# Patient Record
Sex: FEMALE | Race: BLACK OR AFRICAN AMERICAN | Employment: UNEMPLOYED | RURAL
[De-identification: names, ages, dates, MRNs, and addresses within clinical notes are randomized per-mention and may not be internally consistent; named-entity substitution may affect disease eponyms.]

---

## 2017-01-03 RX ORDER — ASPIRIN 81 MG/1
TABLET ORAL
Qty: 60 TAB | Refills: 3 | Status: SHIPPED | OUTPATIENT
Start: 2017-01-03 | End: 2017-05-16 | Stop reason: SDUPTHER

## 2017-02-02 ENCOUNTER — TELEPHONE (OUTPATIENT)
Dept: FAMILY MEDICINE CLINIC | Age: 62
End: 2017-02-02

## 2017-02-02 NOTE — TELEPHONE ENCOUNTER
Spoke with [facility] pharmacist who states the Labetalol was written on 7/20/16 when she was seen at the hospital by Dr. Renelle Denver.

## 2017-02-09 ENCOUNTER — OFFICE VISIT (OUTPATIENT)
Dept: FAMILY MEDICINE CLINIC | Age: 62
End: 2017-02-09

## 2017-02-09 VITALS
RESPIRATION RATE: 16 BRPM | HEART RATE: 80 BPM | DIASTOLIC BLOOD PRESSURE: 60 MMHG | BODY MASS INDEX: 29.45 KG/M2 | OXYGEN SATURATION: 100 % | SYSTOLIC BLOOD PRESSURE: 140 MMHG | TEMPERATURE: 97.9 F | WEIGHT: 156 LBS | HEIGHT: 61 IN

## 2017-02-09 DIAGNOSIS — R09.81 NASAL CONGESTION: ICD-10-CM

## 2017-02-09 DIAGNOSIS — E11.8 TYPE 2 DIABETES MELLITUS WITH COMPLICATION, WITHOUT LONG-TERM CURRENT USE OF INSULIN (HCC): ICD-10-CM

## 2017-02-09 DIAGNOSIS — H53.9 VISION DISTURBANCE: ICD-10-CM

## 2017-02-09 DIAGNOSIS — M54.50 BILATERAL LOW BACK PAIN WITHOUT SCIATICA, UNSPECIFIED CHRONICITY: Primary | ICD-10-CM

## 2017-02-09 DIAGNOSIS — M79.642 PAIN IN BOTH HANDS: ICD-10-CM

## 2017-02-09 DIAGNOSIS — M79.641 PAIN IN BOTH HANDS: ICD-10-CM

## 2017-02-09 LAB
ALBUMIN UR QL STRIP: 150 MG/L
CREATININE, URINE POC: 100 MG/DL
MICROALBUMIN/CREAT RATIO POC: 300 MG/G

## 2017-02-09 NOTE — MR AVS SNAPSHOT
Visit Information Date & Time Provider Department Dept. Phone Encounter #  
 2/9/2017 10:30 AM Matt Mobley Patrick [phone] [id number] Follow-up Instructions Return if symptoms worsen or fail to improve. Upcoming Health Maintenance Date Due Hepatitis C Screening 1955 FOOT EXAM Q1 10/23/1966 MICROALBUMIN Q1 10/23/1966 EYE EXAM RETINAL OR DILATED Q1 10/23/1966 Pneumococcal 19-64 Highest Risk (1 of 3 - PCV13) 10/23/1975 DTaP/Tdap/Td series (1 - Tdap) 10/23/1977 PAP AKA CERVICAL CYTOLOGY 10/23/1977 BREAST CANCER SCRN MAMMOGRAM 10/23/2006 FOBT Q 1 YEAR AGE 50-75 10/23/2006 ZOSTER VACCINE AGE 60> 10/23/2016 HEMOGLOBIN A1C Q6M 5/14/2017 LIPID PANEL Q1 11/14/2017 Allergies as of 2/9/2017  Review Complete On: 2/9/2017 By: Annette Lema MD  
 No Known Allergies Current Immunizations  Reviewed on 9/19/2016 Name Date Influenza Vaccine (Quad) PF 9/19/2016  2:34 PM  
  
 Not reviewed this visit You Were Diagnosed With   
  
 Codes Comments Bilateral low back pain without sciatica, unspecified chronicity    -  Primary ICD-10-CM: M54.5 ICD-9-CM: 724.2 Pain in both hands     ICD-10-CM: M79.641, U28.913 ICD-9-CM: 729.5 Type 2 diabetes mellitus with complication, without long-term current use of insulin (HCC)     ICD-10-CM: E11.8 ICD-9-CM: 250.90 Vision disturbance     ICD-10-CM: H53.9 ICD-9-CM: 368.9 Nasal congestion     ICD-10-CM: R09.81 ICD-9-CM: 478.19 Vitals BP Pulse Temp Resp Height(growth percentile) Weight(growth percentile) 140/60 (BP 1 Location: Left arm, BP Patient Position: Sitting) 80 97.9 °F (36.6 °C) (Oral) 16 5' 1\" (1.549 m) 156 lb (70.8 kg) SpO2 BMI OB Status Smoking Status 100% 29.48 kg/m2 Postmenopausal Current Some Day Smoker Vitals History BMI and BSA Data  Body Mass Index Body Surface Area  
 29.48 kg/m 2 1.75 m 2  
  
  
 Preferred Pharmacy Pharmacy Name Phone Kevin Willams, [address] [facility] [phone] Your Updated Medication List  
  
   
This list is accurate as of: 2/9/17 11:33 AM.  Always use your most recent med list. amLODIPine 10 mg tablet Commonly known as:  Christine Colon Take 10 mg by mouth daily. aspirin delayed-release 81 mg tablet  
take 2 tablets by mouth once daily  
  
 atorvastatin 10 mg tablet Commonly known as:  LIPITOR  
take 1 tablet by mouth once daily  
  
 glipiZIDE 5 mg tablet Commonly known as:  GLUCOTROL  
take 1 tablet by mouth once daily  
  
 labetalol 200 mg tablet Commonly known as:  Lorayne Parker Take 1 Tab by mouth every eight (8) hours. losartan-hydroCHLOROthiazide 100-25 mg per tablet Commonly known as:  HYZAAR Take 1 Tab by mouth daily. One Touch Delica 33 gauge Misc Generic drug:  lancets ONETOUCH VERIO strip Generic drug:  glucose blood VI test strips We Performed the Following AMB POC URINE, MICROALBUMIN, SEMIQUANT (3 RESULTS) [01652 CPT(R)] REFERRAL TO OPHTHALMOLOGY [REF57 Custom] Follow-up Instructions Return if symptoms worsen or fail to improve. To-Do List   
 02/09/2017 Imaging:  XR HAND LT MIN 3 V   
  
 02/09/2017 Imaging:  XR HAND RT MIN 3 V   
  
 02/09/2017 Imaging:  XR SPINE LUMB 2 OR 3 V Referral Information Referral ID Referred By Referred To  
  
 [id number] [place], [facility] [address] Phone: [phone] Fax: [phone] Visits Status Start Date End Date 1 New Request 2/9/17 2/9/18 If your referral has a status of pending review or denied, additional information will be sent to support the outcome of this decision. Patient Instructions Learning About Relief for Back Pain What is back tension and strain? Back strain happens when you overstretch, or pull, a muscle in your back. You may hurt your back in an accident or when you exercise or lift something. Most back pain will get better with rest and time. You can take care of yourself at home to help your back heal. 
What can you do first to relieve back pain? When you first feel back pain, try these steps: 
· Walk. Take a short walk (10 to 20 minutes) on a level surface (no slopes, hills, or stairs) every 2 to 3 hours. Walk only distances you can manage without pain, especially leg pain. · Relax. Find a comfortable position for rest. Some people are comfortable on the floor or a medium-firm bed with a small pillow under their head and another under their knees. Some people prefer to lie on their side with a pillow between their knees. Don't stay in one position for too long. · Try heat or ice. Try using a heating pad on a low or medium setting, or take a warm shower, for 15 to 20 minutes every 2 to 3 hours. Or you can buy single-use heat wraps that last up to 8 hours. You can also try an ice pack for 10 to 15 minutes every 2 to 3 hours. You can use an ice pack or a bag of frozen vegetables wrapped in a thin towel. There is not strong evidence that either heat or ice will help, but you can try them to see if they help. You may also want to try switching between heat and cold. · Take pain medicine exactly as directed. ¨ If the doctor gave you a prescription medicine for pain, take it as prescribed. ¨ If you are not taking a prescription pain medicine, ask your doctor if you can take an over-the-counter medicine. What else can you do? · Stretch and exercise. Exercises that increase flexibility may relieve your pain and make it easier for your muscles to keep your spine in a good, neutral position. And don't forget to keep walking. · Do self-massage.  You can use self-massage to unwind after work or school or to energize yourself in the morning. You can easily massage your feet, hands, or neck. Self-massage works best if you are in comfortable clothes and are sitting or lying in a comfortable position. Use oil or lotion to massage bare skin. · Reduce stress. Back pain can lead to a vicious [place]: Distress about the pain tenses the muscles in your back, which in turn causes more pain. Learn how to relax your mind and your muscles to lower your stress. Where can you learn more? Go to http://milo-vania.info/. Enter H542 in the search box to learn more about \"Learning About Relief for Back Pain. \" Current as of: May 23, 2016 Content Version: 11.1 © 4349-9237 [employer]UMOback, OnePage[employer]M. Care instructions adapted under license by [employer] (which disclaims liability or warranty for this information). If you have questions about a medical condition or this instruction, always ask your healthcare professional. [place] [address] any warranty or liability for your use of this information. Introducing [facility]! Christopher Pabon introduces DoutÃ­ssima patient portal. Now you can access parts of your medical record, email your doctor's office, and request medication refills online. 1. In your internet browser, go to [email or web address] 2. Click on the First Time User? Click Here link in the Sign In box. You will see the New Member Sign Up page. 3. Enter your DoutÃ­ssima Access Code exactly as it appears below. You will not need to use this code after youve completed the sign-up process. If you do not sign up before the expiration date, you must request a new code. · DoutÃ­ssima Access Code: [email or web address] Expires: 2/12/2017  9:24 AM 
 
4. Enter the last four digits of your Social Security Number (xxxx) and Date of Birth (mm/dd/yyyy) as indicated and click Submit. You will be taken to the next sign-up page. 5. Create a Nordicplan ID. This will be your Nordicplan login ID and cannot be changed, so think of one that is secure and easy to remember. 6. Create a Nordicplan password. You can change your password at any time. 7. Enter your Password Reset Question and Answer. This can be used at a later time if you forget your password. 8. Enter your e-mail address. You will receive e-mail notification when new information is available in [address] 9. Click Sign Up. You can now view and download portions of your medical record. 10. Click the Download Summary menu link to download a portable copy of your medical information. If you have questions, please visit the Frequently Asked Questions section of the Nordicplan website. Remember, Nordicplan is NOT to be used for urgent needs. For medical emergencies, dial 911. Now available from your iPhone and Android! Please provide this summary of care documentation to your next provider. Your primary care clinician is listed as Dane Suazo. If you have any questions after today's visit, please call [phone].

## 2017-02-09 NOTE — PROGRESS NOTES
Subjective:      Yue Acevedo is a 62 y.o. female here for the following:     Sinus congestion: reports nasal congestion mainly at night. Has been going on for \"quite a while\". Has pain behind the eyes. Associated with postnasal drainage and left ear pressure. No fever or chills. Reports that she has taken a nasal spray that was given at the hospital without much relief. Reports that smelling Vicks vapor rub. Back Pain: Symptoms have been present for several weeks and include pain in lower back (aching, \"tired feeling\" in character; intermittent). Initial inciting event: fall at home a few months ago. Alleviating factors identifiable by patient are none. Exacerbating factors identifiable by patient are prolonged standing, bending forwards, bending backwards, bending sideways. Treatments so far initiated by patient: none Previous lower back problems: none. Previous workup: none. Previous treatments: none. Denies paresthesias, weakness, bowel or bladder incontinence. Ankle and pedal edema: worse on the left > right. No swelling in the morning. Notices when she is sitting. Will prop the foot up which will help with the swelling. Current Outpatient Prescriptions   Medication Sig Dispense Refill    labetalol (NORMODYNE) 200 mg tablet Take 1 Tab by mouth every eight (8) hours. 90 Tab 5    aspirin delayed-release 81 mg tablet take 2 tablets by mouth once daily 60 Tab 3    amLODIPine (NORVASC) 10 mg tablet Take 10 mg by mouth daily. 0    glipiZIDE (GLUCOTROL) 5 mg tablet take 1 tablet by mouth once daily 30 Tab 2    atorvastatin (LIPITOR) 10 mg tablet take 1 tablet by mouth once daily 30 Tab 2    ONETOUCH VERIO strip   1    ONE TOUCH DELICA 33 gauge misc   1    losartan-hydrochlorothiazide (HYZAAR) 100-25 mg per tablet Take 1 Tab by mouth daily.   0       No Known Allergies      Past Medical History   Diagnosis Date    Anemia     Arthritis     Calculus of kidney     Cong[facility] heart failure ([facility]     Diabetes ([facility]        Social History   Substance Use Topics    Smoking status: Current Some Day Smoker    Smokeless tobacco: Never Used    Alcohol use No        Review of Systems  Pertinent items are noted in HPI. Objective:     Visit Vitals    /60 (BP 1 Location: Left arm, BP Patient Position: Sitting)    Pulse 80    Temp 97.9 °F (36.6 °C) (Oral)    Resp 16    Ht 5' 1\" (1.549 m)    Wt 156 lb (70.8 kg)    SpO2 100%    BMI 29.48 kg/m2      General appearance - alert, well appearing, and in no distress  Eyes - pupils equal and reactive, extraocular eye movements intact, sclera anicteric  Oropharyngx - mucous membranes moist, pharynx normal without lesions  Nasal - mucosal congestion, mucosal erythema and clear rhinorrhea. Chest - clear to auscultation, no wheezes, rales or rhonchi, symmetric air entry, no tachypnea, retractions or cyanosis  Heart - normal rate, regular rhythm, normal S1, S2, no murmurs, rubs, clicks or gallops  Hands - normal radial pulse, normal capillary refilling and circulation, tenderness of both hands   Back - pain with motion noted during exam, tenderness noted lumbar spine, negative straight-leg raise bilaterally     Assessment/Plan:   Benita Roth is a 62 y.o. female seen for:     1. Bilateral low back pain without sciatica, unspecified chronicity: no alarming history or examination findings. Consider arthritis. Check XR.   - XR SPINE LUMB 2 OR 3 V; Future    2. Pain in both hands: prolonged with arthritic deformities on exam. Check hand XRs. - XR HAND LT MIN 3 V; Future  - XR HAND RT MIN 3 V; Future    3. Type 2 diabetes mellitus with complication, without long-term current use of insulin ([facility])  - AMB POC URINE, MICROALBUMIN, SEMIQUANT (3 RESULTS)    4. Vision disturbance  - REFERRAL TO OPHTHALMOLOGY    5. Nasal congestion: saline nasal spray as needed for congestion.      I have discussed the diagnosis with the patient and the intended plan as seen in the above orders. The patient has received an after-visit summary and questions were answered concerning future plans. I have discussed medication side effects and warnings with the patient as well. Patient verbalizes understanding of plan of care and denies further questions or concerns at this time. Informed patient to return to the office if symptoms worsen or if new symptoms arise. Follow-up Disposition:  Return if symptoms worsen or fail to improve.

## 2017-02-09 NOTE — PATIENT INSTRUCTIONS
Learning About Relief for Back Pain  What is back tension and strain? Back strain happens when you overstretch, or pull, a muscle in your back. You may hurt your back in an accident or when you exercise or lift something. Most back pain will get better with rest and time. You can take care of yourself at home to help your back heal.  What can you do first to relieve back pain? When you first feel back pain, try these steps:  · Walk. Take a short walk (10 to 20 minutes) on a level surface (no slopes, hills, or stairs) every 2 to 3 hours. Walk only distances you can manage without pain, especially leg pain. · Relax. Find a comfortable position for rest. Some people are comfortable on the floor or a medium-firm bed with a small pillow under their head and another under their knees. Some people prefer to lie on their side with a pillow between their knees. Don't stay in one position for too long. · Try heat or ice. Try using a heating pad on a low or medium setting, or take a warm shower, for 15 to 20 minutes every 2 to 3 hours. Or you can buy single-use heat wraps that last up to 8 hours. You can also try an ice pack for 10 to 15 minutes every 2 to 3 hours. You can use an ice pack or a bag of frozen vegetables wrapped in a thin towel. There is not strong evidence that either heat or ice will help, but you can try them to see if they help. You may also want to try switching between heat and cold. · Take pain medicine exactly as directed. ¨ If the doctor gave you a prescription medicine for pain, take it as prescribed. ¨ If you are not taking a prescription pain medicine, ask your doctor if you can take an over-the-counter medicine. What else can you do? · Stretch and exercise. Exercises that increase flexibility may relieve your pain and make it easier for your muscles to keep your spine in a good, neutral position. And don't forget to keep walking. · Do self-massage.  You can use self-massage to unwind after work or school or to energize yourself in the morning. You can easily massage your feet, hands, or neck. Self-massage works best if you are in comfortable clothes and are sitting or lying in a comfortable position. Use oil or lotion to massage bare skin. · Reduce stress. Back pain can lead to a vicious [place]: Distress about the pain tenses the muscles in your back, which in turn causes more pain. Learn how to relax your mind and your muscles to lower your stress. Where can you learn more? Go to http://milo-vania.info/. Enter P304 in the search box to learn more about \"Learning About Relief for Back Pain. \"  Current as of: May 23, 2016  Content Version: 11.1  © 7344-4581 [employer]. Care instructions adapted under license by [employer] (which disclaims liability or warranty for this information). If you have questions about a medical condition or this instruction, always ask your healthcare professional. [place] [address] any warranty or liability for your use of this information.

## 2017-02-09 NOTE — PROGRESS NOTES
Chief Complaint   Patient presents with    LOW BACK PAIN     low back hurts off and on with lifting and bending    Foot Swelling     both ankles swell at times. L > R     \"REVIEWED RECORD IN PREPARATION FOR VISIT AND HAVE OBTAINED THE NECESSARY DOCUMENTATION\"  1. Have you been to the ER, urgent care clinic since your last visit? Hospitalized since your last visit? No    2. Have you seen or consulted any other health care providers outside of the [address] [place] [address] since your last visit? Include any pap smears or colon screening. No  Patient does not have advanced directives.

## 2017-02-13 ENCOUNTER — HOSPITAL ENCOUNTER (OUTPATIENT)
Dept: GENERAL RADIOLOGY | Age: 62
Discharge: HOME | End: 2017-02-13
Payer: COMMERCIAL

## 2017-02-13 DIAGNOSIS — M79.641 PAIN IN BOTH HANDS: ICD-10-CM

## 2017-02-13 DIAGNOSIS — M54.50 BILATERAL LOW BACK PAIN WITHOUT SCIATICA, UNSPECIFIED CHRONICITY: ICD-10-CM

## 2017-02-13 DIAGNOSIS — M79.642 PAIN IN BOTH HANDS: ICD-10-CM

## 2017-02-13 PROCEDURE — 73130 X-RAY EXAM OF HAND: CPT

## 2017-02-13 PROCEDURE — 72100 X-RAY EXAM L-S SPINE 2/3 VWS: CPT

## 2017-03-09 ENCOUNTER — APPOINTMENT (OUTPATIENT)
Dept: CT IMAGING | Age: 62
DRG: 378 | End: 2017-03-09
Attending: PHYSICIAN ASSISTANT
Payer: COMMERCIAL

## 2017-03-09 ENCOUNTER — HOSPITAL ENCOUNTER (INPATIENT)
Age: 62
LOS: 4 days | Discharge: HOME | DRG: 378 | End: 2017-03-13
Attending: EMERGENCY MEDICINE | Admitting: FAMILY MEDICINE
Payer: COMMERCIAL

## 2017-03-09 ENCOUNTER — APPOINTMENT (OUTPATIENT)
Dept: GENERAL RADIOLOGY | Age: 62
DRG: 378 | End: 2017-03-09
Attending: PHYSICIAN ASSISTANT
Payer: COMMERCIAL

## 2017-03-09 DIAGNOSIS — N17.9 ACUTE RENAL FAILURE, UNSPECIFIED ACUTE RENAL FAILURE TYPE (HCC): ICD-10-CM

## 2017-03-09 DIAGNOSIS — D64.9 ANEMIA, UNSPECIFIED TYPE: ICD-10-CM

## 2017-03-09 DIAGNOSIS — K92.2 GASTROINTESTINAL HEMORRHAGE, UNSPECIFIED GASTROINTESTINAL HEMORRHAGE TYPE: Primary | ICD-10-CM

## 2017-03-09 PROBLEM — K80.20 CALCULUS OF GALLBLADDER WITHOUT CHOLECYSTITIS: Status: ACTIVE | Noted: 2017-03-09

## 2017-03-09 LAB
ALBUMIN SERPL BCP-MCNC: 3.4 G/DL (ref 3.5–5)
ALBUMIN/GLOB SERPL: 0.8 {RATIO} (ref 1.1–2.2)
ALP SERPL-CCNC: 185 U/L (ref 45–117)
ALT SERPL-CCNC: 98 U/L (ref 12–78)
ANION GAP BLD CALC-SCNC: 8 MMOL/L (ref 5–15)
APPEARANCE UR: CLEAR
AST SERPL W P-5'-P-CCNC: 41 U/L (ref 15–37)
BACTERIA URNS QL MICRO: NEGATIVE /HPF
BASOPHILS # BLD AUTO: 0 K/UL (ref 0–0.1)
BASOPHILS # BLD: 0 % (ref 0–1)
BILIRUB SERPL-MCNC: 0.3 MG/DL (ref 0.2–1)
BILIRUB UR QL: NEGATIVE
BUN SERPL-MCNC: 55 MG/DL (ref 6–20)
BUN/CREAT SERPL: 20 (ref 12–20)
CALCIUM SERPL-MCNC: 8.5 MG/DL (ref 8.5–10.1)
CHLORIDE SERPL-SCNC: 107 MMOL/L (ref 97–108)
CO2 SERPL-SCNC: 24 MMOL/L (ref 21–32)
COLOR UR: ABNORMAL
CREAT SERPL-MCNC: 2.76 MG/DL (ref 0.55–1.02)
DIFFERENTIAL METHOD BLD: ABNORMAL
EOSINOPHIL # BLD: 0.2 K/UL (ref 0–0.4)
EOSINOPHIL NFR BLD: 3 % (ref 0–7)
EPITH CASTS URNS QL MICRO: ABNORMAL /LPF
ERYTHROCYTE [DISTWIDTH] IN BLOOD BY AUTOMATED COUNT: 13 % (ref 11.5–14.5)
GLOBULIN SER CALC-MCNC: 4.3 G/DL (ref 2–4)
GLUCOSE BLD STRIP.AUTO-MCNC: 87 MG/DL (ref 65–100)
GLUCOSE SERPL-MCNC: 86 MG/DL (ref 65–100)
GLUCOSE UR STRIP.AUTO-MCNC: NEGATIVE MG/DL
HCT VFR BLD AUTO: 23.5 % (ref 35–47)
HEMOCCULT STL QL: POSITIVE
HGB BLD-MCNC: 7.2 G/DL (ref 11.5–16)
HGB UR QL STRIP: ABNORMAL
KETONES UR QL STRIP.AUTO: NEGATIVE MG/DL
LACTATE SERPL-SCNC: 1 MMOL/L (ref 0.4–2)
LEUKOCYTE ESTERASE UR QL STRIP.AUTO: ABNORMAL
LIPASE SERPL-CCNC: 273 U/L (ref 73–393)
LYMPHOCYTES # BLD AUTO: 19 % (ref 12–49)
LYMPHOCYTES # BLD: 0.9 K/UL (ref 0.8–3.5)
MAGNESIUM SERPL-MCNC: 1.9 MG/DL (ref 1.6–2.4)
MCH RBC QN AUTO: 27.2 PG (ref 26–34)
MCHC RBC AUTO-ENTMCNC: 30.6 G/DL (ref 30–36.5)
MCV RBC AUTO: 88.7 FL (ref 80–99)
MONOCYTES # BLD: 0.6 K/UL (ref 0–1)
MONOCYTES NFR BLD AUTO: 13 % (ref 5–13)
NEUTS SEG # BLD: 3.1 K/UL (ref 1.8–8)
NEUTS SEG NFR BLD AUTO: 65 % (ref 32–75)
NITRITE UR QL STRIP.AUTO: NEGATIVE
PH UR STRIP: 6 [PH] (ref 5–8)
PLATELET # BLD AUTO: 159 K/UL (ref 150–400)
POTASSIUM SERPL-SCNC: 4.7 MMOL/L (ref 3.5–5.1)
PROT SERPL-MCNC: 7.7 G/DL (ref 6.4–8.2)
PROT UR STRIP-MCNC: 100 MG/DL
RBC # BLD AUTO: 2.65 M/UL (ref 3.8–5.2)
RBC #/AREA URNS HPF: ABNORMAL /HPF (ref 0–5)
SERVICE CMNT-IMP: NORMAL
SODIUM SERPL-SCNC: 139 MMOL/L (ref 136–145)
SP GR UR REFRACTOMETRY: 1.02 (ref 1–1.03)
TROPONIN I SERPL-MCNC: 0.06 NG/ML
UA: UC IF INDICATED,UAUC: ABNORMAL
UROBILINOGEN UR QL STRIP.AUTO: 0.2 EU/DL (ref 0.2–1)
WBC # BLD AUTO: 4.9 K/UL (ref 3.6–11)
WBC URNS QL MICRO: ABNORMAL /HPF (ref 0–4)

## 2017-03-09 PROCEDURE — 74011250637 HC RX REV CODE- 250/637: Performed by: PHYSICIAN ASSISTANT

## 2017-03-09 PROCEDURE — 82270 OCCULT BLOOD FECES: CPT

## 2017-03-09 PROCEDURE — 82962 GLUCOSE BLOOD TEST: CPT

## 2017-03-09 PROCEDURE — 87086 URINE CULTURE/COLONY COUNT: CPT | Performed by: EMERGENCY MEDICINE

## 2017-03-09 PROCEDURE — 74011250636 HC RX REV CODE- 250/636: Performed by: FAMILY MEDICINE

## 2017-03-09 PROCEDURE — 99285 EMERGENCY DEPT VISIT HI MDM: CPT

## 2017-03-09 PROCEDURE — 84484 ASSAY OF TROPONIN QUANT: CPT | Performed by: PHYSICIAN ASSISTANT

## 2017-03-09 PROCEDURE — 74022 RADEX COMPL AQT ABD SERIES: CPT

## 2017-03-09 PROCEDURE — 83605 ASSAY OF LACTIC ACID: CPT | Performed by: PHYSICIAN ASSISTANT

## 2017-03-09 PROCEDURE — 74176 CT ABD & PELVIS W/O CONTRAST: CPT

## 2017-03-09 PROCEDURE — 81001 URINALYSIS AUTO W/SCOPE: CPT | Performed by: EMERGENCY MEDICINE

## 2017-03-09 PROCEDURE — 93005 ELECTROCARDIOGRAM TRACING: CPT

## 2017-03-09 PROCEDURE — 65270000029 HC RM PRIVATE

## 2017-03-09 PROCEDURE — 36415 COLL VENOUS BLD VENIPUNCTURE: CPT | Performed by: PHYSICIAN ASSISTANT

## 2017-03-09 PROCEDURE — 74011000250 HC RX REV CODE- 250: Performed by: PHYSICIAN ASSISTANT

## 2017-03-09 PROCEDURE — 74011000250 HC RX REV CODE- 250: Performed by: FAMILY MEDICINE

## 2017-03-09 PROCEDURE — 83735 ASSAY OF MAGNESIUM: CPT | Performed by: PHYSICIAN ASSISTANT

## 2017-03-09 PROCEDURE — 80053 COMPREHEN METABOLIC PANEL: CPT | Performed by: PHYSICIAN ASSISTANT

## 2017-03-09 PROCEDURE — 96360 HYDRATION IV INFUSION INIT: CPT

## 2017-03-09 PROCEDURE — C9113 INJ PANTOPRAZOLE SODIUM, VIA: HCPCS | Performed by: FAMILY MEDICINE

## 2017-03-09 PROCEDURE — 74011250636 HC RX REV CODE- 250/636: Performed by: PHYSICIAN ASSISTANT

## 2017-03-09 PROCEDURE — 83690 ASSAY OF LIPASE: CPT | Performed by: PHYSICIAN ASSISTANT

## 2017-03-09 PROCEDURE — 85025 COMPLETE CBC W/AUTO DIFF WBC: CPT | Performed by: PHYSICIAN ASSISTANT

## 2017-03-09 RX ORDER — SODIUM CHLORIDE 9 MG/ML
250 INJECTION, SOLUTION INTRAVENOUS AS NEEDED
Status: DISCONTINUED | OUTPATIENT
Start: 2017-03-09 | End: 2017-03-13 | Stop reason: HOSPADM

## 2017-03-09 RX ADMIN — SODIUM CHLORIDE 1000 ML: 900 INJECTION, SOLUTION INTRAVENOUS at 20:30

## 2017-03-09 RX ADMIN — SODIUM CHLORIDE 40 MG: 9 INJECTION INTRAMUSCULAR; INTRAVENOUS; SUBCUTANEOUS at 22:54

## 2017-03-09 RX ADMIN — LIDOCAINE HYDROCHLORIDE 40 ML: 20 SOLUTION ORAL; TOPICAL at 19:15

## 2017-03-09 NOTE — IP AVS SNAPSHOT
Current Discharge Medication List  
  
Take these medications at their scheduled times Dose & Instructions Dispensing Information Comments Morning Noon Evening Bedtime  
 amLODIPine 10 mg tablet Commonly known as:  Wilder Reeder Your next dose is: Today, Tomorrow Comments:  __________ Dose:  10 mg Take 10 mg by mouth daily. Refills:  0  
     
   
   
   
  
 amoxicillin-clavulanate 500-125 mg per tablet Commonly known as:  AUGMENTIN Your next dose is: Today, Tomorrow Comments:  __________ Dose:  1 Tab Take 1 Tab by mouth every twelve (12) hours for 14 days. Quantity:  28 Tab Refills:  0  
     
   
   
   
  
 atorvastatin 10 mg tablet Commonly known as:  LIPITOR Your next dose is: Today, Tomorrow Comments:  __________ Dose:  10 mg Take 1 Tab by mouth nightly. Quantity:  30 Tab Refills:  5  
     
   
   
   
  
 dorzolamide-timolol 22.3-6.8 mg/mL ophthalmic solution Commonly known as:  COSOPT Your next dose is: Today, Tomorrow Comments:  __________ Dose:  1 Drop Administer 1 Drop to both eyes two (2) times a day. Refills:  0  
     
   
   
   
  
 labetalol 200 mg tablet Commonly known as:  Littie Ates Your next dose is: Today, Tomorrow Comments:  __________ Dose:  200 mg Take 200 mg by mouth two (2) times a day. Refills:  0  
     
   
   
   
  
 losartan 100 mg tablet Commonly known as:  COZAAR Your next dose is: Today, Tomorrow Comments:  __________ Dose:  100 mg Take 1 Tab by mouth daily. Quantity:  30 Tab Refills:  0 Take these medications as needed Dose & Instructions Dispensing Information Comments Morning Noon Evening Bedtime TYLENOL PM PO Your next dose is: Today, Tomorrow Comments:  __________ Dose:  2 Tab Take 2 Tabs by mouth nightly as needed (sleep). Refills:  0 Take these medications as directed Dose & Instructions Dispensing Information Comments Morning Noon Evening Bedtime  
 aspirin delayed-release 81 mg tablet Your next dose is: Today, Tomorrow Comments:  __________  
   
   
 take 2 tablets by mouth once daily Quantity:  60 Tab Refills:  3  
     
   
   
   
  
 glipiZIDE 5 mg tablet Commonly known as:  Olesommer Agudelo Your next dose is: Today, Tomorrow Comments:  __________  
   
   
 take 1 tablet by mouth once daily Quantity:  30 Tab Refills:  5 Where to Get Your Medications Information about where to get these medications is not yet available ! Ask your nurse or doctor about these medications  
  amoxicillin-clavulanate 500-125 mg per tablet  
 losartan 100 mg tablet

## 2017-03-09 NOTE — ED TRIAGE NOTES
I am here for this terrible pain I have in my left side. Started 2 -3 weeks ago and getting worse now. Denies nausea, vomiting, or diarrhea.

## 2017-03-10 ENCOUNTER — ANESTHESIA (OUTPATIENT)
Dept: ENDOSCOPY | Age: 62
DRG: 378 | End: 2017-03-10
Payer: COMMERCIAL

## 2017-03-10 ENCOUNTER — ANESTHESIA EVENT (OUTPATIENT)
Dept: ENDOSCOPY | Age: 62
DRG: 378 | End: 2017-03-10
Payer: COMMERCIAL

## 2017-03-10 LAB
ALBUMIN SERPL BCP-MCNC: 2.9 G/DL (ref 3.5–5)
ALBUMIN/GLOB SERPL: 0.8 {RATIO} (ref 1.1–2.2)
ALP SERPL-CCNC: 154 U/L (ref 45–117)
ALT SERPL-CCNC: 87 U/L (ref 12–78)
ANION GAP BLD CALC-SCNC: 7 MMOL/L (ref 5–15)
AST SERPL W P-5'-P-CCNC: 33 U/L (ref 15–37)
ATRIAL RATE: 78 BPM
ATRIAL RATE: 85 BPM
BASOPHILS # BLD AUTO: 0.1 K/UL (ref 0–0.1)
BASOPHILS # BLD: 1 % (ref 0–1)
BILIRUB SERPL-MCNC: 0.3 MG/DL (ref 0.2–1)
BUN SERPL-MCNC: 47 MG/DL (ref 6–20)
BUN/CREAT SERPL: 20 (ref 12–20)
CALCIUM SERPL-MCNC: 7.9 MG/DL (ref 8.5–10.1)
CALCULATED P AXIS, ECG09: 58 DEGREES
CALCULATED P AXIS, ECG09: 75 DEGREES
CALCULATED R AXIS, ECG10: 34 DEGREES
CALCULATED R AXIS, ECG10: 72 DEGREES
CALCULATED T AXIS, ECG11: 106 DEGREES
CALCULATED T AXIS, ECG11: 113 DEGREES
CHLORIDE SERPL-SCNC: 112 MMOL/L (ref 97–108)
CHOLEST SERPL-MCNC: 125 MG/DL
CO2 SERPL-SCNC: 22 MMOL/L (ref 21–32)
CREAT SERPL-MCNC: 2.38 MG/DL (ref 0.55–1.02)
DIAGNOSIS, 93000: NORMAL
DIAGNOSIS, 93000: NORMAL
DIFFERENTIAL METHOD BLD: ABNORMAL
EOSINOPHIL # BLD: 0.1 K/UL (ref 0–0.4)
EOSINOPHIL NFR BLD: 2 % (ref 0–7)
ERYTHROCYTE [DISTWIDTH] IN BLOOD BY AUTOMATED COUNT: 13.7 % (ref 11.5–14.5)
EST. AVERAGE GLUCOSE BLD GHB EST-MCNC: ABNORMAL MG/DL
FERRITIN SERPL-MCNC: 388 NG/ML (ref 8–252)
FOLATE SERPL-MCNC: 12 NG/ML (ref 5–21)
GLOBULIN SER CALC-MCNC: 3.7 G/DL (ref 2–4)
GLUCOSE BLD STRIP.AUTO-MCNC: 100 MG/DL (ref 65–100)
GLUCOSE BLD STRIP.AUTO-MCNC: 110 MG/DL (ref 65–100)
GLUCOSE BLD STRIP.AUTO-MCNC: 159 MG/DL (ref 65–100)
GLUCOSE SERPL-MCNC: 83 MG/DL (ref 65–100)
HAPTOGLOB SERPL-MCNC: 127 MG/DL (ref 30–200)
HBA1C MFR BLD: <3.5 % (ref 4.2–6.3)
HCT VFR BLD AUTO: 21.8 % (ref 35–47)
HCT VFR BLD AUTO: 22.8 % (ref 35–47)
HCT VFR BLD AUTO: 26 % (ref 35–47)
HCT VFR BLD AUTO: 26.2 % (ref 35–47)
HDLC SERPL-MCNC: 71 MG/DL
HDLC SERPL: 1.8 {RATIO} (ref 0–5)
HGB BLD-MCNC: 6.7 G/DL (ref 11.5–16)
HGB BLD-MCNC: 7 G/DL (ref 11.5–16)
HGB BLD-MCNC: 8.1 G/DL (ref 11.5–16)
HGB BLD-MCNC: 8.4 G/DL (ref 11.5–16)
INR PPP: 1 (ref 0.9–1.1)
IRON SATN MFR SERPL: 22 % (ref 20–50)
IRON SERPL-MCNC: 45 UG/DL (ref 35–150)
LDH SERPL L TO P-CCNC: 206 U/L (ref 81–246)
LDLC SERPL CALC-MCNC: 48.6 MG/DL (ref 0–100)
LIPID PROFILE,FLP: NORMAL
LYMPHOCYTES # BLD AUTO: 24 % (ref 12–49)
LYMPHOCYTES # BLD: 1.3 K/UL (ref 0.8–3.5)
MCH RBC QN AUTO: 26.6 PG (ref 26–34)
MCHC RBC AUTO-ENTMCNC: 30.7 G/DL (ref 30–36.5)
MCV RBC AUTO: 86.5 FL (ref 80–99)
MONOCYTES # BLD: 0.6 K/UL (ref 0–1)
MONOCYTES NFR BLD AUTO: 12 % (ref 5–13)
NEUTS SEG # BLD: 3.2 K/UL (ref 1.8–8)
NEUTS SEG NFR BLD AUTO: 61 % (ref 32–75)
P-R INTERVAL, ECG05: 138 MS
P-R INTERVAL, ECG05: 154 MS
PERIPHERAL SMEAR,PSM: NORMAL
PLATELET # BLD AUTO: 161 K/UL (ref 150–400)
POTASSIUM SERPL-SCNC: 4.3 MMOL/L (ref 3.5–5.1)
PROT SERPL-MCNC: 6.6 G/DL (ref 6.4–8.2)
PROTHROMBIN TIME: 10.5 SEC (ref 9–11.1)
Q-T INTERVAL, ECG07: 360 MS
Q-T INTERVAL, ECG07: 370 MS
QRS DURATION, ECG06: 72 MS
QRS DURATION, ECG06: 78 MS
QTC CALCULATION (BEZET), ECG08: 421 MS
QTC CALCULATION (BEZET), ECG08: 428 MS
RBC # BLD AUTO: 2.52 M/UL (ref 3.8–5.2)
RBC MORPH BLD: ABNORMAL
RETICS/RBC NFR AUTO: 2.3 % (ref 0.7–2.1)
SERVICE CMNT-IMP: ABNORMAL
SERVICE CMNT-IMP: ABNORMAL
SERVICE CMNT-IMP: NORMAL
SODIUM SERPL-SCNC: 141 MMOL/L (ref 136–145)
TIBC SERPL-MCNC: 206 UG/DL (ref 250–450)
TRIGL SERPL-MCNC: 27 MG/DL (ref ?–150)
TROPONIN I SERPL-MCNC: <0.04 NG/ML
VENTRICULAR RATE, ECG03: 78 BPM
VENTRICULAR RATE, ECG03: 85 BPM
VIT B12 SERPL-MCNC: 536 PG/ML (ref 211–911)
VLDLC SERPL CALC-MCNC: 5.4 MG/DL
WBC # BLD AUTO: 5.3 K/UL (ref 3.6–11)

## 2017-03-10 PROCEDURE — 74011250636 HC RX REV CODE- 250/636: Performed by: PHYSICIAN ASSISTANT

## 2017-03-10 PROCEDURE — 82746 ASSAY OF FOLIC ACID SERUM: CPT

## 2017-03-10 PROCEDURE — 74011250637 HC RX REV CODE- 250/637: Performed by: FAMILY MEDICINE

## 2017-03-10 PROCEDURE — 74011250636 HC RX REV CODE- 250/636

## 2017-03-10 PROCEDURE — 85045 AUTOMATED RETICULOCYTE COUNT: CPT

## 2017-03-10 PROCEDURE — 85610 PROTHROMBIN TIME: CPT | Performed by: FAMILY MEDICINE

## 2017-03-10 PROCEDURE — 80061 LIPID PANEL: CPT

## 2017-03-10 PROCEDURE — 85025 COMPLETE CBC W/AUTO DIFF WBC: CPT

## 2017-03-10 PROCEDURE — 83540 ASSAY OF IRON: CPT

## 2017-03-10 PROCEDURE — 0DJ08ZZ INSPECTION OF UPPER INTESTINAL TRACT, VIA NATURAL OR ARTIFICIAL OPENING ENDOSCOPIC: ICD-10-PCS | Performed by: INTERNAL MEDICINE

## 2017-03-10 PROCEDURE — 76040000019: Performed by: INTERNAL MEDICINE

## 2017-03-10 PROCEDURE — 83615 LACTATE (LD) (LDH) ENZYME: CPT

## 2017-03-10 PROCEDURE — 36415 COLL VENOUS BLD VENIPUNCTURE: CPT

## 2017-03-10 PROCEDURE — 84484 ASSAY OF TROPONIN QUANT: CPT

## 2017-03-10 PROCEDURE — 99218 HC RM OBSERVATION: CPT

## 2017-03-10 PROCEDURE — 74011250636 HC RX REV CODE- 250/636: Performed by: FAMILY MEDICINE

## 2017-03-10 PROCEDURE — 80053 COMPREHEN METABOLIC PANEL: CPT

## 2017-03-10 PROCEDURE — 74011000250 HC RX REV CODE- 250: Performed by: FAMILY MEDICINE

## 2017-03-10 PROCEDURE — 76060000031 HC ANESTHESIA FIRST 0.5 HR: Performed by: INTERNAL MEDICINE

## 2017-03-10 PROCEDURE — 83036 HEMOGLOBIN GLYCOSYLATED A1C: CPT

## 2017-03-10 PROCEDURE — 83010 ASSAY OF HAPTOGLOBIN QUANT: CPT

## 2017-03-10 PROCEDURE — 82962 GLUCOSE BLOOD TEST: CPT

## 2017-03-10 PROCEDURE — P9016 RBC LEUKOCYTES REDUCED: HCPCS | Performed by: PHYSICIAN ASSISTANT

## 2017-03-10 PROCEDURE — 86900 BLOOD TYPING SEROLOGIC ABO: CPT | Performed by: PHYSICIAN ASSISTANT

## 2017-03-10 PROCEDURE — 93005 ELECTROCARDIOGRAM TRACING: CPT

## 2017-03-10 PROCEDURE — 36430 TRANSFUSION BLD/BLD COMPNT: CPT

## 2017-03-10 PROCEDURE — 86920 COMPATIBILITY TEST SPIN: CPT | Performed by: PHYSICIAN ASSISTANT

## 2017-03-10 PROCEDURE — 82607 VITAMIN B-12: CPT

## 2017-03-10 PROCEDURE — C9113 INJ PANTOPRAZOLE SODIUM, VIA: HCPCS | Performed by: FAMILY MEDICINE

## 2017-03-10 PROCEDURE — 85018 HEMOGLOBIN: CPT

## 2017-03-10 PROCEDURE — 30233N0 TRANSFUSION OF AUTOLOGOUS RED BLOOD CELLS INTO PERIPHERAL VEIN, PERCUTANEOUS APPROACH: ICD-10-PCS | Performed by: FAMILY MEDICINE

## 2017-03-10 PROCEDURE — 74011000250 HC RX REV CODE- 250

## 2017-03-10 PROCEDURE — 82728 ASSAY OF FERRITIN: CPT

## 2017-03-10 RX ORDER — SODIUM CHLORIDE 9 MG/ML
250 INJECTION, SOLUTION INTRAVENOUS AS NEEDED
Status: DISCONTINUED | OUTPATIENT
Start: 2017-03-10 | End: 2017-03-13 | Stop reason: HOSPADM

## 2017-03-10 RX ORDER — EPINEPHRINE 0.1 MG/ML
1 INJECTION INTRACARDIAC; INTRAVENOUS
Status: DISCONTINUED | OUTPATIENT
Start: 2017-03-10 | End: 2017-03-10 | Stop reason: HOSPADM

## 2017-03-10 RX ORDER — MIDAZOLAM HYDROCHLORIDE 1 MG/ML
.25-5 INJECTION, SOLUTION INTRAMUSCULAR; INTRAVENOUS
Status: DISCONTINUED | OUTPATIENT
Start: 2017-03-10 | End: 2017-03-10 | Stop reason: HOSPADM

## 2017-03-10 RX ORDER — SODIUM CHLORIDE 0.9 % (FLUSH) 0.9 %
5-10 SYRINGE (ML) INJECTION AS NEEDED
Status: DISCONTINUED | OUTPATIENT
Start: 2017-03-10 | End: 2017-03-13 | Stop reason: HOSPADM

## 2017-03-10 RX ORDER — DORZOLAMIDE HYDROCHLORIDE AND TIMOLOL MALEATE 20; 5 MG/ML; MG/ML
1 SOLUTION/ DROPS OPHTHALMIC 2 TIMES DAILY
COMMUNITY
End: 2019-01-03

## 2017-03-10 RX ORDER — INSULIN LISPRO 100 [IU]/ML
INJECTION, SOLUTION INTRAVENOUS; SUBCUTANEOUS
Status: DISCONTINUED | OUTPATIENT
Start: 2017-03-10 | End: 2017-03-13 | Stop reason: HOSPADM

## 2017-03-10 RX ORDER — PROPOFOL 10 MG/ML
INJECTION, EMULSION INTRAVENOUS AS NEEDED
Status: DISCONTINUED | OUTPATIENT
Start: 2017-03-10 | End: 2017-03-10 | Stop reason: HOSPADM

## 2017-03-10 RX ORDER — SUCCINYLCHOLINE CHLORIDE 20 MG/ML
INJECTION INTRAMUSCULAR; INTRAVENOUS AS NEEDED
Status: DISCONTINUED | OUTPATIENT
Start: 2017-03-10 | End: 2017-03-10 | Stop reason: HOSPADM

## 2017-03-10 RX ORDER — FENTANYL CITRATE 50 UG/ML
100 INJECTION, SOLUTION INTRAMUSCULAR; INTRAVENOUS ONCE
Status: DISCONTINUED | OUTPATIENT
Start: 2017-03-10 | End: 2017-03-10 | Stop reason: HOSPADM

## 2017-03-10 RX ORDER — POLYETHYLENE GLYCOL 3350 17 G/17G
17 POWDER, FOR SOLUTION ORAL DAILY
Status: DISCONTINUED | OUTPATIENT
Start: 2017-03-10 | End: 2017-03-13 | Stop reason: HOSPADM

## 2017-03-10 RX ORDER — LABETALOL 200 MG/1
200 TABLET, FILM COATED ORAL EVERY 8 HOURS
COMMUNITY
End: 2017-09-16 | Stop reason: SDUPTHER

## 2017-03-10 RX ORDER — DEXTROMETHORPHAN/PSEUDOEPHED 2.5-7.5/.8
1.2 DROPS ORAL
Status: DISCONTINUED | OUTPATIENT
Start: 2017-03-10 | End: 2017-03-10 | Stop reason: HOSPADM

## 2017-03-10 RX ORDER — SODIUM CHLORIDE, SODIUM LACTATE, POTASSIUM CHLORIDE, CALCIUM CHLORIDE 600; 310; 30; 20 MG/100ML; MG/100ML; MG/100ML; MG/100ML
INJECTION, SOLUTION INTRAVENOUS
Status: DISCONTINUED | OUTPATIENT
Start: 2017-03-10 | End: 2017-03-10 | Stop reason: HOSPADM

## 2017-03-10 RX ORDER — LIDOCAINE HYDROCHLORIDE 20 MG/ML
INJECTION, SOLUTION EPIDURAL; INFILTRATION; INTRACAUDAL; PERINEURAL AS NEEDED
Status: DISCONTINUED | OUTPATIENT
Start: 2017-03-10 | End: 2017-03-10 | Stop reason: HOSPADM

## 2017-03-10 RX ORDER — ONDANSETRON 2 MG/ML
4 INJECTION INTRAMUSCULAR; INTRAVENOUS
Status: COMPLETED | OUTPATIENT
Start: 2017-03-10 | End: 2017-03-10

## 2017-03-10 RX ORDER — AMLODIPINE BESYLATE 5 MG/1
10 TABLET ORAL DAILY
Status: DISCONTINUED | OUTPATIENT
Start: 2017-03-10 | End: 2017-03-13 | Stop reason: HOSPADM

## 2017-03-10 RX ORDER — MAGNESIUM SULFATE 100 %
4 CRYSTALS MISCELLANEOUS AS NEEDED
Status: DISCONTINUED | OUTPATIENT
Start: 2017-03-10 | End: 2017-03-13 | Stop reason: HOSPADM

## 2017-03-10 RX ORDER — FLUMAZENIL 0.1 MG/ML
0.2 INJECTION INTRAVENOUS
Status: DISCONTINUED | OUTPATIENT
Start: 2017-03-10 | End: 2017-03-10 | Stop reason: HOSPADM

## 2017-03-10 RX ORDER — SODIUM CHLORIDE 0.9 % (FLUSH) 0.9 %
5-10 SYRINGE (ML) INJECTION EVERY 8 HOURS
Status: DISCONTINUED | OUTPATIENT
Start: 2017-03-10 | End: 2017-03-13 | Stop reason: HOSPADM

## 2017-03-10 RX ORDER — HYDRALAZINE HYDROCHLORIDE 20 MG/ML
10 INJECTION INTRAMUSCULAR; INTRAVENOUS
Status: DISCONTINUED | OUTPATIENT
Start: 2017-03-10 | End: 2017-03-13 | Stop reason: HOSPADM

## 2017-03-10 RX ORDER — DEXTROSE 50 % IN WATER (D50W) INTRAVENOUS SYRINGE
12.5-25 AS NEEDED
Status: DISCONTINUED | OUTPATIENT
Start: 2017-03-10 | End: 2017-03-13 | Stop reason: HOSPADM

## 2017-03-10 RX ORDER — LABETALOL 200 MG/1
200 TABLET, FILM COATED ORAL 2 TIMES DAILY
Status: DISCONTINUED | OUTPATIENT
Start: 2017-03-10 | End: 2017-03-12

## 2017-03-10 RX ORDER — FENTANYL CITRATE 50 UG/ML
INJECTION, SOLUTION INTRAMUSCULAR; INTRAVENOUS AS NEEDED
Status: DISCONTINUED | OUTPATIENT
Start: 2017-03-10 | End: 2017-03-10 | Stop reason: HOSPADM

## 2017-03-10 RX ORDER — ATORVASTATIN CALCIUM 10 MG/1
10 TABLET, FILM COATED ORAL
Status: DISCONTINUED | OUTPATIENT
Start: 2017-03-10 | End: 2017-03-13 | Stop reason: HOSPADM

## 2017-03-10 RX ORDER — ATROPINE SULFATE 0.1 MG/ML
0.5 INJECTION INTRAVENOUS
Status: DISCONTINUED | OUTPATIENT
Start: 2017-03-10 | End: 2017-03-10 | Stop reason: HOSPADM

## 2017-03-10 RX ORDER — NALOXONE HYDROCHLORIDE 0.4 MG/ML
0.4 INJECTION, SOLUTION INTRAMUSCULAR; INTRAVENOUS; SUBCUTANEOUS
Status: DISCONTINUED | OUTPATIENT
Start: 2017-03-10 | End: 2017-03-10 | Stop reason: HOSPADM

## 2017-03-10 RX ORDER — SODIUM CHLORIDE 9 MG/ML
50 INJECTION, SOLUTION INTRAVENOUS CONTINUOUS
Status: DISPENSED | OUTPATIENT
Start: 2017-03-10 | End: 2017-03-10

## 2017-03-10 RX ORDER — ROCURONIUM BROMIDE 10 MG/ML
INJECTION, SOLUTION INTRAVENOUS AS NEEDED
Status: DISCONTINUED | OUTPATIENT
Start: 2017-03-10 | End: 2017-03-10 | Stop reason: HOSPADM

## 2017-03-10 RX ORDER — ATORVASTATIN CALCIUM 20 MG/1
10 TABLET, FILM COATED ORAL
Status: DISCONTINUED | OUTPATIENT
Start: 2017-03-10 | End: 2017-03-10

## 2017-03-10 RX ADMIN — ROCURONIUM BROMIDE 10 MG: 10 INJECTION, SOLUTION INTRAVENOUS at 12:36

## 2017-03-10 RX ADMIN — PROPOFOL 150 MG: 10 INJECTION, EMULSION INTRAVENOUS at 12:36

## 2017-03-10 RX ADMIN — ATORVASTATIN CALCIUM 10 MG: 10 TABLET, FILM COATED ORAL at 21:29

## 2017-03-10 RX ADMIN — INSULIN LISPRO 2 UNITS: 100 INJECTION, SOLUTION INTRAVENOUS; SUBCUTANEOUS at 21:30

## 2017-03-10 RX ADMIN — SODIUM CHLORIDE 40 MG: 9 INJECTION INTRAMUSCULAR; INTRAVENOUS; SUBCUTANEOUS at 09:33

## 2017-03-10 RX ADMIN — ONDANSETRON 4 MG: 2 INJECTION INTRAMUSCULAR; INTRAVENOUS at 14:14

## 2017-03-10 RX ADMIN — AMLODIPINE BESYLATE 10 MG: 5 TABLET ORAL at 09:31

## 2017-03-10 RX ADMIN — LABETALOL HCL 200 MG: 200 TABLET, FILM COATED ORAL at 09:31

## 2017-03-10 RX ADMIN — INSULIN LISPRO 2 UNITS: 100 INJECTION, SOLUTION INTRAVENOUS; SUBCUTANEOUS at 17:33

## 2017-03-10 RX ADMIN — FENTANYL CITRATE 100 MCG: 50 INJECTION, SOLUTION INTRAMUSCULAR; INTRAVENOUS at 12:33

## 2017-03-10 RX ADMIN — LIDOCAINE HYDROCHLORIDE 40 MG: 20 INJECTION, SOLUTION EPIDURAL; INFILTRATION; INTRACAUDAL; PERINEURAL at 12:36

## 2017-03-10 RX ADMIN — LABETALOL HCL 200 MG: 200 TABLET, FILM COATED ORAL at 00:24

## 2017-03-10 RX ADMIN — Medication 10 ML: at 21:32

## 2017-03-10 RX ADMIN — SODIUM CHLORIDE 40 MG: 9 INJECTION INTRAMUSCULAR; INTRAVENOUS; SUBCUTANEOUS at 21:29

## 2017-03-10 RX ADMIN — Medication 10 ML: at 00:17

## 2017-03-10 RX ADMIN — SODIUM CHLORIDE 50 ML/HR: 900 INJECTION, SOLUTION INTRAVENOUS at 12:05

## 2017-03-10 RX ADMIN — LABETALOL HCL 200 MG: 200 TABLET, FILM COATED ORAL at 17:35

## 2017-03-10 RX ADMIN — SUCCINYLCHOLINE CHLORIDE 100 MG: 20 INJECTION INTRAMUSCULAR; INTRAVENOUS at 12:37

## 2017-03-10 RX ADMIN — SODIUM CHLORIDE 250 ML: 900 INJECTION, SOLUTION INTRAVENOUS at 03:44

## 2017-03-10 RX ADMIN — SODIUM CHLORIDE, SODIUM LACTATE, POTASSIUM CHLORIDE, CALCIUM CHLORIDE: 600; 310; 30; 20 INJECTION, SOLUTION INTRAVENOUS at 12:32

## 2017-03-10 NOTE — ED NOTES
Patient continues with blood transfusion, no distress noted, patient resting in dim lit room with eyes closed. Will continue to monitor closely.

## 2017-03-10 NOTE — ANESTHESIA POSTPROCEDURE EVALUATION
Post-Anesthesia Evaluation and Assessment    Patient: Yue Acevedo MRN: [id number]  SSN: [id number]    YOB: 1955  Age: 62 y.o. Sex: female       Cardiovascular Function/Vital Signs  Visit Vitals    /76    Pulse 76    Temp 36.7 °C (98.1 °F)    Resp 18    Ht 5' 1\" (1.549 m)    Wt 70.8 kg (156 lb)    SpO2 95%    Breastfeeding No    BMI 29.48 kg/m2       Patient is status post general anesthesia for Procedure(s):  ESOPHAGOGASTRODUODENOSCOPY (EGD). Nausea/Vomiting: None    Postoperative hydration reviewed and adequate. Pain:  Pain Scale 1: Numeric (0 - 10) (03/10/17 1324)  Pain Intensity 1: 0 (03/10/17 1324)   Managed    Neurological Status: At baseline    Mental Status and Level of Consciousness: Arousable    Pulmonary Status:   O2 Device: Room air (03/10/17 1324)   Adequate oxygenation and airway patent    Complications related to anesthesia: None    Post-anesthesia assessment completed.  No concerns    Signed By: George Fraire MD     March 10, 2017

## 2017-03-10 NOTE — ED NOTES
TRANSFER - OUT REPORT:    Verbal report given to Doreen Mata RN(name) on Tu Loredo  being transferred to [facility](unit) for routine progression of care       Report consisted of patients Situation, Background, Assessment and   Recommendations(SBAR). Information from the following report(s) SBAR was reviewed with the receiving nurse. Lines:   Peripheral IV 03/09/17 Right Antecubital (Active)   Site Assessment Clean, dry, & intact 3/10/2017  1:19 PM   Phlebitis Assessment 0 3/10/2017  1:19 PM   Infiltration Assessment 0 3/10/2017  1:19 PM   Dressing Status Clean, dry, & intact 3/10/2017  1:19 PM   Dressing Type Tape;Transparent 3/10/2017  1:19 PM   Hub Color/Line Status Green;Capped 3/10/2017  1:19 PM   Action Taken Open ports on tubing capped 3/10/2017  1:19 PM   Alcohol Cap Used Yes 3/10/2017  1:19 PM        Opportunity for questions and clarification was provided.       Patient transported with:   Monitor, medic

## 2017-03-10 NOTE — CDMP QUERY
1.    Please clarify if this patient is being treated/managed for:    =>Acute blood loss anemia in the setting of GI bleed as evidenced by hemoglobin 6.7 being treated with transfusion of packed red blood cells 2 units  =>Other Explanation of clinical findings  =>Unable to Determine (no explanation of clinical findings)    The medical record reflects the following clinical findings, treatment, and risk factors:    Risk Factors: history of anemia    Clinical Indicators: 61 y/o black female presents with L sided pain. She is admitted for GI bleed. Stool for occult blood is positive. On admission hemoglobin is 7.2 and it drops to 6.7 (with hydration). She is treated with transfusion of 2 Units of PRBC and repeat hemoglobin is 8.1. Treatment: transfusion of 2 units of PRBC, serial monitoring of hemoglobin levels. Please clarify and document your clinical opinion in the progress notes and discharge summary including the definitive and/or presumptive diagnosis, (suspected or probable), related to the above clinical findings. Please include clinical findings supporting your diagnosis. Thanks for your time.     Alondra Celeste RN, BSN  [phone]

## 2017-03-10 NOTE — PERIOP NOTES
Chandrakant Mckeon  1955  [id number]    Situation:    Scheduled Procedure: Procedure(s):  ESOPHAGOGASTRODUODENOSCOPY (EGD)  Verbal report received from: Maximino Essex, RN  Preoperative diagnosis: Upper GI Bleed    Background:    Procedure: Procedure(s):  ESOPHAGOGASTRODUODENOSCOPY (EGD)  Physician performing procedure; Dr. Jesi Wills RN    NPO Status/Last PO Intake: midnight except meds    Pregnancy Test:Not applicable If yes, result: none    Is the patient taking Blood Thinners: NO  Is the patient diabetic:yes       If yes, what was the last BS:  110  Time taken? 8085  Anything given? no           Does the patient have a Pacemaker/Defibrillator in place?: no   Does the patient need antibiotics before/during/after procedure: no   If the patient is having a colon, How much prep was drank? What were the Colon prep results? Does the patient have SCD in place:no   Is patient on CONTACT precautions:no           Assessment:  Are the vital signs stable prior to patient coming to ENDO?  yes  Is the patient alert/oriented and able to sign consent for the procedures:yes     Does the patient have a patient IV in place?  yes     Recommendation:  Family or Friend present no     Permission to share finding with Family or Friend yes

## 2017-03-10 NOTE — PROGRESS NOTES
3/10/2017   CARE MANAGEMENT NOTE:  CM is following pt in the ER for initial discharge planning. EMR reviewed. CM met with pt who was his own historian for this needs assessment. Reportedly, pt resides with her  in a one story home in [place], [place].  [place], pt was ambulatory, indepn with ADLs, and drives. She uses [employer] [facility] in [place]. Pt does not have home healthcare currently. DME - none. She has a glucometer. PCP is Dr. Todd Stone. Plan is to return home when medically stable. She does not anticipate any post discharge needs at this writing.   Bethanie

## 2017-03-10 NOTE — ED NOTES
TRANSFER - OUT REPORT:    Verbal report given to Allyson Pandey(name) on Lum Havers  being transferred to [facility] ED(unit) for routine progression of care       Report consisted of patients Situation, Background, Assessment and   Recommendations(SBAR). Information from the following report(s) SBAR, ED Summary, Intake/Output, MAR and Recent Results was reviewed with the receiving nurse. Lines:   Peripheral IV 03/09/17 Right Antecubital (Active)   Site Assessment Clean, dry, & intact 3/9/2017  7:15 PM   Phlebitis Assessment 0 3/9/2017  7:15 PM   Infiltration Assessment 0 3/9/2017  7:15 PM   Dressing Status Clean, dry, & intact 3/9/2017  7:15 PM   Dressing Type Transparent 3/9/2017  7:15 PM   Hub Color/Line Status Green;Flushed;Capped 3/9/2017  7:15 PM   Action Taken Blood drawn 3/9/2017  7:15 PM        Opportunity for questions and clarification was provided.       Patient transported with:   Monitor   [address]  EKG

## 2017-03-10 NOTE — PERIOP NOTES
Patient received Fentanyl, Lidocaine, Propofol, Rocuronium, and Succinylcholine , per KEVON Guerrero. Patient transported via stretcher to Endoscopy Recovery area.

## 2017-03-10 NOTE — PROGRESS NOTES
Physical Therapy - Chart reviewed and noted patient off unit for EGD. Patient had returned to room from test but upon greeting, patient too drowsy to participate with PT assessment. 63 yo patient is rated at sam fall score of 1 per nursing so question if she requires PT assessment? Will follow up tomorrow.

## 2017-03-10 NOTE — ED NOTES
TRANSFER - OUT REPORT:    Verbal report given to Jaswinder with [facility](name) on Barbara Iyer  being transferred to [facility] ED(unit) for routine progression of care       Report consisted of patients Situation, Background, Assessment and   Recommendations(SBAR). Information from the following report(s) ED Summary was reviewed with the receiving nurse. Lines:   Peripheral IV 03/09/17 Right Antecubital (Active)   Site Assessment Clean, dry, & intact 3/9/2017  7:15 PM   Phlebitis Assessment 0 3/9/2017  7:15 PM   Infiltration Assessment 0 3/9/2017  7:15 PM   Dressing Status Clean, dry, & intact 3/9/2017  7:15 PM   Dressing Type Transparent 3/9/2017  7:15 PM   Hub Color/Line Status Green;Flushed;Capped 3/9/2017  7:15 PM   Action Taken Blood drawn 3/9/2017  7:15 PM        Opportunity for questions and clarification was provided.       Patient transported with:   Monitor   EKG  EMTALA  18 gauge saline lock RAC

## 2017-03-10 NOTE — PERIOP NOTES
TRANSFER - OUT REPORT:    Verbal report given to Marta Woods RN (name) on Baylee Lam  being transferred to  16 (unit) for routine progression of care       Report consisted of patients Situation, Background, Assessment and   Recommendations(SBAR). Information from the following report(s) SBAR, Procedure Summary, MAR and Recent Results was reviewed with the receiving nurse. Lines:   Peripheral IV 03/09/17 Right Antecubital (Active)   Site Assessment Clean, dry, & intact 3/10/2017  1:19 PM   Phlebitis Assessment 0 3/10/2017  1:19 PM   Infiltration Assessment 0 3/10/2017  1:19 PM   Dressing Status Clean, dry, & intact 3/10/2017  1:19 PM   Dressing Type Tape;Transparent 3/10/2017  1:19 PM   Hub Color/Line Status Green;Capped 3/10/2017  1:19 PM   Action Taken Open ports on tubing capped 3/10/2017  1:19 PM   Alcohol Cap Used Yes 3/10/2017  1:19 PM      Informed nurse that patient is scheduled for colonoscopy on Monday and order for prep to start tomorrow. Opportunity for questions and clarification was provided.       Patient transported with:   Patient chart and labels   IV lock   Glasses   Dentures   Cell phone

## 2017-03-10 NOTE — PROGRESS NOTES
Brief Senior Resident Admit Note    HPI: 61 yo F being admitted for GI bleed with drop in hemoglobin and c/o of abdominal pain. Has history of DM, CHF, CKD, anemia, arthritis, chronic and chronic back pain. Having dark stools and the pain for about one month. Heme positive stool on admission and hgb 7.0 Daily aspirin use for heart prevention. No alcohol use. She is hemodynamically stable. Mild ttp in LUQ abdomen, but otherwise abd is benign; soft, nondistended, no rebound or guarding. A/P: Plan to admit to stepdown for GI bleed given age and risk factors for decline would like close monitoring. Likely upper GI bleed with history of dark stools and elevated BUN. Typed and crossed, start transfusing 1 unit  Serial H&H's  Given hx CHF will watch closely for s/sx of fluid overload. NPO, GI consulted  IV Protonix  Hold anticoagulants     Acute on chronic renal failure likely 2/2 to GI bleed  Gentle fluid hydration again with hx of CHF   Hold home lisinopril- HCTZ    Incidental finding of gallstones w/o evidence of cholecystitis and constipation on CT, may be contributing to the pain but are not likely the main sources of her problem. After scope will put on bowel regimen and outpatient f/u for the gallstones if abd pain continues. See my colleague Dr. Benito Gee note for full H&P and further details on management of chronic medical problems.      Ed Galindo, PGY-3  Family Medicine Resident

## 2017-03-10 NOTE — ANESTHESIA PREPROCEDURE EVALUATION
Anesthetic History   No history of anesthetic complications            Review of Systems / Medical History  Patient summary reviewed, nursing notes reviewed and pertinent labs reviewed    Pulmonary  Within defined limits                 Neuro/Psych   Within defined limits           Cardiovascular    Hypertension: well controlled          Hyperlipidemia    Exercise tolerance: >4 METS     GI/Hepatic/Renal         Renal disease: CRI       Endo/Other    Diabetes: well controlled, type 2    Arthritis and anemia     Other Findings   Comments: hgb 6.7 on arrival, received 1 unit in ED           Physical Exam    Airway  Mallampati: IV  TM Distance: > 6 cm  Neck ROM: normal range of motion   Mouth opening: Normal     Cardiovascular    Rhythm: regular  Rate: normal         Dental    Dentition: Full lower dentures and Full upper dentures     Pulmonary  Breath sounds clear to auscultation               Abdominal         Other Findings            Anesthetic Plan    ASA: 3  Anesthesia type: general          Induction: Intravenous  Anesthetic plan and risks discussed with: Patient

## 2017-03-10 NOTE — ED NOTES
TRANSPORTED BY [facility] TO [facility] ED. \"Feeling better. \"

## 2017-03-10 NOTE — CONSULTS
[address]                Gastrointestinal Abdominal Pain    Subjective:      Jose De Jesus Harding is a 62 y.o. female who presents to ED with complaints of LUQ x 3 weeks. Patient describes intermittent pain, localized to LUQ that feels as if someone is stabbing her. Denies associated fever, chills, CP, SOB, N/V, or weight loss. Tolerates diet but states that fatty/greasy food seems to make symptoms worse. Initially thought the pain was due to her gallbladder. Admits to decreased appetite and early satiety. Nothing seemed to make the pain better. Has been taking tylenol PM at night to help with sleeping but hasn't done much for pain. Denies dysphagia, heartburn, or regurgitation. Does admit that stool has been darker than usual but has no noticed any bright red blood. Denies alcohol use. Denies excessive NSAID use. No prior endoscopy or colonoscopy. Does states that after at the end of last year in [address] [place] [address] she required a blood transfusion. Believes she was supposed to have upper endoscopy but never had it done. Denies family history of colon cancer. Past Medical History:   Diagnosis Date    Anemia     Arthritis     Calculus of kidney     Congestive heart failure (HCC)     Diabetes (HCC)     Hematuria      Past Surgical History:   Procedure Laterality Date    HX ORTHOPAEDIC      left carpal tunnel      History reviewed. No pertinent family history.   Social History   Substance Use Topics    Smoking status: Current Some Day Smoker    Smokeless tobacco: Never Used    Alcohol use No     No Known Allergies  Current Facility-Administered Medications   Medication Dose Route Frequency    amLODIPine (NORVASC) tablet 10 mg  10 mg Oral DAILY    labetalol (NORMODYNE) tablet 200 mg  200 mg Oral BID    sodium chloride (NS) flush 5-10 mL  5-10 mL IntraVENous Q8H    sodium chloride (NS) flush 5-10 mL  5-10 mL IntraVENous PRN    insulin lispro (HUMALOG) injection   SubCUTAneous AC&HS    glucose chewable tablet 16 g  4 Tab Oral PRN    dextrose (D50W) injection syrg 12.5-25 g  12.5-25 g IntraVENous PRN    glucagon (GLUCAGEN) injection 1 mg  1 mg IntraMUSCular PRN    atorvastatin (LIPITOR) tablet 10 mg  10 mg Oral QHS    0.9% sodium chloride infusion 250 mL  250 mL IntraVENous PRN    0.9% sodium chloride infusion 250 mL  250 mL IntraVENous PRN    pantoprazole (PROTONIX) 40 mg in sodium chloride 0.9 % 10 mL injection  40 mg IntraVENous Q12H     Current Outpatient Prescriptions   Medication Sig    ACETAMINOPHEN/DIPHENHYDRAMINE (TYLENOL PM PO) Take 2 Tabs by mouth nightly as needed (sleep).  labetalol (NORMODYNE) 200 mg tablet Take 200 mg by mouth two (2) times a day.  dorzolamide-timolol (COSOPT) 22.3-6.8 mg/mL ophthalmic solution Administer 1 Drop to both eyes two (2) times a day.  glipiZIDE (GLUCOTROL) 5 mg tablet take 1 tablet by mouth once daily    atorvastatin (LIPITOR) 10 mg tablet Take 1 Tab by mouth nightly.  aspirin delayed-release 81 mg tablet take 2 tablets by mouth once daily    amLODIPine (NORVASC) 10 mg tablet Take 10 mg by mouth daily.  losartan-hydrochlorothiazide (HYZAAR) 100-25 mg per tablet Take 1 Tab by mouth daily.         Review of Systems:  Constitutional: negative for weight loss, positive for fatigue   Ears, nose, mouth, throat, and face: negative for dysphagia  Respiratory: negative for cough or dyspnea on exertion  Cardiovascular: negative for chest pain, chest pressure/discomfort, syncope, lower extremity edema  Gastrointestinal: positive for change in bowel habits, constipation and abdominal pain     Objective:      Patient Vitals for the past 8 hrs:   BP Temp Pulse Resp SpO2   03/10/17 0931 (!) 166/106 - 88 - -   03/10/17 0930 - 98.5 °F (36.9 °C) - - -   03/10/17 0518 174/78 98 °F (36.7 °C) 84 16 94 %   03/10/17 0445 166/70 - 82 11 92 %   03/10/17 0434 165/75 98.1 °F (36.7 °C) 81 15 94 %   03/10/17 0430 165/75 - 83 21 94 %   03/10/17 0415 155/71 - 81 - 94 % 03/10/17 0401 168/75 98.4 °F (36.9 °C) 81 19 95 %   03/10/17 0400 168/75 - 80 18 95 %   03/10/17 0349 157/69 98.1 °F (36.7 °C) 83 18 97 %   03/10/17 0345 157/69 - 80 16 96 %   03/10/17 0336 158/70 98.1 °F (36.7 °C) 82 12 97 %   03/10/17 0330 158/70 - 82 (!) 0 97 %   03/10/17 0300 155/69 - 83 - 98 %       Physical Exam:  Visit Vitals    BP (!) 166/106    Pulse 88    Temp 98.5 °F (36.9 °C)    Resp 16    Ht 5' 1\" (1.549 m)    Wt 70.8 kg (156 lb)    SpO2 94%    Breastfeeding No    BMI 29.48 kg/m2     General appearance: alert, cooperative, no distress  Head: Normocephalic, without obvious abnormality, atraumatic  Throat: Lips, mucosa, and tongue normal. Teeth and gums normal  Lungs: clear to auscultation bilaterally  Heart: regular rate and rhythm, S1, S2 normal, no murmur, click, rub or gallop  Abdomen: soft, non-tender at this time, Bowel sounds normal. No masses,  no organomegaly  Extremities: extremities normal, atraumatic, no cyanosis or edema  Pulses: 2+ and symmetric  Skin: Skin color, texture, turgor normal. No rashes or lesions  Neurologic: Grossly normal. No focal deficits.     Laboratory:    Recent Results (from the past 24 hour(s))   URINALYSIS W/ REFLEX CULTURE    Collection Time: 03/09/17  6:59 PM   Result Value Ref Range    Color YELLOW/STRAW      Appearance CLEAR CLEAR      Specific gravity 1.020 1.003 - 1.030      pH (UA) 6.0 5.0 - 8.0      Protein 100 (A) NEG mg/dL    Glucose NEGATIVE  NEG mg/dL    Ketone NEGATIVE  NEG mg/dL    Bilirubin NEGATIVE  NEG      Blood MODERATE (A) NEG      Urobilinogen 0.2 0.2 - 1.0 EU/dL    Nitrites NEGATIVE  NEG      Leukocyte Esterase TRACE (A) NEG      WBC 5-10 0 - 4 /hpf    RBC 0-5 0 - 5 /hpf    Epithelial cells MODERATE (A) FEW /lpf    Bacteria NEGATIVE  NEG /hpf    UA:UC IF INDICATED URINE CULTURE ORDERED (A) CNI     EKG, 12 LEAD, INITIAL    Collection Time: 03/09/17  7:13 PM   Result Value Ref Range    Ventricular Rate 78 BPM    Atrial Rate 78 BPM    P-R Interval 138 ms    QRS Duration 72 ms    Q-T Interval 370 ms    QTC Calculation (Bezet) 421 ms    Calculated P Axis 58 degrees    Calculated R Axis 34 degrees    Calculated T Axis 113 degrees    Diagnosis       Normal sinus rhythm  Abnormal QRS-T angle, consider primary T wave abnormality  Abnormal ECG  When compared with ECG of 03-APR-2016 14:28,  T wave inversion less evident in Lateral leads  Confirmed by Flor HENDERSON, Marco Giang ([id number]) on 3/10/2017 9:44:12 AM     CBC WITH AUTOMATED DIFF    Collection Time: 03/09/17  7:14 PM   Result Value Ref Range    WBC 4.9 3.6 - 11.0 K/uL    RBC 2.65 (L) 3.80 - 5.20 M/uL    HGB 7.2 (L) 11.5 - 16.0 g/dL    HCT 23.5 (L) 35.0 - 47.0 %    MCV 88.7 80.0 - 99.0 FL    MCH 27.2 26.0 - 34.0 PG    MCHC 30.6 30.0 - 36.5 g/dL    RDW 13.0 11.5 - 14.5 %    PLATELET 679 575 - 774 K/uL    NEUTROPHILS 65 32 - 75 %    LYMPHOCYTES 19 12 - 49 %    MONOCYTES 13 5 - 13 %    EOSINOPHILS 3 0 - 7 %    BASOPHILS 0 0 - 1 %    ABS. NEUTROPHILS 3.1 1.8 - 8.0 K/UL    ABS. LYMPHOCYTES 0.9 0.8 - 3.5 K/UL    ABS. MONOCYTES 0.6 0.0 - 1.0 K/UL    ABS. EOSINOPHILS 0.2 0.0 - 0.4 K/UL    ABS. BASOPHILS 0.0 0.0 - 0.1 K/UL    DF AUTOMATED     METABOLIC PANEL, COMPREHENSIVE    Collection Time: 03/09/17  7:14 PM   Result Value Ref Range    Sodium 139 136 - 145 mmol/L    Potassium 4.7 3.5 - 5.1 mmol/L    Chloride 107 97 - 108 mmol/L    CO2 24 21 - 32 mmol/L    Anion gap 8 5 - 15 mmol/L    Glucose 86 65 - 100 mg/dL    BUN 55 (H) 6 - 20 MG/DL    Creatinine 2.76 (H) 0.55 - 1.02 MG/DL    BUN/Creatinine ratio 20 12 - 20      GFR est AA 21 (L) >60 ml/min/1.73m2    GFR est non-AA 18 (L) >60 ml/min/1.73m2    Calcium 8.5 8.5 - 10.1 MG/DL    Bilirubin, total 0.3 0.2 - 1.0 MG/DL    ALT (SGPT) 98 (H) 12 - 78 U/L    AST (SGOT) 41 (H) 15 - 37 U/L    Alk.  phosphatase 185 (H) 45 - 117 U/L    Protein, total 7.7 6.4 - 8.2 g/dL    Albumin 3.4 (L) 3.5 - 5.0 g/dL    Globulin 4.3 (H) 2.0 - 4.0 g/dL    A-G Ratio 0.8 (L) 1.1 - 2.2     LIPASE Collection Time: 03/09/17  7:14 PM   Result Value Ref Range    Lipase 273 73 - 393 U/L   MAGNESIUM    Collection Time: 03/09/17  7:14 PM   Result Value Ref Range    Magnesium 1.9 1.6 - 2.4 mg/dL   TROPONIN I    Collection Time: 03/09/17  7:14 PM   Result Value Ref Range    Troponin-I, Qt. 0.06 <0.08 ng/mL   POC FECAL OCCULT BLOOD    Collection Time: 03/09/17  8:26 PM   Result Value Ref Range    Occult blood, stool (POC) POSITIVE (A) NEG     LACTIC ACID, PLASMA    Collection Time: 03/09/17  8:29 PM   Result Value Ref Range    Lactic acid 1.0 0.4 - 2.0 MMOL/L   GLUCOSE, POC    Collection Time: 03/09/17  9:37 PM   Result Value Ref Range    Glucose (POC) 87 65 - 100 mg/dL    Performed by Deb Jama (PCT)    TYPE & CROSSMATCH    Collection Time: 03/10/17 12:07 AM   Result Value Ref Range    Crossmatch Expiration 03/13/2017     ABO/Rh(D) O POSITIVE     Antibody screen NEG     Unit number [id number]     Blood component type RC LR AS1     Unit division 00     Status of unit ISSUED     Crossmatch result Compatible     Unit number [id number]     Blood component type RC LR AS1     Unit division 00     Status of unit ALLOCATED     Crossmatch result Compatible    HGB & HCT    Collection Time: 03/10/17 12:07 AM   Result Value Ref Range    HGB 7.0 (L) 11.5 - 16.0 g/dL    HCT 22.8 (L) 35.0 - 80.4 %   METABOLIC PANEL, COMPREHENSIVE    Collection Time: 03/10/17  3:29 AM   Result Value Ref Range    Sodium 141 136 - 145 mmol/L    Potassium 4.3 3.5 - 5.1 mmol/L    Chloride 112 (H) 97 - 108 mmol/L    CO2 22 21 - 32 mmol/L    Anion gap 7 5 - 15 mmol/L    Glucose 83 65 - 100 mg/dL    BUN 47 (H) 6 - 20 MG/DL    Creatinine 2.38 (H) 0.55 - 1.02 MG/DL    BUN/Creatinine ratio 20 12 - 20      GFR est AA 25 (L) >60 ml/min/1.73m2    GFR est non-AA 21 (L) >60 ml/min/1.73m2    Calcium 7.9 (L) 8.5 - 10.1 MG/DL    Bilirubin, total 0.3 0.2 - 1.0 MG/DL    ALT (SGPT) 87 (H) 12 - 78 U/L    AST (SGOT) 33 15 - 37 U/L    Alk.  phosphatase 154 (H) 45 - 117 U/L    Protein, total 6.6 6.4 - 8.2 g/dL    Albumin 2.9 (L) 3.5 - 5.0 g/dL    Globulin 3.7 2.0 - 4.0 g/dL    A-G Ratio 0.8 (L) 1.1 - 2.2     CBC WITH AUTOMATED DIFF    Collection Time: 03/10/17  3:29 AM   Result Value Ref Range    WBC 5.3 3.6 - 11.0 K/uL    RBC 2.52 (L) 3.80 - 5.20 M/uL    HGB 6.7 (L) 11.5 - 16.0 g/dL    HCT 21.8 (L) 35.0 - 47.0 %    MCV 86.5 80.0 - 99.0 FL    MCH 26.6 26.0 - 34.0 PG    MCHC 30.7 30.0 - 36.5 g/dL    RDW 13.7 11.5 - 14.5 %    PLATELET 750 763 - 121 K/uL    NEUTROPHILS 61 32 - 75 %    LYMPHOCYTES 24 12 - 49 %    MONOCYTES 12 5 - 13 %    EOSINOPHILS 2 0 - 7 %    BASOPHILS 1 0 - 1 %    ABS. NEUTROPHILS 3.2 1.8 - 8.0 K/UL    ABS. LYMPHOCYTES 1.3 0.8 - 3.5 K/UL    ABS. MONOCYTES 0.6 0.0 - 1.0 K/UL    ABS. EOSINOPHILS 0.1 0.0 - 0.4 K/UL    ABS.  BASOPHILS 0.1 0.0 - 0.1 K/UL    DF MANUAL      RBC COMMENTS NORMOCYTIC, NORMOCHROMIC     HEMOGLOBIN A1C WITH EAG    Collection Time: 03/10/17  3:29 AM   Result Value Ref Range    Hemoglobin A1c <3.5 (L) 4.2 - 6.3 %    Est. average glucose Cannot be calulated mg/dL   TROPONIN I    Collection Time: 03/10/17  3:29 AM   Result Value Ref Range    Troponin-I, Qt. <0.04 <0.05 ng/mL   LIPID PANEL    Collection Time: 03/10/17  3:29 AM   Result Value Ref Range    LIPID PROFILE          Cholesterol, total 125 <200 MG/DL    Triglyceride 27 <150 MG/DL    HDL Cholesterol 71 MG/DL    LDL, calculated 48.6 0 - 100 MG/DL    VLDL, calculated 5.4 MG/DL    CHOL/HDL Ratio 1.8 0 - 5.0     IRON PROFILE    Collection Time: 03/10/17  3:29 AM   Result Value Ref Range    Iron 45 35 - 150 ug/dL    TIBC 206 (L) 250 - 450 ug/dL    Iron % saturation 22 20 - 50 %   FERRITIN    Collection Time: 03/10/17  3:29 AM   Result Value Ref Range    Ferritin 388 (H) 8 - 252 NG/ML   RETICULOCYTE COUNT    Collection Time: 03/10/17  3:29 AM   Result Value Ref Range    Reticulocyte count 2.3 (H) 0.7 - 2.1 %   FOLATE    Collection Time: 03/10/17  3:29 AM   Result Value Ref Range    Folate 12.0 5.0 - 21.0 ng/mL   HAPTOGLOBIN    Collection Time: 03/10/17  3:29 AM   Result Value Ref Range    Haptoglobin 127 30 - 200 mg/dL   LD    Collection Time: 03/10/17  3:29 AM   Result Value Ref Range     81 - 246 U/L   VITAMIN B12    Collection Time: 03/10/17  3:29 AM   Result Value Ref Range    Vitamin B12 536 211 - 911 pg/mL   EKG, 12 LEAD, INITIAL    Collection Time: 03/10/17  6:02 AM   Result Value Ref Range    Ventricular Rate 85 BPM    Atrial Rate 85 BPM    P-R Interval 154 ms    QRS Duration 78 ms    Q-T Interval 360 ms    QTC Calculation (Bezet) 428 ms    Calculated P Axis 75 degrees    Calculated R Axis 72 degrees    Calculated T Axis 106 degrees    Diagnosis       Normal sinus rhythm  Abnormal QRS-T angle, consider primary T wave abnormality  When compared with ECG of 09-MAR-2017 19:13,  No significant change    Confirmed by Flor HENDERSON, Zen ([id number]) on 3/10/2017 9:45:42 AM     HGB & HCT    Collection Time: 03/10/17  7:18 AM   Result Value Ref Range    HGB 8.1 (L) 11.5 - 16.0 g/dL    HCT 26.0 (L) 35.0 - 47.0 %   TROPONIN I    Collection Time: 03/10/17  7:18 AM   Result Value Ref Range    Troponin-I, Qt. <0.04 <0.05 ng/mL   GLUCOSE, POC    Collection Time: 03/10/17  9:36 AM   Result Value Ref Range    Glucose (POC) 110 (H) 65 - 100 mg/dL    Performed by Maegan Lizarraga      CT Results (most recent):    Results from Hospital Encounter encounter on 03/09/17   CT ABD PELV WO CONT   Narrative INDICATION: luq abd pain  for 3 weeks. COMPARISON: 8/15/2013    TECHNIQUE:   Thin axial images were obtained through the abdomen and pelvis. Coronal and  sagittal reconstructions were generated. Oral contrast was not administered. CT  dose reduction was achieved through use of a standardized protocol tailored for  this examination and automatic exposure control for dose modulation. Adaptive  statistical iterative reconstruction (ASIR) was utilized.     The absence of intravenous contrast material reduces the sensitivity for  evaluation of the solid parenchymal organs of the abdomen. FINDINGS:   LUNG BASES: Clear. INCIDENTALLY IMAGED HEART AND MEDIASTINUM: Unremarkable. LIVER: No mass or biliary dilatation. GALLBLADDER: Cholelithiasis without apparent inflammation. SPLEEN: No mass. PANCREAS: No mass or ductal dilatation. ADRENALS: Unremarkable. KIDNEYS/URETERS: No mass, calculus, or hydronephrosis. STOMACH: Unremarkable. SMALL BOWEL: No dilatation or wall thickening. COLON: Moderate stool. No inflammation. APPENDIX: Not visualized. PERITONEUM: No ascites or pneumoperitoneum. RETROPERITONEUM: No lymphadenopathy or aortic aneurysm. REPRODUCTIVE ORGANS: Unremarkable. URINARY BLADDER: No mass or calculus. BONES: No destructive bone lesion. ADDITIONAL COMMENTS: N/A         Impression IMPRESSION:  No acute findings. Moderate stool. Cholelithiasis. No significant change. Assessment:     1. LUQ pain  2. Anemia likely secondary to GI blood loss  3. Positive FOBT  4. Abnormal liver enzymes    Plan:     1. EGD today to further evaluate and suspect upper GI source, keep NPO  2. needs screening colonoscopy also but pending EGD results will determine inpatient vs outpatient  3. IV PPI  4. Serial H/H, transfuse for Hgb <7  5. Abnormal liver enzymes could be medication induced given her recent use of tylenol consistently for the past week as her labs were previously normal. She denies alcohol use. CT scan without liver abnormality and no abnormality if biliary ductal tree. Enzymes are already trending down. Will monitor - if persist or worsen needs further workup. Repeat labs in AM.  6. Start Miralax daily (noted retained stool on CT)    Signed By: Ju Mejía PA-C                       March 10, 2017                       11:07 AM  I have interviewed and examined Ms Kali Disla.   I've discussed EGD, colonoscopy possible biopsy, polypectomy, cautery, injection, alternatives, complications including but not limited to pain, cardiopulmonary event, bleeding, perforation requiring additional blood transfusion or operative repair; all questions answered. Formulated above care plan.     Thanks very much    Deo Morse M.D.

## 2017-03-10 NOTE — ED PROVIDER NOTES
HPI Comments: Taylor Gleason is a 62 y.o. female who presents ambulatory to the ED with a c/o left upper quadrant pain x 3 weeks. Per pt, pain is worse after eating and in the middle of the night. She notes she thinks it is her gall bladder. Pt denies f/c, n/v/d, dysuria, hematuria, cp,sob or rash. She notes her sx are not improved with tylenol pm. She has not been to see her pcp for the same. Her last BM was today and normal. Pt denies urinary sx or blood in her stool     PCP: Oliva Ace MD  PMHx significant for: Past Medical History:  No date: Anemia  No date: Arthritis  No date: Calculus of kidney  No date: Congestive heart failure ([address]  No date: Diabetes ([address]  PSHx significant for: Past Surgical History:  No date: HX ORTHOPAEDIC      Comment: left carpal tunnel    Social Hx: Tobacco: denies current  EtOH: social Illicit drug use: denies     There are no further complaints or symptoms at this time. The history is provided by the patient. Past Medical History:   Diagnosis Date    Anemia     Arthritis     Calculus of kidney     Congestive heart failure (HCC)     Diabetes ([address]        Past Surgical History:   Procedure Laterality Date    HX ORTHOPAEDIC      left carpal tunnel         History reviewed. No pertinent family history. Social History     Social History    Marital status:      Spouse name: N/A    Number of children: N/A    Years of education: N/A     Occupational History    Not on file. Social History Main Topics    Smoking status: Current Some Day Smoker    Smokeless tobacco: Never Used    Alcohol use No    Drug use: No    Sexual activity: Not on file     Other Topics Concern    Not on file     Social History Narrative         ALLERGIES: Review of patient's allergies indicates no known allergies. Review of Systems   Constitutional: Negative for chills and fever. HENT: Negative for congestion, rhinorrhea, sneezing and sore throat.     Eyes: Negative for redness and visual disturbance. Respiratory: Negative for shortness of breath. Cardiovascular: Negative for chest pain and leg swelling. Gastrointestinal: Positive for abdominal pain. Negative for diarrhea, nausea and vomiting. Genitourinary: Negative for difficulty urinating and frequency. Musculoskeletal: Negative for back pain, myalgias and neck stiffness. Skin: Negative for rash. Neurological: Negative for dizziness, syncope, weakness and headaches. Hematological: Negative for adenopathy. Vitals:    03/09/17 1851 03/09/17 2030   BP: 146/66 182/82   Pulse: 81    Resp: 14 16   Temp: 97.7 °F (36.5 °C)    SpO2: 100% 98%   Weight: 70.8 kg (156 lb)    Height: 5' 1\" (1.549 m)             Physical Exam   Constitutional: She is oriented to person, place, and time. She appears well-developed and well-nourished. No distress. Above average bmi female   HENT:   Head: Normocephalic and atraumatic. Right Ear: External ear normal.   Left Ear: External ear normal.   Neck: Neck supple. Cardiovascular: Normal rate, regular rhythm, normal heart sounds and intact distal pulses. Exam reveals no gallop and no friction rub. No murmur heard. Pulmonary/Chest: Effort normal and breath sounds normal. No stridor. No respiratory distress. She has no wheezes. She has no rales. She exhibits no tenderness. Abdominal: Soft. Bowel sounds are normal. She exhibits no distension and no mass. There is tenderness. There is no rebound and no guarding. Left upper quadrant TTP without rebounding or guarding no CVAT   Musculoskeletal: Normal range of motion. She exhibits no edema, tenderness or deformity. Neurological: She is alert and oriented to person, place, and time. No cranial nerve deficit. Coordination normal.   Skin: No rash noted. No erythema. No pallor. Psychiatric: She has a normal mood and affect. Her behavior is normal.   Nursing note and vitals reviewed.        MDM  Number of Diagnoses or Management Options     Amount and/or Complexity of Data Reviewed  Clinical lab tests: ordered and reviewed  Tests in the radiology section of CPT®: ordered and reviewed  Tests in the medicine section of CPT®: reviewed and ordered  Review and summarize past medical records: yes  Independent visualization of images, tracings, or specimens: yes    Patient Progress  Patient progress: stable    ED Course       Procedures  ED EKG interpretation: 7:13 PM  Rhythm: normal sinus rhythm; and regular . Rate (approx.): 78; Axis: normal; P wave: normal; QRS interval: normal ; ST/T wave: non-specific changes; Other findings: abnormal EKG. EKG read from computer reads Abnormal QRST angle, suspect over read of artifact from poor quality EKG. This EKG was interpreted by Dilan Hernadez MD,ED Provider. 7:10 PM  Discussed pt, sx, hx and current findings with Dr Nataliia Sol . she is in agreement with plan   Héctor Jorge. NAKITA Gaspar    Procedure Note - Rectal Exam:   8:27 PM  Performed by: Héctor Gaspar PA-C  Rectal exam performed. Light brown stool was collected. Stool was Hemoccult tested, and found to be heme Positive. The control responded appropriately  The procedure took 1-15 minutes, and pt tolerated well. Héctor Gaspar PA-C      8:55 PM  Héctor Gaspar PA-C spoke with Dr. Louis Arreola, Consult for [facility]. Discussed available diagnostic tests and clinical findings. He is in agreement with care plans as outlined. He will accept pt in transfer. He requests type and cross for 2 units, 2 large bore IVs, IV protonix and orthostatics  Klever García PA-C     Critical Care: The reason for providing this level of medical care for this critically ill patient was due to a critical illness that impaired one or more vital organ systems such that there was a high probability of imminent or life threatening deterioration in the patients condition.  This care involved high complexity decision making to assess, manipulate, and support vital system functions. Total critical care time spend exclusive of procedures:  35 min      LABORATORY TESTS:  Recent Results (from the past 12 hour(s))   URINALYSIS W/ REFLEX CULTURE    Collection Time: 03/09/17  6:59 PM   Result Value Ref Range    Color YELLOW/STRAW      Appearance CLEAR CLEAR      Specific gravity 1.020 1.003 - 1.030      pH (UA) 6.0 5.0 - 8.0      Protein 100 (A) NEG mg/dL    Glucose NEGATIVE  NEG mg/dL    Ketone NEGATIVE  NEG mg/dL    Bilirubin NEGATIVE  NEG      Blood MODERATE (A) NEG      Urobilinogen 0.2 0.2 - 1.0 EU/dL    Nitrites NEGATIVE  NEG      Leukocyte Esterase TRACE (A) NEG      WBC 5-10 0 - 4 /hpf    RBC 0-5 0 - 5 /hpf    Epithelial cells MODERATE (A) FEW /lpf    Bacteria NEGATIVE  NEG /hpf    UA:UC IF INDICATED URINE CULTURE ORDERED (A) CNI     EKG, 12 LEAD, INITIAL    Collection Time: 03/09/17  7:13 PM   Result Value Ref Range    Ventricular Rate 78 BPM    Atrial Rate 78 BPM    P-R Interval 138 ms    QRS Duration 72 ms    Q-T Interval 370 ms    QTC Calculation (Bezet) 421 ms    Calculated P Axis 58 degrees    Calculated R Axis 34 degrees    Calculated T Axis 113 degrees    Diagnosis       Normal sinus rhythm  Abnormal QRS-T angle, consider primary T wave abnormality  Abnormal ECG  When compared with ECG of 03-APR-2016 14:28,  Nonspecific T wave abnormality no longer evident in Inferior leads  T wave inversion less evident in Lateral leads     CBC WITH AUTOMATED DIFF    Collection Time: 03/09/17  7:14 PM   Result Value Ref Range    WBC 4.9 3.6 - 11.0 K/uL    RBC 2.65 (L) 3.80 - 5.20 M/uL    HGB 7.2 (L) 11.5 - 16.0 g/dL    HCT 23.5 (L) 35.0 - 47.0 %    MCV 88.7 80.0 - 99.0 FL    MCH 27.2 26.0 - 34.0 PG    MCHC 30.6 30.0 - 36.5 g/dL    RDW 13.0 11.5 - 14.5 %    PLATELET 535 661 - 716 K/uL    NEUTROPHILS 65 32 - 75 %    LYMPHOCYTES 19 12 - 49 %    MONOCYTES 13 5 - 13 %    EOSINOPHILS 3 0 - 7 %    BASOPHILS 0 0 - 1 %    ABS. NEUTROPHILS 3.1 1.8 - 8.0 K/UL    ABS.  LYMPHOCYTES 0.9 0.8 - 3.5 K/UL    ABS. MONOCYTES 0.6 0.0 - 1.0 K/UL    ABS. EOSINOPHILS 0.2 0.0 - 0.4 K/UL    ABS. BASOPHILS 0.0 0.0 - 0.1 K/UL    DF AUTOMATED     METABOLIC PANEL, COMPREHENSIVE    Collection Time: 03/09/17  7:14 PM   Result Value Ref Range    Sodium 139 136 - 145 mmol/L    Potassium 4.7 3.5 - 5.1 mmol/L    Chloride 107 97 - 108 mmol/L    CO2 24 21 - 32 mmol/L    Anion gap 8 5 - 15 mmol/L    Glucose 86 65 - 100 mg/dL    BUN 55 (H) 6 - 20 MG/DL    Creatinine 2.76 (H) 0.55 - 1.02 MG/DL    BUN/Creatinine ratio 20 12 - 20      GFR est AA 21 (L) >60 ml/min/1.73m2    GFR est non-AA 18 (L) >60 ml/min/1.73m2    Calcium 8.5 8.5 - 10.1 MG/DL    Bilirubin, total 0.3 0.2 - 1.0 MG/DL    ALT (SGPT) 98 (H) 12 - 78 U/L    AST (SGOT) 41 (H) 15 - 37 U/L    Alk. phosphatase 185 (H) 45 - 117 U/L    Protein, total 7.7 6.4 - 8.2 g/dL    Albumin 3.4 (L) 3.5 - 5.0 g/dL    Globulin 4.3 (H) 2.0 - 4.0 g/dL    A-G Ratio 0.8 (L) 1.1 - 2.2     LIPASE    Collection Time: 03/09/17  7:14 PM   Result Value Ref Range    Lipase 273 73 - 393 U/L   MAGNESIUM    Collection Time: 03/09/17  7:14 PM   Result Value Ref Range    Magnesium 1.9 1.6 - 2.4 mg/dL   TROPONIN I    Collection Time: 03/09/17  7:14 PM   Result Value Ref Range    Troponin-I, Qt. 0.06 <0.08 ng/mL   POC FECAL OCCULT BLOOD    Collection Time: 03/09/17  8:26 PM   Result Value Ref Range    Occult blood, stool (POC) POSITIVE (A) NEG         IMAGING RESULTS:  Study Result      ACUTE ABDOMINAL SERIES RADIOGRAPHS. 3/9/2017 7:38 PM     INDICATION: Abdominal pain. HISTORY (per electronic medical record): Left-sided abdominal pain, starting 2-3  weeks ago, and getting worse. No nausea, vomiting, or diarrhea.     COMPARISON: 4/3/2016, 4/16/2014, CT abdomen pelvis 8/15/2013.     TECHNIQUE: AP supine view/s of the abdomen. Upright AP view/s of the chest and  abdomen.     FINDINGS: The lungs are clear and the central airways are patent.  No  pneumothorax or pleural effusion.     There is large volume of stool throughout the colon. The relative paucity of  small bowel gas is consistent with fluid-filled small bowel. Abdominal  radiographs are slightly underpenetrated secondary to patient body habitus. No  pneumoperitoneum.     IMPRESSION  IMPRESSION: Clear lungs. Large volume of stool. Fluid-filled small bowel.        Study Result      INDICATION: luq abd pain for 3 weeks.     COMPARISON: 8/15/2013     TECHNIQUE:   Thin axial images were obtained through the abdomen and pelvis. Coronal and  sagittal reconstructions were generated. Oral contrast was not administered. CT  dose reduction was achieved through use of a standardized protocol tailored for  this examination and automatic exposure control for dose modulation. Adaptive  statistical iterative reconstruction (ASIR) was utilized.     The absence of intravenous contrast material reduces the sensitivity for  evaluation of the solid parenchymal organs of the abdomen.      FINDINGS:   LUNG BASES: Clear. INCIDENTALLY IMAGED HEART AND MEDIASTINUM: Unremarkable. LIVER: No mass or biliary dilatation. GALLBLADDER: Cholelithiasis without apparent inflammation. SPLEEN: No mass. PANCREAS: No mass or ductal dilatation. ADRENALS: Unremarkable. KIDNEYS/URETERS: No mass, calculus, or hydronephrosis. STOMACH: Unremarkable. SMALL BOWEL: No dilatation or wall thickening. COLON: Moderate stool. No inflammation. APPENDIX: Not visualized. PERITONEUM: No ascites or pneumoperitoneum. RETROPERITONEUM: No lymphadenopathy or aortic aneurysm. REPRODUCTIVE ORGANS: Unremarkable. URINARY BLADDER: No mass or calculus. BONES: No destructive bone lesion. ADDITIONAL COMMENTS: N/A     IMPRESSION  IMPRESSION:  No acute findings. Moderate stool. Cholelithiasis.  No significant change.            MEDICATIONS GIVEN:  Medications   0.9% sodium chloride infusion 250 mL (not administered)   pantoprazole (PROTONIX) 80 mg in sodium chloride 0.9 % 20 mL injection (not administered) mylanta/viscous lidocaine (DAKOTA)(GI COCKTAIL) (40 mL Oral Given 3/9/17 1915)   sodium chloride 0.9 % bolus infusion 1,000 mL (1,000 mL IntraVENous New Bag 3/9/17 2030)       IMPRESSION:  1. Gastrointestinal hemorrhage, unspecified gastrointestinal hemorrhage type    2. Acute renal failure, unspecified acute renal failure type ([facility] [address]    3. Anemia, unspecified type        PLAN:  1. Admit to hospital    8:42 PM   Discussed impending transfer with pt and family. Parents were instructed that [address] [place] [address] does not have inpatient services and that pt would be transferred to [facility]. Pt and family understands and agrees with care plan. Mark Anthony Singletary.  NAKITA Gaspar

## 2017-03-10 NOTE — ROUTINE PROCESS
[facility]  1955  [id number]    Situation:  Verbal report received from: Nisreen Leslie RN  Procedure: Procedure(s):  ESOPHAGOGASTRODUODENOSCOPY (EGD)    Background:    Preoperative diagnosis: Upper GI Bleed  Postoperative diagnosis: No bleed source   Normal EGD    :  Dr. Severiano Villatoro  Assistant(s): Endoscopy Technician-1: Alexis Tuttle  Endoscopy RN-1: Olesya Diaz RN    Specimens: * No specimens in log *  H. Pylori  no    Assessment:  Intra-procedure medications     Anesthesia gave intra-procedure sedation and medications, see anesthesia flow sheet yes    Intravenous fluids: NS@ KVO     Vital signs stable     Abdominal assessment: round and soft     Recommendation:  Discharge patient per MD order. Return to ED  Permission to share finding with family or friend yes.

## 2017-03-10 NOTE — PERIOP NOTES
Brought patient back to ED 16, once in bed c/o nausea, vomited 300 ml clear tan fluids. Of note patient did drink 240 ml of ginger ale 30 minutes ago. Put call bell on, waiting for nurse.

## 2017-03-10 NOTE — H&P
[address] [place] [address]   Office [phone]  Fax [phone]       Admission H&P     Name: Tobias Dubon MRN: [id number]  Sex: Female   YOB: 1955  Age: 62 y.o. PCP: Felicitas Matthew MD     Source of Information: patient, medical records    Chief complaint: abdominal pain    History of Present Illness  Tobias Dubon is a 62 y.o. female with known T2DM, CHF, CKD, and anemia who presents to the [facility] ED complaining of LUQ abdominal pain. The patient reports that she has been experiencing this pain (which she describes as \"stabbing\") for the past 3 weeks or so now. She reports that the pain is worse after eating a fatty/greasy meal.  The pain is also reported to be worse in the middle of the night. She is concerned about her gallbladder being the etiology for this pain as she was told that she has gallstones in the past.  She has tried to treat her pain with tylenol, but has not found much relief from this. She denies any history of blood in her stool, but does report that her stools have been very dark (almost black) for the past month or so now. The patient denies any other symptoms besides some increased nasal congestion at night. No fever/chills, nausea/vomitting/diarrhea, constipation (last BM was on day of presentation), dysuria/hematuria, chest pain, sob. In the ER, vital signs were remarkable for elevated BP (up to 193/86)--improved on arrival to [facility]. Labs were remarkable for Hgb of 7.2 (was last 10.7 in 4/2016) and 7.0 on recheck on arrival to [facility] (5 hours later), creatinine of 2.76 (unsure of baseline), ALT of 98, AST of 41, troponin I of 0.06, and +FOBT. Abdominal Acute Series XR showed normal chest, large volume of stool, fluid filled small bowel. CT abdomen/pelvis showed no acute findings, moderate stool, and cholelithiasis. Pt was treated with a GI cocktail, 1L NS bolus, and IV protonix.      Home Medications   Prior to Admission medications    Medication Sig Start Date End Date Taking? Authorizing Provider   umeclidinium (INCRUSE ELLIPTA) 62.5 mcg/actuation inhaler Take 1 Puff by inhalation daily. Yes Phani Angulo MD   glipiZIDE (GLUCOTROL) 5 mg tablet take 1 tablet by mouth once daily 2/9/17  Yes Sekou Villafana MD   atorvastatin (LIPITOR) 10 mg tablet Take 1 Tab by mouth nightly. 2/9/17  Yes Sekou Villafana MD   labetalol (NORMODYNE) 200 mg tablet Take 1 Tab by mouth every eight (8) hours. 2/2/17  Yes Sekou Villafana MD   aspirin delayed-release 81 mg tablet take 2 tablets by mouth once daily 1/3/17  Yes Sekou Villafana MD   amLODIPine (NORVASC) 10 mg tablet Take 10 mg by mouth daily. 11/2/16  Yes Franklin Lino MD   ONETOUCH VERIO strip  6/30/16  Yes Historical Provider   ONE TOUCH DELICA 33 gauge misc  1/09/27  Yes Historical Provider   losartan-hydrochlorothiazide (HYZAAR) 100-25 mg per tablet Take 1 Tab by mouth daily. 7/12/16  Yes Historical Provider       Allergies  No Known Allergies    Past Medical History:   Diagnosis Date    Anemia     Arthritis     Calculus of kidney     Congestive heart failure (HCC)     Diabetes ([address]        Previous Hospitalization(s)  Per the patient, she was admitted at [facility] in 7/2016 where she received a blood transfusion. She does not remember the circumstance of why she required a blood transfusion or why she was admitted in the first place. Past Surgical History:   Procedure Laterality Date    HX ORTHOPAEDIC      left carpal tunnel       History reviewed. No pertinent family history. Social History  Social History     Social History    Marital status:      Spouse name: N/A    Number of children: N/A    Years of education: N/A     Occupational History    Not on file.      Social History Main Topics    Smoking status: Current Some Day Smoker    Smokeless tobacco: Never Used    Alcohol use No    Drug use: No    Sexual activity: Not on file     Other Topics Concern    Not on file     Social History Narrative       Alcohol history: Rarely (last drink <3 months ago)  Smoking history: Non-smoker. Patient denies tobacco use, but is listed in the chart that she is a some-day smoker. Illicit drug history: Not at all    Living arrangement: patient lives with their spouse. Ambulates: Independently     Review of Systems  Review of Systems   Constitutional: Negative for activity change, appetite change, chills, fatigue and fever. HENT: Positive for congestion (increased nasal congestion at night, not a new problem for patient. ). Respiratory: Negative for cough and shortness of breath. Cardiovascular: Negative for chest pain and palpitations. Gastrointestinal: Positive for abdominal pain (LUQ). Negative for abdominal distention, anal bleeding, blood in stool, constipation, diarrhea, nausea, rectal pain and vomiting. Genitourinary: Negative for dysuria, hematuria and vaginal bleeding. Musculoskeletal: Negative for arthralgias and myalgias. Neurological: Negative for dizziness, light-headedness, numbness and headaches. All other systems reviewed and are negative. Physical Exam  Objective:  General Appearance:  Comfortable, well-appearing, in no acute distress and not in pain. Vital signs: (most recent): Blood pressure 193/86, pulse 83, temperature 97.7 °F (36.5 °C), resp. rate 18, height 5' 1\" (1.549 m), weight 156 lb (70.8 kg), SpO2 98 %, not currently breastfeeding. No fever. Lungs:  Normal respiratory rate and normal effort. She is not in respiratory distress. Breath sounds clear to auscultation. No stridor. No wheezes, rales or decreased breath sounds. Heart: Normal rate. Regular rhythm. S1 normal and S2 normal.  No murmur, gallop or friction rub. Chest: No chest wall tenderness. Symmetric chest wall expansion. Extremities: Normal range of motion. There is no dependent edema. Abdomen: Abdomen is soft and non-distended. Bowel sounds are normal.   There is left upper quadrant (tenderness to deep palpation of LUQ) tenderness. There is not right upper quadrant, not right lower quadrant or not left lower quadrant tenderness. There is no rebound tenderness. There is no guarding. There is no mass. There is no splenomegaly. Pulses: Distal pulses are intact.         O2 Device: Room air     Laboratory Data  Recent Results (from the past 8 hour(s))   URINALYSIS W/ REFLEX CULTURE    Collection Time: 03/09/17  6:59 PM   Result Value Ref Range    Color YELLOW/STRAW      Appearance CLEAR CLEAR      Specific gravity 1.020 1.003 - 1.030      pH (UA) 6.0 5.0 - 8.0      Protein 100 (A) NEG mg/dL    Glucose NEGATIVE  NEG mg/dL    Ketone NEGATIVE  NEG mg/dL    Bilirubin NEGATIVE  NEG      Blood MODERATE (A) NEG      Urobilinogen 0.2 0.2 - 1.0 EU/dL    Nitrites NEGATIVE  NEG      Leukocyte Esterase TRACE (A) NEG      WBC 5-10 0 - 4 /hpf    RBC 0-5 0 - 5 /hpf    Epithelial cells MODERATE (A) FEW /lpf    Bacteria NEGATIVE  NEG /hpf    UA:UC IF INDICATED URINE CULTURE ORDERED (A) CNI     EKG, 12 LEAD, INITIAL    Collection Time: 03/09/17  7:13 PM   Result Value Ref Range    Ventricular Rate 78 BPM    Atrial Rate 78 BPM    P-R Interval 138 ms    QRS Duration 72 ms    Q-T Interval 370 ms    QTC Calculation (Bezet) 421 ms    Calculated P Axis 58 degrees    Calculated R Axis 34 degrees    Calculated T Axis 113 degrees    Diagnosis       Normal sinus rhythm  Abnormal QRS-T angle, consider primary T wave abnormality  Abnormal ECG  When compared with ECG of 03-APR-2016 14:28,  Nonspecific T wave abnormality no longer evident in Inferior leads  T wave inversion less evident in Lateral leads     CBC WITH AUTOMATED DIFF    Collection Time: 03/09/17  7:14 PM   Result Value Ref Range    WBC 4.9 3.6 - 11.0 K/uL    RBC 2.65 (L) 3.80 - 5.20 M/uL    HGB 7.2 (L) 11.5 - 16.0 g/dL    HCT 23.5 (L) 35.0 - 47.0 %    MCV 88.7 80.0 - 99.0 FL    MCH 27.2 26.0 - 34.0 PG MCHC 30.6 30.0 - 36.5 g/dL    RDW 13.0 11.5 - 14.5 %    PLATELET 407 551 - 321 K/uL    NEUTROPHILS 65 32 - 75 %    LYMPHOCYTES 19 12 - 49 %    MONOCYTES 13 5 - 13 %    EOSINOPHILS 3 0 - 7 %    BASOPHILS 0 0 - 1 %    ABS. NEUTROPHILS 3.1 1.8 - 8.0 K/UL    ABS. LYMPHOCYTES 0.9 0.8 - 3.5 K/UL    ABS. MONOCYTES 0.6 0.0 - 1.0 K/UL    ABS. EOSINOPHILS 0.2 0.0 - 0.4 K/UL    ABS. BASOPHILS 0.0 0.0 - 0.1 K/UL    DF AUTOMATED     METABOLIC PANEL, COMPREHENSIVE    Collection Time: 03/09/17  7:14 PM   Result Value Ref Range    Sodium 139 136 - 145 mmol/L    Potassium 4.7 3.5 - 5.1 mmol/L    Chloride 107 97 - 108 mmol/L    CO2 24 21 - 32 mmol/L    Anion gap 8 5 - 15 mmol/L    Glucose 86 65 - 100 mg/dL    BUN 55 (H) 6 - 20 MG/DL    Creatinine 2.76 (H) 0.55 - 1.02 MG/DL    BUN/Creatinine ratio 20 12 - 20      GFR est AA 21 (L) >60 ml/min/1.73m2    GFR est non-AA 18 (L) >60 ml/min/1.73m2    Calcium 8.5 8.5 - 10.1 MG/DL    Bilirubin, total 0.3 0.2 - 1.0 MG/DL    ALT (SGPT) 98 (H) 12 - 78 U/L    AST (SGOT) 41 (H) 15 - 37 U/L    Alk.  phosphatase 185 (H) 45 - 117 U/L    Protein, total 7.7 6.4 - 8.2 g/dL    Albumin 3.4 (L) 3.5 - 5.0 g/dL    Globulin 4.3 (H) 2.0 - 4.0 g/dL    A-G Ratio 0.8 (L) 1.1 - 2.2     LIPASE    Collection Time: 03/09/17  7:14 PM   Result Value Ref Range    Lipase 273 73 - 393 U/L   MAGNESIUM    Collection Time: 03/09/17  7:14 PM   Result Value Ref Range    Magnesium 1.9 1.6 - 2.4 mg/dL   TROPONIN I    Collection Time: 03/09/17  7:14 PM   Result Value Ref Range    Troponin-I, Qt. 0.06 <0.08 ng/mL   POC FECAL OCCULT BLOOD    Collection Time: 03/09/17  8:26 PM   Result Value Ref Range    Occult blood, stool (POC) POSITIVE (A) NEG     LACTIC ACID, PLASMA    Collection Time: 03/09/17  8:29 PM   Result Value Ref Range    Lactic acid 1.0 0.4 - 2.0 MMOL/L   GLUCOSE, POC    Collection Time: 03/09/17  9:37 PM   Result Value Ref Range    Glucose (POC) 87 65 - 100 mg/dL    Performed by Purvi Heath (PCT)        Imaging  CXR Results  (Last 48 hours)               03/09/17 1938  XR ABD ACUTE W 1 V CHEST Final result    Impression:  IMPRESSION: Clear lungs. Large volume of stool. Fluid-filled small bowel. Narrative:  ACUTE ABDOMINAL SERIES RADIOGRAPHS. 3/9/2017 7:38 PM       INDICATION: Abdominal pain. HISTORY (per electronic medical record): Left-sided abdominal pain, starting 2-3   weeks ago, and getting worse. No nausea, vomiting, or diarrhea. COMPARISON: 4/3/2016, 4/16/2014, CT abdomen pelvis 8/15/2013. TECHNIQUE: AP supine view/s of the abdomen. Upright AP view/s of the chest and   abdomen. FINDINGS: The lungs are clear and the central airways are patent. No   pneumothorax or pleural effusion. There is large volume of stool throughout the colon. The relative paucity of   small bowel gas is consistent with fluid-filled small bowel. Abdominal   radiographs are slightly underpenetrated secondary to patient body habitus. No   pneumoperitoneum. CT Results  (Last 48 hours)               03/09/17 2043  CT ABD PELV WO CONT Final result    Impression:  IMPRESSION:   No acute findings. Moderate stool. Cholelithiasis. No significant change. Narrative:  INDICATION: luq abd pain  for 3 weeks. COMPARISON: 8/15/2013       TECHNIQUE:    Thin axial images were obtained through the abdomen and pelvis. Coronal and   sagittal reconstructions were generated. Oral contrast was not administered. CT   dose reduction was achieved through use of a standardized protocol tailored for   this examination and automatic exposure control for dose modulation. Adaptive   statistical iterative reconstruction (ASIR) was utilized. The absence of intravenous contrast material reduces the sensitivity for   evaluation of the solid parenchymal organs of the abdomen. FINDINGS:    LUNG BASES: Clear. INCIDENTALLY IMAGED HEART AND MEDIASTINUM: Unremarkable. LIVER: No mass or biliary dilatation. GALLBLADDER: Cholelithiasis without apparent inflammation. SPLEEN: No mass. PANCREAS: No mass or ductal dilatation. ADRENALS: Unremarkable. KIDNEYS/URETERS: No mass, calculus, or hydronephrosis. STOMACH: Unremarkable. SMALL BOWEL: No dilatation or wall thickening. COLON: Moderate stool. No inflammation. APPENDIX: Not visualized. PERITONEUM: No ascites or pneumoperitoneum. RETROPERITONEUM: No lymphadenopathy or aortic aneurysm. REPRODUCTIVE ORGANS: Unremarkable. URINARY BLADDER: No mass or calculus. BONES: No destructive bone lesion. ADDITIONAL COMMENTS: N/A                   Assessment and Plan     Raoul Connors is a 62 y.o. female with known with known T2DM, CHF, CKD, and anemia who is admitted for suspected gastrointestinal bleeding. GI Bleed - patient presents with Hgb of 7.2, +FOBT, reported history of dark stools. Hgb rechecked on arrival to [facility], down to 7.0. The drop could be dilutional from 1L NS bolus, but there is evidence that this patient is bleeding with +FOBT. -Patient admitted to [facility], inpatient status.  -Consulting GI (Dr. Jessica Causey), appreciate recs. -2 large bore IVs  -IV protonix, 40mg BID  -Patient made NPO with meds only. -Type and Cross completed at [facility], will transfuse 1 unit pRBC now as patient's Hgb is falling and there is evidence that patient is bleeding. Acute blood loss anemia in the setting of GI bleed in Patient with Chronic Anemia-  Unsure of baseline Hgb. Last Hgb in our system prior to this presentation 10.7 (4/2016). There is a scanned letter from Dr. Don Leroy office were patient's Hgb was noted to be 8.9 in 10/2016. Patient's hemoglobin was noted to be 10 in 9/2016 on [address] [place] [address] discharge summary. Patient was admitted to [address] [place] [address] in 7/2016 for anemia 2/2 acute blood loss (gross hematuria) and iron deficiency. Patient's UA on initial presentation is still showing moderate hematuria.   Likely a combination of factors with known CKD, iron deficiency, hematuria, and acute GIB. -Management of acute GI bleed as above.  -Anemia lab studies to be drawn:  B12, folate, peripheral smear, LD, haptoglobin, reticulocyte count, ferritin, iron profile. Hematuria - patient admitted for symptomatic anemia in 7/2016 to [address] [place] [address] 2/2 iron deficiency and hematuria. Was noted to have gross hematuria during that admission, but not on today's admission. UA notable for moderate blood. Patient was not evaluated by urology on the admission at [address] [place] [address], but she was told to follow up outpatient. Unsure if she followed up. CT showed no abnormal bladder findings. Unsure of this patient is a smoker as she denies tobacco use, but it is listed in her chart that she is a some day smoker.  -Continue to monitor for gross hematuria.  -Will not consult urology at this time, will recommend and arrange outpatient follow up. Abdominal Pain/Constipation - patient's chief complaint was LUQ pain. Do not think that this is her gallbladder as it is in the wrong location (though the story of her pain worsening following greasy meals and her history of cholelithiasis fits). Lipase negative. CT and abdominal XR showing significant amount of stool in the abdomen. Patient reports having regular bowel movements (with last one occurring during day prior to presentation). -GI to follow during this hospitalization.  -Patient may need laxative/bowel stimulation, but not sure how feasible this is in the setting of acute GIB. Acute on Chronic Stage IV CKD - Creatinine on initial presentation ntoed to be 2.76 (GFR 21). Patient follows with nephrology (Dr. Aram Lee), and sees him every 6 months. Last creatinine in our system noted to be 1.10 in 4/2016. Scanned results letter from Dr. Wynn [place] office shows that the patient's creatinine was 1.84 in 10/2016. Discharge summary from 9/2016 from [address] [place] [address] showed a creatinine of 1.94. I suspect that the patient is above her baseline during this admission. This chronic problem is likely contributing to the patient's anemia as well. -Will trend creatinine with daily BMP to see if there is any improvement.  -Avoiding nephrotoxic agents. Holding hyzaar. Heart Failure with Reduced EF - Patient is followed by Dr. Mila Moore. Last echo in our system from 7/2016 (at [facility]). EF noted to be 45-50% with hypokinesis of the inferior and inferiorseptal myocardium. Mild MR present. Patient's physical exam on initial presentation noted clear lungs and no swelling in BLE. -Continue patient's home meds:  labetalol, amlodipine. Holding hyzaar 2/2 acute on chronic kidney disease.  -Will defer TTE on this admission since she has had one in the last year. Hypertension  - BP on admission up to 193/86. At this time, 155/69.  - Continuing home medications of labetalol, amlodipine. Holding hyzaar 2/2 acute on chronic kidney disease. Will give patient's evening dose of labetalol at this time (patient reports that she only takes labetalol BID though her MAR reports that she takes it Q8H). - Will continue to monitor at this time and readjust as BP's trend. Transaminitis - ALT noted to be significantly elevated on admission. AST only slightly elevated. Liver unremarkable on abdominal CT. -GI to follow patient.  -Daily CMP. Troponinemia - patient noted to have a troponin of 0.06 on admission. EKG was not performed. Not complaining of any chest pain. Likely 2/2 renal insufficency, but could be 2/2 demand ischemia in the setting of GIB. -Trending troponin Q6H with EKG. Diabetes Mellitus T2  - Last HgA1c 5.9 in 11/2016. Patient did have an elevated HbA1c of 6.9 in 9/2016 (on discharge summary from [address] [place] [address] where she was admitted for hypoglycemia). - Will plan to repeat HbA1c with morning lab draw. - Discontinued home medications of glipizide. - Insulin Sliding Scale normal sensitivity with AC&HS glucose checks. - Hypoglycemia protocols ordered.     Dyslipidemia - last lipid panel (11/2016): Total cholesterol 157, HDL 61, LDL 87, TG 46.  -Repeating lipid panel with morning labs. -Resume home Lipitor. FEN/GI - NPO with meds. Activity - Ambulate as tolerated  DVT prophylaxis - SCDs  GI prophylaxis - IV Protonix  Fall prophylaxis - Not indicated at this time. Disposition - Admit to Stepdown. Plan to d/c to TBD. Consulting PT/OT/CM. Code Status - Full, discussed with patient / caregivers. Patient Stone Galan will be discussed Dr. Jc Antoine.     12:18 AM, 03/10/17  Jamari Vo MD  Family Medicine Resident       For Billing    Chief Complaint   Patient presents with   [facility] Abdominal Pain       Hospital Problems  Date Reviewed: 2/9/2017          Codes Class Noted POA    GIB (gastrointestinal bleeding) ICD-10-CM: K92.2  ICD-9-CM: 578.9  3/9/2017 Unknown        Calculus of gallbladder without cholecystitis ICD-10-CM: K80.20  ICD-9-CM: 574.20  3/9/2017 Unknown

## 2017-03-10 NOTE — PROGRESS NOTES
[facility]: MED RECONCILIATION    Comments/Recommendations:    Patient is awake, alert, and knowledgeable about home medications    Reports compliance to prescribed regimen   Blood sugar  o Morning fasting blood sugar 129  o Evening blood sugar sugar 128  o Patient is familiar with the symptoms of hypoglycemia and is aware of how to treat if it occurs   Blood pressure  o Wednesday evening 147/74 - this is a usual reading for her    Medications added:     · Dorzolamide-timolol  · Acetaminophen/diphenhydramine    Medications removed:    · Incruse Ellipta - The patient did not find this helpful. She discussed with provider and the medication was stopped. Medications adjusted:    · Labetalol changed from every 8 hours to bid. The patient had a hard time fitting in the third dose every day so she discussed with her provider and decided to take it twice daily. Information obtained from: patient, EMR, Rx Query    Allergies: Review of patient's allergies indicates no known allergies. Prior to Admission Medications:     Medication Documentation Review Audit       Reviewed by Judge Miky PHARMD (Pharmacist) on 03/10/17 at 0817         Medication Sig Documenting Provider Last Dose Status Taking? ACETAMINOPHEN/DIPHENHYDRAMINE (TYLENOL PM PO) Take 2 Tabs by mouth nightly as needed (sleep). Historical Provider  Active Yes    amLODIPine (NORVASC) 10 mg tablet Take 10 mg by mouth daily. Aldo Walker MD 3/9/2017 1220 Active Yes             Med Note (Addison Shows   Fri Mar 10, 2017  8:14 AM): .      aspirin delayed-release 81 mg tablet take 2 tablets by mouth once daily Margaux Fong MD 3/9/2017 1220 Active Yes    atorvastatin (LIPITOR) 10 mg tablet Take 1 Tab by mouth nightly. Margaux Fong MD 3/9/2017 1220 Active Yes    dorzolamide-timolol (COSOPT) 22.3-6.8 mg/mL ophthalmic solution Administer 1 Drop to both eyes two (2) times a day.  Historical Provider 3/9/2017 Unknown time Active Yes    glipiZIDE (GLUCOTROL) 5 mg tablet take 1 tablet by mouth once daily Horace Haney MD 3/9/2017 1220 Active Yes    labetalol (NORMODYNE) 200 mg tablet Take 200 mg by mouth two (2) times a day. Historical Provider 3/9/2017 Unknown time Active Yes    losartan-hydrochlorothiazide (HYZAAR) 100-25 mg per tablet Take 1 Tab by mouth daily. Historical Provider 3/9/2017 1220 Active Yes             Med Note (Gretel Morton Mar 10, 2017  8:14 AM): Herlinda Toledo                     Thank you,    Kaity Williamson, PharmD, BCPS

## 2017-03-10 NOTE — PERIOP NOTES
Patient placed upper and lower dentures in denture cup; denture cup placed in belonging bag and given to patients daughter.

## 2017-03-10 NOTE — PROCEDURES
Darshana Delcid M.D. March 10, 2017    Esophagogastroduodenoscopy (EGD) Procedure Note  Osman Bruno  : 1955  Esteban Smyth Medical Record Number: [id number]      Indications:    Melena/hematochezia, acute blood loss anemia  Referring Physician:  Osmani Field MD  Anesthesia/Sedation: Conscious Sedation/Moderate Sedation  Endoscopist:  Dr. Yariel New:  The indications, risks, benefits and alternatives were reviewed with the patient or their decision maker who was provided an opportunity to ask questions and all questions were answered. The specific risks of esophagogastroduodenoscopy with conscious sedation were reviewed, including but not limited to anesthetic complication, bleeding, adverse drug reaction, missed lesion, infection, IV site reactions, and intestinal perforation which would lead to the need for surgical repair. Alternatives to EGD including radiographic imaging, observation without testing, or laboratory testing were reviewed as well as the limitations of those alternatives discussed. After considering the options and having all their questions answered, the patient or their decision maker provided both verbal and written consent to proceed. Procedure in Detail:  After obtaining informed consent, positioning of the patient in the left lateral decubitus position, and conduction of a pre-procedure pause or \"time out\" the endoscope was introduced into the mouth and advanced to the third portion duodenum. A careful inspection was made, and findings or interventions are described below.     Findings:   Esophagus:normal  Stomach: normal   Duodenum/jejunum: normal    Complications/estimated blood loss: none    Therapies:  none    Specimens: none           Recommendations:  -transfuse as needed  CT angio for repeat acute bleed  Colonoscopy for 3/13    Thank you for entrusting me with this patient's care. Please do not hesitate to contact me with any questions or if I can be of assistance with any of your other patients' GI needs.   Signed By: Madelyn Vu MD                        March 10, 2017

## 2017-03-10 NOTE — PROGRESS NOTES
Occupational therapy note:  Orders received, chart reviewed. Attempted to see patient for OT evaluation. Patient currently off the floor to endoscopy. Will follow up with patient as able. Nicole Villanueva MS OTR/L

## 2017-03-11 PROBLEM — K92.2 GI BLEED: Status: ACTIVE | Noted: 2017-03-11

## 2017-03-11 LAB
ALBUMIN SERPL BCP-MCNC: 3 G/DL (ref 3.5–5)
ALBUMIN/GLOB SERPL: 0.9 {RATIO} (ref 1.1–2.2)
ALP SERPL-CCNC: 163 U/L (ref 45–117)
ALT SERPL-CCNC: 71 U/L (ref 12–78)
ANION GAP BLD CALC-SCNC: 8 MMOL/L (ref 5–15)
AST SERPL W P-5'-P-CCNC: 23 U/L (ref 15–37)
BACTERIA SPEC CULT: NORMAL
BASOPHILS # BLD AUTO: 0 K/UL (ref 0–0.1)
BASOPHILS # BLD: 0 % (ref 0–1)
BILIRUB DIRECT SERPL-MCNC: 0.2 MG/DL (ref 0–0.2)
BILIRUB SERPL-MCNC: 0.5 MG/DL (ref 0.2–1)
BUN SERPL-MCNC: 45 MG/DL (ref 6–20)
BUN/CREAT SERPL: 19 (ref 12–20)
CALCIUM SERPL-MCNC: 8.1 MG/DL (ref 8.5–10.1)
CC UR VC: NORMAL
CHLORIDE SERPL-SCNC: 108 MMOL/L (ref 97–108)
CO2 SERPL-SCNC: 23 MMOL/L (ref 21–32)
CREAT SERPL-MCNC: 2.31 MG/DL (ref 0.55–1.02)
EOSINOPHIL # BLD: 0.2 K/UL (ref 0–0.4)
EOSINOPHIL NFR BLD: 3 % (ref 0–7)
ERYTHROCYTE [DISTWIDTH] IN BLOOD BY AUTOMATED COUNT: 15 % (ref 11.5–14.5)
GLOBULIN SER CALC-MCNC: 3.5 G/DL (ref 2–4)
GLUCOSE BLD STRIP.AUTO-MCNC: 103 MG/DL (ref 65–100)
GLUCOSE BLD STRIP.AUTO-MCNC: 112 MG/DL (ref 65–100)
GLUCOSE BLD STRIP.AUTO-MCNC: 147 MG/DL (ref 65–100)
GLUCOSE BLD STRIP.AUTO-MCNC: 214 MG/DL (ref 65–100)
GLUCOSE BLD STRIP.AUTO-MCNC: 74 MG/DL (ref 65–100)
GLUCOSE BLD STRIP.AUTO-MCNC: 82 MG/DL (ref 65–100)
GLUCOSE BLD STRIP.AUTO-MCNC: 97 MG/DL (ref 65–100)
GLUCOSE SERPL-MCNC: 67 MG/DL (ref 65–100)
HCT VFR BLD AUTO: 24.3 % (ref 35–47)
HCT VFR BLD AUTO: 24.7 % (ref 35–47)
HGB BLD-MCNC: 7.8 G/DL (ref 11.5–16)
HGB BLD-MCNC: 7.8 G/DL (ref 11.5–16)
LYMPHOCYTES # BLD AUTO: 16 % (ref 12–49)
LYMPHOCYTES # BLD: 1 K/UL (ref 0.8–3.5)
MCH RBC QN AUTO: 26.7 PG (ref 26–34)
MCHC RBC AUTO-ENTMCNC: 32.1 G/DL (ref 30–36.5)
MCV RBC AUTO: 83.2 FL (ref 80–99)
MONOCYTES # BLD: 0.5 K/UL (ref 0–1)
MONOCYTES NFR BLD AUTO: 8 % (ref 5–13)
NEUTS SEG # BLD: 4.7 K/UL (ref 1.8–8)
NEUTS SEG NFR BLD AUTO: 73 % (ref 32–75)
PLATELET # BLD AUTO: 160 K/UL (ref 150–400)
POTASSIUM SERPL-SCNC: 4.9 MMOL/L (ref 3.5–5.1)
PROT SERPL-MCNC: 6.5 G/DL (ref 6.4–8.2)
RBC # BLD AUTO: 2.92 M/UL (ref 3.8–5.2)
SERVICE CMNT-IMP: ABNORMAL
SERVICE CMNT-IMP: NORMAL
SODIUM SERPL-SCNC: 139 MMOL/L (ref 136–145)
TROPONIN I SERPL-MCNC: <0.04 NG/ML
WBC # BLD AUTO: 6.5 K/UL (ref 3.6–11)

## 2017-03-11 PROCEDURE — 85025 COMPLETE CBC W/AUTO DIFF WBC: CPT | Performed by: PHYSICIAN ASSISTANT

## 2017-03-11 PROCEDURE — 74011250636 HC RX REV CODE- 250/636: Performed by: FAMILY MEDICINE

## 2017-03-11 PROCEDURE — 36415 COLL VENOUS BLD VENIPUNCTURE: CPT | Performed by: PHYSICIAN ASSISTANT

## 2017-03-11 PROCEDURE — 74011250637 HC RX REV CODE- 250/637: Performed by: FAMILY MEDICINE

## 2017-03-11 PROCEDURE — 80048 BASIC METABOLIC PNL TOTAL CA: CPT | Performed by: PHYSICIAN ASSISTANT

## 2017-03-11 PROCEDURE — 74011250637 HC RX REV CODE- 250/637: Performed by: PHYSICIAN ASSISTANT

## 2017-03-11 PROCEDURE — 74011000250 HC RX REV CODE- 250: Performed by: FAMILY MEDICINE

## 2017-03-11 PROCEDURE — 99218 HC RM OBSERVATION: CPT

## 2017-03-11 PROCEDURE — 97165 OT EVAL LOW COMPLEX 30 MIN: CPT

## 2017-03-11 PROCEDURE — 84484 ASSAY OF TROPONIN QUANT: CPT

## 2017-03-11 PROCEDURE — 74011000250 HC RX REV CODE- 250: Performed by: INTERNAL MEDICINE

## 2017-03-11 PROCEDURE — 82962 GLUCOSE BLOOD TEST: CPT

## 2017-03-11 PROCEDURE — 80076 HEPATIC FUNCTION PANEL: CPT | Performed by: PHYSICIAN ASSISTANT

## 2017-03-11 PROCEDURE — 97535 SELF CARE MNGMENT TRAINING: CPT

## 2017-03-11 PROCEDURE — 65270000029 HC RM PRIVATE

## 2017-03-11 PROCEDURE — C9113 INJ PANTOPRAZOLE SODIUM, VIA: HCPCS | Performed by: FAMILY MEDICINE

## 2017-03-11 PROCEDURE — 85018 HEMOGLOBIN: CPT | Performed by: FAMILY MEDICINE

## 2017-03-11 PROCEDURE — 77030032490 HC SLV COMPR SCD KNE COVD -B

## 2017-03-11 RX ADMIN — ATORVASTATIN CALCIUM 10 MG: 10 TABLET, FILM COATED ORAL at 21:40

## 2017-03-11 RX ADMIN — AMLODIPINE BESYLATE 10 MG: 5 TABLET ORAL at 09:40

## 2017-03-11 RX ADMIN — SODIUM CHLORIDE 40 MG: 9 INJECTION INTRAMUSCULAR; INTRAVENOUS; SUBCUTANEOUS at 21:40

## 2017-03-11 RX ADMIN — SODIUM CHLORIDE 40 MG: 9 INJECTION INTRAMUSCULAR; INTRAVENOUS; SUBCUTANEOUS at 09:40

## 2017-03-11 RX ADMIN — POLYETHYLENE GLYCOL 3350 17 G: 17 POWDER, FOR SOLUTION ORAL at 09:40

## 2017-03-11 RX ADMIN — INSULIN LISPRO 3 UNITS: 100 INJECTION, SOLUTION INTRAVENOUS; SUBCUTANEOUS at 11:54

## 2017-03-11 RX ADMIN — LABETALOL HCL 200 MG: 200 TABLET, FILM COATED ORAL at 17:23

## 2017-03-11 RX ADMIN — Medication 10 ML: at 21:41

## 2017-03-11 RX ADMIN — Medication 10 ML: at 06:35

## 2017-03-11 RX ADMIN — LABETALOL HCL 200 MG: 200 TABLET, FILM COATED ORAL at 09:40

## 2017-03-11 RX ADMIN — Medication 5 ML: at 14:00

## 2017-03-11 NOTE — PROGRESS NOTES
Primary Nurse Shona Alarcon and Trent De Oliveira, RN performed a dual skin assessment on this patient No impairment noted  Chandler score is 22

## 2017-03-11 NOTE — PROGRESS NOTES
Pt has been ambulating independently and states she is at her baseline mobility. Pt was instructed in B LE exercises for DVT prevention. Pt has no further need for skilled PT at this time.

## 2017-03-11 NOTE — PROGRESS NOTES
0720 - Bedside and Verbal shift change report given to Donato Moreno (oncoming nurse) by Burak Mcintosh (offgoing nurse). Report included the following information SBAR, Kardex, Intake/Output, Accordion, Recent Results and Cardiac Rhythm NSR. Pt awake in bed. Respirations even and unlabored on RA. No acute distress noted. 46 - Family practice in room. Inquired re: scheduled Q6 trops with EKGs. Per family practice, will d/c.    [phone] - Per pt., Dr. Hermann Johnson rounded and told pt she is not to start clear liquid diet or the colyte today and that pt is supposed to have solid diet today. GI paged for clarification. 1056 - Rec'd call back from Dr. Twin Spain, GI on call. Updated MD that pt scheduled for colonoscopy on Monday. MD acknowledged and ordered to start clear liquid diet tomorrow (3/12), change diet to regular diet, and cancel colyte today and re-start tomorrow. Orders RB&V.    1910 - Bedside and Verbal shift change report given to Nandini López (oncoming nurse) by [place] (offgoing nurse).  Report included the following information SBAR, Kardex, Intake/Output, Accordion, Recent Results and Cardiac Rhythm SR.

## 2017-03-11 NOTE — PROGRESS NOTES
Occupational Therapy EVALUATION/discharge  Patient: Phillip Pickard (20 y.o. female)  Date: 3/11/2017  Primary Diagnosis: GIB (gastrointestinal bleeding)  Upper GI Bleed  GIB (gastrointestinal bleeding)  GI bleed  Procedure(s) (LRB):  ESOPHAGOGASTRODUODENOSCOPY (EGD) (N/A) 1 Day Post-Op   Precautions:   Fall    ASSESSMENT:   Based on the objective data described below, the patient presents with admission for GIB, POD #1 EGD with pending colonoscopy on 3/13. Patient presents alert and oriented, VSS. BUE ROM and strength WDL, noted left sided pain increased with functional task mobility and forward trunk flexion. Patient completes bed mobility and functional task mobility at baseline independent, <> bathroom and <> commode with use of grab bar for balance mod I. Family present and supportive. Further intervention from OT not indicated, patient demonstrates good safety awareness and understanding of safety techniques. .  Skilled acute occupational therapy is completed at this time. Discharge Recommendations: None  Further Equipment Recommendations for Discharge: none      SUBJECTIVE:   Patient stated I stay on the go most of the time.     OBJECTIVE DATA SUMMARY:   HISTORY:   Past Medical History:   Diagnosis Date    Anemia     Arthritis     Calculus of kidney     Congestive heart failure (HCC)     Diabetes (HCC)     Hematuria      Past Surgical History:   Procedure Laterality Date    HX ORTHOPAEDIC      left carpal tunnel    HX TUBAL LIGATION Bilateral        Prior Level of Function/Home Situation: I with ADLS and IADLS  Expanded or extensive additional review of patient history:     Home Situation  Home Environment: Private residence  # Steps to Enter: 2  Rails to Enter: No  One/Two Story Residence: One story  Living Alone: No  Support Systems: Family member(s)  Patient Expects to be Discharged to[de-identified] Private residence  Current DME Used/Available at Home: [employer]ut[employer] Al, rolling, Wheelchair, Grab bars  Tub or Skoovy0 [employer] Type: Shower  [x]  Right hand dominant   []  Left hand dominant    EXAMINATION OF PERFORMANCE DEFICITS:  Cognitive/Behavioral Status:  Neurologic State: Alert  Orientation Level: Appropriate for age;Oriented X4  Cognition: Appropriate decision making; Appropriate for age attention/concentration; Appropriate safety awareness; Follows commands  Perception: Appears intact  Perseveration: No perseveration noted  Safety/Judgement: Awareness of environment; Insight into deficits  Skin: appears intact UE  Edema: none noted UE  Hearing: Auditory  Auditory Impairment: None  Vision/Perceptual:    Tracking: Able to track stimulus in all quadrants w/o difficulty                 Diplopia: No    Acuity: Within Defined Limits;Able to read clock/calendar on wall without difficulty    Corrective Lenses: Glasses  Range of Motion:    AROM: Within functional limits  PROM: Within functional limits                    Strength:    Strength: Within functional limits              Coordination:  Coordination: Within functional limits  Fine Motor Skills-Upper: Left Intact; Right Intact    Gross Motor Skills-Upper: Left Intact; Right Intact  Tone & Sensation:    Tone: Normal  Sensation: Intact                      Balance:  Sitting: Intact; Without support  Standing: Intact; Without support    Functional Mobility and Transfers for ADLs:  Bed Mobility:  Rolling: Independent  Supine to Sit: Modified independent (use of bedrail)  Sit to Supine: Independent  Scooting: Independent    Transfers:  Sit to Stand: Independent  Stand to Sit: Independent  Bed to Chair: Independent  Toilet Transfer : Independent    ADL Assessment:  Feeding: Setup    Oral Facial Hygiene/Grooming: Independent (in standing at sink)    Bathing: Independent    Upper Body Dressing: Independent    Lower Body Dressing: Minimum assistance (limited forward trunk flexion with left sided pain)    Toileting: Modified independent (use of grab bar)                ADL Intervention and task modifications:    Patient instructed to don all clothing while sitting prior to standing, doff all clothing to knees while standing, then sit to doff clothing off from knees to feet in order to facilitate fall prevention, pain management, and energy conservation with ADLS. Patient indicated understanding/recalled strategies with min instruction. Patient instructed and indicated understanding the benefits of maintaining activity tolerance, functional mobility, and independence with self care tasks during acute stay to ensure safe return home and to baseline. Encouraged patient to increase frequency and duration OOB, be out of bed for all meals, perform daily ADLs (as approved by RN/MD regarding bathing etc), and performing functional mobility to/from bathroom. Cognitive Retraining  Safety/Judgement: Awareness of environment; Insight into deficits    Functional Measure:  Barthel  G codes: In compliance with CMSs Claims Based Outcome Reporting, the following G-code set was chosen for this patient based on their primary functional limitation being treated: The outcome measure chosen to determine the severity of the functional limitation was the Barthel with a score of 95/100 which was correlated with the impairment scale. ?  Other PT/OT Primary Functional Limitations:     - CURRENT STATUS: CI - 1%-19% impaired, limited or restricted    - GOAL STATUS: CI - 1%-19% impaired, limited or restricted    - D/C STATUS:  CI - 1%-19% impaired, limited or restricted     Occupational Therapy Evaluation Charge Determination   History Examination Decision-Making   LOW Complexity : Brief history review  LOW Complexity : 1-3 performance deficits relating to physical, cognitive , or psychosocial skils that result in activity limitations and / or participation restrictions  LOW Complexity : No comorbidities that affect functional and no verbal or physical assistance needed to complete eval tasks       Based on the above components, the patient evaluation is determined to be of the following complexity level: LOW   Pain:  Pain Scale 1: Numeric (0 - 10)  Pain Intensity 1: 0              Activity Tolerance:   Patient completed multiple sit to stand transfers, mobility around room and bathroom   in completion of ADLS with no LOB or break  Please refer to the flowsheet for vital signs taken during this treatment. After treatment:   []  Patient left in no apparent distress sitting up in chair  [x]  Patient left in no apparent distress in bed  [x]  Call bell left within reach  [x]  Nursing notified  [x]  Caregiver present  []  Bed alarm activated    COMMUNICATION/EDUCATION:   Communication/Collaboration:  [x]      Home safety education was provided and the patient/caregiver indicated understanding. [x]      Patient/family have participated as able and agree with findings and recommendations. []      Patient is unable to participate in plan of care at this time.   Findings and recommendations were discussed with: Physical Therapist, Registered Nurse and     Robi Duggan OT  Time Calculation: 30 mins

## 2017-03-11 NOTE — PROGRESS NOTES
Bedside, Verbal and  shift change report given to NIYA Francis.SAMPSON (oncoming nurse) by Angel Tate (offgoing nurse). Report included the following information SBAR, Kardex, Intake/Output, MAR, Recent Results and Cardiac Rhythm Sinus Rhythm.

## 2017-03-11 NOTE — PROGRESS NOTES
[id number] PROGRAM  PROGRESS NOTE     3/11/2017  PCP: Lucy Seth MD     Assessment/Plan:   Vicky Washington is a 62 y.o. female with known with known T2DM, CHF, CKD, and anemia who is admitted for suspected gastrointestinal bleeding.     GI Bleed - Acute blood loss anemia in the setting of GI bleed in patient with chronic anemia. Anemia likely multifactorial, possibly related to renal insufficiency as well. Patient with Hgb of 6.7, +FOBT, reported history of dark stools. Treated with 1 unit of pRBC. S/P upper endoscopy which was unrevealing. -GI (Dr. Cecy Nguyen) following. Colonoscopy scheduled for Monday.  -Daily CBC.     Acute on Chronic Stage 2 CKD - Improving  -Continue gentle hydration  -Avoiding nephrotoxic agents. Holding hyzaar.     Heart Failure with Reduced EF - Patient is followed by Dr. Charlene Trevino. Last echo in our system from 7/2016 (at [facility]). EF noted to be 45-50% with hypokinesis of the inferior and inferiorseptal myocardium. Mild MR present.  -Continue patient's home meds: labetalol, amlodipine. Holding hyzaar 2/2 acute on chronic kidney disease.     Hypertension 163/70  - Continuing home medications of labetalol, amlodipine. Holding hyzaar 2/2 acute on chronic kidney disease.  - Will continue to monitor at this time and readjust as BP's trend.     Transaminitis - Resolved. Liver unremarkable on abdominal CT. Likely medication induced  -GI to follow patient.  -Daily CMP.    Troponinemia -  Resolved. Likely 2/2 renal insufficency, but could be 2/2 demand ischemia in the setting of GIB. -Troponin negative X3      Diabetes Mellitus T2: Last HgA1c 5.9 in 11/2016, repeated yesterday and was <3.5.   - Discontinued home medications of glipizide. - Insulin Sliding Scale normal sensitivity with AC&HS glucose checks. - Hypoglycemia protocols ordered.     Dyslipidemia - last lipid panel (3/17):  Total cholesterol 125, HDL 71, LDL 48.6, TG 27  -Resume home Lipitor.     FEN/GI - CLD  Activity - Ambulate as tolerated  DVT prophylaxis - SCDs  GI prophylaxis - IV Protonix  Disposition -Plan to d/c to TBD. Consulting PT/OT/CM. Code Status - Full, discussed with patient / caregivers. Subjective:       Pt was seen and examined at bedside. Had low blood glucose overnight, but 112 this morning. Patient has no abdominal pain or blood in stool. Reports flatus. Denies chills, headaches, chest pain, shortness of breath, palpitations, abdominal pain, nausea and vomiting, and LE edema. Tolerating CLD diet. Allergies:  No Known Allergies    Objective:   Physical examination  Visit Vitals    /75 (BP 1 Location: Right arm)    Pulse 91    Temp 98 °F (36.7 °C)    Resp 18    Ht 5' 1\" (1.549 m)    Wt 162 lb 3.2 oz (73.6 kg)    SpO2 97%    Breastfeeding No    BMI 30.65 kg/m2      Temp (24hrs), Av.6 °F (37 °C), Min:98 °F (36.7 °C), Max:99.6 °F (37.6 °C)     O2 Flow Rate (L/min): 3 l/min   O2 Device: Room air      Intake/Output Summary (Last 24 hours) at 17 1029  Last data filed at 17 0634   Gross per 24 hour   Intake              460 ml   Output              675 ml   Net             -215 ml     Last shift:       Last 3 shifts:     1901 -  0700  In: 760 [P.O.:360; I.V.:100]  Out: 1375 [Urine:1375]      General Appearance: Comfortable, well-appearing, in no acute distress and not in pain. Lungs: Normal respiratory rate and normal effort. She is not in respiratory distress. Breath sounds clear to auscultation. No stridor. No wheezes, rales or decreased breath sounds. Heart: Normal rate. Regular rhythm. S1 normal and S2 normal. No murmur, gallop or friction rub. Chest: No chest wall tenderness. Symmetric chest wall expansion. Extremities: Normal range of motion. There is no dependent edema. Abdomen: Abdomen is soft and non-distended. Bowel sounds are normal. There is no tenderness. There is no rebound tenderness. There is no guarding.  There is no mass. There is no splenomegaly. Pulses: Distal pulses are intact. Data Review:     Recent Labs      03/11/17   0543  03/11/17   0105  03/10/17   1421   03/10/17   0329   03/09/17   1914   WBC   --   6.5   --    --   5.3   --   4.9   HGB  7.8*  7.8*  8.4*   < >  6.7*   < >  7.2*   HCT  24.7*  24.3*  26.2*   < >  21.8*   < >  23.5*   PLT   --   160   --    --   161   --   159    < > = values in this interval not displayed.      Recent Labs      03/11/17   0105  03/10/17   1421  03/10/17   0329  03/09/17   1914   NA  139   --   141  139   K  4.9   --   4.3  4.7   CL  108   --   112*  107   CO2  23   --   22  24   GLU  67   --   83  86   BUN  45*   --   47*  55*   CREA  2.31*   --   2.38*  2.76*   CA  8.1*   --   7.9*  8.5   MG   --    --    --   1.9   ALB  3.0*   --   2.9*  3.4*   SGOT  23   --   33  41*   ALT  71   --   87*  98*   INR   --   1.0   --    --      Medications reviewed  Current Facility-Administered Medications   Medication Dose Route Frequency    amLODIPine (NORVASC) tablet 10 mg  10 mg Oral DAILY    labetalol (NORMODYNE) tablet 200 mg  200 mg Oral BID    sodium chloride (NS) flush 5-10 mL  5-10 mL IntraVENous Q8H    sodium chloride (NS) flush 5-10 mL  5-10 mL IntraVENous PRN    insulin lispro (HUMALOG) injection   SubCUTAneous AC&HS    glucose chewable tablet 16 g  4 Tab Oral PRN    dextrose (D50W) injection syrg 12.5-25 g  12.5-25 g IntraVENous PRN    glucagon (GLUCAGEN) injection 1 mg  1 mg IntraMUSCular PRN    atorvastatin (LIPITOR) tablet 10 mg  10 mg Oral QHS    0.9% sodium chloride infusion 250 mL  250 mL IntraVENous PRN    polyethylene glycol (MIRALAX) packet 17 g  17 g Oral DAILY    [START ON 3/12/2017] peg 3350-electrolytes (COLYTE) 4000 mL  4,000 mL Oral ONCE    hydrALAZINE (APRESOLINE) 20 mg/mL injection 10 mg  10 mg IntraVENous Q6H PRN    0.9% sodium chloride infusion 250 mL  250 mL IntraVENous PRN    pantoprazole (PROTONIX) 40 mg in sodium chloride 0.9 % 10 mL injection 40 mg IntraVENous Q12H       Imaging:  EGD:  Findings:   Esophagus:normal  Stomach: normal   Duodenum/jejunum: normal      Signed:    Celsa Peraza DO   Resident, Family Medicine

## 2017-03-11 NOTE — PROGRESS NOTES
SHIFT CHANGE REPORT:  1 Verbal report received from [facility]  Report consisted of patients Situation, Background, Assessment and    Recommendations(SBAR), Kardex, and Rhythm. Opportunity for questions and clarification was provided.       SHIFT SUMMARY:  2100 Tech reported HS BS of 208.  2129 2 units given for BS of 208 2200 Inquired about H&H continuing to be q4, FP resident stated to check H&H with routine AM labs. 3467 FP called requesting H&H to be drawn now and to keep H&H q4.  0105 H&H, CBC, and BMP drawn and sent. 0155 Labs have resulted, glucose read 67.  0157 accucheck performed read 74, 4oz orange juice given. Rechecked, pt BS 82. Will monitor. Patient resting comfortably no complaints. END OF SHIFT REPORT:  0330 Bedside shift change report given to One Jeanette George (oncoming nurse) by Apoorva Callejas RN (offgoing nurse).  Report included the following information SBAR, Kardex, Intake/Output, MAR, Recent Results and Cardiac Rhythm SR.

## 2017-03-11 NOTE — PROGRESS NOTES
Bedside shift change report given to Molly RN (oncoming nurse) by Natalio Louis RN (offgoing nurse). Report included the following information SBAR, Kardex, Intake/Output, MAR, Recent Results and Cardiac Rhythm NSR.

## 2017-03-11 NOTE — PROGRESS NOTES
Hanna Berrios M.D.  [phone]           GI PROGRESS NOTE        NAME: Trey Carlin   :  1955   MRN:  [id number]       Subjective:   Denies any complaints, reports feeling good, tolerating solid food. Denies any bleeding. Objective: In NAD      VITALS:   Last 24hrs VS reviewed since prior progress note. Most recent are:  Visit Vitals    /75 (BP 1 Location: Right arm)    Pulse 91    Temp 98 °F (36.7 °C)    Resp 18    Ht 5' 1\" (1.549 m)    Wt 73.6 kg (162 lb 3.2 oz)    SpO2 97%    Breastfeeding No    BMI 30.65 kg/m2       Intake/Output Summary (Last 24 hours) at 17 1024  Last data filed at 17 8028   Gross per 24 hour   Intake              460 ml   Output              675 ml   Net             -215 ml       PHYSICAL EXAM:  General: Alert, in no acute distress    HEENT: Anicteric sclerae. Lungs:            CTA Bilaterally. Heart:  Regular  rhythm,    Abdomen: Soft, Non distended, Non tender.  (+)Bowel sounds, no HSM  Extremities: No c/c/e  Neurologic:  CN 2-12 gi, Alert and oriented X 3. No acute neurological distress   Psych:   Good insight. Not anxious nor agitated. Lab Data Reviewed:   Recent Labs      17   0543  03/11/17   0105   03/10/17   0329   WBC   --   6.5   --   5.3   HGB  7.8*  7.8*   < >  6.7*   HCT  24.7*  24.3*   < >  21.8*   PLT   --   160   --   161    < > = values in this interval not displayed.      Recent Labs      03/11/17   0105  03/10/17   0329   NA  139  141   K  4.9  4.3   CL  108  112*   CO2  23  22   BUN  45*  47*   CREA  2.31*  2.38*   GLU  67  83   CA  8.1*  7.9*     Recent Labs      03/11/17   0105  03/10/17   0329  03/09/17   1914   SGOT  23  33  41*   AP  163*  154*  185*   TP  6.5  6.6  7.7   ALB  3.0*  2.9*  3.4*   GLOB  3.5  3.7  4.3*   LPSE   --    --   273       ________________________________________________________________________  Patient Active Problem List   Diagnosis Code    Diabetes mellitus type 2 with complications (HCC) Q40.5    Essential hypertension I10    Anemia D64.9    GIB (gastrointestinal bleeding) K92.2    Calculus of gallbladder without cholecystitis K80.20         Assessment and Plan:  Anemia with occult blood loss in the stools, S/P upper endoscopy which was unrevealing. Plan for colonoscopy on Monday by Dr. Love Burnette. Anemia likely multifactorial, possibly related to renal insufficiency as well. Liver enzymes slowly improving, likely medication induced. Continue to monitor. Please call us if needed over the weekend.        Signed By: Denny Romero MD     3/11/2017  10:24 AM

## 2017-03-12 LAB
ALBUMIN SERPL BCP-MCNC: 2.8 G/DL (ref 3.5–5)
ALBUMIN/GLOB SERPL: 0.7 {RATIO} (ref 1.1–2.2)
ALP SERPL-CCNC: 147 U/L (ref 45–117)
ALT SERPL-CCNC: 54 U/L (ref 12–78)
ANION GAP BLD CALC-SCNC: 7 MMOL/L (ref 5–15)
AST SERPL W P-5'-P-CCNC: 19 U/L (ref 15–37)
BASOPHILS # BLD AUTO: 0 K/UL (ref 0–0.1)
BASOPHILS # BLD: 0 % (ref 0–1)
BILIRUB SERPL-MCNC: 0.3 MG/DL (ref 0.2–1)
BUN SERPL-MCNC: 40 MG/DL (ref 6–20)
BUN/CREAT SERPL: 17 (ref 12–20)
CALCIUM SERPL-MCNC: 8.2 MG/DL (ref 8.5–10.1)
CHLORIDE SERPL-SCNC: 109 MMOL/L (ref 97–108)
CO2 SERPL-SCNC: 24 MMOL/L (ref 21–32)
CREAT SERPL-MCNC: 2.31 MG/DL (ref 0.55–1.02)
EOSINOPHIL # BLD: 0.2 K/UL (ref 0–0.4)
EOSINOPHIL NFR BLD: 4 % (ref 0–7)
ERYTHROCYTE [DISTWIDTH] IN BLOOD BY AUTOMATED COUNT: 14.7 % (ref 11.5–14.5)
GLOBULIN SER CALC-MCNC: 3.8 G/DL (ref 2–4)
GLUCOSE BLD STRIP.AUTO-MCNC: 102 MG/DL (ref 65–100)
GLUCOSE BLD STRIP.AUTO-MCNC: 108 MG/DL (ref 65–100)
GLUCOSE BLD STRIP.AUTO-MCNC: 149 MG/DL (ref 65–100)
GLUCOSE BLD STRIP.AUTO-MCNC: 260 MG/DL (ref 65–100)
GLUCOSE SERPL-MCNC: 122 MG/DL (ref 65–100)
HCT VFR BLD AUTO: 24.1 % (ref 35–47)
HGB BLD-MCNC: 7.8 G/DL (ref 11.5–16)
LYMPHOCYTES # BLD AUTO: 22 % (ref 12–49)
LYMPHOCYTES # BLD: 1.3 K/UL (ref 0.8–3.5)
MCH RBC QN AUTO: 27.3 PG (ref 26–34)
MCHC RBC AUTO-ENTMCNC: 32.4 G/DL (ref 30–36.5)
MCV RBC AUTO: 84.3 FL (ref 80–99)
MONOCYTES # BLD: 0.7 K/UL (ref 0–1)
MONOCYTES NFR BLD AUTO: 11 % (ref 5–13)
NEUTS SEG # BLD: 3.6 K/UL (ref 1.8–8)
NEUTS SEG NFR BLD AUTO: 63 % (ref 32–75)
PLATELET # BLD AUTO: 146 K/UL (ref 150–400)
POTASSIUM SERPL-SCNC: 4.6 MMOL/L (ref 3.5–5.1)
PROT SERPL-MCNC: 6.6 G/DL (ref 6.4–8.2)
RBC # BLD AUTO: 2.86 M/UL (ref 3.8–5.2)
SERVICE CMNT-IMP: ABNORMAL
SODIUM SERPL-SCNC: 140 MMOL/L (ref 136–145)
WBC # BLD AUTO: 5.7 K/UL (ref 3.6–11)

## 2017-03-12 PROCEDURE — 85025 COMPLETE CBC W/AUTO DIFF WBC: CPT | Performed by: FAMILY MEDICINE

## 2017-03-12 PROCEDURE — 80053 COMPREHEN METABOLIC PANEL: CPT | Performed by: FAMILY MEDICINE

## 2017-03-12 PROCEDURE — 74011250636 HC RX REV CODE- 250/636: Performed by: FAMILY MEDICINE

## 2017-03-12 PROCEDURE — 74011000250 HC RX REV CODE- 250: Performed by: INTERNAL MEDICINE

## 2017-03-12 PROCEDURE — 74011000250 HC RX REV CODE- 250: Performed by: FAMILY MEDICINE

## 2017-03-12 PROCEDURE — 89055 LEUKOCYTE ASSESSMENT FECAL: CPT | Performed by: FAMILY MEDICINE

## 2017-03-12 PROCEDURE — C9113 INJ PANTOPRAZOLE SODIUM, VIA: HCPCS | Performed by: FAMILY MEDICINE

## 2017-03-12 PROCEDURE — 65270000029 HC RM PRIVATE

## 2017-03-12 PROCEDURE — 82962 GLUCOSE BLOOD TEST: CPT

## 2017-03-12 PROCEDURE — 74011250637 HC RX REV CODE- 250/637: Performed by: FAMILY MEDICINE

## 2017-03-12 PROCEDURE — 36415 COLL VENOUS BLD VENIPUNCTURE: CPT | Performed by: FAMILY MEDICINE

## 2017-03-12 PROCEDURE — 87177 OVA AND PARASITES SMEARS: CPT

## 2017-03-12 PROCEDURE — 74011250637 HC RX REV CODE- 250/637: Performed by: PHYSICIAN ASSISTANT

## 2017-03-12 RX ORDER — LABETALOL 200 MG/1
200 TABLET, FILM COATED ORAL 3 TIMES DAILY
Status: DISCONTINUED | OUTPATIENT
Start: 2017-03-12 | End: 2017-03-13 | Stop reason: HOSPADM

## 2017-03-12 RX ADMIN — INSULIN LISPRO 2 UNITS: 100 INJECTION, SOLUTION INTRAVENOUS; SUBCUTANEOUS at 16:30

## 2017-03-12 RX ADMIN — POLYETHYLENE GLYCOL-3350 AND ELECTROLYTES 4000 ML: 236; 6.74; 5.86; 2.97; 22.74 POWDER, FOR SOLUTION ORAL at 09:00

## 2017-03-12 RX ADMIN — INSULIN LISPRO 5 UNITS: 100 INJECTION, SOLUTION INTRAVENOUS; SUBCUTANEOUS at 11:30

## 2017-03-12 RX ADMIN — Medication 10 ML: at 06:42

## 2017-03-12 RX ADMIN — ATORVASTATIN CALCIUM 10 MG: 10 TABLET, FILM COATED ORAL at 22:40

## 2017-03-12 RX ADMIN — HYDRALAZINE HYDROCHLORIDE 10 MG: 20 INJECTION INTRAMUSCULAR; INTRAVENOUS at 09:31

## 2017-03-12 RX ADMIN — SODIUM CHLORIDE 40 MG: 9 INJECTION INTRAMUSCULAR; INTRAVENOUS; SUBCUTANEOUS at 08:04

## 2017-03-12 RX ADMIN — LABETALOL HCL 200 MG: 200 TABLET, FILM COATED ORAL at 17:08

## 2017-03-12 RX ADMIN — SODIUM CHLORIDE 40 MG: 9 INJECTION INTRAMUSCULAR; INTRAVENOUS; SUBCUTANEOUS at 22:40

## 2017-03-12 RX ADMIN — LABETALOL HCL 200 MG: 200 TABLET, FILM COATED ORAL at 22:40

## 2017-03-12 RX ADMIN — Medication 5 ML: at 14:00

## 2017-03-12 RX ADMIN — LABETALOL HCL 200 MG: 200 TABLET, FILM COATED ORAL at 08:04

## 2017-03-12 RX ADMIN — Medication 10 ML: at 22:40

## 2017-03-12 RX ADMIN — POLYETHYLENE GLYCOL 3350 17 G: 17 POWDER, FOR SOLUTION ORAL at 08:04

## 2017-03-12 RX ADMIN — AMLODIPINE BESYLATE 10 MG: 5 TABLET ORAL at 08:04

## 2017-03-12 NOTE — PROGRESS NOTES
6228 - Bedside and Verbal shift change report given to Donato Moreno (oncoming nurse) by Tiffanie Tyson (offgoing nurse). Report included the following information SBAR, Kardex, Intake/Output, Accordion, Recent Results and Cardiac Rhythm SR. Pt sleeping in bed. Respirations even and unlabored on RA. No acute distress noted. 1915 - Bedside and Verbal shift change report given to Taras BRADEN (oncoming nurse) by [place] (offgoing nurse). Report included the following information SBAR, Kardex, Intake/Output, Accordion, Recent Results and Cardiac Rhythm NSR.

## 2017-03-12 NOTE — PROGRESS NOTES
[address] [facility] PROGRAM  PROGRESS NOTE     3/12/2017  PCP: Felicitas Matthew MD     Assessment/Plan:   Tobias Dubon is a 62 y.o. female with known with known T2DM, CHF, CKD, and anemia who is admitted for suspected gastrointestinal bleeding.     GI Bleed - Acute blood loss anemia in the setting of GI bleed in patient with chronic anemia. Anemia likely multifactorial, possibly related to renal insufficiency as well. Patient with Hgb of 6.7, +FOBT, reported history of dark stools. Treated with 1 unit of pRBC. S/P upper endoscopy which was unrevealing. -GI (Dr. Kaity Brady) following. Colonoscopy scheduled for Monday.  -Daily CBC.  - Consider advancing diet as tolerated.       Acute on Chronic Stage 2 CKD - Improving  - gentle IVF  -Avoiding nephrotoxic agents. Holding hyzaar.     Heart Failure with Reduced EF - Patient is followed by Dr. Fletcher Tadeo. Last echo in our system from 7/2016 (at [address]). EF noted to be 45-50% with hypokinesis of the inferior and inferiorseptal myocardium. Mild MR present.  -Continue patient's home meds: labetalol, amlodipine. Holding hyzaar 2/2 acute on chronic kidney disease.     Hypertension   - Continuing home medications of labetalol, amlodipine. Holding hyzaar 2/2 acute on chronic kidney disease.  - consider adding hydralazine 10 tid  - Will continue to monitor at this time and readjust as BP's trend.     Transaminitis - Resolved. Liver unremarkable on abdominal CT. Likely medication induced  -GI to follow patient.  -Daily CMP.    Troponinemia -  Resolved. Likely 2/2 renal insufficency, but could be 2/2 demand ischemia in the setting of GIB. -Troponin negative X3      Diabetes Mellitus T2: Last HgA1c 5.9 in 11/2016, A1c on 03/10: <3.5  - Discontinued home glipizide. - Insulin Sliding Scale normal sensitivity with AC&HS glucose checks. - Hypoglycemia protocols ordered.     Dyslipidemia - last lipid panel (3/17):  Total cholesterol 125, HDL 71, LDL 48.6, TG 27  -Resume home Lipitor.     FEN/GI - CLD  Activity - Ambulate as tolerated  DVT prophylaxis - SCDs  GI prophylaxis - IV Protonix  Disposition -Plan to d/c to TBD. Consulting PT/OT/CM. Code Status - Full, discussed with patient / caregivers. Subjective:       Pt was seen and examined at bedside. Patient has no abdominal pain, no bowel movement in 4 days or blood in stool. .     Denies chills, headaches, chest pain, shortness of breath, palpitations, abdominal pain, nausea and vomiting, and LE edema. Tolerating CLD diet. Allergies:  No Known Allergies    Objective:   Physical examination  Visit Vitals    /73 (BP 1 Location: Left arm, BP Patient Position: At rest)    Pulse 80    Temp 97.8 °F (36.6 °C)    Resp 16    Ht 5' 1\" (1.549 m)    Wt 160 lb 14.4 oz (73 kg)    SpO2 96%    Breastfeeding No    BMI 30.4 kg/m2      Temp (24hrs), Av.1 °F (36.7 °C), Min:97.8 °F (36.6 °C), Max:98.5 °F (36.9 °C)     O2 Flow Rate (L/min): 3 l/min   O2 Device: Room air      Intake/Output Summary (Last 24 hours) at 17 0549  Last data filed at 17 1812   Gross per 24 hour   Intake              864 ml   Output              550 ml   Net              314 ml     Last shift:       Last 3 shifts:    03/10 0701 -  1900  In: 2598 [P.O.:1104; I.V.:100]  Out: 1225 [Urine:1225]      General Appearance: Comfortable, well-appearing, in no acute distress and not in pain. Lungs: Normal respiratory rate and normal effort. She is not in respiratory distress. Breath sounds clear to auscultation. No stridor. No wheezes, rales or decreased breath sounds. Heart: Normal rate. Regular rhythm. S1 normal and S2 normal. No murmur, gallop or friction rub. Chest: No chest wall tenderness. Symmetric chest wall expansion. Extremities: Normal range of motion. There is no dependent edema. Abdomen: Abdomen is soft and non-distended. Bowel sounds are normal. There is no tenderness. There is no rebound tenderness. There is no guarding. There is no mass. There is no splenomegaly. Pulses: Distal pulses are intact. Data Review:     Recent Labs      03/12/17 0339 03/11/17   0543  03/11/17   0105   03/10/17   0329   WBC  5.7   --   6.5   --   5.3   HGB  7.8*  7.8*  7.8*   < >  6.7*   HCT  24.1*  24.7*  24.3*   < >  21.8*   PLT  146*   --   160   --   161    < > = values in this interval not displayed.      Recent Labs      03/12/17   0339  03/11/17   0105  03/10/17   1421  03/10/17   0329  03/09/17   1914   NA  140  139   --   141  139   K  4.6  4.9   --   4.3  4.7   CL  109*  108   --   112*  107   CO2  24  23   --   22  24   GLU  122*  67   --   83  86   BUN  40*  45*   --   47*  55*   CREA  2.31*  2.31*   --   2.38*  2.76*   CA  8.2*  8.1*   --   7.9*  8.5   MG   --    --    --    --   1.9   ALB  2.8*  3.0*   --   2.9*  3.4*   SGOT  19  23   --   33  41*   ALT  54  71   --   87*  98*   INR   --    --   1.0   --    --      Medications reviewed  Current Facility-Administered Medications   Medication Dose Route Frequency    amLODIPine (NORVASC) tablet 10 mg  10 mg Oral DAILY    labetalol (NORMODYNE) tablet 200 mg  200 mg Oral BID    sodium chloride (NS) flush 5-10 mL  5-10 mL IntraVENous Q8H    sodium chloride (NS) flush 5-10 mL  5-10 mL IntraVENous PRN    insulin lispro (HUMALOG) injection   SubCUTAneous AC&HS    glucose chewable tablet 16 g  4 Tab Oral PRN    dextrose (D50W) injection syrg 12.5-25 g  12.5-25 g IntraVENous PRN    glucagon (GLUCAGEN) injection 1 mg  1 mg IntraMUSCular PRN    atorvastatin (LIPITOR) tablet 10 mg  10 mg Oral QHS    0.9% sodium chloride infusion 250 mL  250 mL IntraVENous PRN    polyethylene glycol (MIRALAX) packet 17 g  17 g Oral DAILY    peg 3350-electrolytes (COLYTE) 4000 mL  4,000 mL Oral ONCE    hydrALAZINE (APRESOLINE) 20 mg/mL injection 10 mg  10 mg IntraVENous Q6H PRN    0.9% sodium chloride infusion 250 mL  250 mL IntraVENous PRN    pantoprazole (PROTONIX) 40 mg in sodium chloride 0.9 % 10 mL injection  40 mg IntraVENous Q12H       Imaging:  EGD:  Findings:   Esophagus:normal  Stomach: normal   Duodenum/jejunum: normal      Signed:   Amber Joseph MD   Resident, Family Medicine

## 2017-03-12 NOTE — PROGRESS NOTES
Bedside and Verbal shift change report given to [address] [place] [address] Pkwy (oncoming nurse) by Leonora Solano (offgoing nurse). Report included the following information SBAR, Kardex, MAR, Recent Results and Cardiac Rhythm NSR. Stool collected for OVA and parasites    Patient continues to drink bowel prep for colonoscopy tomorrow. Bowel prep completed. Patient ready to go down to ENDO. Bedside and Verbal shift change report given to Holley Arriaza RN (oncoming nurse) by Mina Fry RN (offgoing nurse). Report included the following information SBAR, Kardex, MAR, Recent Results and Cardiac Rhythm NSR.

## 2017-03-12 NOTE — PROGRESS NOTES
The beginning of Daylight Saving Time occurred at 0200 hrs.  Documentation of patient care and medications administered is done with respect to the time change.

## 2017-03-12 NOTE — PROGRESS NOTES
Bedside and Verbal shift change report given to [employer] (oncoming nurse) by Marilee Pickens (offgoing nurse). Report included the following information SBAR, Kardex and MAR.

## 2017-03-13 ENCOUNTER — ANESTHESIA EVENT (OUTPATIENT)
Dept: ENDOSCOPY | Age: 62
DRG: 378 | End: 2017-03-13
Payer: COMMERCIAL

## 2017-03-13 ENCOUNTER — ANESTHESIA (OUTPATIENT)
Dept: ENDOSCOPY | Age: 62
DRG: 378 | End: 2017-03-13
Payer: COMMERCIAL

## 2017-03-13 VITALS
WEIGHT: 162.04 LBS | HEART RATE: 85 BPM | RESPIRATION RATE: 18 BRPM | HEIGHT: 61 IN | DIASTOLIC BLOOD PRESSURE: 84 MMHG | OXYGEN SATURATION: 97 % | TEMPERATURE: 98.2 F | SYSTOLIC BLOOD PRESSURE: 178 MMHG | BODY MASS INDEX: 30.59 KG/M2

## 2017-03-13 LAB
ALBUMIN SERPL BCP-MCNC: 3.1 G/DL (ref 3.5–5)
ALBUMIN/GLOB SERPL: 0.8 {RATIO} (ref 1.1–2.2)
ALP SERPL-CCNC: 148 U/L (ref 45–117)
ALT SERPL-CCNC: 51 U/L (ref 12–78)
ANION GAP BLD CALC-SCNC: 12 MMOL/L (ref 5–15)
AST SERPL W P-5'-P-CCNC: 21 U/L (ref 15–37)
BASOPHILS # BLD AUTO: 0 K/UL (ref 0–0.1)
BASOPHILS # BLD: 0 % (ref 0–1)
BILIRUB SERPL-MCNC: 0.4 MG/DL (ref 0.2–1)
BUN SERPL-MCNC: 36 MG/DL (ref 6–20)
BUN/CREAT SERPL: 17 (ref 12–20)
CALCIUM SERPL-MCNC: 8.1 MG/DL (ref 8.5–10.1)
CHLORIDE SERPL-SCNC: 107 MMOL/L (ref 97–108)
CO2 SERPL-SCNC: 23 MMOL/L (ref 21–32)
CREAT SERPL-MCNC: 2.11 MG/DL (ref 0.55–1.02)
EOSINOPHIL # BLD: 0.3 K/UL (ref 0–0.4)
EOSINOPHIL NFR BLD: 4 % (ref 0–7)
ERYTHROCYTE [DISTWIDTH] IN BLOOD BY AUTOMATED COUNT: 14.5 % (ref 11.5–14.5)
GLOBULIN SER CALC-MCNC: 3.9 G/DL (ref 2–4)
GLUCOSE BLD STRIP.AUTO-MCNC: 105 MG/DL (ref 65–100)
GLUCOSE BLD STRIP.AUTO-MCNC: 119 MG/DL (ref 65–100)
GLUCOSE BLD STRIP.AUTO-MCNC: 93 MG/DL (ref 65–100)
GLUCOSE SERPL-MCNC: 87 MG/DL (ref 65–100)
HCT VFR BLD AUTO: 25.4 % (ref 35–47)
HGB BLD-MCNC: 7.8 G/DL (ref 11.5–16)
LYMPHOCYTES # BLD AUTO: 18 % (ref 12–49)
LYMPHOCYTES # BLD: 1.1 K/UL (ref 0.8–3.5)
MCH RBC QN AUTO: 25.7 PG (ref 26–34)
MCHC RBC AUTO-ENTMCNC: 30.7 G/DL (ref 30–36.5)
MCV RBC AUTO: 83.8 FL (ref 80–99)
MONOCYTES # BLD: 0.7 K/UL (ref 0–1)
MONOCYTES NFR BLD AUTO: 12 % (ref 5–13)
NEUTS SEG # BLD: 4.1 K/UL (ref 1.8–8)
NEUTS SEG NFR BLD AUTO: 66 % (ref 32–75)
PLATELET # BLD AUTO: 140 K/UL (ref 150–400)
POTASSIUM SERPL-SCNC: 4.2 MMOL/L (ref 3.5–5.1)
PROT SERPL-MCNC: 7 G/DL (ref 6.4–8.2)
RBC # BLD AUTO: 3.03 M/UL (ref 3.8–5.2)
SERVICE CMNT-IMP: ABNORMAL
SERVICE CMNT-IMP: ABNORMAL
SERVICE CMNT-IMP: NORMAL
SODIUM SERPL-SCNC: 142 MMOL/L (ref 136–145)
WBC # BLD AUTO: 6.2 K/UL (ref 3.6–11)
WBC #/AREA STL HPF: NORMAL /HPF (ref 0–4)

## 2017-03-13 PROCEDURE — 85025 COMPLETE CBC W/AUTO DIFF WBC: CPT | Performed by: FAMILY MEDICINE

## 2017-03-13 PROCEDURE — 76060000031 HC ANESTHESIA FIRST 0.5 HR: Performed by: INTERNAL MEDICINE

## 2017-03-13 PROCEDURE — 74011250637 HC RX REV CODE- 250/637: Performed by: FAMILY MEDICINE

## 2017-03-13 PROCEDURE — 74011250636 HC RX REV CODE- 250/636: Performed by: FAMILY MEDICINE

## 2017-03-13 PROCEDURE — 36415 COLL VENOUS BLD VENIPUNCTURE: CPT | Performed by: FAMILY MEDICINE

## 2017-03-13 PROCEDURE — 0DJD8ZZ INSPECTION OF LOWER INTESTINAL TRACT, VIA NATURAL OR ARTIFICIAL OPENING ENDOSCOPIC: ICD-10-PCS | Performed by: INTERNAL MEDICINE

## 2017-03-13 PROCEDURE — 74011000250 HC RX REV CODE- 250: Performed by: FAMILY MEDICINE

## 2017-03-13 PROCEDURE — 74011250636 HC RX REV CODE- 250/636

## 2017-03-13 PROCEDURE — 82962 GLUCOSE BLOOD TEST: CPT

## 2017-03-13 PROCEDURE — C9113 INJ PANTOPRAZOLE SODIUM, VIA: HCPCS | Performed by: FAMILY MEDICINE

## 2017-03-13 PROCEDURE — 80053 COMPREHEN METABOLIC PANEL: CPT | Performed by: FAMILY MEDICINE

## 2017-03-13 PROCEDURE — 76040000019: Performed by: INTERNAL MEDICINE

## 2017-03-13 RX ORDER — METRONIDAZOLE 500 MG/1
500 TABLET ORAL 3 TIMES DAILY
Qty: 42 TAB | Refills: 0 | Status: SHIPPED | OUTPATIENT
Start: 2017-03-13 | End: 2017-03-13

## 2017-03-13 RX ORDER — CIPROFLOXACIN 500 MG/1
500 TABLET ORAL 2 TIMES DAILY
Qty: 28 TAB | Refills: 0 | Status: SHIPPED | OUTPATIENT
Start: 2017-03-13 | End: 2017-03-13

## 2017-03-13 RX ORDER — LOSARTAN POTASSIUM 50 MG/1
100 TABLET ORAL DAILY
Status: DISCONTINUED | OUTPATIENT
Start: 2017-03-13 | End: 2017-03-13 | Stop reason: HOSPADM

## 2017-03-13 RX ORDER — LABETALOL 200 MG/1
200 TABLET, FILM COATED ORAL 2 TIMES DAILY
Qty: 60 TAB | Refills: 0 | OUTPATIENT
Start: 2017-03-13 | End: 2017-03-13

## 2017-03-13 RX ORDER — CIPROFLOXACIN 500 MG/1
500 TABLET ORAL 2 TIMES DAILY
Qty: 28 TAB | Refills: 0 | OUTPATIENT
Start: 2017-03-13 | End: 2017-03-13

## 2017-03-13 RX ORDER — SULFAMETHOXAZOLE AND TRIMETHOPRIM 800; 160 MG/1; MG/1
1 TABLET ORAL 2 TIMES DAILY
Qty: 28 TAB | Refills: 0 | Status: SHIPPED | OUTPATIENT
Start: 2017-03-13 | End: 2017-03-13

## 2017-03-13 RX ORDER — LOSARTAN POTASSIUM 100 MG/1
100 TABLET ORAL DAILY
Qty: 30 TAB | Refills: 0 | Status: SHIPPED | OUTPATIENT
Start: 2017-03-13 | End: 2017-08-29 | Stop reason: SDUPTHER

## 2017-03-13 RX ORDER — PROPOFOL 10 MG/ML
INJECTION, EMULSION INTRAVENOUS
Status: DISCONTINUED | OUTPATIENT
Start: 2017-03-13 | End: 2017-03-13 | Stop reason: HOSPADM

## 2017-03-13 RX ORDER — AMOXICILLIN AND CLAVULANATE POTASSIUM 500; 125 MG/1; MG/1
1 TABLET, FILM COATED ORAL EVERY 12 HOURS
Qty: 28 TAB | Refills: 0 | Status: SHIPPED | OUTPATIENT
Start: 2017-03-13 | End: 2017-03-29

## 2017-03-13 RX ORDER — PROPOFOL 10 MG/ML
INJECTION, EMULSION INTRAVENOUS AS NEEDED
Status: DISCONTINUED | OUTPATIENT
Start: 2017-03-13 | End: 2017-03-13 | Stop reason: HOSPADM

## 2017-03-13 RX ORDER — LABETALOL 200 MG/1
200 TABLET, FILM COATED ORAL 2 TIMES DAILY
Qty: 60 TAB | Refills: 0 | Status: SHIPPED | OUTPATIENT
Start: 2017-03-13 | End: 2017-03-13

## 2017-03-13 RX ORDER — METRONIDAZOLE 500 MG/1
500 TABLET ORAL 3 TIMES DAILY
Qty: 42 TAB | Refills: 0 | OUTPATIENT
Start: 2017-03-13 | End: 2017-03-13

## 2017-03-13 RX ORDER — LOSARTAN POTASSIUM 100 MG/1
100 TABLET ORAL DAILY
Qty: 30 TAB | Refills: 0 | OUTPATIENT
Start: 2017-03-13 | End: 2017-03-13

## 2017-03-13 RX ADMIN — LABETALOL HCL 200 MG: 200 TABLET, FILM COATED ORAL at 09:05

## 2017-03-13 RX ADMIN — SODIUM CHLORIDE 40 MG: 9 INJECTION INTRAMUSCULAR; INTRAVENOUS; SUBCUTANEOUS at 09:00

## 2017-03-13 RX ADMIN — AMLODIPINE BESYLATE 10 MG: 5 TABLET ORAL at 09:05

## 2017-03-13 RX ADMIN — PROPOFOL 30 MG: 10 INJECTION, EMULSION INTRAVENOUS at 11:55

## 2017-03-13 RX ADMIN — Medication 10 ML: at 05:08

## 2017-03-13 RX ADMIN — LOSARTAN POTASSIUM 100 MG: 50 TABLET, FILM COATED ORAL at 13:58

## 2017-03-13 RX ADMIN — PROPOFOL 120 MCG/KG/MIN: 10 INJECTION, EMULSION INTRAVENOUS at 11:47

## 2017-03-13 RX ADMIN — Medication 10 ML: at 14:00

## 2017-03-13 RX ADMIN — PROPOFOL 70 MG: 10 INJECTION, EMULSION INTRAVENOUS at 11:47

## 2017-03-13 NOTE — PERIOP NOTES
Report from Dr. Constance Ferris See anesthesia record. ABD remains soft and non-tender post procedure. Pt has no complaints at this time and tolerated the procedure well.

## 2017-03-13 NOTE — PERIOP NOTES
TRANSFER - OUT REPORT:    Verbal report given to Chidi Herrmann RN (name) on Vicky Washington  being transferred to [facility] (unit) for ordered procedure       Report consisted of patients Situation, Background, Assessment and   Recommendations(SBAR). Information from the following report(s) SBAR, Procedure Summary and Recent Results was reviewed with the receiving nurse. Lines:   Peripheral IV 03/09/17 Right Antecubital (Active)   Site Assessment Clean, dry, & intact 3/13/2017  7:26 AM   Phlebitis Assessment 0 3/13/2017  7:26 AM   Infiltration Assessment 0 3/13/2017  7:26 AM   Dressing Status Clean, dry, & intact 3/13/2017  7:26 AM   Dressing Type Transparent;Tape 3/13/2017  7:26 AM   Hub Color/Line Status Green;Capped 3/13/2017  7:26 AM   Action Taken Open ports on tubing capped 3/13/2017  4:00 AM   Alcohol Cap Used Yes 3/13/2017  7:26 AM        Opportunity for questions and clarification was provided.       Patient transported with:   [employer]

## 2017-03-13 NOTE — DISCHARGE INSTRUCTIONS
HOME DISCHARGE INSTRUCTIONS    Chandrakant Mckeon / [id number] : 1955    Admission date: 3/9/2017 Discharge date: 3/12/2017 10:28 PM     Please bring this form with you to show your care provider at your follow-up appointment. Primary care provider:  Hugo Noble MD    Discharging provider: Jocelin Molina DO  - Family Medicine Resident  Dr. Araseli Urrutia MD - Family Medicine Attending      FINAL DIAGNOSES:  GI bleed  . . . . . . . . . . . . . . . . . . . . . . . . . . . . . . . . . . . . . . . . . . . . . . . . . . . . . . . . . . . . . . . . . . . . . . . Mica Lundberg FOLLOW-UP CARE RECOMMENDATIONS:    Appointments  · Follow-up with: Follow-up Information     Follow up With Details Comments [address], [place] Schedule an appointment as soon as possible for a visit in 2 days  [address] [place] [address]  [phone]      ·   Please stop Hyzaar, as medication may hurt your kidney function  · Start Losartan (cozaar)    Follow-up with PCP this week. Please check Hemoglobin level, Blood pressure, and kidney function    Please take antibiotic for 2 weeks and then follow-up with GI outpatient    Please let you physician know if you see any blood in your stool. Follow-up tests needed: CBC, Cr, K    Pending test results: At the time of your discharge the following test results are still pending: Stool studies. Please make sure you review these results with your outpatient follow-up provider(s). Specific symptoms to watch for: chest pain, shortness of breath, fever, chills, nausea, vomiting, diarrhea, change in mentation, falling, weakness, bleeding. DIET/what to eat:  Regular Diet    ACTIVITY:  Activity as tolerated    Wound care: None    Equipment needed:  None    What to do if new or unexpected symptoms occur?     If you experience any of the above symptoms (or should other concerns or questions arise after discharge) please call your primary care physician. Return to the emergency room if you cannot get hold of your doctor. · It is very important that you keep your follow-up appointment(s). · Please bring discharge papers, medication list (and/or medication bottles) to your follow-up appointments for review by your outpatient provider(s). · Please check the list of medications and be sure it includes every medication (even non-prescription medications) that your provider wants you to take. · It is important that you take the medication exactly as they are prescribed. · Keep your medication in the bottles provided by the pharmacist and keep a list of the medication names, dosages, and times to be taken in your wallet. · Do not take other medications without consulting your doctor. · If you have any questions about your medications or other instructions, please talk to your nurse or care provider before you leave the hospital.     Information obtained by:     I understand that if any problems occur once I am at home I am to contact my physician. These instructions were explained to me and I had the opportunity to ask questions. I understand and acknowledge receipt of the instructions indicated above.                                                                                                                                                Physician's or R.N.'s Signature                                                                  Date/Time                                                                                                                                              Patient or Representative Signature                                                          Date/Time

## 2017-03-13 NOTE — DISCHARGE SUMMARY
[facility]                                                                                DISCHARGE SUMMARY     Patient: Beryle Hart  MRN: [id number]  YOB: 1955  Age: 62 y.o. Date of admission:  3/9/2017  Date of discharge:  3/13/2017  Primary care provider:  Margaux Fong MD   Admitting provider:  Al Kim MD    Discharging provider(s): Erika Saini DO - Family Medicine Resident  Dr. Deborah Vargas MD -  Family Medicine Attending      Consultations:  [address] TO GASTROENTEROLOGY    Procedures:  · Colonoscopy    Discharge destination: Home. The patient is stable for discharge. Admission diagnosis:  · GIB (gastrointestinal bleeding)    HPI:   Beryle Hart is a 62 y.o. female with known T2DM, CHF, CKD, and anemia who presents to the [facility] ED complaining of LUQ abdominal pain. The patient reports that she has been experiencing this pain (which she describes as \"stabbing\") for the past 3 weeks or so now. She reports that the pain is worse after eating a fatty/greasy meal. The pain is also reported to be worse in the middle of the night. She is concerned about her gallbladder being the etiology for this pain as she was told that she has gallstones in the past. She has tried to treat her pain with tylenol, but has not found much relief from this. She denies any history of blood in her stool, but does report that her stools have been very dark (almost black) for the past month or so now. The patient denies any other symptoms besides some increased nasal congestion at night. No fever/chills, nausea/vomitting/diarrhea, constipation (last BM was on day of presentation), dysuria/hematuria, chest pain, sob.     In the ER, vital signs were remarkable for elevated BP (up to 193/86)--improved on arrival to [facility].  Labs were remarkable for Hgb of 7.2 (was last 10.7 in 4/2016) and 7.0 on recheck on arrival to [facility] (5 hours later), creatinine of 2.76 (unsure of baseline), ALT of 98, AST of 41, troponin I of 0.06, and +FOBT. Abdominal Acute Series XR showed normal chest, large volume of stool, fluid filled small bowel. CT abdomen/pelvis showed no acute findings, moderate stool, and cholelithiasis. Pt was treated with a GI cocktail, 1L NS bolus, and IV protonix. Final discharge diagnoses and brief hospital course:   GI Bleed - Acute blood loss anemia in the setting of GI bleed in patient with chronic anemia. As pt had Hgb of 6.7, +FOBT, and reported history of dark stools. Treated with 1 unit of pRBC. S/p upper endoscopy which was unrevealing. Colonoscopy showed colonic diverticultis; while no bleeding was noted at that time, likely the bleed source. -GI consult ok with discharge. Pt placed on Augmentin for 2 weeks. Pt was allergic to Ciprofloxacin. Bactrim dosing needed to treat were going to interact with Losartan increasing pt's chances of HyperK. Discussed with pharmacist and Augmentin 500 mg was renally appropriate for this pt.  -Pt will follow-up outpatient after.  -Script and precautions given      Acute on Chronic Stage 2 CKD - Cr baseline approx 1.9. Improving with gentle IVF (hx of CHF). Held Hyzaar.  -Hold Hyzaar at home, as it contains HCTZ. Started pt on Losartan 100 mg daily. -F/u with PCP to check kidney function, if ok can discontinue Losartan and resume Hyzaar for BP control      Heart Failure with Reduced EF - Patient is followed by Dr. Evre Chapman. Last echo in our system from 7/2016 (at [address] [facility]). EF noted to be 45-50% with hypokinesis of the inferior and inferiorseptal myocardium. Mild MR present.  -Continue patient's home meds: labetalol, amlodipine.  -Hold Hyzaar at home, as it contains HCTZ. Started pt on Losartan 100 mg daily.   -F/u with PCP to check kidney function, if ok can discontinue Losartan and resume Hyzaar for BP control       Hypertension   - Continuing home medications of labetalol (200 mg BID), amlodipine. Hold Hyzaar 2/2 acute on chronic kidney disease.  -Hold Hyzaar at home, as it contains HCTZ. Started pt on Losartan 100 mg daily. -F/u with PCP to check kidney function, if ok can discontinue Losartan and resume Hyzaar for BP control      Diabetes Mellitus T2: Last HgA1c 5.9 in 11/2016, A1c on 03/10: <3.5  - Discontinued home glipizide. - Insulin Sliding Scale normal sensitivity with AC&HS glucose checks. - Hypoglycemia protocols ordered.      Dyslipidemia - last lipid panel (3/17): Total cholesterol 125, HDL 71, LDL 48.6, TG 27  -Resume home Lipitor. Follow-up Cares:   · Pending LABS at the time of Discharge: None  · Labs Need to Repeat by PCP: Cr, BP, Hgb    Follow-up Information     Follow up With Details Comments [address], [place] Go on 3/15/2017 Hospital follow-up . 10:30 am [address]  [phone]      Mandie Marte MD Schedule an appointment as soon as possible for a visit in 2 weeks [facility] follow-up [address]  [phone]              Physical Examination at Discharge:     Visit Vitals    /84    Pulse 85    Temp 98.2 °F (36.8 °C)    Resp 18    Ht 5' 1\" (1.549 m)    Wt 162 lb 0.6 oz (73.5 kg)    SpO2 97%    Breastfeeding No    BMI 30.62 kg/m2       General Appearance: Comfortable, well-appearing, in no acute distress and not in pain. Lungs: Normal respiratory rate and normal effort. She is not in respiratory distress. Breath sounds clear to auscultation. No stridor. No wheezes, rales or decreased breath sounds. Heart: Normal rate. Regular rhythm. S1 normal and S2 normal. No murmur, gallop or friction rub. Chest: No chest wall tenderness. Symmetric chest wall expansion. Extremities: Normal range of motion. There is no dependent edema. Abdomen: Abdomen is soft and non-distended. Bowel sounds are normal. There is no tenderness.  There is no rebound tenderness. There is no guarding. There is no mass. There is no splenomegaly. Pulses: Distal pulses are intact. Pertinent Imaging Studies:         Xr Abd Acute W 1 V Chest    Result Date: 3/9/2017  ACUTE ABDOMINAL SERIES RADIOGRAPHS. 3/9/2017 7:38 PM INDICATION: Abdominal pain. HISTORY (per electronic medical record): Left-sided abdominal pain, starting 2-3 weeks ago, and getting worse. No nausea, vomiting, or diarrhea. COMPARISON: 4/3/2016, 4/16/2014, CT abdomen pelvis 8/15/2013. TECHNIQUE: AP supine view/s of the abdomen. Upright AP view/s of the chest and abdomen. FINDINGS: The lungs are clear and the central airways are patent. No pneumothorax or pleural effusion. There is large volume of stool throughout the colon. The relative paucity of small bowel gas is consistent with fluid-filled small bowel. Abdominal radiographs are slightly underpenetrated secondary to patient body habitus. No pneumoperitoneum. IMPRESSION: Clear lungs. Large volume of stool. Fluid-filled small bowel. Ct Abd Pelv Wo Cont    Result Date: 3/9/2017  INDICATION: luq abd pain  for 3 weeks. COMPARISON: 8/15/2013 TECHNIQUE: Thin axial images were obtained through the abdomen and pelvis. Coronal and sagittal reconstructions were generated. Oral contrast was not administered. CT dose reduction was achieved through use of a standardized protocol tailored for this examination and automatic exposure control for dose modulation. Adaptive statistical iterative reconstruction (ASIR) was utilized. The absence of intravenous contrast material reduces the sensitivity for evaluation of the solid parenchymal organs of the abdomen. FINDINGS: LUNG BASES: Clear. INCIDENTALLY IMAGED HEART AND MEDIASTINUM: Unremarkable. LIVER: No mass or biliary dilatation. GALLBLADDER: Cholelithiasis without apparent inflammation. SPLEEN: No mass. PANCREAS: No mass or ductal dilatation. ADRENALS: Unremarkable.  KIDNEYS/URETERS: No mass, calculus, or hydronephrosis. STOMACH: Unremarkable. SMALL BOWEL: No dilatation or wall thickening. COLON: Moderate stool. No inflammation. APPENDIX: Not visualized. PERITONEUM: No ascites or pneumoperitoneum. RETROPERITONEUM: No lymphadenopathy or aortic aneurysm. REPRODUCTIVE ORGANS: Unremarkable. URINARY BLADDER: No mass or calculus. BONES: No destructive bone lesion. ADDITIONAL COMMENTS: N/A     IMPRESSION: No acute findings. Moderate stool. Cholelithiasis. No significant change.     ---------------------------------    Discharge Medications:      Current Discharge Medication List      START taking these medications    Details   losartan (COZAAR) 100 mg tablet Take 1 Tab by mouth daily. Qty: 30 Tab, Refills: 0      amoxicillin-clavulanate (AUGMENTIN) 500-125 mg per tablet Take 1 Tab by mouth every twelve (12) hours for 14 days. Qty: 28 Tab, Refills: 0         CONTINUE these medications which have NOT CHANGED    Details   ACETAMINOPHEN/DIPHENHYDRAMINE (TYLENOL PM PO) Take 2 Tabs by mouth nightly as needed (sleep). labetalol (NORMODYNE) 200 mg tablet Take 200 mg by mouth two (2) times a day. dorzolamide-timolol (COSOPT) 22.3-6.8 mg/mL ophthalmic solution Administer 1 Drop to both eyes two (2) times a day. glipiZIDE (GLUCOTROL) 5 mg tablet take 1 tablet by mouth once daily  Qty: 30 Tab, Refills: 5      atorvastatin (LIPITOR) 10 mg tablet Take 1 Tab by mouth nightly. Qty: 30 Tab, Refills: 5      aspirin delayed-release 81 mg tablet take 2 tablets by mouth once daily  Qty: 60 Tab, Refills: 3      amLODIPine (NORVASC) 10 mg tablet Take 10 mg by mouth daily.   Refills: 0         STOP taking these medications       losartan-hydrochlorothiazide (HYZAAR) 100-25 mg per tablet Comments:   Reason for Stopping:               Chronic Diagnoses:    Problem List as of 3/13/2017  Date Reviewed: 2/9/2017          Codes Class Noted - Resolved    GI bleed ICD-10-CM: K92.2  ICD-9-CM: 578.9  3/11/2017 - Present        GIB (gastrointestinal bleeding) ICD-10-CM: K92.2  ICD-9-CM: 578.9  3/9/2017 - Present        Calculus of gallbladder without cholecystitis ICD-10-CM: K80.20  ICD-9-CM: 574.20  3/9/2017 - Present        Diabetes mellitus type 2 with complications ([facility] FIU-00-AY: E11.8  ICD-9-CM: 250.90  7/28/2016 - Present        Essential hypertension ICD-10-CM: I10  ICD-9-CM: 401.9  7/28/2016 - Present        Anemia ICD-10-CM: D64.9  ICD-9-CM: 285.9  7/28/2016 - Present              Signed: Quintin Juan DO   Family Medicine Resident      3/13/2017   10:12 PM     Cc: Krysten Schmidt MD   I saw and evaluated the patient, performing the key elements of the service. I discussed the findings, assessment and plan with the resident and agree with the resident's findings and plan as documented in the resident's note. Pt seen and examined.   Reports no complaints  Abdomen:soft,nttp  A/p Gi bleed  Ok to discharge if ok with GI  vss-afebrile

## 2017-03-13 NOTE — ANESTHESIA POSTPROCEDURE EVALUATION
Post-Anesthesia Evaluation and Assessment    Patient: Melissa Carrel MRN: [id number]  SSN: [id number]    YOB: 1955  Age: 62 y.o. Sex: female       Cardiovascular Function/Vital Signs  Visit Vitals    /71    Pulse 88    Temp 36.7 °C (98.1 °F)    Resp 17    Ht 5' 1\" (1.549 m)    Wt 73.5 kg (162 lb 0.6 oz)    SpO2 99%    Breastfeeding No    BMI 30.62 kg/m2       Patient is status post MAC anesthesia for Procedure(s):  COLONOSCOPY. Nausea/Vomiting: None    Postoperative hydration reviewed and adequate. Pain:  Pain Scale 1: Numeric (0 - 10) (03/13/17 1239)  Pain Intensity 1: 0 (03/13/17 1239)   Managed    Neurological Status:   Neuro  Neurologic State: Alert (03/13/17 9573)  Orientation Level: Oriented X4 (03/13/17 0726)  Cognition: Follows commands (03/13/17 0726)   At baseline    Mental Status and Level of Consciousness: Arousable    Pulmonary Status:   O2 Device: Room air (03/13/17 1239)   Adequate oxygenation and airway patent    Complications related to anesthesia: None    Post-anesthesia assessment completed.  No concerns    Signed By: Aiad Pierce DO     March 13, 2017

## 2017-03-13 NOTE — PROCEDURES
[address]  [phone]      2017    Colonoscopy Procedure Note  Josh Whiteside  :  1955  [facility] Medical Record Number: [id number]    Indications:     Gastrointestinal hemorrhage  PCP:  Jostin Elena MD  Anesthesia/Sedation: Conscious Sedation/Moderate Sedation  Endoscopist:  Dr. Robert Nichole  Complications:  None  Estimate Blood Loss:  None    Permit:  The indications, risks, benefits and alternatives were reviewed with the patient or their decision maker who was provided an opportunity to ask questions and all questions were answered. The specific risks of colonoscopy with conscious sedation were reviewed, including but not limited to anesthetic complication, bleeding, adverse drug reaction, missed lesion, infection, IV site reactions, and intestinal perforation which would lead to the need for surgical repair. Alternatives to colonoscopy including radiographic imaging, observation without testing, or laboratory testing were reviewed including the limitations of those alternatives. After considering the options and having all their questions answered, the patient or their decision maker provided both verbal and written consent to proceed. Procedure in Detail:  After obtaining informed consent, positioning of the patient in the left lateral decubitus position, and conduction of a pre-procedure pause or \"time out\" the endoscope was introduced into the anus and advanced to the cecum, which was identified by the ileocecal valve and appendiceal orifice. The quality of the colonic preparation was adequate. A careful inspection was made as the colonoscope was withdrawn, findings and interventions are described below.     Appendiceal orifice photgraphed    Findings:   Numerous sigmoid and descending diverticulosis with area confined edema; exam otherwise normal; no blood sen anywhere on this exam    Specimens:    none    Complications:   None; patient tolerated the procedure well. Estimated blood loss: none    Impression:  colonic diverticultis; while ot bleeding at the moment, likely the bleed source    Recommendations:   From my oint of view can discharge on tw weeks oral antibiotics of your choice prior to outpatient follow up  Thanks again    Thank you for entrusting me with this patient's care. Please do not hesitate to contact me with any questions or if I can be of assistance with any of your other patients' GI needs.     Signed By: Ofilia Moritz, MD                        March 13, 2017

## 2017-03-13 NOTE — PERIOP NOTES
Taylor Gleason  1955  [id number]    Situation:    Scheduled Procedure: Procedure(s):  COLONOSCOPY  Verbal report received from: SAMPSON JOHNSON  Preoperative diagnosis: anemia    Background:    Procedure: Procedure(s):  COLONOSCOPY  Physician performing procedure; Dr. Dasia Montilla RN    NPO Status/Last PO Intake: midnight    Pregnancy Test:Not applicable If yes, result: none  Is the patient taking Blood Thinners: NO If yes, list:  and last taken   Is the patient diabetic:yes       If yes, what was the last BS:  93  Time taken? 3271  Anything given? no           Does the patient have a Pacemaker/Defibrillator in place?: no   Does the patient need antibiotics before/during/after procedure: no   If the patient is having a colon, How much prep was drank? all   What were the Colon prep results? clear   Does the patient have SCD in place:no   Is patient on CONTACT precautions:no        If yes, what kind of CONTACT precautions:     Assessment:  Are the vital signs stable prior to patient coming to ENDO?  yes  Is the patient alert/oriented and able to sign consent for the procedures:yes    Does the patient have a patient IV in place?  yes     Recommendation:  Family or Friend present no     Permission to share finding with Family or Friend n/a

## 2017-03-13 NOTE — PROGRESS NOTES
Dorothea Stewart  1955  [id number]    Situation:  Verbal report received from: Adrian Plunkett  Procedure: Procedure(s):  COLONOSCOPY    Background:    Preoperative diagnosis: anemia  Postoperative diagnosis: Diverticulitis    :  Dr. Noreen Blank  Assistant(s): Endoscopy Technician-1: [place]  Endoscopy RN-1: Jackie Au RN    Specimens: * No specimens in log *  H. Pylori  no    Assessment:  Intra-procedure medications     Anesthesia gave intra-procedure sedation and medications, see anesthesia flow sheet yes    Intravenous fluids: NS@ KVO     Vital signs stable   yes    Abdominal assessment: round and soft   yes    Recommendation:  Discharge patient per MD order  yes. Return to floor  outpatient  Family or Friend   None present  Permission to share finding none present at this time.

## 2017-03-13 NOTE — PROGRESS NOTES
Problem: Falls - Risk of  Goal: *Absence of falls  Outcome: Progressing Towards Goal  Bed wheels locked, bed in low position. Side rails x 3. Call bell and possessions within reach. Asked patient to call for assistance. Patient verbalized understanding. Will continue to monitor. [address] with [facility] in [place]. Reviewed patient information. Scheduled for 11:30. She will put in for transport now. 1040  Patient left floor for ENDO.      0637  Patient remains off floor. 1313  Patient returned to floor. 18  Dr. Curtis Rodriguezails with patient.

## 2017-03-14 LAB
ABO + RH BLD: NORMAL
BLD PROD TYP BPU: NORMAL
BLD PROD TYP BPU: NORMAL
BLOOD GROUP ANTIBODIES SERPL: NORMAL
BPU ID: NORMAL
BPU ID: NORMAL
CROSSMATCH RESULT,%XM: NORMAL
CROSSMATCH RESULT,%XM: NORMAL
SPECIMEN EXP DATE BLD: NORMAL
STATUS OF UNIT,%ST: NORMAL
STATUS OF UNIT,%ST: NORMAL
UNIT DIVISION, %UDIV: 0
UNIT DIVISION, %UDIV: 0

## 2017-03-15 ENCOUNTER — OFFICE VISIT (OUTPATIENT)
Dept: FAMILY MEDICINE CLINIC | Age: 62
End: 2017-03-15

## 2017-03-15 VITALS
RESPIRATION RATE: 16 BRPM | SYSTOLIC BLOOD PRESSURE: 150 MMHG | TEMPERATURE: 98.2 F | DIASTOLIC BLOOD PRESSURE: 70 MMHG | HEIGHT: 61 IN | OXYGEN SATURATION: 98 % | HEART RATE: 86 BPM | BODY MASS INDEX: 31.91 KG/M2 | WEIGHT: 169 LBS

## 2017-03-15 DIAGNOSIS — Z09 HOSPITAL DISCHARGE FOLLOW-UP: ICD-10-CM

## 2017-03-15 DIAGNOSIS — I10 ESSENTIAL HYPERTENSION: ICD-10-CM

## 2017-03-15 DIAGNOSIS — N18.9 CKD (CHRONIC KIDNEY DISEASE), UNSPECIFIED STAGE: ICD-10-CM

## 2017-03-15 DIAGNOSIS — D50.0 ANEMIA DUE TO GASTROINTESTINAL BLOOD LOSS: Primary | ICD-10-CM

## 2017-03-15 DIAGNOSIS — E11.8 TYPE 2 DIABETES MELLITUS WITH COMPLICATION, WITHOUT LONG-TERM CURRENT USE OF INSULIN (HCC): ICD-10-CM

## 2017-03-15 DIAGNOSIS — E11.311 DIABETIC MACULAR EDEMA (HCC): ICD-10-CM

## 2017-03-15 LAB
O+P SPEC MICRO: NORMAL
O+P STL CONC: NORMAL
SPECIMEN SOURCE: NORMAL

## 2017-03-15 RX ORDER — LOSARTAN POTASSIUM AND HYDROCHLOROTHIAZIDE 25; 100 MG/1; MG/1
TABLET ORAL
Refills: 0 | COMMUNITY
Start: 2017-02-19 | End: 2017-03-15

## 2017-03-15 NOTE — PROGRESS NOTES
Subjective:      Baylee Lam is a 62 y.o. female here today for hospital discharge follow up. Hospitalized at [facility] 3/9/17 - 3/13/17 for acute blood loss anemia in the setting of a GI bleed. Hgb on admission 6.7 and she was transfused 1 unit of PRBC. Evaluation included EGD and colonoscopy which showed colonic diverticulitis which is believed to be the source of bleeding. Due to ciprofloxacin allergy, she was treated with Augmentin x 2 weeks with recommendation to follow up with GI as an outpatient. Home Hyzaar was held due to her renal function and on discharge she was sent with prescription for losartan 100 mg. Glipizide therapy was also discontinued as her A1c <3.5 during this admission. She reports that she is has been doing much better since being home. Did not get much sleep while in the hospital. Still has some left sided abdominal pain which has improved from prior. Denies melena or hematochezia. She needs an Opthalmology referral placed. She has visit scheduled with Dr. Gumaro Floyd for 3/31/17. Current Outpatient Prescriptions   Medication Sig Dispense Refill    losartan (COZAAR) 100 mg tablet Take 1 Tab by mouth daily. 30 Tab 0    amoxicillin-clavulanate (AUGMENTIN) 500-125 mg per tablet Take 1 Tab by mouth every twelve (12) hours for 14 days. 28 Tab 0    ACETAMINOPHEN/DIPHENHYDRAMINE (TYLENOL PM PO) Take 2 Tabs by mouth nightly as needed (sleep).  labetalol (NORMODYNE) 200 mg tablet Take 200 mg by mouth two (2) times a day.  dorzolamide-timolol (COSOPT) 22.3-6.8 mg/mL ophthalmic solution Administer 1 Drop to both eyes two (2) times a day.  glipiZIDE (GLUCOTROL) 5 mg tablet take 1 tablet by mouth once daily 30 Tab 5    atorvastatin (LIPITOR) 10 mg tablet Take 1 Tab by mouth nightly. 30 Tab 5    aspirin delayed-release 81 mg tablet take 2 tablets by mouth once daily 60 Tab 3    amLODIPine (NORVASC) 10 mg tablet Take 10 mg by mouth daily.   0       Allergies   Allergen Reactions    Ciprofloxacin Angioedema       Past Medical History:   Diagnosis Date    Anemia     Arthritis     Calculus of kidney     Congestive heart failure (HCC)     Diabetes (HCC)     Hematuria        Social History   Substance Use Topics    Smoking status: Never Smoker    Smokeless tobacco: Never Used    Alcohol use No        Review of Systems  Pertinent items are noted in HPI. Objective:     Visit Vitals    /70 (BP 1 Location: Left arm, BP Patient Position: Sitting)    Pulse 86    Temp 98.2 °F (36.8 °C) (Oral)    Resp 16    Ht 5' 1\" (1.549 m)    Wt 169 lb (76.7 kg)    SpO2 98%    BMI 31.93 kg/m2      General appearance - alert, well appearing, and in no distress  Eyes - pupils equal and reactive, extraocular eye movements intact, sclera anicteric  Oropharyngx - mucous membranes moist, pharynx normal without lesions  Neck - supple, no significant adenopathy  Chest - clear to auscultation, no wheezes, rales or rhonchi, symmetric air entry, no tachypnea, retractions or cyanosis  Heart - normal rate, regular rhythm, normal S1, S2, no murmurs, rubs, clicks or gallops  Abdomen - soft, mild left-sided tenderness, nondistended, no masses or organomegaly    Assessment/Plan:   Jose De Jesus Harding is a 62 y.o. female seen for:     1. Anemia due to gastrointestinal blood loss: acute-on-chronic anemia. Baseline Hgb ~10 per our record. Will check CBC to ensure that anemia has not worsened. Hgb 7.8 on discharge. She has not scheduled GI follow up yet and she is encouraged to do so. - CBC W/O DIFF    2. CKD (chronic kidney disease), unspecified stage: check BMP.   - METABOLIC PANEL, BASIC    3. Diabetic macular edema [facility]): referral to Dr. Robert Moreno placed. - REFERRAL TO OPHTHALMOLOGY    4. Type 2 diabetes mellitus with complication, without long-term current use of insulin ([facility] [address]  - REFERRAL TO OPHTHALMOLOGY    5. Essential hypertension: HCTZ held on discharge due to renal function.  She has appointment with Dr. Ida Mackenzie scheduled. 6. Hospital discharge follow-up: hospital course including discharge summary were reviewed. - CBC W/O DIFF  - METABOLIC PANEL, BASIC    I have discussed the diagnosis with the patient and the intended plan as seen in the above orders. The patient has received an after-visit summary and questions were answered concerning future plans. I have discussed medication side effects and warnings with the patient as well. Patient verbalizes understanding of plan of care and denies further questions or concerns at this time. Informed patient to return to the office if symptoms worsen or if new symptoms arise. Follow-up Disposition:  Return in about 5 days (around 3/20/2017) for labs and in 3 months for diabetes check or sooner as needed.

## 2017-03-15 NOTE — MR AVS SNAPSHOT
Visit Information Date & Time Provider Department Dept. Phone Encounter #  
 3/15/2017 10:30 AM Krysten SchmidtMatt [phone] [id number] Follow-up Instructions Return in about 5 days (around 3/20/2017) for labs and in 3 months for diabetes check or sooner as needed. Upcoming Health Maintenance Date Due Hepatitis C Screening 1955 FOOT EXAM Q1 10/23/1966 EYE EXAM RETINAL OR DILATED Q1 10/23/1966 Pneumococcal 19-64 Highest Risk (1 of 3 - PCV13) 10/23/1975 DTaP/Tdap/Td series (1 - Tdap) 10/23/1977 PAP AKA CERVICAL CYTOLOGY 10/23/1977 BREAST CANCER SCRN MAMMOGRAM 10/23/2006 ZOSTER VACCINE AGE 60> 10/23/2016 HEMOGLOBIN A1C Q6M 9/10/2017 MICROALBUMIN Q1 2/9/2018 FOBT Q 1 YEAR AGE 50-75 3/9/2018 LIPID PANEL Q1 3/10/2018 Allergies as of 3/15/2017  Review Complete On: 3/15/2017 By: Krysten Schmidt MD  
  
 Severity Noted Reaction Type Reactions Ciprofloxacin  03/13/2017   Systemic Angioedema Current Immunizations  Reviewed on 9/19/2016 Name Date Influenza Vaccine (Quad) PF 9/19/2016  2:34 PM  
  
 Not reviewed this visit You Were Diagnosed With   
  
 Codes Comments Anemia due to gastrointestinal blood loss    -  Primary ICD-10-CM: D50.0 ICD-9-CM: 280.0 CKD (chronic kidney disease), unspecified stage     ICD-10-CM: N18.9 ICD-9-CM: 585.9 Diabetic macular edema (HCC)     ICD-10-CM: H76.216 ICD-9-CM: 250.50, 362.07, 362.01 Type 2 diabetes mellitus with complication, without long-term current use of insulin (HCC)     ICD-10-CM: E11.8 ICD-9-CM: 250.90 Essential hypertension     ICD-10-CM: I10 
ICD-9-CM: 401.9 Hospital discharge follow-up     ICD-10-CM: [facility] ICD-9-CM: V67.59 Vitals BP Pulse Temp Resp Height(growth percentile) Weight(growth percentile)  150/70 (BP 1 Location: Left arm, BP Patient Position: Sitting) 86 98.2 °F (36.8 °C) (Oral) 16 5' 1\" (1.549 m) 169 lb (76.7 kg) SpO2 BMI OB Status Smoking Status 98% 31.93 kg/m2 Postmenopausal Never Smoker Vitals History BMI and BSA Data Body Mass Index Body Surface Area  
 31.93 kg/m 2 1.82 m 2 Preferred Pharmacy Pharmacy Name Phone [facility], [address]  [phone] Your Updated Medication List  
  
   
This list is accurate as of: 3/15/17 11:13 AM.  Always use your most recent med list. amLODIPine 10 mg tablet Commonly known as:  Santos Alstrom Take 10 mg by mouth daily. amoxicillin-clavulanate 500-125 mg per tablet Commonly known as:  AUGMENTIN Take 1 Tab by mouth every twelve (12) hours for 14 days. aspirin delayed-release 81 mg tablet  
take 2 tablets by mouth once daily  
  
 atorvastatin 10 mg tablet Commonly known as:  LIPITOR Take 1 Tab by mouth nightly. dorzolamide-timolol 22.3-6.8 mg/mL ophthalmic solution Commonly known as:  COSOPT Administer 1 Drop to both eyes two (2) times a day. labetalol 200 mg tablet Commonly known as:  Jannetta Pore Take 200 mg by mouth two (2) times a day. losartan 100 mg tablet Commonly known as:  COZAAR Take 1 Tab by mouth daily. TYLENOL PM PO Take 2 Tabs by mouth nightly as needed (sleep). We Performed the Following CBC W/O DIFF [36475 CPT(R)] METABOLIC PANEL, BASIC [98002 CPT(R)] REFERRAL TO OPHTHALMOLOGY [REF57 Custom] Comments:  
 Please evaluate patient for diabetic macular edema. Appointment scheduled for 3/31/17 at 9:30 AM at [facility] office. Follow-up Instructions Return in about 5 days (around 3/20/2017) for labs and in 3 months for diabetes check or sooner as needed. Referral Information Referral ID Referred By Referred To  
  
 [id number] Yeison Bull MD   
   [address] [place] Phone: [phone] Fax: [phone] Visits Status Start Date End Date 1 New Request 3/15/17 3/15/18 If your referral has a status of pending review or denied, additional information will be sent to support the outcome of this decision. Patient Instructions A Healthy Lifestyle: Care Instructions Your Care Instructions A healthy lifestyle can help you feel good, stay at a healthy weight, and have plenty of energy for both work and play. A healthy lifestyle is something you can share with your whole family. A healthy lifestyle also can lower your risk for serious health problems, such as high blood pressure, heart disease, and diabetes. You can follow a few steps listed below to improve your health and the health of your family. Follow-up care is a key part of your treatment and safety. Be sure to make and go to all appointments, and call your doctor if you are having problems. Its also a good idea to know your test results and keep a list of the medicines you take. How can you care for yourself at home? · Do not eat too much sugar, fat, or fast foods. You can still have dessert and treats now and then. The goal is moderation. · Start small to improve your eating habits. Pay attention to portion sizes, drink less juice and soda pop, and eat more fruits and vegetables. ¨ Eat a healthy amount of food. A 3-ounce serving of meat, for example, is about the size of a deck of cards. Fill the rest of your plate with vegetables and whole grains. ¨ Limit the amount of soda and sports drinks you have every day. Drink more water when you are thirsty. ¨ Eat at least 5 servings of fruits and vegetables every day. It may seem like a lot, but it is not hard to reach this goal. A serving or helping is 1 piece of fruit, 1 cup of vegetables, or 2 cups of leafy, raw vegetables.  Have an apple or some carrot sticks as an afternoon snack instead of a candy bar. Try to have fruits and/or vegetables at every meal. 
· Make exercise part of your daily routine. You may want to start with simple activities, such as walking, bicycling, or slow swimming. Try to be active 30 to 60 minutes every day. You do not need to do all 30 to 60 minutes all at once. For example, you can exercise 3 times a day for 10 or 20 minutes. Moderate exercise is safe for most people, but it is always a good idea to talk to your doctor before starting an exercise program. 
· Keep moving. Mikaela Limb the lawn, work in the garden, or [employer]. Take the stairs instead of the elevator at work. · If you smoke, quit. People who smoke have an increased risk for heart attack, stroke, cancer, and other lung illnesses. Quitting is hard, but there are ways to boost your chance of quitting tobacco for good. ¨ Use nicotine gum, patches, or lozenges. ¨ Ask your doctor about stop-smoking programs and medicines. ¨ Keep trying. In addition to reducing your risk of diseases in the future, you will notice some benefits soon after you stop using tobacco. If you have shortness of breath or asthma symptoms, they will likely get better within a few weeks after you quit. · Limit how much alcohol you drink. Moderate amounts of alcohol (up to 2 drinks a day for men, 1 drink a day for women) are okay. But drinking too much can lead to liver problems, high blood pressure, and other health problems. Family health If you have a family, there are many things you can do together to improve your health. · Eat meals together as a family as often as possible. · Eat healthy foods. This includes fruits, vegetables, lean meats and dairy, and whole grains. · Include your family in your fitness plan. Most people think of activities such as jogging or tennis as the way to fitness, but there are many ways you and your family can be more active.  Anything that makes you breathe hard and gets your heart pumping is exercise. Here are some tips: 
¨ Walk to do errands or to take your child to school or the bus. ¨ Go for a family bike ride after dinner instead of watching TV. Where can you learn more? Go to http://milo-vania.info/. Enter P577 in the search box to learn more about \"A Healthy Lifestyle: Care Instructions. \" Current as of: July 26, 2016 Content Version: 11.1 © 5063-7838 [employer]. Care instructions adapted under license by [employer] (which disclaims liability or warranty for this information). If you have questions about a medical condition or this instruction, always ask your healthcare professional. [address] any warranty or liability for your use of this information. Please provide this summary of care documentation to your next provider. Your primary care clinician is listed as Fannie Marie. If you have any questions after today's visit, please call [phone].

## 2017-03-15 NOTE — PATIENT INSTRUCTIONS

## 2017-03-15 NOTE — PROGRESS NOTES
Chief Complaint   Patient presents with    Transitions Of Care     follow up from [facility] admission for rectal bleeding. discharged 3/13/17     \"REVIEWED RECORD IN PREPARATION FOR VISIT AND HAVE OBTAINED THE NECESSARY DOCUMENTATION\"  1. Have you been to the ER, urgent care clinic since your last visit? Hospitalized since your last visit? Yes Where: see above    2. Have you seen or consulted any other health care providers outside of the [address] [place] [address] since your last visit? Include any pap smears or colon screening. No  Patient does not have advanced directives.

## 2017-03-16 NOTE — ADT AUTH CERT NOTES
Utilization Review           March 13 ; DC conclusions by Vinicius Oliver Status Review Entered       In Primary 3/15/2017       Details         Colonoscopy showed colonic diverticultis; while no bleeding was noted at that time, likely the bleed source. -GI consult ok with discharge. Pt placed on Augmentin for 2 weeks. Pt was allergic to Ciprofloxacin. Bactrim dosing needed to treat were going to interact with Losartan increasing pt's chances of HyperK. Discussed with pharmacist and Augmentin 500 mg was renally appropriate for this pt.  -Pt will follow-up outpatient after.                  Gastrointestinal Bleeding, Upper - Care Day 4 (3/12/2017) by Vinicius Sarkar        Review Status Review Entered       Completed 3/15/2017       Details              Care Day: 4 Care Date: 3/12/2017 Level of Care: Telemetry       Guideline Day 3        Clinical Status       (X) * Stable Hgb/Hct       3/15/2017 5:12 PM EDT by Shira Bailey          7.8/ 24.1, rdw 14.7, plt 146. chloride 109, gluc 122, bun 40, cr 2.31, gfr non aa 22, albumin 2.8, alk phos 147.              (X) * Hemodynamic stability       3/15/2017 5:12 PM EDT by Shira Bread         98.3  °F (36.8  °C) 84 -18,  218/96 -- Post activity  95 % Room air -- LATER bp 208/91, 194/93, 149/73.              (X) * No significant bleeding       (X) * Surgical and other acute interventions not needed       ( ) * Discharge plans and education understood              Activity       (X) * Ambulatory              Routes       (X) * Oral hydration, medications, and diet       3/15/2017 5:17 PM EDT by Maggie Michaud MD documents clear liquid diet              3/15/2017 5:12 PM EDT by Shira Bailey         NPO                     Interventions       (X) Hgb/Hct       3/15/2017 5:12 PM EDT by Shira CipherApps          7.8/ 24.1,                     Medications       (X) Proton pump inhibitor       3/15/2017 5:12 PM EDT by Taras Patel Protonix iv q 12h,  other meds-colyte po , norvasc po , Apresoline 10 mg iv , insulin sq, Labetalol po * increased to  tid, miralax po                     3/15/2017 5:12 PM EDT by Maegan Valdovinos       Subject: Additional Clinical Information       Stool collected for ova and parasites.                                   * Milestone              Additional Notes       presenting with acute blood loss anemia due to LGIB requiring transfusion. Doing well. Asymptomatic. No BM since admission.               -GI (Dr. Rain Gonzalez) following. Colonoscopy scheduled for Monday.       -Daily CBC.       - Acute on Chronic Stage 2 CKD - Improving       - Avoiding nephrotoxic agents. Holding hyzaar.                Heart Failure with Reduced EF - Holding hyzaar 2/2 acute on chronic kidney disease.                Hypertension        - Continuing home medications of labetalol, amlodipine. Holding hyzaar 2/2 acute on chronic kidney disease.       - consider adding hydralazine 10 tid       - Will continue to monitor at this time and readjust as BP's trend.                Transaminitis - Resolved. Liver unremarkable on abdominal CT. Likely medication induced       -GI to follow patient.-Daily CMP.                 Diabetes Mellitus T2: Last HgA1c 5.9 in 11/2016, A1c on 03/10: <3.5       - Discontinued home glipizide.       - Insulin Sliding Scale normal sensitivity with AC&HS glucose checks.       - Hypoglycemia protocols ordered.                FEN/GI - CLD           Gastrointestinal Bleeding, Upper - Care Day 3 (3/11/2017) by Neal Hernandez        Review Status Review Entered       Completed 3/15/2017       Details              Care Day: 3 Care Date: 3/11/2017 Level of Care: Telemetry       Guideline Day 2        Level Of Care       (X) Floor              Clinical Status       (X) * Hemodynamic stability       3/15/2017 4:56 PM EDT by Maegan Valdovinos         98.3  °F (36.8  °C) 83-19,  151/69 -- At rest  98 % Room air              (X) * No significant bleeding              Activity       (X) Activity as tolerated              Routes       (X) * Oral hydration, advanced diet       3/15/2017 4:56 PM EDT by Osmany Dorado       .  pt scheduled for colonoscopy on Monday3/13. MD ordered to start clear liquid diet tomorrow (3/12), change diet to regular  diet, and cancel colyte today and re-start tomorrow.                     Interventions       (X) * NG tube absent       (X) Hgb/Hct       3/15/2017 4:56 PM EDT by Sofie Wall         hh 7.8/ 24.3> 7.8 / 24.7. RBC 2.92. , bun 45, cr 2.31, gfr non aa 22, ALK phos 163.                     Medications       (X) Proton pump inhibitor       3/15/2017 4:56 PM EDT by Sofie Wall         Protonix iv q 12h. Mirilax po                     3/15/2017 4:56 PM EDT by Sofie Wall       Subject: Additional Clinical Information              PER GI--Anemia with occult blood loss in the stools, S/P upper endoscopy which was unrevealing. Plan for colonoscopy on Monday by Dr. Hugo Almendarez. Anemia likely multifactorial, possibly related to renal insufficiency as well. Liver enzymes slowly improving, likely medication induced. Continue to monitor.                                   * Milestone              Additional Notes       Inpatient order was written on March 11 for  GIB.              admitted for suspected gastrointestinal bleeding.                GI Bleed - Acute blood loss anemia in the setting of GI bleed in patient with chronic anemia. Anemia likely multifactorial, possibly related to renal insufficiency as well. Patient with Hgb of 6.7, +FOBT, reported history of dark stools. Treated with 1 unit of pRBC. S/P upper endoscopy which was unrevealing.       -GI (Dr. Hugo Almendarez) following. Colonoscopy scheduled for Monday.       -Daily CBC.                Acute on Chronic Stage 2 CKD - Improving       -Continue gentle hydration       -Avoiding nephrotoxic agents.  Holding hyzaar.                Heart Failure with Reduced EF - Patient is followed by Dr. Emily Dhillon. Last echo in our system from 7/2016 (at [address] EF noted to be 45-50% with hypokinesis of the inferior and inferiorseptal myocardium. Mild MR present.       -Continue patient's home meds: labetalol, amlodipine. Holding hyzaar 2/2 acute on chronic kidney disease.                Hypertension 163/70       - Continuing home medications of labetalol, amlodipine. Holding hyzaar 2/2 acute on chronic kidney disease.       - Will continue to monitor at this time and readjust as BP's trend.                Transaminitis - Resolved. Liver unremarkable on abdominal CT. Likely medication induced       -GI to follow patient.-Daily CMP.                Troponinemia - Resolved. Likely 2/2 renal insufficency, but could be 2/2 demand ischemia in the setting of GIB.       -Troponin negative X3                 Diabetes Mellitus T2: Last HgA1c 5.9 in 11/2016, repeated yesterday and was <3.5.        - Discontinued home medications of glipizide.       - Insulin Sliding Scale normal sensitivity with AC&HS glucose checks.       - Hypoglycemia protocols ordered.                FEN/GI - CLD       Activity - Ambulate as tolerated       DVT prophylaxis - SCDs       GI prophylaxis - IV Protonix       Disposition -Plan to d/c to TBD. Consulting PT/OT/CM.       Code Status - Full, discussed with patient / caregivers.               Subjective: Pt was seen and examined at bedside. Had low blood glucose overnight, but 112 this morning. . Reports flatus.

## 2017-03-17 NOTE — ADT AUTH CERT NOTES
Utilization Review           March 13 ; DC conclusions by Gurinder Oliver Status Review Entered       In Primary 3/15/2017       Details         Colonoscopy showed colonic diverticultis; while no bleeding was noted at that time, likely the bleed source. -GI consult ok with discharge. Pt placed on Augmentin for 2 weeks. Pt was allergic to Ciprofloxacin. Bactrim dosing needed to treat were going to interact with Losartan increasing pt's chances of HyperK. Discussed with pharmacist and Augmentin 500 mg was renally appropriate for this pt.  -Pt will follow-up outpatient after.                  Gastrointestinal Bleeding, Upper - Care Day 4 (3/12/2017) by Gurinder Oliver Status Review Entered       Completed 3/15/2017       Details              Care Day: 4 Care Date: 3/12/2017 Level of Care: Telemetry       Guideline Day 3        Clinical Status       (X) * Stable Hgb/Hct       3/15/2017 5:12 PM EDT by Mozell Pop         HH 7.8/ 24.1, rdw 14.7, plt 146. chloride 109, gluc 122, bun 40, cr 2.31, gfr non aa 22, albumin 2.8, alk phos 147.              (X) * Hemodynamic stability       3/15/2017 5:12 PM EDT by Mozell Pop         98.3  °F (36.8  °C) 84 -18,  218/96 -- Post activity  95 % Room air -- LATER bp 208/91, 194/93, 149/73.              (X) * No significant bleeding       (X) * Surgical and other acute interventions not needed       ( ) * Discharge plans and education understood              Activity       (X) * Ambulatory              Routes       (X) * Oral hydration, medications, and diet       3/15/2017 5:17 PM EDT by Maldonado Michaud MD documents clear liquid diet              3/15/2017 5:12 PM EDT by Mozell Pop         NPO                     Interventions       (X) Hgb/Hct       3/15/2017 5:12 PM EDT by Mozell Pop         HH 7.8/ 24.1,                     Medications       (X) Proton pump inhibitor       3/15/2017 5:12 PM EDT by Cindi Michele Protonix iv q 12h,  other meds-colyte po , norvasc po , Apresoline 10 mg iv , insulin sq, Labetalol po * increased to  tid, miralax po                     3/15/2017 5:12 PM EDT by Jesenia Lechuga       Subject: Additional Clinical Information       Stool collected for ova and parasites.                                   * Milestone              Additional Notes       presenting with acute blood loss anemia due to LGIB requiring transfusion. Doing well. Asymptomatic. No BM since admission.               -GI (Dr. Qing Vera) following. Colonoscopy scheduled for Monday.       -Daily CBC.       - Acute on Chronic Stage 2 CKD - Improving       - Avoiding nephrotoxic agents. Holding hyzaar.                Heart Failure with Reduced EF - Holding hyzaar 2/2 acute on chronic kidney disease.                Hypertension        - Continuing home medications of labetalol, amlodipine. Holding hyzaar 2/2 acute on chronic kidney disease.       - consider adding hydralazine 10 tid       - Will continue to monitor at this time and readjust as BP's trend.                Transaminitis - Resolved. Liver unremarkable on abdominal CT. Likely medication induced       -GI to follow patient.-Daily CMP.                 Diabetes Mellitus T2: Last HgA1c 5.9 in 11/2016, A1c on 03/10: <3.5       - Discontinued home glipizide.       - Insulin Sliding Scale normal sensitivity with AC&HS glucose checks.       - Hypoglycemia protocols ordered.                FEN/GI - CLD           Gastrointestinal Bleeding, Upper - Care Day 3 (3/11/2017) by Kaycee Mackay        Review Status Review Entered       Completed 3/15/2017       Details              Care Day: 3 Care Date: 3/11/2017 Level of Care: Telemetry       Guideline Day 2        Level Of Care       (X) Floor              Clinical Status       (X) * Hemodynamic stability       3/15/2017 4:56 PM EDT by Jesenia Lechuga         98.3  °F (36.8  °C) 83-19,  151/69 -- At rest  98 % Room air              (X) * No significant bleeding              Activity       (X) Activity as tolerated              Routes       (X) * Oral hydration, advanced diet       3/15/2017 4:56 PM EDT by Javi Sauceda       .  pt scheduled for colonoscopy on Monday3/13. MD ordered to start clear liquid diet tomorrow (3/12), change diet to regular  diet, and cancel colyte today and re-start tomorrow.                     Interventions       (X) * NG tube absent       (X) Hgb/Hct       3/15/2017 4:56 PM EDT by Bess Womack         hh 7.8/ 24.3> 7.8 / 24.7. RBC 2.92. , bun 45, cr 2.31, gfr non aa 22, ALK phos 163.                     Medications       (X) Proton pump inhibitor       3/15/2017 4:56 PM EDT by Bess Womack         Protonix iv q 12h. Mirilax po                     3/15/2017 4:56 PM EDT by Bess Womack       Subject: Additional Clinical Information              PER GI--Anemia with occult blood loss in the stools, S/P upper endoscopy which was unrevealing. Plan for colonoscopy on Monday by Dr. Noreen Blank. Anemia likely multifactorial, possibly related to renal insufficiency as well. Liver enzymes slowly improving, likely medication induced. Continue to monitor.                                   * Milestone              Additional Notes       Inpatient order was written on March 11 for  GIB.              admitted for suspected gastrointestinal bleeding.                GI Bleed - Acute blood loss anemia in the setting of GI bleed in patient with chronic anemia. Anemia likely multifactorial, possibly related to renal insufficiency as well. Patient with Hgb of 6.7, +FOBT, reported history of dark stools. Treated with 1 unit of pRBC. S/P upper endoscopy which was unrevealing.       -GI (Dr. Noreen Blank) following. Colonoscopy scheduled for Monday.       -Daily CBC.                Acute on Chronic Stage 2 CKD - Improving       -Continue gentle hydration       -Avoiding nephrotoxic agents.  Holding hyzaar.                Heart Failure with Reduced EF - Patient is followed by Dr. Colleen Hamilton. Last echo in our system from 7/2016 (at [address] EF noted to be 45-50% with hypokinesis of the inferior and inferiorseptal myocardium. Mild MR present.       -Continue patient's home meds: labetalol, amlodipine. Holding hyzaar 2/2 acute on chronic kidney disease.                Hypertension 163/70       - Continuing home medications of labetalol, amlodipine. Holding hyzaar 2/2 acute on chronic kidney disease.       - Will continue to monitor at this time and readjust as BP's trend.                Transaminitis - Resolved. Liver unremarkable on abdominal CT. Likely medication induced       -GI to follow patient.-Daily CMP.                Troponinemia - Resolved. Likely 2/2 renal insufficency, but could be 2/2 demand ischemia in the setting of GIB.       -Troponin negative X3                 Diabetes Mellitus T2: Last HgA1c 5.9 in 11/2016, repeated yesterday and was <3.5.        - Discontinued home medications of glipizide.       - Insulin Sliding Scale normal sensitivity with AC&HS glucose checks.       - Hypoglycemia protocols ordered.                FEN/GI - CLD       Activity - Ambulate as tolerated       DVT prophylaxis - SCDs       GI prophylaxis - IV Protonix       Disposition -Plan to d/c to TBD. Consulting PT/OT/CM.       Code Status - Full, discussed with patient / caregivers.               Subjective: Pt was seen and examined at bedside. Had low blood glucose overnight, but 112 this morning. . Reports flatus.

## 2017-03-21 ENCOUNTER — TELEPHONE (OUTPATIENT)
Dept: FAMILY MEDICINE CLINIC | Age: 62
End: 2017-03-21

## 2017-03-21 LAB
BUN SERPL-MCNC: 36 MG/DL (ref 8–27)
BUN/CREAT SERPL: 17 (ref 11–26)
CALCIUM SERPL-MCNC: 8.4 MG/DL (ref 8.7–10.3)
CHLORIDE SERPL-SCNC: 104 MMOL/L (ref 96–106)
CO2 SERPL-SCNC: 20 MMOL/L (ref 18–29)
CREAT SERPL-MCNC: 2.15 MG/DL (ref 0.57–1)
ERYTHROCYTE [DISTWIDTH] IN BLOOD BY AUTOMATED COUNT: 15.2 % (ref 12.3–15.4)
GLUCOSE SERPL-MCNC: 130 MG/DL (ref 65–99)
HCT VFR BLD AUTO: 23.9 % (ref 34–46.6)
HGB BLD-MCNC: 7.7 G/DL (ref 11.1–15.9)
INTERPRETATION: NORMAL
MCH RBC QN AUTO: 27.2 PG (ref 26.6–33)
MCHC RBC AUTO-ENTMCNC: 32.2 G/DL (ref 31.5–35.7)
MCV RBC AUTO: 85 FL (ref 79–97)
PLATELET # BLD AUTO: 160 X10E3/UL (ref 150–379)
POTASSIUM SERPL-SCNC: 4.9 MMOL/L (ref 3.5–5.2)
RBC # BLD AUTO: 2.83 X10E6/UL (ref 3.77–5.28)
SODIUM SERPL-SCNC: 140 MMOL/L (ref 134–144)
WBC # BLD AUTO: 7 X10E3/UL (ref 3.4–10.8)

## 2017-03-21 NOTE — TELEPHONE ENCOUNTER
[employer] called and stated that they have stat lab values for this patient. Hemoglobin 7.7.

## 2017-03-26 ENCOUNTER — APPOINTMENT (OUTPATIENT)
Dept: GENERAL RADIOLOGY | Age: 62
DRG: 291 | End: 2017-03-26
Attending: EMERGENCY MEDICINE
Payer: COMMERCIAL

## 2017-03-26 ENCOUNTER — HOSPITAL ENCOUNTER (INPATIENT)
Age: 62
LOS: 1 days | Discharge: HOME | DRG: 291 | End: 2017-03-29
Attending: EMERGENCY MEDICINE | Admitting: FAMILY MEDICINE
Payer: COMMERCIAL

## 2017-03-26 DIAGNOSIS — J90 PLEURAL EFFUSION: ICD-10-CM

## 2017-03-26 DIAGNOSIS — N28.9 RENAL INSUFFICIENCY: ICD-10-CM

## 2017-03-26 DIAGNOSIS — D64.9 ANEMIA, UNSPECIFIED TYPE: ICD-10-CM

## 2017-03-26 DIAGNOSIS — J81.0 ACUTE PULMONARY EDEMA (HCC): Primary | ICD-10-CM

## 2017-03-26 LAB
ANION GAP BLD CALC-SCNC: 9 MMOL/L (ref 5–15)
BASOPHILS # BLD AUTO: 0 K/UL (ref 0–0.1)
BASOPHILS # BLD: 0 % (ref 0–1)
BNP SERPL-MCNC: 8130 PG/ML (ref 0–125)
BUN SERPL-MCNC: 40 MG/DL (ref 6–20)
BUN/CREAT SERPL: 15 (ref 12–20)
CALCIUM SERPL-MCNC: 8.3 MG/DL (ref 8.5–10.1)
CHLORIDE SERPL-SCNC: 108 MMOL/L (ref 97–108)
CK SERPL-CCNC: 163 U/L (ref 26–192)
CO2 SERPL-SCNC: 21 MMOL/L (ref 21–32)
CREAT SERPL-MCNC: 2.71 MG/DL (ref 0.55–1.02)
DIFFERENTIAL METHOD BLD: ABNORMAL
EOSINOPHIL # BLD: 0.2 K/UL (ref 0–0.4)
EOSINOPHIL NFR BLD: 3 % (ref 0–7)
ERYTHROCYTE [DISTWIDTH] IN BLOOD BY AUTOMATED COUNT: 14.2 % (ref 11.5–14.5)
GLUCOSE SERPL-MCNC: 120 MG/DL (ref 65–100)
HCT VFR BLD AUTO: 24.1 % (ref 35–47)
HGB BLD-MCNC: 7.4 G/DL (ref 11.5–16)
LYMPHOCYTES # BLD AUTO: 16 % (ref 12–49)
LYMPHOCYTES # BLD: 1.1 K/UL (ref 0.8–3.5)
MAGNESIUM SERPL-MCNC: 1.6 MG/DL (ref 1.6–2.4)
MCH RBC QN AUTO: 26.7 PG (ref 26–34)
MCHC RBC AUTO-ENTMCNC: 30.7 G/DL (ref 30–36.5)
MCV RBC AUTO: 87 FL (ref 80–99)
MONOCYTES # BLD: 0.6 K/UL (ref 0–1)
MONOCYTES NFR BLD AUTO: 9 % (ref 5–13)
NEUTS SEG # BLD: 4.8 K/UL (ref 1.8–8)
NEUTS SEG NFR BLD AUTO: 72 % (ref 32–75)
PLATELET # BLD AUTO: 187 K/UL (ref 150–400)
POTASSIUM SERPL-SCNC: 5.1 MMOL/L (ref 3.5–5.1)
RBC # BLD AUTO: 2.77 M/UL (ref 3.8–5.2)
SODIUM SERPL-SCNC: 138 MMOL/L (ref 136–145)
TROPONIN I BLD-MCNC: <0.04 NG/ML (ref 0–0.08)
TROPONIN I SERPL-MCNC: <0.04 NG/ML
WBC # BLD AUTO: 6.7 K/UL (ref 3.6–11)

## 2017-03-26 PROCEDURE — 74011250636 HC RX REV CODE- 250/636: Performed by: FAMILY MEDICINE

## 2017-03-26 PROCEDURE — 99284 EMERGENCY DEPT VISIT MOD MDM: CPT

## 2017-03-26 PROCEDURE — 74011250636 HC RX REV CODE- 250/636: Performed by: EMERGENCY MEDICINE

## 2017-03-26 PROCEDURE — 84484 ASSAY OF TROPONIN QUANT: CPT

## 2017-03-26 PROCEDURE — 36415 COLL VENOUS BLD VENIPUNCTURE: CPT | Performed by: FAMILY MEDICINE

## 2017-03-26 PROCEDURE — 99218 HC RM OBSERVATION: CPT

## 2017-03-26 PROCEDURE — 85025 COMPLETE CBC W/AUTO DIFF WBC: CPT | Performed by: EMERGENCY MEDICINE

## 2017-03-26 PROCEDURE — 96374 THER/PROPH/DIAG INJ IV PUSH: CPT

## 2017-03-26 PROCEDURE — 83880 ASSAY OF NATRIURETIC PEPTIDE: CPT | Performed by: EMERGENCY MEDICINE

## 2017-03-26 PROCEDURE — 83735 ASSAY OF MAGNESIUM: CPT | Performed by: EMERGENCY MEDICINE

## 2017-03-26 PROCEDURE — 71020 XR CHEST PA LAT: CPT

## 2017-03-26 PROCEDURE — 74011250637 HC RX REV CODE- 250/637: Performed by: FAMILY MEDICINE

## 2017-03-26 PROCEDURE — 82550 ASSAY OF CK (CPK): CPT | Performed by: EMERGENCY MEDICINE

## 2017-03-26 PROCEDURE — 94762 N-INVAS EAR/PLS OXIMTRY CONT: CPT

## 2017-03-26 PROCEDURE — 80048 BASIC METABOLIC PNL TOTAL CA: CPT | Performed by: EMERGENCY MEDICINE

## 2017-03-26 RX ORDER — LOSARTAN POTASSIUM 50 MG/1
100 TABLET ORAL DAILY
Status: DISCONTINUED | OUTPATIENT
Start: 2017-03-27 | End: 2017-03-29 | Stop reason: HOSPADM

## 2017-03-26 RX ORDER — SODIUM CHLORIDE 0.9 % (FLUSH) 0.9 %
5-10 SYRINGE (ML) INJECTION EVERY 8 HOURS
Status: DISCONTINUED | OUTPATIENT
Start: 2017-03-26 | End: 2017-03-29 | Stop reason: HOSPADM

## 2017-03-26 RX ORDER — MAGNESIUM SULFATE HEPTAHYDRATE 40 MG/ML
2 INJECTION, SOLUTION INTRAVENOUS ONCE
Status: COMPLETED | OUTPATIENT
Start: 2017-03-26 | End: 2017-03-26

## 2017-03-26 RX ORDER — ATORVASTATIN CALCIUM 10 MG/1
10 TABLET, FILM COATED ORAL
Status: DISCONTINUED | OUTPATIENT
Start: 2017-03-26 | End: 2017-03-29 | Stop reason: HOSPADM

## 2017-03-26 RX ORDER — HEPARIN SODIUM 5000 [USP'U]/ML
5000 INJECTION, SOLUTION INTRAVENOUS; SUBCUTANEOUS EVERY 8 HOURS
Status: DISCONTINUED | OUTPATIENT
Start: 2017-03-26 | End: 2017-03-29 | Stop reason: HOSPADM

## 2017-03-26 RX ORDER — LABETALOL 200 MG/1
200 TABLET, FILM COATED ORAL
Status: COMPLETED | OUTPATIENT
Start: 2017-03-26 | End: 2017-03-26

## 2017-03-26 RX ORDER — FUROSEMIDE 10 MG/ML
40 INJECTION INTRAMUSCULAR; INTRAVENOUS ONCE
Status: COMPLETED | OUTPATIENT
Start: 2017-03-26 | End: 2017-03-26

## 2017-03-26 RX ORDER — AMLODIPINE BESYLATE 5 MG/1
10 TABLET ORAL DAILY
Status: DISCONTINUED | OUTPATIENT
Start: 2017-03-27 | End: 2017-03-29 | Stop reason: HOSPADM

## 2017-03-26 RX ORDER — SODIUM CHLORIDE 0.9 % (FLUSH) 0.9 %
5-10 SYRINGE (ML) INJECTION EVERY 8 HOURS
Status: DISCONTINUED | OUTPATIENT
Start: 2017-03-26 | End: 2017-03-27 | Stop reason: SDUPTHER

## 2017-03-26 RX ORDER — SODIUM CHLORIDE 0.9 % (FLUSH) 0.9 %
5-10 SYRINGE (ML) INJECTION AS NEEDED
Status: DISCONTINUED | OUTPATIENT
Start: 2017-03-26 | End: 2017-03-27 | Stop reason: SDUPTHER

## 2017-03-26 RX ORDER — LABETALOL 200 MG/1
200 TABLET, FILM COATED ORAL 2 TIMES DAILY
Status: DISCONTINUED | OUTPATIENT
Start: 2017-03-27 | End: 2017-03-29

## 2017-03-26 RX ORDER — ASPIRIN 81 MG/1
81 TABLET ORAL
Status: COMPLETED | OUTPATIENT
Start: 2017-03-26 | End: 2017-03-26

## 2017-03-26 RX ORDER — ASPIRIN 81 MG/1
81 TABLET ORAL DAILY
Status: DISCONTINUED | OUTPATIENT
Start: 2017-03-27 | End: 2017-03-29 | Stop reason: HOSPADM

## 2017-03-26 RX ORDER — FUROSEMIDE 10 MG/ML
40 INJECTION INTRAMUSCULAR; INTRAVENOUS DAILY
Status: DISCONTINUED | OUTPATIENT
Start: 2017-03-27 | End: 2017-03-27

## 2017-03-26 RX ORDER — DORZOLAMIDE HYDROCHLORIDE AND TIMOLOL MALEATE 20; 5 MG/ML; MG/ML
1 SOLUTION/ DROPS OPHTHALMIC 2 TIMES DAILY
Status: DISCONTINUED | OUTPATIENT
Start: 2017-03-27 | End: 2017-03-29 | Stop reason: HOSPADM

## 2017-03-26 RX ORDER — SODIUM CHLORIDE 0.9 % (FLUSH) 0.9 %
5-10 SYRINGE (ML) INJECTION AS NEEDED
Status: DISCONTINUED | OUTPATIENT
Start: 2017-03-26 | End: 2017-03-29 | Stop reason: HOSPADM

## 2017-03-26 RX ADMIN — Medication 10 ML: at 20:11

## 2017-03-26 RX ADMIN — HEPARIN SODIUM 5000 UNITS: 5000 INJECTION, SOLUTION INTRAVENOUS; SUBCUTANEOUS at 21:55

## 2017-03-26 RX ADMIN — ASPIRIN 81 MG: 81 TABLET, COATED ORAL at 22:38

## 2017-03-26 RX ADMIN — Medication 10 ML: at 21:56

## 2017-03-26 RX ADMIN — FUROSEMIDE 40 MG: 10 INJECTION, SOLUTION INTRAMUSCULAR; INTRAVENOUS at 14:43

## 2017-03-26 RX ADMIN — LABETALOL HCL 200 MG: 200 TABLET, FILM COATED ORAL at 22:38

## 2017-03-26 RX ADMIN — ATORVASTATIN CALCIUM 10 MG: 10 TABLET, FILM COATED ORAL at 22:38

## 2017-03-26 RX ADMIN — MAGNESIUM SULFATE HEPTAHYDRATE 2 G: 40 INJECTION, SOLUTION INTRAVENOUS at 20:11

## 2017-03-26 NOTE — ED TRIAGE NOTES
Pt presents to ED with c/o several months of intermittent swelling in bilateral lower legs. Pt with recent diagnosis of CHF. Pt states dyspnea on exertion. Pt is non dyspneac after walking to treatment area. Pt speaking in complete sentences. Lungs are clear to auscultation.

## 2017-03-26 NOTE — IP AVS SNAPSHOT
[facility] 
 
 
 [address] 
[phone] Patient: Carrie Howe MRN: [id number] :1955 You are allergic to the following Allergen Reactions Ciprofloxacin Angioedema Recent Documentation Height Weight BMI OB Status Smoking Status 1.549 m 75.8 kg 31.57 kg/m2 Postmenopausal Never Smoker Emergency Contacts Name Discharge Info Relation Home Work Mobile [address]  Spouse [3] [phone] [facility] DISCHARGE CAREGIVER [3] Child [2] [phone] About your hospitalization You were admitted on:  2017 You last received care in the:  [facility] 5M1 MED SURG 1 You were discharged on:  2017 Unit phone number:  [phone] Why you were hospitalized Your primary diagnosis was:  Chf Exacerbation (Hcc) Your diagnoses also included:  Anemia, Diabetes Mellitus Type 2 With Complications (Hcc), Essential Hypertension, Chf (Congestive Heart Failure) (Hcc) Providers Seen During Your Hospitalizations Provider Role Specialty Primary office phone Jesus Lynch MD Attending Provider Emergency Medicine [phone] Armand Baumann MD Attending Provider [facility] [phone] José Farfan DO Attending Provider [facility] [phone] Your Primary Care Physician (PCP) Primary Care Physician Office Phone Office Fax Sergio Mohan [phone] Follow-up Information Follow up With Details Comments Contact Info Leonard Angel MD Go in 2 days for follow up [address] [facility] 
[phone] Saud Becerra MD  as scheduled by nephrologist Mehul Broussard [address] 
[phone] Mani Butler MD  follow up as scheduled by Dr. Stevan Arias 100[address] [facility] Cardiology of [address] 
[phone] Noemí Hull MD Go on 3/31/2017 Dr. Aubrey Martines on Friday (3/31) at 11:00AM [address] [facility] 
[phone] Your Appointments Friday March 31, 2017 11:00 AM EDT TRANSITIONAL CARE MANAGEMENT with Noemí Hull MD  
[address] [facility]) [address] [facility]  
[phone] Current Discharge Medication List  
  
START taking these medications Dose & Instructions Dispensing Information Comments Morning Noon Evening Bedtime  
 doxazosin 2 mg tablet Commonly known as:  CARDURA Your last dose was: Your next dose is:    
   
   
 Dose:  2 mg Take 1 Tab by mouth daily. Quantity:  30 Tab Refills:  1  
     
   
   
   
  
 furosemide 20 mg tablet Commonly known as:  LASIX Your last dose was: Your next dose is:    
   
   
 Dose:  20 mg Take 1 Tab by mouth daily. Quantity:  30 Tab Refills:  0 CONTINUE these medications which have NOT CHANGED Dose & Instructions Dispensing Information Comments Morning Noon Evening Bedtime  
 amLODIPine 10 mg tablet Commonly known as:  Brinda Medici Your last dose was: Your next dose is:    
   
   
 Dose:  10 mg Take 10 mg by mouth daily. Refills:  0  
     
   
   
   
  
 aspirin delayed-release 81 mg tablet Your last dose was: Your next dose is:    
   
   
 take 2 tablets by mouth once daily Quantity:  60 Tab Refills:  3  
     
   
   
   
  
 atorvastatin 10 mg tablet Commonly known as:  LIPITOR Your last dose was: Your next dose is:    
   
   
 Dose:  10 mg Take 1 Tab by mouth nightly. Quantity:  30 Tab Refills:  5  
     
   
   
   
  
 dorzolamide-timolol 22.3-6.8 mg/mL ophthalmic solution Commonly known as:  COSOPT  
 Your last dose was: Your next dose is:    
   
   
 Dose:  1 Drop Administer 1 Drop to both eyes two (2) times a day. Refills:  0  
     
   
   
   
  
 labetalol 200 mg tablet Commonly known as:  Coreen Choe Your last dose was: Your next dose is:    
   
   
 Dose:  200 mg Take 200 mg by mouth two (2) times a day. Refills:  0  
     
   
   
   
  
 losartan 100 mg tablet Commonly known as:  COZAAR Your last dose was: Your next dose is:    
   
   
 Dose:  100 mg Take 1 Tab by mouth daily. Quantity:  30 Tab Refills:  0  
     
   
   
   
  
 TYLENOL PM PO Your last dose was: Your next dose is:    
   
   
 Dose:  2 Tab Take 2 Tabs by mouth nightly as needed (sleep). Refills:  0 STOP taking these medications   
 amoxicillin-clavulanate 500-125 mg per tablet Commonly known as:  AUGMENTIN Where to Get Your Medications These medications were sent to [address] [facility] [address] Phone:  [phone]  
  doxazosin 2 mg tablet  
 furosemide 20 mg tablet Discharge Instructions HOME DISCHARGE INSTRUCTIONS Vicente Riverales / [id number] : 1955 Admission date: 3/26/2017 Discharge date: 3/29/2017 8:33 AM  
 
Please bring this form with you to show your care provider at your follow-up appointment. Primary care provider:  Jackie Otero MD 
 
Discharging provider:  Humaira Joseph MD  - Family Medicine Resident Ferris Ganser, MD - Family Medicine Attending You have been admitted to the hospital with the following diagnoses: 
 
ACUTE DIAGNOSES: 
· pleural effusions, CHF, anemia, PATRICIA · CHF (congestive heart failure) ([facility] [place] Maldonado . . . . . . . . . . . . . . . . . . . . . . . . . . . . . . . . . . . [place] Maldonado . . . . . . . . . . . . . . . . . . . . . . . . . . . . . . . . . . . Medication changes: Continue Lasix 20mg every day. You were also started on a new medication by Cardiology, it is called doxazosin. Please take this as prescribed FOLLOW-UP CARE RECOMMENDATIONS: 
- Your stress test here did not show any signs of damage or stress to the heart. That is great news! - It is very important that you take your blood pressure medications as this will help with your heart and your kidneys. 
- You have been started on a new medication called Lasix. You tolerated it well while here in the hospital. It will help prevent fluid build up. - Please be mindful of the amount of salt you eat. - It is important for you to follow up with your primary care doctor and have your electrolytes checked - It is important for you to follow up with your kidney doctor (Nephrologist). They may want to start you on a medication, called erythropoietin, to help with your anemia. - Please make a follow up appointment at your earliest convenience to be seen by Cardiology. Appointments Follow-up Information Follow up With Details Comments Contact Info Noemí Hull MD Go in 2 days for follow up [address] [place] [address] [address] 
[phone] Maite Chan MD  as scheduled by nephrologist [address] [facility] [address] 1007 [facility] 
[phone] Shanthi Maher MD  follow up as scheduled by Dr. Marcello Das [facility] Cardiology [facility] [address] [facility] 
[phone] Follow-up tests needed: CMP Pending test results: At the time of your discharge the following test results are still pending: none. Please make sure you review these results with your outpatient follow-up provider(s).  
 
Specific symptoms to watch for: chest pain, shortness of breath, fever, chills, nausea, vomiting, diarrhea, change in mentation, falling, weakness, bleeding. DIET/what to eat:  Renal Diet ACTIVITY:  Activity as tolerated What to do if new or unexpected symptoms occur? If you experience any of the above symptoms (or should other concerns or questions arise after discharge) please call your primary care physician. Return to the emergency room if you cannot get hold of your doctor. · It is very important that you keep your follow-up appointment(s). · Please bring discharge papers, medication list (and/or medication bottles) to your follow-up appointments for review by your outpatient provider(s). · Please check the list of medications and be sure it includes every medication (even non-prescription medications) that your provider wants you to take. · It is important that you take the medication exactly as they are prescribed. · Keep your medication in the bottles provided by the pharmacist and keep a list of the medication names, dosages, and times to be taken in your wallet. · Do not take other medications without consulting your doctor. · If you have any questions about your medications or other instructions, please talk to your nurse or care provider before you leave the hospital.  
 
Information obtained by:  
 
I understand that if any problems occur once I am at home I am to contact my physician. These instructions were explained to me and I had the opportunity to ask questions. I understand and acknowledge receipt of the instructions indicated above. Physician's or R.N.'s Signature                                                                  Date/Time Patient or Representative Signature Date/Time Heart Failure: Care Instructions Your Care Instructions Heart failure occurs when your heart does not pump as much blood as the body needs. Failure does not mean that the heart has stopped pumping but rather that it is not pumping as well as it should. Over time, this causes fluid buildup in your lungs and other parts of your body. Fluid buildup can cause shortness of breath, fatigue, swollen ankles, and other problems. By taking medicines regularly, reducing sodium (salt) in your diet, checking your weight every day, and making lifestyle changes, you can feel better and live longer. Follow-up care is a key part of your treatment and safety. Be sure to make and go to all appointments, and call your doctor if you are having problems. It's also a good idea to know your test results and keep a list of the medicines you take. How can you care for yourself at home? Medicines · Be safe with medicines. Take your medicines exactly as prescribed. Call your doctor if you think you are having a problem with your medicine. · Do not take any vitamins, over-the-counter medicine, or herbal products without talking to your doctor first. Cristy Givens not take ibuprofen (Advil or Motrin) and naproxen (Aleve) without talking to your doctor first. They could make your heart failure worse. · You may be taking some of the following medicine. ¨ Beta-blockers can slow heart rate, decrease blood pressure, and improve your condition. Taking a beta-blocker may lower your chance of needing to be hospitalized. ¨ Angiotensin-converting enzyme inhibitors (ACEIs) reduce the heart's workload, lower blood pressure, and reduce swelling. Taking an ACEI may lower your chance of needing to be hospitalized again. ¨ Angiotensin II receptor blockers (ARBs) work like ACEIs. Your doctor may prescribe them instead of ACEIs. ¨ Diuretics, also called water pills, reduce swelling. ¨ Potassium supplements replace this important mineral, which is sometimes lost with diuretics. ¨ Aspirin and other blood thinners prevent blood clots, which can cause a stroke or heart attack. You will get more details on the specific medicines your doctor prescribes. Diet · Your doctor may suggest that you limit sodium to 1,500 milligrams (mg) a day. That is less than 1 teaspoon of salt a day, including all the salt you eat in cooking or in packaged foods. People get most of their sodium from processed foods. Fast food and restaurant meals also tend to be very high in sodium. · Ask your doctor how much liquid you can drink each day. You may have to limit liquids. Weight · Weigh yourself without clothing at the same time each day. Record your weight. Call your doctor if you gain more than 3 pounds in 24 hours or 5 pounds in one week. A sudden weight gain may mean that your heart failure is getting worse. Activity level · Start light exercise (if your doctor says it is okay). Even if you can only do a small amount, exercise will help you get stronger, have more energy, and manage your weight and your stress. Walking is an easy way to get exercise. Start out by walking a little more than you did before. Bit by bit, increase the amount you walk. · When you exercise, watch for signs that your heart is working too hard. You are pushing yourself too hard if you cannot talk while you are exercising. If you become short of breath or dizzy or have chest pain, stop, sit down, and rest. 
· If you feel \"wiped out\" the day after you exercise, walk slower or for a shorter distance until you can work up to a better pace. · Get enough rest at night. Sleeping with 1 or 2 pillows under your upper body and head may help you breathe easier. Lifestyle changes · Do not smoke. Smoking can make a heart condition worse. If you need help quitting, talk to your doctor about stop-smoking programs and medicines. These can increase your chances of quitting for good. Quitting smoking may be the most important step you can take to protect your heart. · Limit alcohol to 2 drinks a day for men and 1 drink a day for women. Too much alcohol can cause health problems. · Avoid getting sick from colds and the flu. Get a pneumococcal vaccine shot. If you have had one before, ask your doctor whether you need another dose. Get a flu shot each year. If you must be around people with colds or the flu, wash your hands often. When should you call for help? Call 911 if you have symptoms of sudden heart failure such as: 
· You have severe trouble breathing. · You cough up pink, foamy mucus. · You have a new irregular or rapid heartbeat. Call your doctor now or seek immediate medical care if: 
· You have new or increased shortness of breath. · You are dizzy or lightheaded, or you feel like you may faint. · You have sudden weight gain, such as 3 pounds in 24 hours, or 5 pounds in one week. · You have increased swelling in your legs, ankles, or feet. · You are suddenly so tired or weak that you cannot do your usual activities. Watch closely for changes in your health, and be sure to contact your doctor if: 
· You develop new symptoms. Where can you learn more? Go to Proxible.be Enter L512 in the search box to learn more about \"Heart Failure: Care Instructions. \"  
© 5523-5432 [employer]wise, [employer]. Care instructions adapted under license by Jeri Kuhn (which disclaims liability or warranty for this information). This care instruction is for use with your licensed healthcare professional. If you have questions about a medical condition or this instruction, always ask your healthcare professional. [place] [address] any warranty or liability for your use of this information. Content Version: 84.6.192912; Current as of: February 20, 2015 (modified 9/26/16). Discharge Instructions Attachments/References FUROSEMIDE (BY MOUTH) (ENGLISH) Discharge Orders None [facility] Announcement We are excited to announce that we are making your provider's discharge notes available to you in Sees[employer]. You will see these notes when they are completed and signed by the physician that discharged you from your recent hospital stay. If you have any questions or concerns about any information you see in Seesawt, please call the Health Information Department where you were seen or reach out to your Primary Care Provider for more information about your plan of care. General Information Please provide this summary of care documentation to your next provider. Patient Signature:  ____________________________________________________________ Date:  ____________________________________________________________  
  
Kirk Ackerman Provider Signature:  ____________________________________________________________ Date:  ____________________________________________________________ More Information Furosemide (By mouth) Furosemide (fxcl-JA-xb-mide) Treats fluid retention and high blood pressure. This medicine is a diuretic (water pill). Brand Name(s): Active-Medicated Specimen Collection Kit, Lasix, RX-Specimen Collection Kit, Urinx Medicated Specimen Collection Package There may be other brand names for this medicine. When This Medicine Should Not Be Used: This medicine is not right for everyone. Do not use it if you had an allergic reaction to furosemide or if you are not able to urinate. How to Use This Medicine:  
Liquid, Tablet · Take your medicine as directed. Your dose may need to be changed several times to find what works best for you. · Measure the oral liquid medicine with a marked measuring spoon, oral syringe, or medicine cup. · You may take this medicine with food if it upsets your stomach. · Missed dose: Take a dose as soon as you remember. If it is almost time for your next dose, wait until then and take a regular dose. Do not take extra medicine to make up for a missed dose. · Store the medicine in a closed container at room temperature, away from heat, moisture, and direct light. Drugs and Foods to Avoid: Ask your doctor or pharmacist before using any other medicine, including over-the-counter medicines, vitamins, and herbal products. · Some medicines can affect how furosemide works. Tell your doctor if you are also using cisplatin, cyclosporine, digoxin, ethacrynic acid, indomethacin, licorice, lithium, mecamylamine, methotrexate, or phenytoin. · Also tell your doctor if you are also using an adrenocorticotropic hormone (ACTH), a laxative, medicine to treat an infection, other medicine for high blood pressure, a steroid medicine (such as hydrocortisone, methylprednisolone, prednisone, prednisolone, dexamethasone), or an NSAID pain or arthritis medicine (such as aspirin, diclofenac, ibuprofen, naproxen). · If you also take sucralfate, allow at least 2 hours between the time you take furosemide and the time you take sucralfate. Warnings While Using This Medicine: · Tell your doctor if you are pregnant or breastfeeding, or if you have kidney disease, liver disease, anemia, diabetes, gout, hearing problems, low blood pressure, lupus, an enlarged prostate, trouble urinating, or an allergy to sulfa drugs. Tell your doctor if you drink alcohol. · This medicine may cause the following problems:  
¨ Hypokalemia (low potassium in your blood) ¨ Changes in blood sugar levels ¨ Hearing problems · Make sure any doctor or dentist who treats you knows that you are using this medicine. · This medicine could lower your blood pressure too much, especially when you first use it or if you are dehydrated. Stand or sit up slowly if you feel lightheaded or dizzy. · Drink plenty of fluids if you exercise, sweat more than usual, or have diarrhea or vomiting. · This medicine may make your skin more sensitive to sunlight. Wear sunscreen. Do not use sunlamps or tanning beds. · Your doctor will do lab tests at regular visits to check on the effects of this medicine. Keep all appointments. · Keep all medicine out of the reach of children. Never share your medicine with anyone. Possible Side Effects While Using This Medicine:  
Call your doctor right away if you notice any of these side effects: · Allergic reaction: Itching or hives, swelling in your face or hands, swelling or tingling in your mouth or throat, chest tightness, trouble breathing · Blistering, peeling, red skin rash · Chest pain, shortness of breath · Confusion, weakness, muscle twitching · Dry mouth, increased thirst, muscle cramps, nausea or vomiting, uneven heartbeat · Sudden and severe stomach pain, nausea, vomiting, fever, lightheadedness · Hearing loss, ringing in the ears · Lightheadedness, dizziness, fainting · Severe diarrhea · Unusual bleeding or bruising · Yellow skin or eyes If you notice these less serious side effects, talk with your doctor: · Loss of appetite, stomach cramps If you notice other side effects that you think are caused by this medicine, tell your doctor. Call your doctor for medical advice about side effects. You may report side effects to FDA at 6-274-FDA-8649 © 2016 [address] is for End User's use only and may not be sold, redistributed or otherwise used for commercial purposes. The above information is an  only. It is not intended as medical advice for individual conditions or treatments. Talk to your doctor, nurse or pharmacist before following any medical regimen to see if it is safe and effective for you.

## 2017-03-26 NOTE — ED NOTES
Timeout: EMTALA completed. Report to Dylan Tate, P-EMT of [facility]. Pt is stable for transfer to [facility] room 508.

## 2017-03-26 NOTE — ED PROVIDER NOTES
HPI Comments: 45-year-old [facility] American female with history of CHF and chronic renal insufficiency presents to the emergency department with coughing and shortness of breath. Patient states that the symptoms have been present over the last several weeks. She feels like the shortness of breath is worse if she lies flat and better if she sits up. She also felt like the cough and shortness of breath was better when she used her nasal spray this morning. Patient has also had swelling in her legs over the past several weeks. She was admitted to the hospital several weeks ago. She was diagnosed with mild congestive heart failure. Patient denies any chest pain. She denies any abdominal pain today. Patient does not smoke or drink alcohol. The history is provided by the patient. Past Medical History:   Diagnosis Date    Anemia     Arthritis     Calculus of kidney     Congestive heart failure (HCC)     Diabetes (HCC)     Hematuria        Past Surgical History:   Procedure Laterality Date    COLONOSCOPY N/A 3/13/2017    COLONOSCOPY performed by Tanna Alvraado [facility] at [facility]      left carpal tunnel    HX TUBAL LIGATION Bilateral          History reviewed. No pertinent family history. Social History     Social History    Marital status:      Spouse name: N/A    Number of children: N/A    Years of education: N/A     Occupational History    Not on file. Social History Main Topics    Smoking status: Never Smoker    Smokeless tobacco: Never Used    Alcohol use No    Drug use: No    Sexual activity: Not on file     Other Topics Concern    Not on file     Social History Narrative         ALLERGIES: Ciprofloxacin    Review of Systems   Constitutional: Negative for fever. HENT: Positive for congestion. Negative for facial swelling and nosebleeds. Eyes: Negative for pain. Respiratory: Positive for cough, shortness of breath and wheezing.  Negative for chest tightness. Cardiovascular: Negative for chest pain and leg swelling. Gastrointestinal: Negative for abdominal pain and vomiting. Endocrine: Negative for polyuria. Genitourinary: Negative for difficulty urinating and flank pain. Musculoskeletal: Negative for arthralgias and back pain. Skin: Negative for color change. Allergic/Immunologic: Negative for immunocompromised state. Neurological: Negative for dizziness and headaches. Hematological: Does not bruise/bleed easily. Psychiatric/Behavioral: Negative for agitation. All other systems reviewed and are negative. Vitals:    03/26/17 1252   BP: 166/80   Pulse: 70   Resp: 17   Temp: 97.5 °F (36.4 °C)   SpO2: 98%   Weight: 78.5 kg (173 lb 1 oz)   Height: 5' 1\" (1.549 m)            Physical Exam   Constitutional: She is oriented to person, place, and time. She appears well-developed and well-nourished. Well appearing, no distress   HENT:   Head: Normocephalic and atraumatic. Right Ear: External ear normal.   Left Ear: External ear normal.   Nose: Nose normal.   Mouth/Throat: Oropharynx is clear and moist.   Eyes: EOM are normal. Pupils are equal, round, and reactive to light. No scleral icterus. Neck: Normal range of motion. Neck supple. No JVD present. No tracheal deviation present. No thyromegaly present. Cardiovascular: Normal rate, regular rhythm, normal heart sounds and intact distal pulses. Exam reveals no friction rub. No murmur heard. Pulmonary/Chest: Effort normal and breath sounds normal. No stridor. No respiratory distress. She has no wheezes. She has no rales. She exhibits no tenderness. Lungs are clear. O2 sat is 98% on RA   Abdominal: Soft. Bowel sounds are normal. She exhibits no distension. There is no tenderness. There is no rebound and no guarding. Musculoskeletal: Normal range of motion. She exhibits no tenderness. 1+ edema in bilateral lower legs   Lymphadenopathy:     She has no cervical adenopathy. Neurological: She is alert and oriented to person, place, and time. She has normal reflexes. No cranial nerve deficit. Coordination normal.   Skin: Skin is warm and dry. No rash noted. No erythema. Psychiatric: She has a normal mood and affect. Her behavior is normal. Judgment and thought content normal.   Nursing note and vitals reviewed. MDM  Number of Diagnoses or Management Options  Acute pulmonary edema ([address]): Anemia, unspecified type:   Pleural effusion:   Renal insufficiency:   Diagnosis management comments: Patient looks well and nontoxic. Lungs are clear. We'll check EKG, blood work, chest x-ray. We'll reassess when testing his back. Differential diagnosis includes bronchitis, upper respiratory infection, CHF, nasal congestion.        Amount and/or Complexity of Data Reviewed  Clinical lab tests: ordered and reviewed  Tests in the radiology section of CPT®: ordered and reviewed  Tests in the medicine section of CPT®: reviewed and ordered  Discussion of test results with the performing providers: yes  Decide to obtain previous medical records or to obtain history from someone other than the patient: yes  Obtain history from someone other than the patient: yes  Review and summarize past medical records: yes  Discuss the patient with other providers: yes  Independent visualization of images, tracings, or specimens: yes    Risk of Complications, Morbidity, and/or Mortality  Presenting problems: high  Diagnostic procedures: high  Management options: high    Critical Care  Total time providing critical care: 30-74 minutes (Total critical care time spend exclusive of procedures:  30 min)    Patient Progress  Patient progress: improved    ED Course       Procedures    CXR- No pleural effusions, pulmonary edema    HGB slightly worse at 7.4    Creat is slightly worse at 2.71    Trop is negative    Will have to admit for pleural effusions, CHF, anemia, worsening kidney function     Will give Lasix and admit. Will page family medicine now. BNP is elevated as well from CHF and pulm edema    2:38 PM  CONSULT  I spoke to the family medicine resident who will admit. Attending is Dr Isidro Weeks.

## 2017-03-26 NOTE — ROUTINE PROCESS
Bedside shift change report given to Analy Alonzo (oncoming nurse) by Hien Hartman RN (offgoing nurse). Report included the following information SBAR, Kardex, MAR and Recent Results.

## 2017-03-26 NOTE — ROUTINE PROCESS
TRANSFER - OUT REPORT:    Verbal report given to Sandie Benjamin on Rhona To  being transferred to 5th floor room 508(unit) for routine progression of care       Report consisted of patients Situation, Background, Assessment and   Recommendations(SBAR). Information from the following report(s) SBAR, ED Summary, [facility] and Recent Results was reviewed with the receiving nurse. Lines:   Peripheral IV 03/26/17 Left Antecubital (Active)   Site Assessment Clean, dry, & intact 3/26/2017  1:28 PM   Phlebitis Assessment 0 3/26/2017  1:28 PM   Infiltration Assessment 0 3/26/2017  1:28 PM   Dressing Status Clean, dry, & intact 3/26/2017  1:28 PM   Dressing Type Tape;Transparent 3/26/2017  1:28 PM   Hub Color/Line Status Pink;Flushed;Patent 3/26/2017  1:28 PM   Action Taken Blood drawn 3/26/2017  1:28 PM        Opportunity for questions and clarification was provided.       Patient transported with:   Monitor pulse oximetry

## 2017-03-26 NOTE — PROGRESS NOTES
TRANSFER - IN REPORT:    Verbal report received from Po Cornejo [facility] (name) on Ephriam Range  being received from [place] ED (unit) for routine progression of care      Report consisted of patients Situation, Background, Assessment and   Recommendations(SBAR). Information from the following report(s) SBAR, Kardex, [facility] and Recent Results was reviewed with the receiving nurse. Opportunity for questions and clarification was provided. Assessment completed upon patients arrival to unit and care assumed.

## 2017-03-26 NOTE — IP AVS SNAPSHOT
Current Discharge Medication List  
  
START taking these medications Dose & Instructions Dispensing Information Comments Morning Noon Evening Bedtime  
 doxazosin 2 mg tablet Commonly known as:  CARDURA Your last dose was: Your next dose is:    
   
   
 Dose:  2 mg Take 1 Tab by mouth daily. Quantity:  30 Tab Refills:  1  
     
   
   
   
  
 furosemide 20 mg tablet Commonly known as:  LASIX Your last dose was: Your next dose is:    
   
   
 Dose:  20 mg Take 1 Tab by mouth daily. Quantity:  30 Tab Refills:  0 CONTINUE these medications which have NOT CHANGED Dose & Instructions Dispensing Information Comments Morning Noon Evening Bedtime  
 amLODIPine 10 mg tablet Commonly known as:  Delphilonny Peat Your last dose was: Your next dose is:    
   
   
 Dose:  10 mg Take 10 mg by mouth daily. Refills:  0  
     
   
   
   
  
 aspirin delayed-release 81 mg tablet Your last dose was: Your next dose is:    
   
   
 take 2 tablets by mouth once daily Quantity:  60 Tab Refills:  3  
     
   
   
   
  
 atorvastatin 10 mg tablet Commonly known as:  LIPITOR Your last dose was: Your next dose is:    
   
   
 Dose:  10 mg Take 1 Tab by mouth nightly. Quantity:  30 Tab Refills:  5  
     
   
   
   
  
 dorzolamide-timolol 22.3-6.8 mg/mL ophthalmic solution Commonly known as:  COSOPT Your last dose was: Your next dose is:    
   
   
 Dose:  1 Drop Administer 1 Drop to both eyes two (2) times a day. Refills:  0  
     
   
   
   
  
 labetalol 200 mg tablet Commonly known as:  Alok Delarosase Your last dose was: Your next dose is:    
   
   
 Dose:  200 mg Take 200 mg by mouth two (2) times a day. Refills:  0  
     
   
   
   
  
 losartan 100 mg tablet Commonly known as:  COZAAR Your last dose was: Your next dose is:    
   
   
 Dose:  100 mg Take 1 Tab by mouth daily. Quantity:  30 Tab Refills:  0  
     
   
   
   
  
 TYLENOL PM PO Your last dose was: Your next dose is:    
   
   
 Dose:  2 Tab Take 2 Tabs by mouth nightly as needed (sleep). Refills:  0 STOP taking these medications   
 amoxicillin-clavulanate 500-125 mg per tablet Commonly known as:  AUGMENTIN Where to Get Your Medications These medications were sent to [address] [facility] [address] Phone:  [phone]  
  doxazosin 2 mg tablet  
 furosemide 20 mg tablet

## 2017-03-27 PROBLEM — I50.9 CHF EXACERBATION (HCC): Status: ACTIVE | Noted: 2017-03-27

## 2017-03-27 LAB
ANION GAP BLD CALC-SCNC: 10 MMOL/L (ref 5–15)
ATRIAL RATE: 78 BPM
BASOPHILS # BLD AUTO: 0 K/UL (ref 0–0.1)
BASOPHILS # BLD: 0 % (ref 0–1)
BUN SERPL-MCNC: 36 MG/DL (ref 6–20)
BUN/CREAT SERPL: 14 (ref 12–20)
CALCIUM SERPL-MCNC: 8 MG/DL (ref 8.5–10.1)
CALCULATED P AXIS, ECG09: 52 DEGREES
CALCULATED R AXIS, ECG10: 38 DEGREES
CALCULATED T AXIS, ECG11: 94 DEGREES
CHLORIDE SERPL-SCNC: 110 MMOL/L (ref 97–108)
CO2 SERPL-SCNC: 20 MMOL/L (ref 21–32)
CREAT SERPL-MCNC: 2.5 MG/DL (ref 0.55–1.02)
DIAGNOSIS, 93000: NORMAL
EOSINOPHIL # BLD: 0.2 K/UL (ref 0–0.4)
EOSINOPHIL NFR BLD: 3 % (ref 0–7)
ERYTHROCYTE [DISTWIDTH] IN BLOOD BY AUTOMATED COUNT: 14.9 % (ref 11.5–14.5)
ERYTHROCYTE [DISTWIDTH] IN BLOOD BY AUTOMATED COUNT: 15 % (ref 11.5–14.5)
FERRITIN SERPL-MCNC: 440 NG/ML (ref 8–252)
GLUCOSE SERPL-MCNC: 95 MG/DL (ref 65–100)
HAPTOGLOB SERPL-MCNC: 93 MG/DL (ref 30–200)
HCT VFR BLD AUTO: 21.7 % (ref 35–47)
HCT VFR BLD AUTO: 22.8 % (ref 35–47)
HGB BLD-MCNC: 7 G/DL (ref 11.5–16)
HGB BLD-MCNC: 7.1 G/DL (ref 11.5–16)
IRON SATN MFR SERPL: 24 % (ref 20–50)
IRON SERPL-MCNC: 43 UG/DL (ref 35–150)
LDH SERPL L TO P-CCNC: 256 U/L (ref 81–246)
LYMPHOCYTES # BLD AUTO: 15 % (ref 12–49)
LYMPHOCYTES # BLD: 1 K/UL (ref 0.8–3.5)
MAGNESIUM SERPL-MCNC: 1.8 MG/DL (ref 1.6–2.4)
MCH RBC QN AUTO: 25.9 PG (ref 26–34)
MCH RBC QN AUTO: 26.9 PG (ref 26–34)
MCHC RBC AUTO-ENTMCNC: 30.7 G/DL (ref 30–36.5)
MCHC RBC AUTO-ENTMCNC: 32.7 G/DL (ref 30–36.5)
MCV RBC AUTO: 82.2 FL (ref 80–99)
MCV RBC AUTO: 84.4 FL (ref 80–99)
MONOCYTES # BLD: 0.6 K/UL (ref 0–1)
MONOCYTES NFR BLD AUTO: 8 % (ref 5–13)
NEUTS SEG # BLD: 5.1 K/UL (ref 1.8–8)
NEUTS SEG NFR BLD AUTO: 74 % (ref 32–75)
P-R INTERVAL, ECG05: 140 MS
PHOSPHATE SERPL-MCNC: 4.4 MG/DL (ref 2.6–4.7)
PLATELET # BLD AUTO: 152 K/UL (ref 150–400)
PLATELET # BLD AUTO: 177 K/UL (ref 150–400)
POTASSIUM SERPL-SCNC: 4.2 MMOL/L (ref 3.5–5.1)
Q-T INTERVAL, ECG07: 366 MS
QRS DURATION, ECG06: 78 MS
QTC CALCULATION (BEZET), ECG08: 417 MS
RBC # BLD AUTO: 2.64 M/UL (ref 3.8–5.2)
RBC # BLD AUTO: 2.7 M/UL (ref 3.8–5.2)
SODIUM SERPL-SCNC: 140 MMOL/L (ref 136–145)
TIBC SERPL-MCNC: 178 UG/DL (ref 250–450)
TROPONIN I SERPL-MCNC: <0.04 NG/ML
VENTRICULAR RATE, ECG03: 78 BPM
WBC # BLD AUTO: 6.6 K/UL (ref 3.6–11)
WBC # BLD AUTO: 6.9 K/UL (ref 3.6–11)

## 2017-03-27 PROCEDURE — 74011250636 HC RX REV CODE- 250/636: Performed by: FAMILY MEDICINE

## 2017-03-27 PROCEDURE — 83010 ASSAY OF HAPTOGLOBIN QUANT: CPT | Performed by: FAMILY MEDICINE

## 2017-03-27 PROCEDURE — G8989 SELF CARE D/C STATUS: HCPCS

## 2017-03-27 PROCEDURE — 74011000250 HC RX REV CODE- 250: Performed by: FAMILY MEDICINE

## 2017-03-27 PROCEDURE — 93306 TTE W/DOPPLER COMPLETE: CPT

## 2017-03-27 PROCEDURE — 86900 BLOOD TYPING SEROLOGIC ABO: CPT | Performed by: FAMILY MEDICINE

## 2017-03-27 PROCEDURE — 85025 COMPLETE CBC W/AUTO DIFF WBC: CPT | Performed by: FAMILY MEDICINE

## 2017-03-27 PROCEDURE — 97116 GAIT TRAINING THERAPY: CPT

## 2017-03-27 PROCEDURE — 74011250637 HC RX REV CODE- 250/637: Performed by: FAMILY MEDICINE

## 2017-03-27 PROCEDURE — P9016 RBC LEUKOCYTES REDUCED: HCPCS | Performed by: FAMILY MEDICINE

## 2017-03-27 PROCEDURE — 83540 ASSAY OF IRON: CPT | Performed by: FAMILY MEDICINE

## 2017-03-27 PROCEDURE — 83735 ASSAY OF MAGNESIUM: CPT | Performed by: FAMILY MEDICINE

## 2017-03-27 PROCEDURE — 36430 TRANSFUSION BLD/BLD COMPNT: CPT

## 2017-03-27 PROCEDURE — 85027 COMPLETE CBC AUTOMATED: CPT | Performed by: FAMILY MEDICINE

## 2017-03-27 PROCEDURE — 97165 OT EVAL LOW COMPLEX 30 MIN: CPT

## 2017-03-27 PROCEDURE — 77030029131 HC ADMN ST IV BLD N DEHP ICUM -B

## 2017-03-27 PROCEDURE — G8987 SELF CARE CURRENT STATUS: HCPCS

## 2017-03-27 PROCEDURE — 97161 PT EVAL LOW COMPLEX 20 MIN: CPT

## 2017-03-27 PROCEDURE — G8988 SELF CARE GOAL STATUS: HCPCS

## 2017-03-27 PROCEDURE — 80048 BASIC METABOLIC PNL TOTAL CA: CPT | Performed by: FAMILY MEDICINE

## 2017-03-27 PROCEDURE — 84100 ASSAY OF PHOSPHORUS: CPT | Performed by: FAMILY MEDICINE

## 2017-03-27 PROCEDURE — 82728 ASSAY OF FERRITIN: CPT | Performed by: FAMILY MEDICINE

## 2017-03-27 PROCEDURE — 86920 COMPATIBILITY TEST SPIN: CPT | Performed by: FAMILY MEDICINE

## 2017-03-27 PROCEDURE — 93005 ELECTROCARDIOGRAM TRACING: CPT

## 2017-03-27 PROCEDURE — 36415 COLL VENOUS BLD VENIPUNCTURE: CPT | Performed by: FAMILY MEDICINE

## 2017-03-27 PROCEDURE — 99218 HC RM OBSERVATION: CPT

## 2017-03-27 PROCEDURE — 97535 SELF CARE MNGMENT TRAINING: CPT

## 2017-03-27 PROCEDURE — 84484 ASSAY OF TROPONIN QUANT: CPT | Performed by: FAMILY MEDICINE

## 2017-03-27 PROCEDURE — 83615 LACTATE (LD) (LDH) ENZYME: CPT | Performed by: FAMILY MEDICINE

## 2017-03-27 RX ORDER — SIMETHICONE 80 MG
80 TABLET,CHEWABLE ORAL
Status: DISCONTINUED | OUTPATIENT
Start: 2017-03-27 | End: 2017-03-29 | Stop reason: HOSPADM

## 2017-03-27 RX ORDER — SODIUM CHLORIDE 9 MG/ML
250 INJECTION, SOLUTION INTRAVENOUS AS NEEDED
Status: DISCONTINUED | OUTPATIENT
Start: 2017-03-27 | End: 2017-03-29 | Stop reason: SDUPTHER

## 2017-03-27 RX ORDER — SODIUM CHLORIDE 9 MG/ML
250 INJECTION, SOLUTION INTRAVENOUS AS NEEDED
Status: DISCONTINUED | OUTPATIENT
Start: 2017-03-27 | End: 2017-03-29 | Stop reason: HOSPADM

## 2017-03-27 RX ORDER — FUROSEMIDE 10 MG/ML
40 INJECTION INTRAMUSCULAR; INTRAVENOUS 2 TIMES DAILY
Status: DISCONTINUED | OUTPATIENT
Start: 2017-03-27 | End: 2017-03-28

## 2017-03-27 RX ADMIN — AMLODIPINE BESYLATE 10 MG: 5 TABLET ORAL at 09:49

## 2017-03-27 RX ADMIN — DORZOLAMIDE HYDROCHLORIDE AND TIMOLOL MALEATE 1 DROP: 20; 5 SOLUTION/ DROPS OPHTHALMIC at 11:36

## 2017-03-27 RX ADMIN — HEPARIN SODIUM 5000 UNITS: 5000 INJECTION, SOLUTION INTRAVENOUS; SUBCUTANEOUS at 05:42

## 2017-03-27 RX ADMIN — FUROSEMIDE 40 MG: 10 INJECTION, SOLUTION INTRAMUSCULAR; INTRAVENOUS at 11:36

## 2017-03-27 RX ADMIN — LABETALOL HCL 200 MG: 200 TABLET, FILM COATED ORAL at 09:49

## 2017-03-27 RX ADMIN — Medication 10 ML: at 21:35

## 2017-03-27 RX ADMIN — DORZOLAMIDE HYDROCHLORIDE AND TIMOLOL MALEATE 1 DROP: 20; 5 SOLUTION/ DROPS OPHTHALMIC at 18:23

## 2017-03-27 RX ADMIN — NITROGLYCERIN 1 INCH: 20 OINTMENT TOPICAL at 11:34

## 2017-03-27 RX ADMIN — HEPARIN SODIUM 5000 UNITS: 5000 INJECTION, SOLUTION INTRAVENOUS; SUBCUTANEOUS at 15:32

## 2017-03-27 RX ADMIN — Medication 10 ML: at 05:42

## 2017-03-27 RX ADMIN — ASPIRIN 81 MG: 81 TABLET, COATED ORAL at 09:49

## 2017-03-27 RX ADMIN — SIMETHICONE 80 MG: 80 TABLET, CHEWABLE ORAL at 21:34

## 2017-03-27 RX ADMIN — HEPARIN SODIUM 5000 UNITS: 5000 INJECTION, SOLUTION INTRAVENOUS; SUBCUTANEOUS at 21:34

## 2017-03-27 RX ADMIN — LOSARTAN POTASSIUM 100 MG: 50 TABLET, FILM COATED ORAL at 09:49

## 2017-03-27 RX ADMIN — ATORVASTATIN CALCIUM 10 MG: 10 TABLET, FILM COATED ORAL at 21:34

## 2017-03-27 RX ADMIN — Medication 10 ML: at 15:12

## 2017-03-27 NOTE — PROGRESS NOTES
[address] [facility] PROGRAM  PROGRESS NOTE     3/27/2017  PCP: Chip Ramos MD     Assessment/Plan:   Hospital Day: 2    Preston Gipson is a 62 y.o. female who is admitted for CHF Decompensation.     CHF Decompensation; likely secondary to dietary indiscretion (high sodium diet). ECHO on 07/19/16 reveals EF 45-50%. proBNP is 8130. This is the first episode of such presentation. Trop <0.04 x1   - Cardiology consulted; follow up recomendations   - Diurese with Furosemide  - Strict In's and Out's   - Weight during last office visit on 03/22/2017 = 169 ()  - Obtain EKG and ECHO  - Trend troponins   - Cardiac diet with low sodium and potassium  - Continue Normodyne, Cozaar, Norvasc      Chronic Kidney Disease (Stage 4); POA Cr 2.71 (BL 2.11 - 2.31), GFR 22  - Avoid nephrotoxic medications  - Diuresis with Lasix for now     Hypertension; Current BP: 166/80  -Continue home Norvasc, Normodyne and Cozaar      DM; HgA1C <3.5. POC Glucose 120. Patient states that she was treated for a time with metformin but was suspended. Last HgA1C was taken during a period of acute blood loss, for which is not very accurate.      Hyperlipidemia; Lipid Panel 03/10/2017 Tchol: 125 LDL: 48.6 HDL: 71 Trigs: 27  -Continue Lipitor every day     FEN/GI - Cardiac Diet. Activity - Ambulate with Assistance   DVT prophylaxis - Heparin   GI prophylaxis - None  Disposition - Plan to d/c to [facility]BA.      CODE STATUS:  FULL CODE    Pt to be discussed with Dr. Margot Barroso (on-call attending physician)     Subjective: \"Im breathing OK. Had gas overnight\"    Pt was seen and examined at bedside. Concerns overnight include: gas pain overnight. Added simethicone for gas relieve. Denies chest pain, SOB, nausea, vomiting, dizziness.      Objective:   Physical examination  Visit Vitals    /77 (BP 1 Location: Left arm, BP Patient Position: At rest)    Pulse 84    Temp 99 °F (37.2 °C)    Resp 17    Ht 5' 1\" (1.549 m)    Wt 173 lb 1 oz (78.5 kg)    SpO2 95%    BMI 32.7 kg/m2      Temp (24hrs), Av.1 °F (36.7 °C), Min:97.5 °F (36.4 °C), Max:99 °F (37.2 °C)         O2 Device: Room air      Date 17 - 17 0617 - 17 0659   Shift  24 Hour Total  24 Hour Total   I  N  T  A  K  E   Shift Total  (mL/kg)         O  U  T  P  U  T   Urine  (mL/kg/hr) 400  (0.4)  400         Urine Voided 400  400         Urine Occurrence(s) 300 x  300 x       Shift Total  (mL/kg) 400  (5.1)  400  (5.1)      NET -400  -400      Weight (kg) 78.5 78.5 78.5 78.5 78.5 78.5         Last 3 shifts:    701 - 1900  In: -   Out: 400 [Urine:400]    General: No acute distress. Alert. Cooperative. Head: Normocephalic. Atraumatic. Eyes:  Conjunctiva pink. Sclera white. PERRL. Ears:  Ear canals patent. TM non-erythematous. Nose:  Septum midline. Mucosa pink. No drainage. Throat: Mucosa pink. Moist mucous membranes. No tonsillar exudates or erythema. Palate movement equal bilaterally. Neck: Supple. Normal ROM. No stiffness. Respiratory: CTAB. No w/r/r/c.   Cardiovascular: RRR. Normal S1,S2. No m/r/g. Pulses 2+ throughout. GI: + bowel sounds. Nontender. No rebound tenderness or guarding. Nondistended. Extremities: No edema. No palpable cord. No tenderness. Musculoskeletal: Full ROM in all extremities. Neuro: CN II-XII grossly intact. Strength 5/5 in all extremities. Sensation intact in all extremities. DTRs 2+ throughout. Skin: Clear. No rashes. No ulcers.         Data Review:     Recent Labs      17   1330   WBC  6.7   HGB  7.4*   HCT  24.1*   PLT  187     Recent Labs      17   1330   NA  138   K  5.1   CL  108   CO2  21   GLU  120*   BUN  40*   CREA  2.71*   CA  8.3*   MG  1.6     Medications reviewed  Current Facility-Administered Medications   Medication Dose Route Frequency    simethicone (MYLICON) tablet 80 mg  80 mg Oral QID PRN    sodium chloride (NS) flush 5-10 mL  5-10 mL IntraVENous Q8H    sodium chloride (NS) flush 5-10 mL  5-10 mL IntraVENous PRN    sodium chloride (NS) flush 5-10 mL  5-10 mL IntraVENous Q8H    sodium chloride (NS) flush 5-10 mL  5-10 mL IntraVENous PRN    heparin (porcine) injection 5,000 Units  5,000 Units SubCUTAneous Q8H    furosemide (LASIX) injection 40 mg  40 mg IntraVENous DAILY    amLODIPine (NORVASC) tablet 10 mg  10 mg Oral DAILY    atorvastatin (LIPITOR) tablet 10 mg  10 mg Oral QHS    dorzolamide-timolol (COSOPT) 22.3-6.8 mg/mL ophthalmic solution 1 Drop  1 Drop Both Eyes BID    labetalol (NORMODYNE) tablet 200 mg  200 mg Oral BID    losartan (COZAAR) tablet 100 mg  100 mg Oral DAILY    aspirin delayed-release tablet 81 mg  81 mg Oral DAILY         Signed:   Zelda Dela Cruz MD   Resident, [facility] Problems  Date Reviewed: 3/15/2017    None

## 2017-03-27 NOTE — CONSULTS
Rocío Arnett MD    [id number] [facility] [place], [address] [facility]    Office [phone],LSX [phone]  Pager [phone]    Date of  Admission: 3/26/2017 12:50 PM  PCP- Leonard Angel MD    Carrie Howe is a 62 y.o. female admitted for pleural effusions, CHF, anemia, PATRICIA. Consult requested by José Farfan, DO    Assessment/Plan    Carrie Howe is a 62 y.o. female with PMH of HTN, HLD, CHF, DMII, CKD, anemia, GI bleed, arthritis who is admitted with SOB, edema. 1) Dyspnea, leg swelling: CHF exaceration - likely multifactorial with the following contributing to patient's sxs: HFpEF, pleural effusion, CKD, uncontrolled HTN, anemia. Echo today with EF 50-55% (improved from previous) and diastolic dysfunction. Followed by Dr. Stevan Arias as outpt, not on diuretics at home. - Diurese with lasix 40mg IV BID   - Requesting records from Dr. Mariya Crisostomo - if no ischemia w/u in the last 12 months will plan for stress test later once underlying medical issues have improved  - Control BP (see below)  - Consider nephrology consult  - Daily weights, strict I&Os    2) HTN: Markedly elevated BP, received am BP meds   - Continue home BB, ARB, CCB  - Add Nitropaste 1\" q6hrs (hold for SBP < 110), will wean to 1/2\" q6h tomorrow  - Recommend prn hydralazine 10mg IV q4h prn if persistent SBP> 170 or DBP > 110    3) ? Acute on Chronic KD: recent baseline Cr 2.3-2.7 during early March hospitalization, was discharged home on ARB. Followed by Dr. Memo Ramos as outpt. Renal failure may be a factor in controlling volume status. - Consider nephrology consult     4) Anemia: Recent baseline Hgb 7-8/ W/u per primary team  - Consider transfuse to keep > 8.0    5) DMII: diet controlled         I appreciate the opportunity to be involved in Ms. [address] [place] [address]. See below note for details. Please do not hesitate to contact us with questions or concerns. Feliz Meza MD    Pt personally seen and examined. Chart reviewed. Agree with Dr Esvin Bee, exam and  A/P with changes/additons    Evonne Fairbanks MD, [facility]      Cardiac Testing/ Procedures: A. Cardiac Cath/PCI: None on file    B.ECHO/CANDIE:   Echo 7/9/16 ([address]): Definity used, LVEF 45-50%, inferior/inferoseptal wall hypokinesis, LV size at upper limit of normal. Mild MR, mild to mod PA htn  Echo 3/27/17: EF 50 % to 55 %. No RWMA. Wall thickness was mildly increased. Grade 2 diastolic dysfunction. Mild MR, mild TR, mild PV regurg, mild to mod PA htn. Left pleural effusion. C.StressNuclear/Stress ECHO/Stress test: None on file, requesting records    Care Plan discussed with: Patient, Nursing Staff, Consultant/Specialist and >50% of time spent in counseling and coordination of care    Subjective:  Yesi Diego is a 62 y.o. female with PMH of HTN, HLD, CHF, DMII, CKD, anemia, GI bleed, arthritis who presents with dyspnea and leg swelling. Ms. Zapata Hallmark c/o worsening dyspnea and sofie swelling x 4-5 days. Says her dyspnea was preceded by congestion, cough - thought she had a cold. She reports off and on LLE edema x 1-2 months, then new RLE edema in the last few days. + orthopnea. + BLE cramps, worsen with walking. LUQ vs L side pain that worsens with deep breathing x 2 weeks. Recently admitted to [facility] on 3/9 - 3/13/17 for anemia/GI bleed, found to have diverticulitis but no visualized source of bleeding. Pt denies fever, chills, headache, chest pain, palpitations, dizziness, nausea, vomiting, dark stools, bleeding.       Past Medical History:   Diagnosis Date    Anemia     Arthritis     Calculus of kidney     Congestive heart failure (HCC)     Diabetes (HCC)     Hematuria       Past Surgical History:   Procedure Laterality Date    COLONOSCOPY N/A 3/13/2017    COLONOSCOPY performed by Shaun Santillan MD at [facility]      left carpal tunnel    HX TUBAL LIGATION Bilateral      Allergies   Allergen Reactions    Ciprofloxacin Angioedema     History reviewed. No pertinent family history. Social History   Substance Use Topics    Smoking status: Never Smoker    Smokeless tobacco: Never Used    Alcohol use No          Medications:  Prescriptions Prior to Admission   Medication Sig    losartan (COZAAR) 100 mg tablet Take 1 Tab by mouth daily.  amoxicillin-clavulanate (AUGMENTIN) 500-125 mg per tablet Take 1 Tab by mouth every twelve (12) hours for 14 days.  labetalol (NORMODYNE) 200 mg tablet Take 200 mg by mouth two (2) times a day.  dorzolamide-timolol (COSOPT) 22.3-6.8 mg/mL ophthalmic solution Administer 1 Drop to both eyes two (2) times a day.  atorvastatin (LIPITOR) 10 mg tablet Take 1 Tab by mouth nightly.  aspirin delayed-release 81 mg tablet take 2 tablets by mouth once daily    amLODIPine (NORVASC) 10 mg tablet Take 10 mg by mouth daily.  ACETAMINOPHEN/DIPHENHYDRAMINE (TYLENOL PM PO) Take 2 Tabs by mouth nightly as needed (sleep).      Current Facility-Administered Medications   Medication Dose Route Frequency    simethicone (MYLICON) tablet 80 mg  80 mg Oral QID PRN    furosemide (LASIX) injection 40 mg  40 mg IntraVENous BID    nitroglycerin (NITROBID) 2 % ointment 1 Inch  1 Inch Topical Q6H    remove paste/gel/ointment 1 Each  1 Each TransDERmal QPM    sodium chloride (NS) flush 5-10 mL  5-10 mL IntraVENous Q8H    sodium chloride (NS) flush 5-10 mL  5-10 mL IntraVENous PRN    heparin (porcine) injection 5,000 Units  5,000 Units SubCUTAneous Q8H    amLODIPine (NORVASC) tablet 10 mg  10 mg Oral DAILY    atorvastatin (LIPITOR) tablet 10 mg  10 mg Oral QHS    dorzolamide-timolol (COSOPT) 22.3-6.8 mg/mL ophthalmic solution 1 Drop  1 Drop Both Eyes BID    labetalol (NORMODYNE) tablet 200 mg  200 mg Oral BID    losartan (COZAAR) tablet 100 mg  100 mg Oral DAILY    aspirin delayed-release tablet 81 mg  81 mg Oral DAILY         Review of Systems:  (bold if positive, if negative)    As in HPI - rest of ROS noncontributory        Physical Exam:  Visit Vitals    /78 (BP 1 Location: Left arm, BP Patient Position: At rest)    Pulse 84    Temp 98.9 °F (37.2 °C)    Resp 18    Ht 5' 1\" (1.549 m)    Wt 167 lb 5.3 oz (75.9 kg)    SpO2 97%    BMI 31.62 kg/m2         Telemetry: normal sinus rhythm    Gen: Well-developed, well-nourished, in no acute distress  HEENT:  Pink conjunctivae, hearing intact to voice, moist mucous membranes  Neck: Supple, + JVD, No Carotid Bruit  Resp: No accessory muscle use, Clear breath sounds, + rales in bilateral lower lungs L>R, no wheezes or rhonchi  Card: Regular Rate,Rythm, Normal S1, S2, 3/6 systolic murmur, no rubs or gallop. No thrills. Abd:  Soft, mild ttp in LUQ, non-distended, normoactive bowel sounds are present, no abdominal bruit   MSK: No cyanosis or clubbing  Skin: No rashes or ulcers  Neuro:  Cranial nerves are grossly intact, moving all four extremities, no focal deficit, follows commands appropriately  Psych:  Good insight, oriented to person, place and time, alert, Nml Affect  LE: 2+ pitting edema bilaterally  Vascular: Distal Pulses 2+ and symmetric        EKG: I personally viewed EKG. NSR, unchanged from previous tracings. Cxray (3/26/2017): I personally viewed images. Bilateral pleural effusions R>L, mild pulmonary edema.      LABS: Notable for pro-BNP 8130, Trop neg x 3, Cr 2.50 (recent baseline 2.3-2.7), Hgb 7.0 this am (down from 7.4 on admission)            Lab Results   Component Value Date/Time    WBC 6.6 03/27/2017 06:06 AM    HGB 7.0 03/27/2017 06:06 AM    HCT 22.8 03/27/2017 06:06 AM    PLATELET 897 76/94/7135 06:06 AM    MCV 84.4 03/27/2017 06:06 AM     Lab Results   Component Value Date/Time    Sodium 140 03/27/2017 06:06 AM    Potassium 4.2 03/27/2017 06:06 AM    Chloride 110 03/27/2017 06:06 AM    CO2 20 03/27/2017 06:06 AM    Anion gap 10 03/27/2017 06:06 AM    Glucose 95 03/27/2017 06:06 AM    BUN 36 03/27/2017 06:06 AM    Creatinine 2.50 03/27/2017 06:06 AM    BUN/Creatinine ratio 14 03/27/2017 06:06 AM    GFR est AA 24 03/27/2017 06:06 AM    GFR est non-AA 20 03/27/2017 06:06 AM    Calcium 8.0 03/27/2017 06:06 AM     Lab Results   Component Value Date/Time    Troponin-I, Qt. <0.04 03/27/2017 06:06 AM     No results found for: APTT  Lab Results   Component Value Date/Time    INR 1.0 03/10/2017 02:21 PM    Prothrombin time 10.5 03/10/2017 02:21 PM     No results found for: BNP, BNPP, XBNPT    Patient seen, examined and discussed with Dr. Bree Cm.  Alvaro Esqueda MD  [address] [facility] Medicine Residency

## 2017-03-27 NOTE — INTERDISCIPLINARY ROUNDS
Interdisciplinary team rounds were held 3/27/2017 with the following team members:Care Management, Nursing, Nutrition, Physical Therapy, Physician, Clinical Coordinator and Unit Nurse Manager. Plan of care discussed. See clinical pathway and/or care plan for interventions and desired outcomes.     Anticipated discharge:

## 2017-03-27 NOTE — PROGRESS NOTES
*ATTENTION:  This note has been created by a medical student for educational purposes only. Please do not refer to the content of this note for clinical decision-making, billing, or other purposes. Please see attending physicians note to obtain clinical information on this patient. *        Progress Note    Patient: Yesi Diego MRN: [id number]  SSN: [id number]    YOB: 1955  Age: 62 y.o. Sex: female      Admit Date: 3/26/2017    LOS: 0 days     Subjective:     Patient reports improved breathing and decreased LE edema. Denies fever, chills, headaches, chest pain, palpitations, cough, abdominal pain, nausea, vomiting. Objective:     Vitals:    03/27/17 0700 03/27/17 0719 03/27/17 0923 03/27/17 1400   BP:   188/78    Pulse: 85  84 83   Resp:   18    Temp:   98.9 °F (37.2 °C)    SpO2:   97%    Weight:  167 lb 5.3 oz (75.9 kg)     Height:            Intake and Output:  Current Shift: 03/27 0701 - 03/27 1900  In: 360 [P.O.:360]  Out: 250 [Urine:250]  Last three shifts: 03/25 1901 - 03/27 0700  In: -   Out: 400 [Urine:400]    Physical Exam:   General:  Alert, cooperative, no distress, appears stated age. Head:  Normocephalic, without obvious abnormality, atraumatic. Eyes:  Conjunctivae/corneas clear. PERRL, EOMs intact. Ears:  Normal TMs and external ear canals both ears. Nose: Nares normal. Septum midline. Mucosa normal. No drainage or sinus tenderness. Throat: Lips, mucosa, and tongue normal. Teeth and gums normal.   Neck: Supple, symmetrical, trachea midline, no adenopathy, thyroid: no enlargement/tenderness/nodules, no carotid bruit and no JVD. Back:   Symmetric, no curvature. ROM normal. No CVA tenderness. Lungs:   Bibasilar crackles   Chest wall:  No tenderness or deformity. Heart:  Regular rate and rhythm, S1, S2 normal, no murmur, click, rub or gallop. Abdomen:   Soft, non-tender. Bowel sounds normal. No masses,  No organomegaly.    Extremities: 2+ pitting edema b/l half up shins, atraumatic, no cyanosis or edema. Pulses: 2+ and symmetric all extremities. Skin: Skin color, texture, turgor normal. No rashes or lesions. Lymph nodes: Cervical, supraclavicular, and axillary nodes normal.   Neurologic: CNII-XII intact. Normal strength, sensation and reflexes throughout. Lab/Data Review:  BMP:   Lab Results   Component Value Date/Time     03/27/2017 06:06 AM    K 4.2 03/27/2017 06:06 AM     (H) 03/27/2017 06:06 AM    CO2 20 (L) 03/27/2017 06:06 AM    AGAP 10 03/27/2017 06:06 AM    GLU 95 03/27/2017 06:06 AM    BUN 36 (H) 03/27/2017 06:06 AM    CREA 2.50 (H) 03/27/2017 06:06 AM    GFRAA 24 (L) 03/27/2017 06:06 AM    GFRNA 20 (L) 03/27/2017 06:06 AM     CMP:   Lab Results   Component Value Date/Time     03/27/2017 06:06 AM    K 4.2 03/27/2017 06:06 AM     (H) 03/27/2017 06:06 AM    CO2 20 (L) 03/27/2017 06:06 AM    AGAP 10 03/27/2017 06:06 AM    GLU 95 03/27/2017 06:06 AM    BUN 36 (H) 03/27/2017 06:06 AM    CREA 2.50 (H) 03/27/2017 06:06 AM    GFRAA 24 (L) 03/27/2017 06:06 AM    GFRNA 20 (L) 03/27/2017 06:06 AM    CA 8.0 (L) 03/27/2017 06:06 AM    MG 1.8 03/27/2017 06:06 AM    PHOS 4.4 03/27/2017 06:06 AM     All Cardiac Markers in the last 24 hours:   Lab Results   Component Value Date/Time    TROIQ <0.04 03/27/2017 06:06 AM    TROIQ <0.04 03/26/2017 10:40 PM     Recent Glucose Results:   Lab Results   Component Value Date/Time    GLU 95 03/27/2017 06:06 AM            Assessment:     Principal Problem:    CHF exacerbation ([address] (3/27/2017)    Active Problems:    Diabetes mellitus type 2 with complications ([facility] [address] (8/90/4992)      Essential hypertension (7/28/2016)      Anemia (7/28/2016)        Plan:   63 yo female with CHF, HTN, CKD4, DM, HLD who is admitted for CHF exacerbation. CHF decompensation: BNP 8130, troponin negativex2. pt received 40mg IV Lasix in ED. SOB and LE edema have improved. UOP = 400mL. ECHO on 3/27/19 reveals EF 50-55%. Cardiology recs appreciated. -IV Lasix 40mg BID  -Labetalol 200 mg PO BID  -Losartan 100 mg PO every day    CKD stage 4: Cr down trending from 2.7 to 2.5 (baseline 2.1-2.3)  -avoid nephrotoxic agents  -IV Lasix    HTN: BP currently 177/77. Cards recommend nitropaste, labetalol, losartan  -Nitroglycerin paste   -IV Lasix 40mg BID  -Labetalol 200 mg PO BID  -Losartan 100 mg PO every day    Anemia: Hgb 7.1. Card recommend transfusion if below 8.  -transfuse 2 units RBCs    DM: AIC 3.5 from last admission when had acute blood loss. BS stable, diet controlled.     HLD: stable  -continue Lipitor     Signed By: Jarad Bains     March 27, 2017

## 2017-03-27 NOTE — NURSE NAVIGATOR
Chart reviewed by Heart Failure Nurse Navigator. Heart Failure database completed. Echo this admission shows  EF 50-55% with wall thickness mildly increased and grade 2 diastolic dysfunction. ACEi/ARB: losartan 100 mg daily. BB: labetalol 200 mg BID. CRT not indicated. NYHA Functional Class III with orthopnea, MANZO, swelling for past 1 to 2 weeks. Heart Failure Teach Back in Patient Education. Heart Failure Avoiding Triggers on Discharge Instructions. Usual cardiologist: Dr Miguel Finney      3/27/17 1345:  Met with patient and son and introduced self and role of HF nurse navigator. Pt agreeable to discussion with son in room. Pt recently seen by cardiac rehab as well. Briefly reviewed low salt diet, daily weights and signs and symptoms of heart failure. Reviewed Heart Failure Zones and when to call the physician. Discussed importance of early f/u with PCP and cardiology. Pt has been independent and up in room. Will notify OP NN at discharge.

## 2017-03-27 NOTE — PROGRESS NOTES
Medical records requested from Dr. Balbir Palafox (Cardiologist). 1300  Records received. Notified MD that they are in patients chart.

## 2017-03-27 NOTE — ROUTINE PROCESS
physical Therapy EVALUATION/DISCHARGE  Patient: Harsh Carson (36 y.o. female)  Date: 3/27/2017  Primary Diagnosis: pleural effusions, CHF, anemia, PATRICIA        Precautions:   Fall  ASSESSMENT :  Based on the objective data described below, the patient presents with increased shortness of breath due to pleural effusions. Patient received sitting up in bed, agreeable to PT. Patient reports prior to admission not requiring any additional supplemental O2 or assistive devices. Nursing indicates she titrated patient to room air earlier this AM and stable on room air. Patient able to perform all bed mobility and transfer Independent. She was able to able in hallway for 6 min walk test for 550 feet, she did required 2 standing rest breaks due to knee soreness (premorbid arthritis) but no loss of balance or instability noted. Patient was returned to room, all vitals stable throughout. Patient is currently at her baseline for all mobility. She will not require any additional PT services while in the hospital or at discharge. Documentation for home O2:     ROOM AIR    AT REST   O2 SATS  96% HR  68   ROOM AIR WITH ACTIVITY 02 SATS  97% HR  87   (    ) LITERS OF O2 WITH ACTIVITY O2 SATS   HR     (0    )LITERS OF 02 PATIENT LEFT COMFORTABLY  SITTING/SUPINE 02 SATS  96% HR  87           Skilled physical therapy is not indicated at this time. PLAN :  Discharge Recommendations: None  Further Equipment Recommendations for Discharge: none     SUBJECTIVE:   Patient stated i am feeling much better.     OBJECTIVE DATA SUMMARY:   HISTORY:    Past Medical History:   Diagnosis Date    Anemia     Arthritis     Calculus of kidney     Congestive heart failure (HCC)     Diabetes (HCC)     Hematuria      Past Surgical History:   Procedure Laterality Date    COLONOSCOPY N/A 3/13/2017    COL[facility] performed by [facility]ne [facility] at [facility]      left carpal tunnel    HX TUBAL LIGATION Bilateral Prior Level of Function/Home Situation: see above  Personal factors and/or comorbidities impacting plan of care:     Home Situation  Home Environment: Private residence  # Steps to Enter: 4  Rails to Enter: Yes  One/Two Story Residence: One story  Living Alone: No  Support Systems: Family member(s), Friends \ neighbors  Patient Expects to be Discharged to[de-identified] Private residence  Current DME Used/Available at Home: None    EXAMINATION/PRESENTATION/DECISION MAKING:   Critical Behavior:              Hearing: Auditory  Auditory Impairment: None  Skin:  All exposed intact  Edema: none noted  Range Of Motion:  AROM: Within functional limits           PROM: Within functional limits           Strength:    Strength: Within functional limits                    Tone & Sensation:   Tone: Normal              Sensation: Intact               Coordination:  Coordination: Within functional limits  Vision:      Functional Mobility:  Bed Mobility:  Rolling: Independent  Supine to Sit: Independent  Sit to Supine: Independent     Transfers:  Sit to Stand: Independent  Stand to Sit: Independent        Bed to Chair: Independent              Balance:   Sitting: Intact  Standing: Intact  Ambulation/Gait Training:  Distance (ft): 550 Feet (ft)  Assistive Device: Gait belt  Ambulation - Level of Assistance: Independent     Gait Description (WDL): Exceptions to WDL                                              Stairs:                Therapeutic Exercises:       Functional Measure:  Tinetti test:    Sitting Balance: 1  Arises: 2  Attempts to Rise: 2  Immediate Standing Balance: 2  Standing Balance: 2  Nudged: 1  Eyes Closed: 1  Turn 360 Degrees - Continuous/Discontinuous: 1  Turn 360 Degrees - Steady/Unsteady: 1  Sitting Down: 2  Balance Score: 15  Indication of Gait: 0  R Step Length/Height: 1  L Step Length/Height: 1  R Foot Clearance: 1  L Foot Clearance: 1  Step Symmetry: 1  Step Continuity: 1  Path: 2  Trunk: 2  Walking Time: 0  Gait Score: 10  Total Score: 25       Tinetti Test and G-code impairment scale:  Percentage of Impairment CH    0%   CI    1-19% CJ    20-39% CK    40-59% CL    60-79% CM    80-99% CN     100%   Tinetti  Score 0-28 28 23-27 17-22 12-16 6-11 1-5 0       Tinetti Tool Score Risk of Falls  <19 = High Fall Risk  19-24 = Moderate Fall Risk  25-28 = Low Fall Risk  Tinetti ME. Performance-Oriented Assessment of Mobility Problems in Elderly Patients. [address]; [id number]. (Scoring Description: PT Bulletin Feb. 10, 1993)    Older adults: Bright Cuellar et al, 2009; n = 1000 [facility] elderly evaluated with ABC, RIVKA, ADL, and IADL)  · Mean RIVKA score for males aged 69-68 years = 26.21(3.40)  · Mean RIVKA score for females age 69-68 years = 25.16(4.30)  · Mean RIVKA score for males over 80 years = 23.29(6.02)  · Mean RIVKA score for females over 80 years = 17.20(8.32)       G codes: In compliance with CMSs Claims Based Outcome Reporting, the following G-code set was chosen for this patient based on their primary functional limitation being treated: The outcome measure chosen to determine the severity of the functional limitation was the Tinetti with a score of 25/28 which was correlated with the impairment scale.     ? Mobility - Walking and Moving Around:     - CURRENT STATUS: CI - 1%-19% impaired, limited or restricted    - GOAL STATUS: CI - 1%-19% impaired, limited or restricted    - D/C STATUS:  CI - 1%-19% impaired, limited or restricted        Physical Therapy Evaluation Charge Determination   History Examination Presentation Decision-Making   MEDIUM  Complexity : 1-2 comorbidities / personal factors will impact the outcome/ POC  MEDIUM Complexity : 3 Standardized tests and measures addressing body structure, function, activity limitation and / or participation in recreation  LOW Complexity : Stable, uncomplicated  Other outcome measures Tinetti  LOW       Based on the above components, the patient evaluation is determined to be of the following complexity level: LOW     Pain:  Pain Scale 1: Numeric (0 - 10)  Pain Intensity 1: 0     Activity Tolerance:   Good- no complications with upright activity  Please refer to the flowsheet for vital signs taken during this treatment. After treatment:   [x]   Patient left in no apparent distress sitting up in chair  []   Patient left in no apparent distress in bed  [x]   Call bell left within reach  [x]   Nursing notified  []   Caregiver present  []   Bed alarm activated    COMMUNICATION/EDUCATION:   Communication/Collaboration:  [x]   Fall prevention education was provided and the patient/caregiver indicated understanding. [x]   Patient/family have participated as able and agree with findings and recommendations. []   Patient is unable to participate in plan of care at this time.   Findings and recommendations were discussed with: Registered Nurse    Thank you for this referral.  Fara Márquez PT, DPT   Time Calculation: 11 mins

## 2017-03-27 NOTE — PROGRESS NOTES
Occupational Therapy EVALUATION/discharge  Patient: Nanda Morin (82 y.o. female)  Date: 3/27/2017  Primary Diagnosis: pleural effusions, CHF, anemia, PATRICIA        Precautions:  Fall    ASSESSMENT:   Based on the objective data described below, the patient presents with independence with ADL tasks. Patient reports feeling better today and hoping to discharge tomorrow after additional testing. Patient requires no assist with ADL tasks. .  Further skilled acute occupational therapy is not indicated at this time. Discharge Recommendations: None  Further Equipment Recommendations for Discharge: none       SUBJECTIVE:   Patient stated I am feeling better. I can do all that .     OBJECTIVE DATA SUMMARY:   HISTORY:   Past Medical History:   Diagnosis Date    Anemia     Arthritis     Calculus of kidney     Congestive heart failure (HCC)     Diabetes (HCC)     Hematuria      Past Surgical History:   Procedure Laterality Date    COLONOSCOPY N/A 3/13/2017    COLONOSCOPY performed by Susan Dc MD at [facility]      left carpal tunnel    HX TUBAL LIGATION Bilateral        Prior Level of Function/Home Situation: independent with ADLs   Expanded or extensive additional review of patient history:     Home Situation  Home Environment: Private residence  # Steps to Enter: 4  Rails to Enter: Yes  One/Two Story Residence: One story  Living Alone: No  Support Systems: Family member(s), Friends \ neighbors  Patient Expects to be Discharged to[de-identified] Private residence  Current DME Used/Available at Home: None  Tub or Shower Type: Tub/Shower combination  [x]  Right hand dominant   []  Left hand dominant    EXAMINATION OF PERFORMANCE DEFICITS:  Cognitive/Behavioral Status:  Neurologic State: Alert  Orientation Level: Oriented X4  Cognition: Appropriate decision making; Follows commands  Perception: Appears intact  Perseveration: No perseveration noted  Safety/Judgement: Awareness of environment    Skin: intact as seen    Edema: none noted in bilateral UEs     Hearing: Auditory  Auditory Impairment: None    Vision/Perceptual:         Range of Motion:    AROM: Within functional limits  PROM: Within functional limits    Strength:  Strength: Within functional limits       Coordination:  Coordination: Within functional limits  Fine Motor Skills-Upper: Left Intact; Right Intact    Gross Motor Skills-Upper: Left Intact; Right Intact    Tone & Sensation:  Tone: Normal  Sensation: Intact        Balance:  Sitting: Intact  Standing: Intact    Functional Mobility and Transfers for ADLs:  Bed Mobility:  Rolling: Independent  Supine to Sit: Independent  Sit to Supine: Independent    Transfers:  Sit to Stand: Independent  Stand to Sit: Independent  Bed to Chair: Independent  Toilet Transfer : Independent    ADL Assessment:  Feeding: Independent    Oral Facial Hygiene/Grooming: Independent    Bathing: Independent    Upper Body Dressing: Independent    Lower Body Dressing: Independent    Toileting: Independent       ADL Intervention and task modifications:       Cognitive Retraining  Safety/Judgement: Awareness of environment    Therapeutic Exercise:    Functional Measure:  Barthel Index:    Bathin  Bladder: 10  Bowels: 10  Groomin  Dressing: 10  Feeding: 10  Mobility: 15  Stairs: 10  Toilet Use: 10  Transfer (Bed to Chair and Back): 15  Total: 100       Barthel and G-code impairment scale:  Percentage of impairment CH  0% CI  1-19% CJ  20-39% CK  40-59% CL  60-79% CM  80-99% CN  100%   Barthel Score 0-100 100 99-80 79-60 59-40 20-39 1-19   0   Barthel Score 0-20 20 17-19 13-16 9-12 5-8 1-4 0      The Barthel ADL Index: Guidelines  1. The index should be used as a record of what a patient does, not as a record of what a patient could do. 2. The main aim is to establish degree of independence from any help, physical or verbal, however minor and for whatever reason.   3. The need for supervision renders the patient not independent. 4. A patient's performance should be established using the best available evidence. Asking the patient, friends/relatives and nurses are the usual sources, but direct observation and common sense are also important. However direct testing is not needed. 5. Usually the patient's performance over the preceding 24-48 hours is important, but occasionally longer periods will be relevant. 6. Middle categories imply that the patient supplies over 50 per cent of the effort. 7. Use of aids to be independent is allowed. Cecile See., Barthel, ALFAW. (1051). Functional evaluation: the Barthel Index. [address] [facility] (14)2. Racheal Schaffer milton JESICA Nunes, Nick Travis., Kandice Szymanski., [place], [address] [place] [address] Ave (1999). Measuring the change indisability after inpatient rehabilitation; comparison of the responsiveness of the Barthel Index and Functional [place] Measure. Journal of Neurology, Neurosurgery, and Psychiatry, 66(4), 632-109. Katerina Scott, N.J.A, JOE Blackmon, & German Caruso, M.A. (2004.) Assessment of post-stroke quality of life in cost-effectiveness studies: The usefulness of the Barthel Index and the EuroQoL-5D. Quality of Life Research, 13, 373-11       G codes: In compliance with CMSs Claims Based Outcome Reporting, the following G-code set was chosen for this patient based on their primary functional limitation being treated: The outcome measure chosen to determine the severity of the functional limitation was the Barthel Index with a score of 100/100 which was correlated with the impairment scale. ?  Self Care:     - CURRENT STATUS: CH - 0% impaired, limited or restricted    - GOAL STATUS: CH - 0% impaired, limited or restricted    - D/C STATUS:  CH - 0% impaired, limited or restricted     Occupational Therapy Evaluation Charge Determination   History Examination Decision-Making   LOW Complexity : Brief history review  LOW Complexity : 1-3 performance deficits relating to physical, cognitive , or psychosocial skils that result in activity limitations and / or participation restrictions  LOW Complexity : No comorbidities that affect functional and no verbal or physical assistance needed to complete eval tasks       Based on the above components, the patient evaluation is determined to be of the following complexity level: LOW   Pain:  Pain Scale 1: Numeric (0 - 10)  Pain Intensity 1: 0              Activity Tolerance:   VSS  Please refer to the flowsheet for vital signs taken during this treatment. After treatment:   []  Patient left in no apparent distress sitting up in chair  [x]  Patient left in no apparent distress in bed  [x]  Call bell left within reach  [x]  Nursing notified  []  Caregiver present  []  Bed alarm activated    COMMUNICATION/EDUCATION:   Communication/Collaboration:  [x]      Home safety education was provided and the patient/caregiver indicated understanding. [x]      Patient/family have participated as able and agree with findings and recommendations. []      Patient is unable to participate in plan of care at this time.   Findings and recommendations were discussed with: Registered Nurse    FLOR Gerber/L  Time Calculation: 12 mins

## 2017-03-27 NOTE — H&P
[facility]   Admission H&P    Date of admission: 3/26/2017    Patient name: Rhona To  MRN: [id number]  YOB: 1955  Age: 62 y.o. Primary care provider:  Maicol Walker MD     Source of Information: patient, medical records    Chief complaint:  SOB and Leg swelling    History of Present Illness  hRona To is a 62 y.o. female with Hx of CHF, HTN, TIA, DM, Diverticulosis with Diverticulitis, and CKD-G4 who presents to the ER complaining of SOB and bilateral Leg Swelling for about 3 days. Pt states that she started to notice a significant swelling of her legs since last Thursday (03/23/17) and SOB since Saturday (03/25/17). She noticed that she required extra pillows to sleep during this time. This is the first time the patient presents with current presentation. She was diagnosed with CHF last year. She denies cough, CP, dizziness, abdominal pain, or any other sign or symptoms. In the ER, vital signs were remarkable for /80. Labs were remarkable for Hg 7.4 (BL 7.8 - 8.4), Cr 2.71 (BL 2.11 - 2.31), GFR 22, Trop <0.04, , proBNP 8130. CXR showed New moderate right and small left pleural effusions. New mild pulmonary edema. No pneumothorax. Pt was treated with Furosemide and Magnesium sulfate. Home Medications   Prior to Admission medications    Medication Sig Start Date End Date Taking? Authorizing Provider   losartan (COZAAR) 100 mg tablet Take 1 Tab by mouth daily. 3/13/17  Yes Chip Major, DO   amoxicillin-clavulanate (AUGMENTIN) 500-125 mg per tablet Take 1 Tab by mouth every twelve (12) hours for 14 days. 3/13/17 3/27/17 Yes Chip Major, DO   labetalol (NORMODYNE) 200 mg tablet Take 200 mg by mouth two (2) times a day. Yes Historical Provider   dorzolamide-timolol (COSOPT) 22.3-6.8 mg/mL ophthalmic solution Administer 1 Drop to both eyes two (2) times a day.    Yes Historical Provider   atorvastatin (LIPITOR) 10 mg tablet Take 1 Tab by mouth nightly. 2/9/17  Yes Dax Garcia MD   aspirin delayed-release 81 mg tablet take 2 tablets by mouth once daily 1/3/17  Yes Dax Garcia MD   amLODIPine (NORVASC) 10 mg tablet Take 10 mg by mouth daily. 11/2/16  Yes Luis Garcia MD   ACETAMINOPHEN/DIPHENHYDRAMINE (TYLENOL PM PO) Take 2 Tabs by mouth nightly as needed (sleep). Historical Provider       Allergies   Allergies   Allergen Reactions    Ciprofloxacin Angioedema       Past Medical History:   Diagnosis Date    Anemia     Arthritis     Calculus of kidney     Congestive heart failure (HCC)     Diabetes (HCC)     Hematuria        Past Surgical History:   Procedure Laterality Date    COLONOSCOPY N/A 3/13/2017    COLONOSCOPY performed by Natalia Olsen MD [facility]      left carpal tunnel    HX TUBAL LIGATION Bilateral        History reviewed. No pertinent family history. Social History   Patient resides  x  Independently      With family care      Assisted living      SNF      Ambulates  x  Independently      With cane       Assisted walker           Alcohol history     None   x  Social     Chronic     Smoking history  x  None     Former smoker     Current smoker     History   Smoking Status    Never Smoker   Smokeless Tobacco    Never Used       Drug history  x  None     Former drug user     Current drug user     Sexual history  x  Sexually active      Not sexually active     Code status  x  Full code     DNR/DNI     Partial    Code status discussed with the patient/caregivers. Review of Systems (negative unless on bold)    Constitutional: Negative for chills, fatigue, fever and weight loss. HENT: Negative for sore throat. Eyes: Negative for blurred vision and double vision. Respiratory: Negative for cough, SOB hemoptysis and wheezing. Cardiovascular: Negative for chest pain, orthopnea and leg swelling.    Gastrointestinal: Negative for abdominal pain, diarrhea, heartburn, nausea and vomiting. Genitourinary: Negative for dysuria, frequency and urgency. Musculoskeletal: Negative for joint pain. Negative for myalgias. Bilateral leg swelling   Skin: Negative for itching and rash. Neurological: Negative for dizziness, seizures and headaches. Endo/Heme/Allergies: Negative for environmental allergies. Negative for polydipsia. Does not bruise/bleed easily. Psychiatric/Behavioral: Negative for depression and suicidal ideas. Physical Exam  Visit Vitals    BP (!) 185/93 (BP 1 Location: Left arm, BP Patient Position: At rest)    Pulse 79    Temp 97.7 °F (36.5 °C)    Resp 17    Ht 5' 1\" (1.549 m)    Wt 173 lb 1 oz (78.5 kg)    SpO2 90%    BMI 32.7 kg/m2        General: No acute distress. Alert. Cooperative. Head: Normocephalic. Atraumatic. Eyes:  Conjunctiva pink. Sclera white. PERRL. Ears:  Ear canals patent. TM non-erythematous. Nose:  Septum midline. Mucosa pink. No drainage. Throat: Mucosa pink. Moist mucous membranes. No tonsillar exudates or erythema. Palate movement equal bilaterally. Neck: Supple. Normal ROM. No stiffness. Respiratory: CTAB. No w/r/r/c.   Cardiovascular: RRR. Normal S1,S2. No m/r/g. Pulses 2+ throughout. GI: + bowel sounds. Nontender. No rebound tenderness or guarding. Nondistended. Extremities: No edema. No palpable cord. No tenderness. Musculoskeletal: Full ROM in all extremities. Neuro: CN II-XII grossly intact. Strength 5/5 in all extremities. Sensation intact in all extremities. DTRs 2+ throughout. Skin: Clear. No rashes. No ulcers.     : Deferred   Rectal: Deferred       Laboratory Data  Recent Results (from the past 24 hour(s))   CBC WITH AUTOMATED DIFF    Collection Time: 03/26/17  1:30 PM   Result Value Ref Range    WBC 6.7 3.6 - 11.0 K/uL    RBC 2.77 (L) 3.80 - 5.20 M/uL    HGB 7.4 (L) 11.5 - 16.0 g/dL    HCT 24.1 (L) 35.0 - 47.0 %    MCV 87.0 80.0 - 99.0 FL    MCH 26.7 26.0 - 34.0 PG    MCHC 30.7 30.0 - 36.5 g/dL    RDW 14.2 11.5 - 14.5 %    PLATELET 693 656 - 648 K/uL    NEUTROPHILS 72 32 - 75 %    LYMPHOCYTES 16 12 - 49 %    MONOCYTES 9 5 - 13 %    EOSINOPHILS 3 0 - 7 %    BASOPHILS 0 0 - 1 %    ABS. NEUTROPHILS 4.8 1.8 - 8.0 K/UL    ABS. LYMPHOCYTES 1.1 0.8 - 3.5 K/UL    ABS. MONOCYTES 0.6 0.0 - 1.0 K/UL    ABS. EOSINOPHILS 0.2 0.0 - 0.4 K/UL    ABS. BASOPHILS 0.0 0.0 - 0.1 K/UL    DF AUTOMATED     METABOLIC PANEL, BASIC    Collection Time: 03/26/17  1:30 PM   Result Value Ref Range    Sodium 138 136 - 145 mmol/L    Potassium 5.1 3.5 - 5.1 mmol/L    Chloride 108 97 - 108 mmol/L    CO2 21 21 - 32 mmol/L    Anion gap 9 5 - 15 mmol/L    Glucose 120 (H) 65 - 100 mg/dL    BUN 40 (H) 6 - 20 MG/DL    Creatinine 2.71 (H) 0.55 - 1.02 MG/DL    BUN/Creatinine ratio 15 12 - 20      GFR est AA 22 (L) >60 ml/min/1.73m2    GFR est non-AA 18 (L) >60 ml/min/1.73m2    Calcium 8.3 (L) 8.5 - 10.1 MG/DL   CK W/ REFLX CKMB    Collection Time: 03/26/17  1:30 PM   Result Value Ref Range     26 - 192 U/L   MAGNESIUM    Collection Time: 03/26/17  1:30 PM   Result Value Ref Range    Magnesium 1.6 1.6 - 2.4 mg/dL   PRO-BNP    Collection Time: 03/26/17  1:30 PM   Result Value Ref Range    NT pro-BNP 8130 (H) 0 - 125 PG/ML   POC TROPONIN-I    Collection Time: 03/26/17  1:39 PM   Result Value Ref Range    Troponin-I (POC) <0.04 0.00 - 0.08 ng/mL     Imaging    EXAM: XR CHEST PA LAT     INDICATION: Shortness of breath on exertion. Recent diagnosis of CHF. Bilateral  lower extremity swelling for several months.     COMPARISON: Chest views on 3/9/2017     TECHNIQUE: PA and lateral chest views     FINDINGS: The cardiomediastinal and hilar contours are within normal limits. Pulmonary vascular prominence is minimal.      New moderate right pleural effusion. New small left pleural effusion. Reticular  interstitial opacities are mild. New trace fluid in the right minor fissure.  The  visualized bones and upper abdomen are age-appropriate.     IMPRESSION:     New moderate right and small left pleural effusions. New mild pulmonary edema. No pneumothorax. Recommend followup PA and lateral chest radiographs in 8-10  weeks to ensure resolution. Assessment and Plan   Joana Corbin is a 62 y.o. female who is admitted for CHF Decompensation. CHF Decompensation; likely secondary to dietary indiscretion (high sodium diet). ECHO on 07/19/16 reveals EF 45-50%. proBNP is 8130.  - Admit to telemetry   - Cardiology consulted; follow up recomendations   - Diurese with Furosemide  - Strict In's and Out's   - Weight during last office visit on 03/22/2017 = 169 ()  - Obtain EKG and ECHO  - Trend troponins  - Cardiac diet with low sodium and potassium  - Continue Normodyne, Cozaar, Norvasc   - Cardiology consult     Chronic Kidney Disease (Stage 4); POA Cr 2.71 (BL 2.11 - 2.31), GFR 22  - Avoid nephrotoxic medications  - Diuresis with Lasix for now    Hypertension; Current BP: 166/80  -Continue home Norvasc, Normodyne and Cozaar     DM; HgA1C  < 3.5. POC Glucose 120. Patient states that she was treated for a time with metformin but was suspended. Last HgA1C was taken during a period of acute blood loss, for which is not very accurate. Hyperlipidemia; Lipid Panel 03/10/2017 Tchol: 125 LDL: 48.6 HDL: 71 Trigs: 27  -Continue Lipitor  QD    FEN/GI - Cardiac Diet. Activity - Ambulate with Assistance   DVT prophylaxis - Heparin   GI prophylaxis -  None  Disposition - Plan to d/c to [facility].      CODE STATUS:  FULL CODE       Patient to be discussed with Dr. Cleopatra Ellis, [address] [facility] Problems  Date Reviewed: 3/15/2017    None

## 2017-03-27 NOTE — ADT AUTH CERT NOTES
Notification of observation admittance patient admitted as observation status 3/26/17 and is currently still in house as observation    `   Allergies (verified on: 03/26/17)    `      CIPROFLOXACIN   `          `   Diagnoses    `     Acute pulmonary edema (HCC) [J81.0]      Pleural effusion [J90]      Anemia, unspecified type [D64.9]      Renal insufficiency [N28.9]    Jael Mejia ([id number])  Patient Demographics         Name:  Norah Schwarz  MRN:  [id number]       Address:  [address] [facility] [address]  SSN:             Sex:  Female       Home Phone:  [phone]  YOB: 1955 (62 yrs)       Work Phone:    E-Mail Address:  [email or web address]       Registration Status:  Verified  PCP:  Guilherme Lara       Date Last Verified:    Employer:  NOT EMPLOYED       Next Review Date:  4/25/2017                            Hospital Account       Name:  [facility] Account ID:  [de-identified]     Class:  OBSERVATION  Status:  Open     Guarantor:  Sabrina Douglas  Primary Coverage:  Esta Filter [place]AP HPN [facility]     External Hosp Acct ID:  [id number]           Guarantor Account Information                 Account ID - Guarantor  Service Area  Active?   Guarantor Account Type  Guarantor Account Status  Craig Status            [id number] - Chanelle Prince  Yes  P/F    Verified           Coverages:    [place] LEAP HPN [place]       UNITED HEALTHCARE/BSHSI UNITED HEALTHCARE OPTIONS PPO       UNITED HEALTHCARE/BSHSI UNITED HEALTHCARE HMO       [place] [address] [address]. LEAP HPN [place]                   Coverage Information         F/O  Payor/Plan  Subscriber Name  Eff From  Eff To  Craig Status     1  [place]ENTRY/[place] AETNA *  ELISE WEBB  01/01/2017         9  UNITED HE*/BSHSI UNI*  ELISE WEBB  10/01/2012  04/15/2014       10  UNITED HE*/BSHSI UNI*  ELISE WEBB  10/01/2013  12/31/2016       11  [place]ENTRY/[place] AETNA * [address] [facility] Hiwot Mariano  01/01/2016 12/31/2016            Admission Information       Attending Provider:  Emily Anaya DO  Auth/Cert Status:  Incomplete     Admitting Provider:  Victorino Benavides MD  Service Area:  [address] [place] [address]     Admission Type:  EMERGENCY  Unit:  [facility] 5M1 MED SURG 1     [facility] Service:  MEDICINE  Room:  508     Admission Date:  3/26/2017  Bed:  01     Discharge Date:

## 2017-03-27 NOTE — PROGRESS NOTES
We were asked to see the pt for CKD evaluation  Renal function has been the same since admission   Dr Vishal Bryson will see the pt in the morning   Please call me for any renal emergent issues   Thank you for the consult     [facility].   Office :[phone]  Fax: [phone]

## 2017-03-27 NOTE — PROGRESS NOTES
1530  Bedside and Verbal shift change report given to Mitzy Grossman RN (oncoming nurse) by Sandy Ferguson (offgoing nurse). Report included the following information SBAR, Kardex, ED Summary, Procedure Summary, Intake/Output, MAR, Accordion, Recent Results and Med Rec Status.

## 2017-03-27 NOTE — PROGRESS NOTES
[facility] Resident Admit Addendum    Date of admission: 3/26/2017    Patient name: Asher España  MRN: [id number]  YOB: 1955  Age: 62 y.o. Chief complaint:  \"wheezing, swelling\"    History of Present Illness  Asher España is a 62 y.o. female with history of CHF rEF, DM2 and CKD4 presents to the emergency department shortness of breath and swelling. Patient states that the symptoms have been present over the last few days. She feels like the shortness of breath is worse if she lies flat and better if she sits up. She has never had sx like these before. Doesn't do daily weights. She has had a few inpatient hospital admissions. She had 3 to [address] last summer for hypertension, TIA, hypoglycemia. During the course of her admits she was referred to Dr. Diana Hinton who performed an Echo in the office and diagnosed CHF w reEF, LVF45%. She had an admission to [facility] for LGIB with diverticulitis on 3/9/17. Required 1U PRBC and 2 weeks outpatient abx. Patient denies any chest pain. She denies any abdominal pain today. Patient does not smoke or drink alcohol. In the ER, vital signs were unremarkable. Labs were remarkable for proBNP 8130. CXR showed new bilateral pleural effusions and mild pulmonary edema. Pt was treated with lasix 40mg with diuresis 400cc. Since arriving at [facility], she has been getting up to urinate without issue. Breathing is improved. Swelling has gone down some, was previously \"tighter\" per patient. Home Medications   Prior to Admission medications    Medication Sig Start Date End Date Taking? Authorizing Provider   losartan (COZAAR) 100 mg tablet Take 1 Tab by mouth daily. 3/13/17  Yes Chip Major, DO   amoxicillin-clavulanate (AUGMENTIN) 500-125 mg per tablet Take 1 Tab by mouth every twelve (12) hours for 14 days.  3/13/17 3/27/17 Yes Chip Major,    labetalol (NORMODYNE) 200 mg tablet Take 200 mg by mouth two (2) times a day. Yes Historical Provider   dorzolamide-timolol (COSOPT) 22.3-6.8 mg/mL ophthalmic solution Administer 1 Drop to both eyes two (2) times a day. Yes Historical Provider   atorvastatin (LIPITOR) 10 mg tablet Take 1 Tab by mouth nightly. 2/9/17  Yes Dorina Osorio MD   aspirin delayed-release 81 mg tablet take 2 tablets by mouth once daily 1/3/17  Yes Dorina Osorio MD   amLODIPine (NORVASC) 10 mg tablet Take 10 mg by mouth daily. 11/2/16  Yes Yobany Rodriguez MD   ACETAMINOPHEN/DIPHENHYDRAMINE (TYLENOL PM PO) Take 2 Tabs by mouth nightly as needed (sleep). Historical Provider        Physical Exam  Visit Vitals    BP (!) 185/93 (BP 1 Location: Left arm, BP Patient Position: At rest)    Pulse 79    Temp 97.7 °F (36.5 °C)    Resp 17    Ht 5' 1\" (1.549 m)    Wt 173 lb 1 oz (78.5 kg)    SpO2 90%    BMI 32.7 kg/m2        General: No acute distress. Alert. Cooperative. Head: Normocephalic. Atraumatic. Eyes:  Conjunctiva pink. Sclera white. PERRL. Ears:  Ear canals patent. TM non-erythematous. Nose:  Septum midline. Mucosa pink. Nc in place. Throat: Mucosa pink. Moist mucous membranes. No tonsillar exudates or erythema. Palate movement equal bilaterally. Neck: Supple. Normal ROM. No stiffness. Respiratory: CTAB. No w/r/r/c.   Cardiovascular: RRR. Normal S1,S2. No m/r/g. Pulses 2+ throughout. GI: + bowel sounds. Nontender. No rebound tenderness or guarding. Nondistended. Extremities: 3+ lower extremity edema. No palpable cord. No tenderness. Musculoskeletal: Full ROM in all extremities. Neuro: CN II-XII grossly intact. Strength 5/5 in all extremities. Sensation intact in all extremities. DTRs 2+ throughout. Skin: Clear. No rashes. No ulcers.     : Deferred   Rectal: Deferred       Laboratory Data  Recent Results (from the past 24 hour(s))   CBC WITH AUTOMATED DIFF    Collection Time: 03/26/17  1:30 PM   Result Value Ref Range    WBC 6.7 3.6 - 11.0 K/uL RBC 2.77 (L) 3.80 - 5.20 M/uL    HGB 7.4 (L) 11.5 - 16.0 g/dL    HCT 24.1 (L) 35.0 - 47.0 %    MCV 87.0 80.0 - 99.0 FL    MCH 26.7 26.0 - 34.0 PG    MCHC 30.7 30.0 - 36.5 g/dL    RDW 14.2 11.5 - 14.5 %    PLATELET 931 270 - 244 K/uL    NEUTROPHILS 72 32 - 75 %    LYMPHOCYTES 16 12 - 49 %    MONOCYTES 9 5 - 13 %    EOSINOPHILS 3 0 - 7 %    BASOPHILS 0 0 - 1 %    ABS. NEUTROPHILS 4.8 1.8 - 8.0 K/UL    ABS. LYMPHOCYTES 1.1 0.8 - 3.5 K/UL    ABS. MONOCYTES 0.6 0.0 - 1.0 K/UL    ABS. EOSINOPHILS 0.2 0.0 - 0.4 K/UL    ABS. BASOPHILS 0.0 0.0 - 0.1 K/UL    DF AUTOMATED     METABOLIC PANEL, BASIC    Collection Time: 03/26/17  1:30 PM   Result Value Ref Range    Sodium 138 136 - 145 mmol/L    Potassium 5.1 3.5 - 5.1 mmol/L    Chloride 108 97 - 108 mmol/L    CO2 21 21 - 32 mmol/L    Anion gap 9 5 - 15 mmol/L    Glucose 120 (H) 65 - 100 mg/dL    BUN 40 (H) 6 - 20 MG/DL    Creatinine 2.71 (H) 0.55 - 1.02 MG/DL    BUN/Creatinine ratio 15 12 - 20      GFR est AA 22 (L) >60 ml/min/1.73m2    GFR est non-AA 18 (L) >60 ml/min/1.73m2    Calcium 8.3 (L) 8.5 - 10.1 MG/DL   CK W/ REFLX CKMB    Collection Time: 03/26/17  1:30 PM   Result Value Ref Range     26 - 192 U/L   MAGNESIUM    Collection Time: 03/26/17  1:30 PM   Result Value Ref Range    Magnesium 1.6 1.6 - 2.4 mg/dL   PRO-BNP    Collection Time: 03/26/17  1:30 PM   Result Value Ref Range    NT pro-BNP 8130 (H) 0 - 125 PG/ML   POC TROPONIN-I    Collection Time: 03/26/17  1:39 PM   Result Value Ref Range    Troponin-I (POC) <0.04 0.00 - 0.08 ng/mL     Lab Results   Component Value Date/Time    Hemoglobin A1c <3.5 03/10/2017 03:29 AM    Hemoglobin A1c 5.9 11/14/2016 09:30 AM         Imaging    CXR 3/26/17  New moderate right and small left pleural effusions. New mild pulmonary edema. No pneumothorax. Recommend followup PA and lateral chest radiographs in 8-10  weeks to ensure resolution.       Assessment and Plan   Amparo Vance is a 62 y.o. female who is admitted for CHF exacerbation. CHF exacerbation - elevated proBNP with pulmonary edema and extremity edema. Symptomatically improving with diuresis performed in ED. GFR is 22. May need further diuresis, but kidney function at risk with low forward perfusion and lasix administration earlier today.    -trend troponins  -hold further diuresis until assessment tomorrow   -daily bmp  -strict i/o  -will obtain echo  -Cardiology consult  -will need CHF education    DMT2 - patient is diet controlled. Last A1c <3.5 during acute GIB, and patient has since received transfusion. Prior to that was 5.9. Prone to hypoglycemia. Serum glucose 120 on BMP today.  -Can consider POC BG, will defer to day team    CKD4 - GFR 22 today. This is stable.   Continuing ARB for now  -hold ARB tomorrow if Cr increases    Please see Dr. Keerthi [facility] H&P for complete details       Patient discussed with Dr. Armand Obrien MD  Family Medicine Resident

## 2017-03-27 NOTE — PROGRESS NOTES
Bedside and Verbal shift change report given to Celestina (oncoming nurse) by Enriqueta Howe (offgoing nurse). Report included the following information SBAR, Kardex and Recent Results.

## 2017-03-27 NOTE — PROGRESS NOTES
Bedside shift change report given to Analy Alonzo (oncoming nurse) by Radha Ramirez RN (offgoing nurse). Report included the following information SBAR, Kardex, Intake/Output, MAR, Recent Results and Med Rec Status.

## 2017-03-28 PROBLEM — I50.9 CHF (CONGESTIVE HEART FAILURE) (HCC): Status: ACTIVE | Noted: 2017-03-28

## 2017-03-28 LAB
ABO + RH BLD: NORMAL
ANION GAP BLD CALC-SCNC: 10 MMOL/L (ref 5–15)
BLD PROD TYP BPU: NORMAL
BLD PROD TYP BPU: NORMAL
BLOOD GROUP ANTIBODIES SERPL: NORMAL
BPU ID: NORMAL
BPU ID: NORMAL
BUN SERPL-MCNC: 39 MG/DL (ref 6–20)
BUN/CREAT SERPL: 14 (ref 12–20)
CALCIUM SERPL-MCNC: 8 MG/DL (ref 8.5–10.1)
CHLORIDE SERPL-SCNC: 108 MMOL/L (ref 97–108)
CO2 SERPL-SCNC: 22 MMOL/L (ref 21–32)
CREAT SERPL-MCNC: 2.81 MG/DL (ref 0.55–1.02)
CROSSMATCH RESULT,%XM: NORMAL
CROSSMATCH RESULT,%XM: NORMAL
ERYTHROCYTE [DISTWIDTH] IN BLOOD BY AUTOMATED COUNT: 14.3 % (ref 11.5–14.5)
GLUCOSE SERPL-MCNC: 95 MG/DL (ref 65–100)
HCT VFR BLD AUTO: 27 % (ref 35–47)
HGB BLD-MCNC: 9.1 G/DL (ref 11.5–16)
MAGNESIUM SERPL-MCNC: 1.7 MG/DL (ref 1.6–2.4)
MCH RBC QN AUTO: 27.9 PG (ref 26–34)
MCHC RBC AUTO-ENTMCNC: 33.7 G/DL (ref 30–36.5)
MCV RBC AUTO: 82.8 FL (ref 80–99)
PHOSPHATE SERPL-MCNC: 4.6 MG/DL (ref 2.6–4.7)
PLATELET # BLD AUTO: 167 K/UL (ref 150–400)
POTASSIUM SERPL-SCNC: 4.1 MMOL/L (ref 3.5–5.1)
RBC # BLD AUTO: 3.26 M/UL (ref 3.8–5.2)
SODIUM SERPL-SCNC: 140 MMOL/L (ref 136–145)
SPECIMEN EXP DATE BLD: NORMAL
STATUS OF UNIT,%ST: NORMAL
STATUS OF UNIT,%ST: NORMAL
UNIT DIVISION, %UDIV: 0
UNIT DIVISION, %UDIV: 0
WBC # BLD AUTO: 6.9 K/UL (ref 3.6–11)

## 2017-03-28 PROCEDURE — 99218 HC RM OBSERVATION: CPT

## 2017-03-28 PROCEDURE — 83735 ASSAY OF MAGNESIUM: CPT | Performed by: FAMILY MEDICINE

## 2017-03-28 PROCEDURE — 74011250636 HC RX REV CODE- 250/636: Performed by: FAMILY MEDICINE

## 2017-03-28 PROCEDURE — 74011250637 HC RX REV CODE- 250/637: Performed by: FAMILY MEDICINE

## 2017-03-28 PROCEDURE — 80048 BASIC METABOLIC PNL TOTAL CA: CPT | Performed by: FAMILY MEDICINE

## 2017-03-28 PROCEDURE — 36415 COLL VENOUS BLD VENIPUNCTURE: CPT | Performed by: FAMILY MEDICINE

## 2017-03-28 PROCEDURE — 65270000029 HC RM PRIVATE

## 2017-03-28 PROCEDURE — 65660000000 HC RM CCU STEPDOWN

## 2017-03-28 PROCEDURE — 84100 ASSAY OF PHOSPHORUS: CPT | Performed by: FAMILY MEDICINE

## 2017-03-28 PROCEDURE — 85027 COMPLETE CBC AUTOMATED: CPT | Performed by: FAMILY MEDICINE

## 2017-03-28 PROCEDURE — 74011250637 HC RX REV CODE- 250/637: Performed by: NURSE PRACTITIONER

## 2017-03-28 RX ORDER — ACETAMINOPHEN 325 MG/1
650 TABLET ORAL ONCE
Status: COMPLETED | OUTPATIENT
Start: 2017-03-28 | End: 2017-03-28

## 2017-03-28 RX ORDER — FUROSEMIDE 10 MG/ML
20 INJECTION INTRAMUSCULAR; INTRAVENOUS 2 TIMES DAILY
Status: DISCONTINUED | OUTPATIENT
Start: 2017-03-28 | End: 2017-03-28

## 2017-03-28 RX ORDER — FUROSEMIDE 20 MG/1
20 TABLET ORAL 2 TIMES DAILY
Status: DISCONTINUED | OUTPATIENT
Start: 2017-03-28 | End: 2017-03-29 | Stop reason: HOSPADM

## 2017-03-28 RX ORDER — FUROSEMIDE 10 MG/ML
20 INJECTION INTRAMUSCULAR; INTRAVENOUS 2 TIMES DAILY
Status: CANCELLED | OUTPATIENT
Start: 2017-03-28

## 2017-03-28 RX ORDER — HYDRALAZINE HYDROCHLORIDE 10 MG/1
10 TABLET, FILM COATED ORAL
Status: DISCONTINUED | OUTPATIENT
Start: 2017-03-28 | End: 2017-03-29

## 2017-03-28 RX ADMIN — NITROGLYCERIN 1 INCH: 20 OINTMENT TOPICAL at 00:00

## 2017-03-28 RX ADMIN — HEPARIN SODIUM 5000 UNITS: 5000 INJECTION, SOLUTION INTRAVENOUS; SUBCUTANEOUS at 05:43

## 2017-03-28 RX ADMIN — FUROSEMIDE 40 MG: 10 INJECTION, SOLUTION INTRAMUSCULAR; INTRAVENOUS at 00:25

## 2017-03-28 RX ADMIN — Medication 10 ML: at 14:40

## 2017-03-28 RX ADMIN — HEPARIN SODIUM 5000 UNITS: 5000 INJECTION, SOLUTION INTRAVENOUS; SUBCUTANEOUS at 22:22

## 2017-03-28 RX ADMIN — LABETALOL HCL 200 MG: 200 TABLET, FILM COATED ORAL at 09:21

## 2017-03-28 RX ADMIN — Medication 10 ML: at 05:43

## 2017-03-28 RX ADMIN — LOSARTAN POTASSIUM 100 MG: 50 TABLET, FILM COATED ORAL at 09:21

## 2017-03-28 RX ADMIN — DORZOLAMIDE HYDROCHLORIDE AND TIMOLOL MALEATE 1 DROP: 20; 5 SOLUTION/ DROPS OPHTHALMIC at 18:01

## 2017-03-28 RX ADMIN — DORZOLAMIDE HYDROCHLORIDE AND TIMOLOL MALEATE 1 DROP: 20; 5 SOLUTION/ DROPS OPHTHALMIC at 09:23

## 2017-03-28 RX ADMIN — HEPARIN SODIUM 5000 UNITS: 5000 INJECTION, SOLUTION INTRAVENOUS; SUBCUTANEOUS at 14:39

## 2017-03-28 RX ADMIN — FUROSEMIDE 20 MG: 20 TABLET ORAL at 18:01

## 2017-03-28 RX ADMIN — ATORVASTATIN CALCIUM 10 MG: 10 TABLET, FILM COATED ORAL at 22:22

## 2017-03-28 RX ADMIN — AMLODIPINE BESYLATE 10 MG: 5 TABLET ORAL at 09:22

## 2017-03-28 RX ADMIN — NITROGLYCERIN 1 INCH: 20 OINTMENT TOPICAL at 06:00

## 2017-03-28 RX ADMIN — LABETALOL HCL 200 MG: 200 TABLET, FILM COATED ORAL at 20:15

## 2017-03-28 RX ADMIN — LABETALOL HCL 200 MG: 200 TABLET, FILM COATED ORAL at 01:09

## 2017-03-28 RX ADMIN — HYDRALAZINE HYDROCHLORIDE 10 MG: 10 TABLET ORAL at 22:22

## 2017-03-28 RX ADMIN — Medication 10 ML: at 22:23

## 2017-03-28 RX ADMIN — ACETAMINOPHEN 650 MG: 325 TABLET ORAL at 00:25

## 2017-03-28 RX ADMIN — ASPIRIN 81 MG: 81 TABLET, COATED ORAL at 09:22

## 2017-03-28 RX ADMIN — NITROGLYCERIN 1 INCH: 20 OINTMENT TOPICAL at 11:59

## 2017-03-28 NOTE — ROUTINE PROCESS
Bedside shift change report given to Oumou Small (oncoming nurse) by Josr Cevallos. Libia Hairston   (offgoing nurse). Report included the following information SBAR, Kardex and MAR.

## 2017-03-28 NOTE — PROGRESS NOTES
Cardiology Progress Note    5th floor      NAME:  Juju Field   :   1955   MRN:   [id number]     Assessment/Plan:   Dyspnea -multifactorial- anemia, PA HTN, HF - improving        - she last saw Dr Ortiz Milling 2016 at which time stress test was recommended. She needs a stress test to r/o ischemia - can either be done here or as OP once other issues improved   HFpEF in setting of CKD, accel HTN, and anemia         - diuretics per renal- switch to oral       - BP control   HTN- elevated on admit- improving -       - cont BB, ARB, CCB-                 -prn hydralazine     CKD- per renal   LE edema- maybe aggravated by norvasc dose   Anemia - baseline Hgb 7-8 - w/uper team      - keep Hgb > 8  T2DM        Pt personally seen and examined. Chart reviewed. Agree with advanced NP's history, exam and  A/P with changes/additons    Samson Raza. MD, [facility]        Subjective:   Juju Field is a 62y.o. year old female w/ hx:  HTN, HLD, CHF, DMII, CKD, anemia, GI bleed, arthritis who presents with dyspnea and leg swelling. Ms. Rosalia Michele c/o worsening dyspnea and sofie swelling x 4-5 days. Says her dyspnea was preceded by congestion, cough - thought she had a cold. She reports off and on LLE edema x 1-2 months, then new RLE edema in the last few days. + orthopnea. + BLE cramps, worsen with walking. LUQ vs L side pain that worsens with deep breathing x 2 weeks. Recently admitted to [facility] on 3/9 - 3/13/17 for anemia/GI bleed, found to have diverticulitis but no visualized source of bleeding.        proBNP 8100   Trop (-) x 2   Overnight activities reviewed:    - -180/80 range    - Tele: SR    - K+ 4.1  Mg++ 1.7 Cr  2.5-> 2.81    -  S/p 2 units PRC  Hgb 9.1 plt 167    - UO:1 L       I/O -440 /24, standing wt 165.6 this am     Cardiac ROS: feeling better, edema somewhat improved, breathing better, Patient denies any exertional chest pain, dyspnea, palpitations, syncope, orthopnea, edema or paroxysmal nocturnal dyspnea. Review of Systems: insomnia, dificulty sleeping, No nausea, indigestion, vomiting, pain, cough, sputum. No bleeding. Taking po. Appetite good . CARDIAC EVALUATION   ECHO 2016: EF 45-50%, inf. Inferoseptal HK, mild MR, RVSP 44mmHg  ECHO 3/28/2017: EF 50-55%, G2DD, mild MR/TR,RVSP 44 mmHg      Objective:     Visit Vitals    /80 (BP 1 Location: Left arm, BP Patient Position: At rest)    Pulse 77    Temp 98.2 °F (36.8 °C)    Resp 18    Ht 5' 1\" (1.549 m)    Wt 143 lb 8 oz (65.1 kg)    SpO2 95%    BMI 27.11 kg/m2        O2 Device: Room air    Temp (24hrs), Av.1 °F (36.7 °C), Min:97.8 °F (36.6 °C), Max:98.8 °F (37.1 °C)        Intake/Output Summary (Last 24 hours) at 17 1203  Last data filed at 17 2345   Gross per 24 hour   Intake              320 ml   Output              750 ml   Net             -430 ml       TELE: SR     General: AAOx3 cooperative, no acute distress. HEENT: Atraumatic. Pink and moist.  Anicteric sclerae. Neck: Supple,     Lungs: CTA bilaterally. No wheezing/rhonchi/crackles. Heart: PMI: nondisplaced,  Left chest wall pain Regular rhythm, prominent P2, no murmur, no S3, no rubs, no gallops. + JVD. No carotid bruits. Abdomen: Soft, non-distended, non-tender. + Bowel sounds. No bruits. : voiding   Extremities: 1+ LE  edema, no clubbing, no cyanosis. No calf tenderness  Neurologic: Grossly intact. Alert and oriented X 3. No acute neurological distress. Psych: Good insight. Not anxious nor agitated.       Care Plan discussed with:    Comments   Patient y    Family      RN     Care Manager                    Consultant:          Data Review:   CMP:   Lab Results   Component Value Date/Time     2017 05:46 AM    K 4.1 2017 05:46 AM     2017 05:46 AM    CO2 22 2017 05:46 AM    AGAP 10 2017 05:46 AM    GLU 95 2017 05:46 AM    BUN 39 (H) 2017 05:46 AM    CREA 2.81 (H) 03/28/2017 05:46 AM    GFRAA 21 (L) 03/28/2017 05:46 AM    GFRNA 17 (L) 03/28/2017 05:46 AM    CA 8.0 (L) 03/28/2017 05:46 AM    MG 1.7 03/28/2017 05:46 AM    PHOS 4.6 03/28/2017 05:46 AM     CBC:   Lab Results   Component Value Date/Time    WBC 6.9 03/28/2017 05:46 AM    HGB 9.1 (L) 03/28/2017 05:46 AM    HCT 27.0 (L) 03/28/2017 05:46 AM     03/28/2017 05:46 AM     All Cardiac Markers in the last 24 hours: No results found for: CPK, CKMMB, CKMB, RCK3, CKMBT, CKNDX, CKND1, RAMAN, TROPT, TROIQ, IDANIA, TROPT, TNIPOC, BNP, BNPP  Recent Glucose Results:   Lab Results   Component Value Date/Time    GLU 95 03/28/2017 05:46 AM     ABG: No results found for: PH, PHI, PCO2, PCO2I, PO2, PO2I, HCO3, HCO3I, FIO2, FIO2I  LIPIDS:  Cholesterol, total   Date Value Ref Range Status   03/10/2017 125 <200 MG/DL Final     Triglyceride   Date Value Ref Range Status   03/10/2017 27 <150 MG/DL Final     Comment:     Based on NCEP-ATP III:  Triglycerides <150 mg/dL  is considered normal, 150-199 mg/dL  borderline high,  200-499 mg/dL high and  greater than or equal to 500 mg/dL very high. HDL Cholesterol   Date Value Ref Range Status   03/10/2017 71 MG/DL Final     Comment:     Based on NCEP ATP III, HDL Cholesterol <40 mg/dL is considered low and >60 mg/dL is elevated.      LDL, calculated   Date Value Ref Range Status   03/10/2017 48.6 0 - 100 MG/DL Final     Comment:     Based on the NCEP-ATP: LDL-C concentrations are considered  optimal <100 mg/dL,  near optimal/above Normal 100-129 mg/dL  Borderline High: 130-159, High: 160-189 mg/dL  Very High: Greater than or equal to 190 mg/dL       VLDL, calculated   Date Value Ref Range Status   03/10/2017 5.4 MG/DL Final     CHOL/HDL Ratio   Date Value Ref Range Status   03/10/2017 1.8 0 - 5.0   Final       Medications reviewed  Current Facility-Administered Medications   Medication Dose Route Frequency    furosemide (LASIX) injection 20 mg  20 mg IntraVENous BID    simethicone (MYLICON) tablet 80 mg  80 mg Oral QID PRN    nitroglycerin (NITROBID) 2 % ointment 1 Inch  1 Inch Topical Q6H    remove paste/gel/ointment 1 Each  1 Each TransDERmal QPM    0.9% sodium chloride infusion 250 mL  250 mL IntraVENous PRN    0.9% sodium chloride infusion 250 mL  250 mL IntraVENous PRN    sodium chloride (NS) flush 5-10 mL  5-10 mL IntraVENous Q8H    sodium chloride (NS) flush 5-10 mL  5-10 mL IntraVENous PRN    heparin (porcine) injection 5,000 Units  5,000 Units SubCUTAneous Q8H    amLODIPine (NORVASC) tablet 10 mg  10 mg Oral DAILY    atorvastatin (LIPITOR) tablet 10 mg  10 mg Oral QHS    dorzolamide-timolol (COSOPT) 22.3-6.8 mg/mL ophthalmic solution 1 Drop  1 Drop Both Eyes BID    labetalol (NORMODYNE) tablet 200 mg  200 mg Oral BID    losartan (COZAAR) tablet 100 mg  100 mg Oral DAILY    aspirin delayed-release tablet 81 mg  81 mg Oral DAILY         Rivka Liang RN, ACNP-BC, [facility]

## 2017-03-28 NOTE — PROGRESS NOTES
Bedside and Verbal shift change report given to Polo Kauffman (oncoming nurse) by Mala Diego (offgoing nurse). Report included the following information SBAR, Kardex and Recent Results.

## 2017-03-28 NOTE — PROGRESS NOTES
3/28/2017   CARE MANAGEMENT NOTE:  CM is following pt for initial discharge planning. EMR reviewed. CM met with pt who was her own historian for this needs assessment. Reportedly, pt resides with her  in a one story home with one entry step. PTA, pt was ambulatory, indepn with ADLs, but does not drive. Pt has RX drug coverage and uses [employer] [facility] in [place]. She does not have home healthcare nor DME for home use. Pt has a glucometer. PCP is Dr. Trinity Carranza. Plan is to return home when medically stable. Pt is currently admitted under OBS status. CM provided written and oral explanation for Medicare Outpt OBS letter and [address] letter. Signed copies will be placed into pt's chart.   Bethanie

## 2017-03-28 NOTE — CONSULTS
55 Sandeep Weeks  YOB: 1955     Assessment & Plan:   1. PATRICIA on CKD progression  · Cr 1.6 in 10 2016, then 2-2.3 last time she was here, now 2.5-2.7 mg%  · ? Due to poor HTN control and or CHF  · Cont current Rx  · PVR  · CT: No hydro 3 9 17  2. Edema  · Due to CHF, Proteinuria and CKD  · Cont loops  3. HTN  · Uncontrolled here  · Claims to have normal BP at home  · Needs home machine checked  · Goal< 130/80  · Cont ARB,loops,Labetalol for now  · Add Amlodipine if SBP> 140 OUT Pt and we checked her machine  · WIll need Loops for volume control and HTN control out pt  4. DM  · Non on meds here  5. CHF  · 2+ edema  · Echo: 3 2017: EF 50-55%  · Change iv  To po loops  6. Anemia  · S/p PRBC  · No M spike or FLC elevation in past  · Might be ckd related  · Will need epo out pt                   Subjective:   CHIEF COMPLAIN:  HPI:      Review of Systems  A comprehensive review of systems was negative except for that written in the HPI. Past Medical History:   Diagnosis Date    Anemia     Arthritis     Calculus of kidney     Congestive heart failure (HCC)     Diabetes (HCC)     Hematuria       Past Surgical History:   Procedure Laterality Date    COLONOSCOPY N/A 3/13/2017    COLONOSCOPY performed by Temitope [facility]ver [facility] at [facility]      left carpal tunnel    HX TUBAL LIGATION Bilateral        Social History     Social History    Marital status:      Spouse name: N/A    Number of children: N/A    Years of education: N/A     Occupational History    Not on file. Social History Main Topics    Smoking status: Never Smoker    Smokeless tobacco: Never Used    Alcohol use No    Drug use: No    Sexual activity: Not on file     Other Topics Concern    Not on file     Social History Narrative      History reviewed. No pertinent family history.    Prior to Admission medications    Medication Sig Start Date End Date Taking? Authorizing Provider   losartan (COZAAR) 100 mg tablet Take 1 Tab by mouth daily. 3/13/17  Yes Chip Major,    amoxicillin-clavulanate (AUGMENTIN) 500-125 mg per tablet Take 1 Tab by mouth every twelve (12) hours for 14 days. 3/13/17 3/27/17 Yes Chip Major DO   labetalol (NORMODYNE) 200 mg tablet Take 200 mg by mouth two (2) times a day. Yes Historical Provider   dorzolamide-timolol (COSOPT) 22.3-6.8 mg/mL ophthalmic solution Administer 1 Drop to both eyes two (2) times a day. Yes Historical Provider   atorvastatin (LIPITOR) 10 mg tablet Take 1 Tab by mouth nightly. 17  Yes Maicol Walker MD   aspirin delayed-release 81 mg tablet take 2 tablets by mouth once daily 1/3/17  Yes Maicol Walker MD   amLODIPine (NORVASC) 10 mg tablet Take 10 mg by mouth daily. 16  Yes Zia Reid MD   ACETAMINOPHEN/DIPHENHYDRAMINE (TYLENOL PM PO) Take 2 Tabs by mouth nightly as needed (sleep). Historical Provider     Allergies   Allergen Reactions    Ciprofloxacin Angioedema       Objective:     Vitals:  Blood pressure 156/80, pulse 83, temperature 98.6 °F (37 °C), resp. rate 20, height 5' 1\" (1.549 m), weight 65.1 kg (143 lb 8 oz), SpO2 97 %.   Temp (24hrs), Av.1 °F (36.7 °C), Min:97.8 °F (36.6 °C), Max:98.8 °F (37.1 °C)      Intake and Output:     1901 -  0700  In: 12 [P.O.:560]  Out: 1000 [Urine:1000]    Physical Exam:                Patient is intubated:  no    Physical Examination:   GENERAL ASSESSMENT: well developed and well nourished  SKIN: normal color, no lesions  HEAD: normocephalic  EYES: normal eyes  NECK: normal  CHEST: normal air exchange, no rales, no rhonchi  HEART: regular rate and rhythm, normal S1/S2  ABDOMEN: soft, non-distended, no masses  :[place]: no  EXTREMITY: no joint swelling  NEURO: gross motor exam normal by observation      ECG/rhythm[de-identified] Rev:yes  Xray/CT/US/MRI REV:yes  Data Review   Recent Results (from the past 72 hour(s))   CBC WITH AUTOMATED DIFF    Collection Time: 03/26/17  1:30 PM   Result Value Ref Range    WBC 6.7 3.6 - 11.0 K/uL    RBC 2.77 (L) 3.80 - 5.20 M/uL    HGB 7.4 (L) 11.5 - 16.0 g/dL    HCT 24.1 (L) 35.0 - 47.0 %    MCV 87.0 80.0 - 99.0 FL    MCH 26.7 26.0 - 34.0 PG    MCHC 30.7 30.0 - 36.5 g/dL    RDW 14.2 11.5 - 14.5 %    PLATELET 755 369 - 372 K/uL    NEUTROPHILS 72 32 - 75 %    LYMPHOCYTES 16 12 - 49 %    MONOCYTES 9 5 - 13 %    EOSINOPHILS 3 0 - 7 %    BASOPHILS 0 0 - 1 %    ABS. NEUTROPHILS 4.8 1.8 - 8.0 K/UL    ABS. LYMPHOCYTES 1.1 0.8 - 3.5 K/UL    ABS. MONOCYTES 0.6 0.0 - 1.0 K/UL    ABS. EOSINOPHILS 0.2 0.0 - 0.4 K/UL    ABS.  BASOPHILS 0.0 0.0 - 0.1 K/UL    DF AUTOMATED     METABOLIC PANEL, BASIC    Collection Time: 03/26/17  1:30 PM   Result Value Ref Range    Sodium 138 136 - 145 mmol/L    Potassium 5.1 3.5 - 5.1 mmol/L    Chloride 108 97 - 108 mmol/L    CO2 21 21 - 32 mmol/L    Anion gap 9 5 - 15 mmol/L    Glucose 120 (H) 65 - 100 mg/dL    BUN 40 (H) 6 - 20 MG/DL    Creatinine 2.71 (H) 0.55 - 1.02 MG/DL    BUN/Creatinine ratio 15 12 - 20      GFR est AA 22 (L) >60 ml/min/1.73m2    GFR est non-AA 18 (L) >60 ml/min/1.73m2    Calcium 8.3 (L) 8.5 - 10.1 MG/DL   CK W/ REFLX CKMB    Collection Time: 03/26/17  1:30 PM   Result Value Ref Range     26 - 192 U/L   MAGNESIUM    Collection Time: 03/26/17  1:30 PM   Result Value Ref Range    Magnesium 1.6 1.6 - 2.4 mg/dL   PRO-BNP    Collection Time: 03/26/17  1:30 PM   Result Value Ref Range    NT pro-BNP 8130 (H) 0 - 125 PG/ML   POC TROPONIN-I    Collection Time: 03/26/17  1:39 PM   Result Value Ref Range    Troponin-I (POC) <0.04 0.00 - 0.08 ng/mL   TROPONIN I    Collection Time: 03/26/17 10:40 PM   Result Value Ref Range    Troponin-I, Qt. <0.04 <0.05 ng/mL   EKG, 12 LEAD, INITIAL    Collection Time: 03/27/17  1:07 AM   Result Value Ref Range    Ventricular Rate 78 BPM    Atrial Rate 78 BPM    P-R Interval 140 ms    QRS Duration 78 ms    Q-T Interval 366 ms    QTC Calculation (Bezet) 417 ms    Calculated P Axis 52 degrees    Calculated R Axis 38 degrees    Calculated T Axis 94 degrees    Diagnosis       Normal sinus rhythm  Normal ECG  When compared with ECG of 10-MAR-2017 06:02,  No significant change was found  Confirmed by Davis Montoya MD. ([id number]) on 3/27/2017 2:89:94 AM     METABOLIC PANEL, BASIC    Collection Time: 03/27/17  6:06 AM   Result Value Ref Range    Sodium 140 136 - 145 mmol/L    Potassium 4.2 3.5 - 5.1 mmol/L    Chloride 110 (H) 97 - 108 mmol/L    CO2 20 (L) 21 - 32 mmol/L    Anion gap 10 5 - 15 mmol/L    Glucose 95 65 - 100 mg/dL    BUN 36 (H) 6 - 20 MG/DL    Creatinine 2.50 (H) 0.55 - 1.02 MG/DL    BUN/Creatinine ratio 14 12 - 20      GFR est AA 24 (L) >60 ml/min/1.73m2    GFR est non-AA 20 (L) >60 ml/min/1.73m2    Calcium 8.0 (L) 8.5 - 10.1 MG/DL   CBC W/O DIFF    Collection Time: 03/27/17  6:06 AM   Result Value Ref Range    WBC 6.6 3.6 - 11.0 K/uL    RBC 2.70 (L) 3.80 - 5.20 M/uL    HGB 7.0 (L) 11.5 - 16.0 g/dL    HCT 22.8 (L) 35.0 - 47.0 %    MCV 84.4 80.0 - 99.0 FL    MCH 25.9 (L) 26.0 - 34.0 PG    MCHC 30.7 30.0 - 36.5 g/dL    RDW 15.0 (H) 11.5 - 14.5 %    PLATELET 334 551 - 313 K/uL   TROPONIN I    Collection Time: 03/27/17  6:06 AM   Result Value Ref Range    Troponin-I, Qt. <0.04 <0.05 ng/mL   MAGNESIUM    Collection Time: 03/27/17  6:06 AM   Result Value Ref Range    Magnesium 1.8 1.6 - 2.4 mg/dL   PHOSPHORUS    Collection Time: 03/27/17  6:06 AM   Result Value Ref Range    Phosphorus 4.4 2.6 - 4.7 MG/DL   TYPE & SCREEN    Collection Time: 03/27/17  7:52 AM   Result Value Ref Range    Crossmatch Expiration 03/30/2017     ABO/Rh(D) O POSITIVE     Antibody screen NEG     Unit number [id number]     Blood component type RC LR AS1     Unit division 00     Status of unit TRANSFUSED     Crossmatch result Compatible     Unit number [id number]     Blood component type RC LR AS1     Unit division 00     Status of unit TRANSFUSED     Crossmatch result Compatible    CBC WITH AUTOMATED DIFF    Collection Time: 03/27/17 11:59 AM   Result Value Ref Range    WBC 6.9 3.6 - 11.0 K/uL    RBC 2.64 (L) 3.80 - 5.20 M/uL    HGB 7.1 (L) 11.5 - 16.0 g/dL    HCT 21.7 (L) 35.0 - 47.0 %    MCV 82.2 80.0 - 99.0 FL    MCH 26.9 26.0 - 34.0 PG    MCHC 32.7 30.0 - 36.5 g/dL    RDW 14.9 (H) 11.5 - 14.5 %    PLATELET 559 977 - 918 K/uL    NEUTROPHILS 74 32 - 75 %    LYMPHOCYTES 15 12 - 49 %    MONOCYTES 8 5 - 13 %    EOSINOPHILS 3 0 - 7 %    BASOPHILS 0 0 - 1 %    ABS. NEUTROPHILS 5.1 1.8 - 8.0 K/UL    ABS. LYMPHOCYTES 1.0 0.8 - 3.5 K/UL    ABS. MONOCYTES 0.6 0.0 - 1.0 K/UL    ABS. EOSINOPHILS 0.2 0.0 - 0.4 K/UL    ABS.  BASOPHILS 0.0 0.0 - 0.1 K/UL   IRON PROFILE    Collection Time: 03/27/17  4:14 PM   Result Value Ref Range    Iron 43 35 - 150 ug/dL    TIBC 178 (L) 250 - 450 ug/dL    Iron % saturation 24 20 - 50 %   FERRITIN    Collection Time: 03/27/17  4:14 PM   Result Value Ref Range    Ferritin 440 (H) 8 - 252 NG/ML   LD    Collection Time: 03/27/17  4:14 PM   Result Value Ref Range     (H) 81 - 246 U/L   HAPTOGLOBIN    Collection Time: 03/27/17  4:14 PM   Result Value Ref Range    Haptoglobin 93 30 - 378 mg/dL   METABOLIC PANEL, BASIC    Collection Time: 03/28/17  5:46 AM   Result Value Ref Range    Sodium 140 136 - 145 mmol/L    Potassium 4.1 3.5 - 5.1 mmol/L    Chloride 108 97 - 108 mmol/L    CO2 22 21 - 32 mmol/L    Anion gap 10 5 - 15 mmol/L    Glucose 95 65 - 100 mg/dL    BUN 39 (H) 6 - 20 MG/DL    Creatinine 2.81 (H) 0.55 - 1.02 MG/DL    BUN/Creatinine ratio 14 12 - 20      GFR est AA 21 (L) >60 ml/min/1.73m2    GFR est non-AA 17 (L) >60 ml/min/1.73m2    Calcium 8.0 (L) 8.5 - 10.1 MG/DL   CBC W/O DIFF    Collection Time: 03/28/17  5:46 AM   Result Value Ref Range    WBC 6.9 3.6 - 11.0 K/uL    RBC 3.26 (L) 3.80 - 5.20 M/uL    HGB 9.1 (L) 11.5 - 16.0 g/dL    HCT 27.0 (L) 35.0 - 47.0 %    MCV 82.8 80.0 - 99.0 FL    MCH 27.9 26.0 - 34.0 PG    MCHC 33.7 30.0 - 36.5 g/dL RDW 14.3 11.5 - 14.5 %    PLATELET 199 551 - 326 K/uL   MAGNESIUM    Collection Time: 03/28/17  5:46 AM   Result Value Ref Range    Magnesium 1.7 1.6 - 2.4 mg/dL   PHOSPHORUS    Collection Time: 03/28/17  5:46 AM   Result Value Ref Range    Phosphorus 4.6 2.6 - 4.7 MG/DL       Discussed with:    Patient  Thank you so much to allow us to participate in this patient's care. We will follow.  : Ester Sanchez MD  3/28/2017      [facility]:  [email or web address]  [facility] office:  [address] [address]  [place], [address] [facility]  Phone: [phone]  Fax :     [phone]    [place] office:  [address] [facility], [address]  Phone - [phone]  Fax - [phone]

## 2017-03-28 NOTE — PROGRESS NOTES
*ATTENTION:  This note has been created by a medical student for educational purposes only. Please do not refer to the content of this note for clinical decision-making, billing, or other purposes. Please see attending physicians note to obtain clinical information on this patient. *        Progress Note    Patient: John Keller MRN: [id number]  SSN: [id number]    YOB: 1955  Age: 62 y.o. Sex: female      Admit Date: 3/26/2017    LOS: 0 days     Subjective:     Patient tolerated 2 units of transfusion yesterday. Blood pressure elevated to 196/93 but decreased after being given night dose of 40mg Lasix and Nitro. Reports improved breathing, return to baseline. Also lower extremity edema much improved. Denies fever, chills, headaches, chest pain, palpitations, cough, SOB, abdominal pain, nausea, vomiting. Objective:     Vitals:    03/28/17 0656 03/28/17 0702 03/28/17 0825 03/28/17 1113   BP:   156/80 175/80   Pulse:  83 83 77   Resp:   20 18   Temp:   98.6 °F (37 °C) 98.2 °F (36.8 °C)   SpO2:   97% 95%   Weight: 143 lb 8 oz (65.1 kg)      Height:            Intake and Output:  Current Shift:    Last three shifts: 03/26 1901 - 03/28 0700  In: 560 [P.O.:560]  Out: 1000 [Urine:1000]    Physical Exam:   General:  Alert, cooperative, no distress, appears stated age. Head:  Normocephalic, without obvious abnormality, atraumatic. Eyes:  Conjunctivae/corneas clear. PERRL, EOMs intact. Ears:  Normal TMs and external ear canals both ears. Nose: Nares normal. Septum midline. Mucosa normal. No drainage or sinus tenderness. Throat: Lips, mucosa, and tongue normal. Teeth and gums normal.   Neck: Supple, symmetrical, trachea midline, no adenopathy, thyroid: no enlargement/tenderness/nodules, no carotid bruit and no JVD. Back:   Symmetric, no curvature. ROM normal. No CVA tenderness. Lungs:   Mild crackles b/l in lower lung fields. Chest wall:  No tenderness or deformity. Heart:  Regular rate and rhythm, S1, S2 normal, no murmur, click, rub or gallop. Abdomen:   Soft, non-tender. Bowel sounds normal. No masses,  No organomegaly. Extremities: 1+ edema b/l up to ankles. atraumatic, no cyanosis or edema. Pulses: 2+ and symmetric all extremities. Skin: Skin color, texture, turgor normal. No rashes or lesions. Lymph nodes: Cervical, supraclavicular, and axillary nodes normal.   Neurologic: CNII-XII intact. Normal strength, sensation and reflexes throughout. Lab/Data Review:  CMP:   Lab Results   Component Value Date/Time     03/28/2017 05:46 AM    K 4.1 03/28/2017 05:46 AM     03/28/2017 05:46 AM    CO2 22 03/28/2017 05:46 AM    AGAP 10 03/28/2017 05:46 AM    GLU 95 03/28/2017 05:46 AM    BUN 39 (H) 03/28/2017 05:46 AM    CREA 2.81 (H) 03/28/2017 05:46 AM    GFRAA 21 (L) 03/28/2017 05:46 AM    GFRNA 17 (L) 03/28/2017 05:46 AM    CA 8.0 (L) 03/28/2017 05:46 AM    MG 1.7 03/28/2017 05:46 AM    PHOS 4.6 03/28/2017 05:46 AM     CBC:   Lab Results   Component Value Date/Time    WBC 6.9 03/28/2017 05:46 AM    HGB 9.1 (L) 03/28/2017 05:46 AM    HCT 27.0 (L) 03/28/2017 05:46 AM     03/28/2017 05:46 AM        Assessment:     Principal Problem:    CHF exacerbation ([facility] [address] (3/27/2017)    Active Problems:    Diabetes mellitus type 2 with complications ([facility] [address] (1/69/9566)      Essential hypertension (7/28/2016)      Anemia (7/28/2016)        Plan:     63 yo female with CHF, HTN, CKD4, DM, HLD who is admitted for CHF exacerbation.     CHF decompensation: BNP 8130, troponin negativex2. pt received 40mg IV Lasix in ED. SOB and LE edema have improved. UOP = 400mL. ECHO on 3/27/19 reveals EF 50-55%. Physical exam noted for improvement in edema and decreased crackles.  Given elevated Cr from 2.50 to 2.80, will decrease Lasix dose to 20 mg.  -IV Lasix 20mg BID  -Labetalol 200 mg PO BID  -Losartan 100 mg PO every day     CKD stage 4: Cr down trending from 2.7 to 2.5 (baseline 2.1-2. 3)  -avoid nephrotoxic agents  -IV Lasix  -f/u Nepro      HTN: BP currently 177/77. Cards recommend nitropaste, labetalol, losartan  -Nitroglycerin paste   -IV Lasix 40mg BID  -Labetalol 200 mg PO BID  -Losartan 100 mg PO every day     Anemia: Hgb 7.1. Card recommend transfusion if below 8. Transfused 2 units RBCs for Hgb of 7.1. Iron studies consistent with anemia of chronic disease, likely from CKD or DM     DM: AIC 3.5 from last admission when had acute blood loss.  BS stable, diet controlled.     HLD: stable  -continue Lipitor     Signed By: Isaias Choi     March 28, 2017

## 2017-03-28 NOTE — PROGRESS NOTES
[facility] PROGRAM  PROGRESS NOTE     3/28/2017  PCP: Seth Anthony MD     Assessment/Plan:   Jarret Vicente is a 62 y.o. female who is admitted for CHF Decompensation.      HFpEF:ECHO on 3/28 reveals EF 50-55% with grade 2 diastolic dysfunction. proBNP is 8130 on admission. Dyspnea on admission likely related to pleural effusion, CKD, uncontrolled HTN, anemia . This is the first episode of such presentation. Trop were negative  - will continue diuresis with Furosemide 20mg iv. Will switch to oral   - Strict In's and Out's   - Cardiac diet with low sodium and potassium  - Continue Normodyne, Cozaar, Norvasc , hydralazine 10 prn if SBP> 170  - will need stress test as outpatient or here per cards, NPO at midnight for possible stress test tomorrow      At risk PATRICIA  On Chronic Kidney Disease Stage 4 Cr 2.81; BL 2.11, GFR 22. Nephro following   - ok with diuresis: Lasix 20 mg        Hypertension; Current BP: 166/80  -Continue home Norvasc, Normodyne, Cozaar and Lasix    Anemia:  S/p 2PRBCs. Iron studies shows anemia of chronic disease, likely CKD related  - Epo as outpatient     DM; HgA1C <3.5. POC Glucose 120. Patient states that she was treated for a time with metformin but was suspended. Last HgA1C was taken during a period of acute blood loss, for which is not very accurate.       Hyperlipidemia; Lipid Panel 03/10/2017 Tchol: 125 LDL: 48.6 HDL: 71 Trigs: 27  -Continue Lipitor every day      FEN/GI - Cardiac Diet. Activity - Ambulate with Assistance   DVT prophylaxis - Heparin   GI prophylaxis - None  Disposition - Plan to d/c to TBA.     CODE STATUS:  FULL CODE     Pt to be discussed with Dr. Justin Willett (on-call attending physician)     Subjective:   Pt was seen and examined at bedside. Afebrile and hemodynamically stable. Denies chest pain, SOB, nausea, vomiting, abdominal pain, dizziness.      Objective:   Physical examination  Visit Vitals    /80 (BP 1 Location: Left arm, BP Patient Position: At rest)    Pulse 77    Temp 98.2 °F (36.8 °C)    Resp 18    Ht 5' 1\" (1.549 m)    Wt 166 lb 0.1 oz (75.3 kg)    SpO2 95%    BMI 31.37 kg/m2      Temp (24hrs), Av.1 °F (36.7 °C), Min:97.8 °F (36.6 °C), Max:98.8 °F (37.1 °C)         O2 Device: Room air      Date 17 - 17 0659 17 07 - 17 0659   Shift 8502-7997 9370-8273 24 Hour Total 6310-8870 9695-1051 24 Hour Total   I  N  T  A  K  E   P. O. 560  560         P. O. 560  560       Shift Total  (mL/kg) 560  (7.4)  560  (8.6)      O  U  T  P  U  T   Urine  (mL/kg/hr) 650  (0.7) 350  (0.4) 1000  (0.6)         Urine Voided        Shift Total  (mL/kg) 650  (8.6) 350  (5.4) 1000  (15.4)      NET -90 -350 -440      Weight (kg) 75.9 65.1 65.1 75.3 75.3 75.3         Last 3 shifts:    1901 -  0700  In: 560 [P.O.:560]  Out: 1000 [Urine:1000]    General:   Alert, cooperative, no acute distress   Head:   Atraumatic   Eyes:   Conjunctivae clear   ENT:  Oral mucosa normal   Neck:  Supple, trachea midline, no adenopathy   No JVD   Back:    No CVA tenderness    Chest wall:    No tenderness or deformities    Lungs:   Clear to auscultation bilaterally    Heart:   Regular rhythm, no murmur   Abdomen:    Soft, non-tender   No masses or organomegaly    Extremities:  No edema or DVT signs   Pulses:  Symmetric all extremities   Skin:  Warm and dry    No rashes or lesions   Neurologic:  Oriented   No focal deficits         Data Review:     Recent Labs      17   0546  17   1159  17   0606   WBC  6.9  6.9  6.6   HGB  9.1*  7.1*  7.0*   HCT  27.0*  21.7*  22.8*   PLT  167  177  152     Recent Labs      17   0546  17   0606  17   1330   NA  140  140  138   K  4.1  4.2  5.1   CL  108  110*  108   CO2  22  20*  21   GLU  95  95  120*   BUN  39*  36*  40*   CREA  2.81*  2.50*  2.71*   CA  8.0*  8.0*  8.3*   MG  1.7  1.8  1.6   PHOS  4.6  4.4   --      Medications reviewed  Current Facility-Administered Medications   Medication Dose Route Frequency    nitroglycerin (NITROBID) 2 % ointment 0.5 Inch  0.5 Inch Topical Q6H    hydrALAZINE (APRESOLINE) tablet 10 mg  10 mg Oral Q8H PRN    furosemide (LASIX) tablet 20 mg  20 mg Oral BID    simethicone (MYLICON) tablet 80 mg  80 mg Oral QID PRN    remove paste/gel/ointment 1 Each  1 Each TransDERmal QPM    0.9% sodium chloride infusion 250 mL  250 mL IntraVENous PRN    0.9% sodium chloride infusion 250 mL  250 mL IntraVENous PRN    sodium chloride (NS) flush 5-10 mL  5-10 mL IntraVENous Q8H    sodium chloride (NS) flush 5-10 mL  5-10 mL IntraVENous PRN    heparin (porcine) injection 5,000 Units  5,000 Units SubCUTAneous Q8H    amLODIPine (NORVASC) tablet 10 mg  10 mg Oral DAILY    atorvastatin (LIPITOR) tablet 10 mg  10 mg Oral QHS    dorzolamide-timolol (COSOPT) 22.3-6.8 mg/mL ophthalmic solution 1 Drop  1 Drop Both Eyes BID    labetalol (NORMODYNE) tablet 200 mg  200 mg Oral BID    losartan (COZAAR) tablet 100 mg  100 mg Oral DAILY    aspirin delayed-release tablet 81 mg  81 mg Oral DAILY         Signed:   Amber Joseph MD   Resident, Family Medicine      Attending note: Attending note to follow. ..

## 2017-03-28 NOTE — INTERDISCIPLINARY ROUNDS
Interdisciplinary team rounds were held 3/28/2017 with the following team members:Care Management, Nursing, Nutrition, Physical Therapy and Physician     Plan of care discussed. See clinical pathway and/or care plan for interventions and desired outcomes.     Anticipated discharge tomorrow

## 2017-03-28 NOTE — PROGRESS NOTES
Letter of Status Determination:   Recommend hospitalization status upgraded from Observation to Inpatient  Status    Pt Name:  Harsh Carson   MR#  [id number]   [facility]#   [id number]   [address] [place] [address]  [phone]  @ [place] date  3/26/2017 12:50 PM   Current Attending Physician  Balaji Dickson, [id number] Failure  ORG: M-190 ([facility])   Principal diagnosis  CHF exacerbation [facility])      Clinicals  62 y.o. y.o  female hospitalized with above diagnosis   This pt is noted to have developed progression of her CRI. Her creatinine in 04/16 was around 1.0 and since then her creatinine has gone up. Earlier this month her creatinine was around 2.1 when she was discharged from [facility]. She is now readmitted with elevated Creatinine, pleural effusions and worsening heart failure. Her GFR has gone down from 28% to 17% (Acute on CRF)      [place] (MCG) criteria   Does   apply Heart Failure  ORG: M-190 ([facility])   STATUS DETERMINATION  Based on documented presenting clinical data, comorbid conditions, high risk of adverse events and deterioration, it is our recommendation that the patient's status should be upgraded from OBSERVATION to INPATIENT status. The final decision of the patient's hospitalization status depends on the attending physician's judgment.           Additional comments     Payor: Fuad Minaya / Plan: Dain Rubio / Product Type: Mary Cabrera /         Sanna Whitaker MD MPH FACP     Physician Advisor    [address]   [employer], [address] [place] [address]    Cell  [phone]

## 2017-03-28 NOTE — CARDIO/PULMONARY
Cardiac Rehab: 61 yo female admitted with bilateral LE swelling & MANZO (3/26). Per Dr Chester Villalta, dx includes: CHF exacerbation (HFpEF). Per Dr Rafael Santos, dx also includes PATRICIA on CKD progression & Anemia withHgb 7.0. S/P blood transfusion X 2 units. LVEF 50-55% by echo (3/2717), with grade 2 DD, mild MR/TR and mild-mod PaHTN. Cardiologist is Dr Cleveland Domínguez. 3/27/2017 Received consult for cardiac teaching on CHF. Recent admission (3/9-13) with GIB & acute blood loss anemia, s/p EDG/colonoscopy, revealed colonic diverticulitis, s/p pRBCs. Info attached to AVS on CHF.  3/28/2017 Met with patient, who was sitting up on side of bed on 5th floor med-surg unit. Pt reported she resides with her  in [facility]. 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 Son Charlie Cedillo) arrived during session. Discussed pt's understanding of her current condition, tx plan and cardiac hx. Reported she got blood transfusions for \"low blood. \" Discussed anemia may be due to CKD & will need epo as outpatient, per Dr Rafael Santos. Pt was unclear on HF. Explained normal EF and diastolic dysfunction. Also explained valve regurg. Gave/reviewed CHF teaching packet. Pt indicated awareness that outpatient stress test has been recommended. Pt to f/u with Dr Daniella Comer. Cardiac Meds:  ACE/ARB - losartan    BB - labetalol  Statin - atorvastatin (see lipid panel, 3/10/17). ASA - 81 mg  Prior to admission meds also included: amlodipine. Diet Education: 3/28/2017 Gave/reviewed handouts on Low Sodium diet (<1500 mg Na/day) & Sodium Food/Beverage Zones. BMI 31.4. Hgb A1c not accurate due to recent blood transfusions. Pt reported she does not add salt to her food. Reviewed rationale for limiting salt intake. Son requested additional info on healthy diet. Provided booklets on Eating for a Healthier Heart and Eating Less Sodium for Life.    Fluid Intake Education: 3/28/2017 Discussed hx CKD, with elevated admission creat 2.7. Pt indicated she probably does not drink enough because she does not feel thirsty. Daily fluid intake includes: 16-24 oz bottled water, 4 oz skim milk, 6 oz OJ, and 12-16 oz regular lemonade. Estimated total 38-50 oz/day. Explained importance of adequate fluid intake to prevent dehydration, while also avoiding fluid overload. Encouraged pt to talk to Dr Silva about recommended daily fluid intake. Daily Weights: 3/28/2017 Pt reported she has a scale, but has not been weighing daily. Reported she was not aware she was supposed to weigh daily. Discussed rationale for monitoring daily wts, and importance of contacting MD if gains 3 lbs in one day, or 5 lbs in one week. Pt declined CHF calendar. Encouraged pt to write both her BP readings and weights on calendar to take to MD appts with Dr Silva. Smoking Cessation: Never smoked. Medication Education: 3/28/2017 Pt denied any difficulties obtaining or taking her meds. She does not know the names/purposes of all her meds. Reinforced med compliance. Pt reported normal BP readings at home. However, BP has been uncontrolled in hospital. Nephrologist has encouraged pt to have BP machine checked at MD office. BP Goal < 130/80, per Dr Janna Ritchie. New Scheduled PO Meds this admission: 3/28/2017 Discussed Lasix. Info attached to AVS on new PO med. Concern for Knowledge Deficit: 3/28/2017 Patient verbalized basic understanding of info provided. She would benefit from reinforcement, especially on purposes of meds. All questions were answered. 1.  During this hospital stay did staff take your preferences and those of your family (or caregiver) into account in deciding your individual heart failure needs, and in deciding when you left the hospital?  2.  Do you have a good understanding of the things you are responsible for in managing heart failure?   3.  Do you clearly understand the purpose for taking each medication?

## 2017-03-28 NOTE — PROGRESS NOTES
[address] [facility] Residency Program       Resident Progress Note in Brief    S: I was paged by nurse about the patient's tien blood pressure. Patient seen and examined at bedside. Th pt denies chest pain, headache, blurry vision, SOB. She reports rib pain on the left side. The 2nd of pRBCs bag is running, the pt is tolerating it well.       O:  Visit Vitals    BP (!) 200/97    Pulse 89    Temp 98 °F (36.7 °C)    Resp 20    Ht 5' 1\" (1.549 m)    Wt 167 lb 5.3 oz (75.9 kg)    SpO2 97%    BMI 31.62 kg/m2     Physical Examination:   General appearance - alert, well appearing, and in no distress  Chest - Bilateral bibasilar crackles,no wheezes, rales or rhonchi  Heart - normal rate, regular rhythm, normal S1, S2, no murmurs, rubs, clicks or gallops,   Neurological - alert, oriented, normal speech    A/P: 64yo F with hx HTN, CKD4, DM, HLD is admitted for CHF exacerbation.    -Will give IV dose of Lasix and recheck BP later  -Tylenol 650 mg for the rib pain        Raymond Tellez MD  Family Medicine Resident

## 2017-03-29 ENCOUNTER — APPOINTMENT (OUTPATIENT)
Dept: NUCLEAR MEDICINE | Age: 62
DRG: 291 | End: 2017-03-29
Attending: INTERNAL MEDICINE
Payer: COMMERCIAL

## 2017-03-29 VITALS
BODY MASS INDEX: 31.55 KG/M2 | WEIGHT: 167.11 LBS | TEMPERATURE: 97.8 F | DIASTOLIC BLOOD PRESSURE: 81 MMHG | RESPIRATION RATE: 18 BRPM | HEIGHT: 61 IN | HEART RATE: 73 BPM | SYSTOLIC BLOOD PRESSURE: 156 MMHG | OXYGEN SATURATION: 100 %

## 2017-03-29 LAB
ALBUMIN SERPL BCP-MCNC: 3.2 G/DL (ref 3.5–5)
ALBUMIN/GLOB SERPL: 0.8 {RATIO} (ref 1.1–2.2)
ALP SERPL-CCNC: 214 U/L (ref 45–117)
ALT SERPL-CCNC: 47 U/L (ref 12–78)
ANION GAP BLD CALC-SCNC: 9 MMOL/L (ref 5–15)
AST SERPL W P-5'-P-CCNC: 21 U/L (ref 15–37)
ATTENDING PHYSICIAN, CST07: NORMAL
BILIRUB DIRECT SERPL-MCNC: 0.1 MG/DL (ref 0–0.2)
BILIRUB SERPL-MCNC: 0.7 MG/DL (ref 0.2–1)
BUN SERPL-MCNC: 40 MG/DL (ref 6–20)
BUN/CREAT SERPL: 15 (ref 12–20)
CALCIUM SERPL-MCNC: 8.4 MG/DL (ref 8.5–10.1)
CHLORIDE SERPL-SCNC: 108 MMOL/L (ref 97–108)
CO2 SERPL-SCNC: 22 MMOL/L (ref 21–32)
CREAT SERPL-MCNC: 2.64 MG/DL (ref 0.55–1.02)
DIAGNOSIS, 93000: NORMAL
DUKE TM SCORE RESULT, CST14: NORMAL
DUKE TREADMILL SCORE, CST13: NORMAL
ECG INTERP BEFORE EX, CST11: NORMAL
ECG INTERP DURING EX, CST12: NORMAL
ERYTHROCYTE [DISTWIDTH] IN BLOOD BY AUTOMATED COUNT: 14.6 % (ref 11.5–14.5)
FUNCTIONAL CAPACITY, CST17: NORMAL
GLOBULIN SER CALC-MCNC: 4 G/DL (ref 2–4)
GLUCOSE SERPL-MCNC: 107 MG/DL (ref 65–100)
HCT VFR BLD AUTO: 28.5 % (ref 35–47)
HGB BLD-MCNC: 9.5 G/DL (ref 11.5–16)
KNOWN CARDIAC CONDITION, CST08: NORMAL
MAGNESIUM SERPL-MCNC: 1.8 MG/DL (ref 1.6–2.4)
MAX. DIASTOLIC BP, CST04: 82 MMHG
MAX. HEART RATE, CST05: 93 BPM
MAX. SYSTOLIC BP, CST03: 164 MMHG
MCH RBC QN AUTO: 27.7 PG (ref 26–34)
MCHC RBC AUTO-ENTMCNC: 33.3 G/DL (ref 30–36.5)
MCV RBC AUTO: 83.1 FL (ref 80–99)
OVERALL BP RESPONSE TO EXERCISE, CST16: NORMAL
OVERALL HR RESPONSE TO EXERCISE, CST15: NORMAL
PEAK EX METS, CST10: 1 METS
PHOSPHATE SERPL-MCNC: 4.1 MG/DL (ref 2.6–4.7)
PLATELET # BLD AUTO: 168 K/UL (ref 150–400)
POTASSIUM SERPL-SCNC: 4.3 MMOL/L (ref 3.5–5.1)
PROT SERPL-MCNC: 7.2 G/DL (ref 6.4–8.2)
PROTOCOL NAME, CST01: NORMAL
RBC # BLD AUTO: 3.43 M/UL (ref 3.8–5.2)
SODIUM SERPL-SCNC: 139 MMOL/L (ref 136–145)
TEST INDICATION, CST09: NORMAL
WBC # BLD AUTO: 8.2 K/UL (ref 3.6–11)

## 2017-03-29 PROCEDURE — 85027 COMPLETE CBC AUTOMATED: CPT | Performed by: FAMILY MEDICINE

## 2017-03-29 PROCEDURE — 74011250636 HC RX REV CODE- 250/636: Performed by: FAMILY MEDICINE

## 2017-03-29 PROCEDURE — 80048 BASIC METABOLIC PNL TOTAL CA: CPT | Performed by: FAMILY MEDICINE

## 2017-03-29 PROCEDURE — 80076 HEPATIC FUNCTION PANEL: CPT | Performed by: FAMILY MEDICINE

## 2017-03-29 PROCEDURE — 74011250637 HC RX REV CODE- 250/637: Performed by: FAMILY MEDICINE

## 2017-03-29 PROCEDURE — 84100 ASSAY OF PHOSPHORUS: CPT | Performed by: FAMILY MEDICINE

## 2017-03-29 PROCEDURE — A9500 TC99M SESTAMIBI: HCPCS

## 2017-03-29 PROCEDURE — 83735 ASSAY OF MAGNESIUM: CPT | Performed by: FAMILY MEDICINE

## 2017-03-29 PROCEDURE — 36415 COLL VENOUS BLD VENIPUNCTURE: CPT | Performed by: FAMILY MEDICINE

## 2017-03-29 PROCEDURE — 74011250636 HC RX REV CODE- 250/636: Performed by: INTERNAL MEDICINE

## 2017-03-29 PROCEDURE — 74011250637 HC RX REV CODE- 250/637: Performed by: INTERNAL MEDICINE

## 2017-03-29 PROCEDURE — 93017 CV STRESS TEST TRACING ONLY: CPT

## 2017-03-29 RX ORDER — DOXAZOSIN 2 MG/1
2 TABLET ORAL DAILY
Status: DISCONTINUED | OUTPATIENT
Start: 2017-03-29 | End: 2017-03-29 | Stop reason: HOSPADM

## 2017-03-29 RX ORDER — DOXAZOSIN 2 MG/1
2 TABLET ORAL DAILY
Qty: 30 TAB | Refills: 1 | Status: SHIPPED | OUTPATIENT
Start: 2017-03-29 | End: 2017-10-05 | Stop reason: SDUPTHER

## 2017-03-29 RX ORDER — HYDRALAZINE HYDROCHLORIDE 20 MG/ML
10 INJECTION INTRAMUSCULAR; INTRAVENOUS
Status: DISCONTINUED | OUTPATIENT
Start: 2017-03-29 | End: 2017-03-29 | Stop reason: HOSPADM

## 2017-03-29 RX ORDER — FUROSEMIDE 20 MG/1
20 TABLET ORAL DAILY
Qty: 30 TAB | Refills: 0 | Status: SHIPPED | OUTPATIENT
Start: 2017-03-29 | End: 2017-05-23

## 2017-03-29 RX ORDER — LABETALOL 200 MG/1
400 TABLET, FILM COATED ORAL 2 TIMES DAILY
Status: DISCONTINUED | OUTPATIENT
Start: 2017-03-29 | End: 2017-03-29 | Stop reason: HOSPADM

## 2017-03-29 RX ORDER — FUROSEMIDE 20 MG/1
20 TABLET ORAL DAILY
Qty: 30 TAB | Refills: 0 | Status: SHIPPED | OUTPATIENT
Start: 2017-03-29 | End: 2017-03-29

## 2017-03-29 RX ADMIN — SIMETHICONE 80 MG: 80 TABLET, CHEWABLE ORAL at 05:07

## 2017-03-29 RX ADMIN — HEPARIN SODIUM 5000 UNITS: 5000 INJECTION, SOLUTION INTRAVENOUS; SUBCUTANEOUS at 13:20

## 2017-03-29 RX ADMIN — Medication 10 ML: at 05:07

## 2017-03-29 RX ADMIN — LOSARTAN POTASSIUM 100 MG: 50 TABLET, FILM COATED ORAL at 09:16

## 2017-03-29 RX ADMIN — LABETALOL HCL 200 MG: 200 TABLET, FILM COATED ORAL at 09:16

## 2017-03-29 RX ADMIN — DOXAZOSIN 2 MG: 2 TABLET ORAL at 13:19

## 2017-03-29 RX ADMIN — AMLODIPINE BESYLATE 10 MG: 5 TABLET ORAL at 09:16

## 2017-03-29 RX ADMIN — DORZOLAMIDE HYDROCHLORIDE AND TIMOLOL MALEATE 1 DROP: 20; 5 SOLUTION/ DROPS OPHTHALMIC at 09:19

## 2017-03-29 RX ADMIN — REGADENOSON 0.4 MG: 0.08 INJECTION, SOLUTION INTRAVENOUS at 10:56

## 2017-03-29 RX ADMIN — HEPARIN SODIUM 5000 UNITS: 5000 INJECTION, SOLUTION INTRAVENOUS; SUBCUTANEOUS at 05:07

## 2017-03-29 NOTE — DISCHARGE SUMMARY
[address] [facility] [address] [place] [address]   Office [phone], Fax [phone]        Discharge Summary     Patient: John Keller       MRN: [id number]       YOB: 1955       Age: 62 y.o. Date of admission:  3/26/2017    Date of discharge:  3/29/2017    Primary care provider:  Leeanne Mcdaniel MD     Admitting provider:  Victorino Benavides MD    Discharging provider(s): Amber Joseph MD - Family Medicine Resident  Jeffry Yan MD - Family Medicine Attending     Consultations  · Cardio  · Nephro    Procedures  · Stress test    Discharge destination: home. The patient is stable for discharge. Admission diagnosis  · pleural effusions, CHF, anemia, PATRICIA  · CHF (congestive heart failure) ([facility] [address]      Final discharge diagnoses and brief hospital course  As per admitting provider: \"  John Keller is a 62 y.o. female with Hx of CHF, HTN, TIA, DM, Diverticulosis with Diverticulitis, and CKD-G4 who presents to the ER complaining of SOB and bilateral Leg Swelling for about 3 days. Pt states that she started to notice a significant swelling of her legs since last Thursday (03/23/17) and SOB since Saturday (03/25/17). She noticed that she required extra pillows to sleep during this time. This is the first time the patient presents with current presentation. She was diagnosed with CHF last year. She denies cough, CP, dizziness, abdominal pain, or any other sign or symptoms.       In the ER, vital signs were remarkable for /80. Labs were remarkable for Hg 7.4 (BL 7.8 - 8.4), Cr 2.71 (BL 2.11 - 2.31), GFR 22, Trop <0.04, , proBNP 8130. CXR showed New moderate right and small left pleural effusions. New mild pulmonary edema. No pneumothorax. Pt was treated with Furosemide and Magnesium sulfate.     Conditions treated during this hospitalization:    John Keller is a 62 y.o. female who is admitted for CHF Decompensation.      HFpEF: ECHO on 3/28 reveals EF 50-55% with grade 2 diastolic dysfunction. proBNP is 8130 on admission. Dyspnea on admission likely related to pleural effusion, CKD, uncontrolled HTN, anemia . Trop were negative. Stress test normal . No ischemia  - continue Furosemide 20mg po   - Cardiac diet with low sodium and potassium  - Continue Normodyne, Cozaar, Norvasc, cardura  - f/u with cards      At risk PATRICIA On Chronic Kidney Disease Stage 4 improving. Cr: 2.64, Baseline 2.11, GFR 22. Nephro followed   - ok with diuresis: Lasix 20 mg   -f/u with nephro and PCP     Hypertension: stable  -Continue home Norvasc, Normodyne, Cozaar and Lasix     Anemia:  S/p 2PRBCs. Iron studies shows anemia of chronic disease, likely CKD related  - Epo as outpatient   - f/u with nephro     DM; HgA1C <3.5. POC Glucose 120. Patient states that she was treated for a time with metformin but was suspended. Last HgA1C was taken during a period of acute blood loss, for which is not very accurate.       Hyperlipidemia; Lipid Panel 03/10/2017 Tchol: 125 LDL: 48.6 HDL: 71 Trigs: 27  -Continue Lipitor every day    Elevated ALP:  Chronic since 08/2013. CT showed lithiasis, no duct dilatation, no RUQ pain. - follow up with PCP        Labs/Imaging Needed on follow up: CMP    Pending test results: At the time of your discharge the following test results are still pending: none  Please make sure you review these results with your outpatient follow-up provider(s). Specific symptoms to watch for: chest pain, shortness of breath, fever, chills, nausea, vomiting, diarrhea, change in mentation, falling, weakness, bleeding. DIET/what to eat:  Renal Diet    ACTIVITY:  Activity as tolerated          Follow-up Care:    Follow-up Information     Follow up With Details Comments [address], [place] Go in 2 days for follow up [address]  [address] [place] [address]  Ana Paula Lima MD  as scheduled by nephrologist [address] [facility] Brian Cadena MD  follow up as scheduled by Dr. Rena Yang [facility]  Cardiology [address]  [phone]            Physical examination at discharge  Visit Vitals    /85 (BP 1 Location: Left arm, BP Patient Position: Sitting)    Pulse 87    Temp 98.5 °F (36.9 °C)    Resp 18    Ht 5' 1\" (1.549 m)    Wt 167 lb 1.7 oz (75.8 kg)    SpO2 96%    BMI 31.57 kg/m2      Physical Examination:   General appearance - alert, well appearing, and in no distress   Mental status - normal mood, behavior, speech, dress, motor activity, and thought processes  Chest - clear to auscultation, no wheezes, rales or rhonchi, symmetric air entry  Heart - normal rate, regular rhythm, normal S1, S2, no murmurs, rubs, clicks or gallops  Abdomen - soft, no epigastric or RUQ tenderness, nondistended, no masses or organomegaly  Neurological - alert, oriented, normal speech, no focal findings or movement disorder noted  Extremities - peripheral pulses normal, no pedal edema, no clubbing or cyanosis    Recent Results (from the past 24 hour(s))   METABOLIC PANEL, BASIC    Collection Time: 03/29/17  5:11 AM   Result Value Ref Range    Sodium 139 136 - 145 mmol/L    Potassium 4.3 3.5 - 5.1 mmol/L    Chloride 108 97 - 108 mmol/L    CO2 22 21 - 32 mmol/L    Anion gap 9 5 - 15 mmol/L    Glucose 107 (H) 65 - 100 mg/dL    BUN 40 (H) 6 - 20 MG/DL    Creatinine 2.64 (H) 0.55 - 1.02 MG/DL    BUN/Creatinine ratio 15 12 - 20      GFR est AA 22 (L) >60 ml/min/1.73m2    GFR est non-AA 18 (L) >60 ml/min/1.73m2    Calcium 8.4 (L) 8.5 - 10.1 MG/DL   CBC W/O DIFF    Collection Time: 03/29/17  5:11 AM   Result Value Ref Range    WBC 8.2 3.6 - 11.0 K/uL    RBC 3.43 (L) 3.80 - 5.20 M/uL    HGB 9.5 (L) 11.5 - 16.0 g/dL    HCT 28.5 (L) 35.0 - 47.0 %    MCV 83.1 80.0 - 99.0 FL    MCH 27.7 26.0 - 34.0 PG    MCHC 33.3 30.0 - 36.5 g/dL    RDW 14.6 (H) 11.5 - 14.5 %    PLATELET 168 150 - 400 K/uL   MAGNESIUM    Collection Time: 03/29/17  5:11 AM   Result Value Ref Range    Magnesium 1.8 1.6 - 2.4 mg/dL   PHOSPHORUS    Collection Time: 03/29/17  5:11 AM   Result Value Ref Range    Phosphorus 4.1 2.6 - 4.7 MG/DL       Current Discharge Medication List      START taking these medications    Details   furosemide (LASIX) 20 mg tablet Take 1 Tab by mouth daily. Qty: 30 Tab, Refills: 0         CONTINUE these medications which have NOT CHANGED    Details   losartan (COZAAR) 100 mg tablet Take 1 Tab by mouth daily. Qty: 30 Tab, Refills: 0      labetalol (NORMODYNE) 200 mg tablet Take 200 mg by mouth two (2) times a day. dorzolamide-timolol (COSOPT) 22.3-6.8 mg/mL ophthalmic solution Administer 1 Drop to both eyes two (2) times a day. atorvastatin (LIPITOR) 10 mg tablet Take 1 Tab by mouth nightly. Qty: 30 Tab, Refills: 5      aspirin delayed-release 81 mg tablet take 2 tablets by mouth once daily  Qty: 60 Tab, Refills: 3      amLODIPine (NORVASC) 10 mg tablet Take 10 mg by mouth daily. Refills: 0      ACETAMINOPHEN/DIPHENHYDRAMINE (TYLENOL PM PO) Take 2 Tabs by mouth nightly as needed (sleep). STOP taking these medications       amoxicillin-clavulanate (AUGMENTIN) 500-125 mg per tablet Comments:   Reason for Stopping:               Admission imaging studies:      Results from Hospital Encounter encounter on 03/26/17   XR CHEST PA LAT   Narrative EXAM:  XR CHEST PA LAT    INDICATION:  Shortness of breath on exertion. Recent diagnosis of CHF. Bilateral  lower extremity swelling for several months. COMPARISON: Chest views on 3/9/2017    TECHNIQUE: PA and lateral chest views    FINDINGS: The cardiomediastinal and hilar contours are within normal limits. Pulmonary vascular prominence is minimal.     New moderate right pleural effusion. New small left pleural effusion. Reticular  interstitial opacities are mild. New trace fluid in the right minor fissure.  The  visualized bones and upper abdomen are age-appropriate. Impression IMPRESSION:    New moderate right and small left pleural effusions. New mild pulmonary edema. No pneumothorax. Recommend followup PA and lateral chest radiographs in 8-10  weeks to ensure resolution. Results from Abstract encounter on 08/20/16   US RETROPERITONEUM COMP             Results from [facility] encounter on 03/09/17   CT ABD PELV WO CONT   Narrative INDICATION: luq abd pain  for 3 weeks. COMPARISON: 8/15/2013    TECHNIQUE:   Thin axial images were obtained through the abdomen and pelvis. Coronal and  sagittal reconstructions were generated. Oral contrast was not administered. CT  dose reduction was achieved through use of a standardized protocol tailored for  this examination and automatic exposure control for dose modulation. Adaptive  statistical iterative reconstruction (ASIR) was utilized. The absence of intravenous contrast material reduces the sensitivity for  evaluation of the solid parenchymal organs of the abdomen. FINDINGS:   LUNG BASES: Clear. INCIDENTALLY IMAGED HEART AND MEDIASTINUM: Unremarkable. LIVER: No mass or biliary dilatation. GALLBLADDER: Cholelithiasis without apparent inflammation. SPLEEN: No mass. PANCREAS: No mass or ductal dilatation. ADRENALS: Unremarkable. KIDNEYS/URETERS: No mass, calculus, or hydronephrosis. STOMACH: Unremarkable. SMALL BOWEL: No dilatation or wall thickening. COLON: Moderate stool. No inflammation. APPENDIX: Not visualized. PERITONEUM: No ascites or pneumoperitoneum. RETROPERITONEUM: No lymphadenopathy or aortic aneurysm. REPRODUCTIVE ORGANS: Unremarkable. URINARY BLADDER: No mass or calculus. BONES: No destructive bone lesion. ADDITIONAL COMMENTS: N/A         Impression IMPRESSION:  No acute findings. Moderate stool. Cholelithiasis. No significant change.                   No procedure found.      -------------------------------------------------------------------------------------------------------------------    Chronic Diagnoses:    Problem List as of 3/29/2017  Date Reviewed: 3/15/2017          Codes Class Noted - Resolved    CHF (congestive heart failure) ([place] [address].) ICD-10-CM: I50.9  ICD-9-CM: 428.0  3/28/2017 - Present        * (Principal)CHF exacerbation ([place] [address].) ICD-10-CM: I50.9  ICD-9-CM: 428.0  3/27/2017 - Present        GI bleed ICD-10-CM: K92.2  ICD-9-CM: 578.9  3/11/2017 - Present        GIB (gastrointestinal bleeding) ICD-10-CM: K92.2  ICD-9-CM: 578.9  3/9/2017 - Present        Calculus of gallbladder without cholecystitis ICD-10-CM: K80.20  ICD-9-CM: 574.20  3/9/2017 - Present        Diabetes mellitus type 2 with complications ([place] [address].) TPQ-05-YM: E11.8  ICD-9-CM: 250.90  7/28/2016 - Present        Essential hypertension ICD-10-CM: I10  ICD-9-CM: 401.9  7/28/2016 - Present        Anemia ICD-10-CM: D64.9  ICD-9-CM: 285.9  7/28/2016 - Present                Signed:      Liz Joseph MD   Family Medicine Resident      3/29/2017

## 2017-03-29 NOTE — DISCHARGE INSTRUCTIONS
HOME DISCHARGE INSTRUCTIONS    Nanda Morin / [id number] : 1955    Admission date: 3/26/2017 Discharge date: 3/29/2017 8:33 AM     Please bring this form with you to show your care provider at your follow-up appointment. Primary care provider:  Axel Payne MD    Discharging provider:  Bailee Joseph MD  - Family Medicine Resident  Rj Bro MD - Family Medicine Attending      You have been admitted to the hospital with the following diagnoses:    ACUTE DIAGNOSES:  · pleural effusions, CHF, anemia, PATRICIA  · CHF (congestive heart failure) ([facility] [address]  . . . . . . . . . . . . . . . . . . . . . . . . . . . . . . . . . . . . . . . . . . . . . . . . . . . . . . . . . . . . . . . . . . . . . . . .         Medication changes: Continue Lasix 20mg every day. You were also started on a new medication by Cardiology, it is called doxazosin. Please take this as prescribed      FOLLOW-UP CARE RECOMMENDATIONS:  - Your stress test here did not show any signs of damage or stress to the heart. That is great news! - It is very important that you take your blood pressure medications as this will help with your heart and your kidneys.  - You have been started on a new medication called Lasix. You tolerated it well while here in the hospital. It will help prevent fluid build up. - Please be mindful of the amount of salt you eat. - It is important for you to follow up with your primary care doctor and have your electrolytes checked  - It is important for you to follow up with your kidney doctor (Nephrologist). They may want to start you on a medication, called erythropoietin, to help with your anemia. - Please make a follow up appointment at your earliest convenience to be seen by Cardiology.      Appointments  Follow-up Information     Follow up With Details [address] MD Viviana Go in 2 days for follow up [address] [place] [address]  8[address] [facility] [address] [address] Vaughn Diaz MD  as scheduled by nephrologist [address] [facility]  [address]  969 [facility]      Lico Jon MD  follow up as scheduled by Dr. Malika Mac  Cardiology [address]  [phone]               Follow-up tests needed: CMP    Pending test results: At the time of your discharge the following test results are still pending: none. Please make sure you review these results with your outpatient follow-up provider(s). Specific symptoms to watch for: chest pain, shortness of breath, fever, chills, nausea, vomiting, diarrhea, change in mentation, falling, weakness, bleeding. DIET/what to eat:  Renal Diet    ACTIVITY:  Activity as tolerated        What to do if new or unexpected symptoms occur? If you experience any of the above symptoms (or should other concerns or questions arise after discharge) please call your primary care physician. Return to the emergency room if you cannot get hold of your doctor. · It is very important that you keep your follow-up appointment(s). · Please bring discharge papers, medication list (and/or medication bottles) to your follow-up appointments for review by your outpatient provider(s). · Please check the list of medications and be sure it includes every medication (even non-prescription medications) that your provider wants you to take. · It is important that you take the medication exactly as they are prescribed. · Keep your medication in the bottles provided by the pharmacist and keep a list of the medication names, dosages, and times to be taken in your wallet. · Do not take other medications without consulting your doctor.    · If you have any questions about your medications or other instructions, please talk to your nurse or care provider before you leave the hospital.     Information obtained by:     I understand that if any problems occur once I am at home I am to contact my physician. These instructions were explained to me and I had the opportunity to ask questions. I understand and acknowledge receipt of the instructions indicated above. Physician's or R.N.'s Signature                                                                  Date/Time                                                                                                                                              Patient or Representative Signature                                                          Date/Time        Heart Failure: Care Instructions  Your Care Instructions     Heart failure occurs when your heart does not pump as much blood as the body needs. Failure does not mean that the heart has stopped pumping but rather that it is not pumping as well as it should. Over time, this causes fluid buildup in your lungs and other parts of your body. Fluid buildup can cause shortness of breath, fatigue, swollen ankles, and other problems. By taking medicines regularly, reducing sodium (salt) in your diet, checking your weight every day, and making lifestyle changes, you can feel better and live longer. Follow-up care is a key part of your treatment and safety. Be sure to make and go to all appointments, and call your doctor if you are having problems. It's also a good idea to know your test results and keep a list of the medicines you take. How can you care for yourself at home? Medicines  · Be safe with medicines. Take your medicines exactly as prescribed. Call your doctor if you think you are having a problem with your medicine.   · Do not take any vitamins, over-the-counter medicine, or herbal products without talking to your doctor first. Skeet Lute not take ibuprofen (Advil or Motrin) and naproxen (Aleve) without talking to your doctor first. They could make your heart failure worse. · You may be taking some of the following medicine. ¨ Beta-blockers can slow heart rate, decrease blood pressure, and improve your condition. Taking a beta-blocker may lower your chance of needing to be hospitalized. ¨ Angiotensin-converting enzyme inhibitors (ACEIs) reduce the heart's workload, lower blood pressure, and reduce swelling. Taking an ACEI may lower your chance of needing to be hospitalized again. ¨ Angiotensin II receptor blockers (ARBs) work like ACEIs. Your doctor may prescribe them instead of ACEIs. ¨ Diuretics, also called water pills, reduce swelling. ¨ Potassium supplements replace this important mineral, which is sometimes lost with diuretics. ¨ Aspirin and other blood thinners prevent blood clots, which can cause a stroke or heart attack. You will get more details on the specific medicines your doctor prescribes. Diet  · Your doctor may suggest that you limit sodium to 1,500 milligrams (mg) a day. That is less than 1 teaspoon of salt a day, including all the salt you eat in cooking or in packaged foods. People get most of their sodium from processed foods. Fast food and restaurant meals also tend to be very high in sodium. · Ask your doctor how much liquid you can drink each day. You may have to limit liquids. Weight  · Weigh yourself without clothing at the same time each day. Record your weight. Call your doctor if you gain more than 3 pounds in 24 hours or 5 pounds in one week. A sudden weight gain may mean that your heart failure is getting worse. Activity level  · Start light exercise (if your doctor says it is okay). Even if you can only do a small amount, exercise will help you get stronger, have more energy, and manage your weight and your stress. Walking is an easy way to get exercise. Start out by walking a little more than you did before. Bit by bit, increase the amount you walk.   · When you exercise, watch for signs that your heart is working too hard. You are pushing yourself too hard if you cannot talk while you are exercising. If you become short of breath or dizzy or have chest pain, stop, sit down, and rest.  · If you feel \"wiped out\" the day after you exercise, walk slower or for a shorter distance until you can work up to a better pace. · Get enough rest at night. Sleeping with 1 or 2 pillows under your upper body and head may help you breathe easier. Lifestyle changes  · Do not smoke. Smoking can make a heart condition worse. If you need help quitting, talk to your doctor about stop-smoking programs and medicines. These can increase your chances of quitting for good. Quitting smoking may be the most important step you can take to protect your heart. · Limit alcohol to 2 drinks a day for men and 1 drink a day for women. Too much alcohol can cause health problems. · Avoid getting sick from colds and the flu. Get a pneumococcal vaccine shot. If you have had one before, ask your doctor whether you need another dose. Get a flu shot each year. If you must be around people with colds or the flu, wash your hands often. When should you call for help? Call 911 if you have symptoms of sudden heart failure such as:  · You have severe trouble breathing. · You cough up pink, foamy mucus. · You have a new irregular or rapid heartbeat. Call your doctor now or seek immediate medical care if:  · You have new or increased shortness of breath. · You are dizzy or lightheaded, or you feel like you may faint. · You have sudden weight gain, such as 3 pounds in 24 hours, or 5 pounds in one week. · You have increased swelling in your legs, ankles, or feet. · You are suddenly so tired or weak that you cannot do your usual activities. Watch closely for changes in your health, and be sure to contact your doctor if:  · You develop new symptoms. Where can you learn more?    Go to [email or web address]  Enter N737 in the search box to learn more about \"Heart Failure: Care Instructions. \"   © 9185-8988 Healthwise, Incorporated. Care instructions adapted under license by Irene Novak (which disclaims liability or warranty for this information). This care instruction is for use with your licensed healthcare professional. If you have questions about a medical condition or this instruction, always ask your healthcare professional. [place] [address] any warranty or liability for your use of this information. Content Version: 50.9.493353; Current as of: February 20, 2015 (modified 9/26/16).

## 2017-03-29 NOTE — PROGRESS NOTES
Discharge teaching completed. Pt and spouse verbalized understanding of discharge teaching. Pt informed that prescriptions were called in to pt's pharmacy. States she will pick them up this evening. Pt discharged via wheelchair, accompanied by spouse and volunteer.

## 2017-03-29 NOTE — PROGRESS NOTES
55 Sandeep Weeks  YOB: 1955          Assessment & Plan:   1. PATRICIA on CKD progression  · Cr 1.6 in 10 2016, then 2-2.3 last time she was here, now 2.5-2.7 mg%  · ? Due to poor HTN control and or CHF  · Cont current Rx  · CT: No hydro 3 9 17  2. Edema  · Due to CHF, Proteinuria and CKD  · Cont loops :lasix 20 mg bid  3. HTN  · Uncontrolled here  · Claims to have normal BP at home  · Needs home machine checked  · Goal< 130/80  · Cont ARB,loops,Labetalol,Amlodipine for now  · Add Cardura and increase Labetalol  · She needs her  BP machine Checked  · WIll need Loops for volume control and HTN control out pt  4. DM  · Non on meds here  5.  CHF  · 2+ edema  · Echo: 3 2017: EF 50-55%  · Change iv To po loops             6. Anemia  · S/p PRBC  · No M spike or FLC elevation in past  · Might be ckd related  · Will need epo out pt       Subjective:   CC:CKD  HPI: Patient seen   Stress test today  HTN not under control  CKD is stable  ROS:as above  Current Facility-Administered Medications   Medication Dose Route Frequency    hydrALAZINE (APRESOLINE) 20 mg/mL injection 10 mg  10 mg IntraVENous Q4H PRN    furosemide (LASIX) tablet 20 mg  20 mg Oral BID    simethicone (MYLICON) tablet 80 mg  80 mg Oral QID PRN    remove paste/gel/ointment 1 Each  1 Each TransDERmal QPM    0.9% sodium chloride infusion 250 mL  250 mL IntraVENous PRN    0.9% sodium chloride infusion 250 mL  250 mL IntraVENous PRN    sodium chloride (NS) flush 5-10 mL  5-10 mL IntraVENous Q8H    sodium chloride (NS) flush 5-10 mL  5-10 mL IntraVENous PRN    heparin (porcine) injection 5,000 Units  5,000 Units SubCUTAneous Q8H    amLODIPine (NORVASC) tablet 10 mg  10 mg Oral DAILY    atorvastatin (LIPITOR) tablet 10 mg  10 mg Oral QHS    dorzolamide-timolol (COSOPT) 22.3-6.8 mg/mL ophthalmic solution 1 Drop  1 Drop Both Eyes BID    labetalol (NORMODYNE) tablet 200 mg  200 mg Oral BID    losartan (COZAAR) tablet 100 mg  100 mg Oral DAILY    aspirin delayed-release tablet 81 mg  81 mg Oral DAILY          Objective:     Vitals:  Blood pressure 197/85, pulse 87, temperature 98.5 °F (36.9 °C), resp. rate 18, height 5' 1\" (1.549 m), weight 75.8 kg (167 lb 1.7 oz), SpO2 96 %. Temp (24hrs), Av.4 °F (36.9 °C), Min:97.9 °F (36.6 °C), Max:98.8 °F (37.1 °C)      Intake and Output:      1901 -  0700  In: 480 [P.O.:480]  Out: 1850 [Urine:1850]    Physical Exam:                Patient is intubated:  no    Physical Examination:   GENERAL ASSESSMENT: NAD  HEENT:Nontraumatic   CHEST: CTA  HEART: S1S2  ABDOMEN: Soft,NT,  :[place]: n  EXTREMITY: 1 EDEMA  NEURO:Grossly non focal          ECG/rhythm:    Data Review      Recent Labs      17   1339   TNIPOC  <0.04      Recent Labs      17   0606  17   2240   TROIQ  <0.04  <0.04     Recent Labs      17   0511  17   0546  17   1159  17   0606   NA  139  140   --   140   K  4.3  4.1   --   4.2   CL  108  108   --   110*   CO2  22  22   --   20*   BUN  40*  39*   --   36*   CREA  2.64*  2.81*   --   2.50*   GLU  107*  95   --   95   PHOS  4.1  4.6   --   4.4   MG  1.8  1.7   --   1.8   CA  8.4*  8.0*   --   8.0*   WBC  8.2  6.9  6.9  6.6   HGB  9.5*  9.1*  7.1*  7.0*   HCT  28.5*  27.0*  21.7*  22.8*   PLT  168  167  177  152      No results for input(s): INR, PTP, APTT in the last 72 hours. No lab exists for component: INREXT  Needs: urine analysis, urine sodium, protein and creatinine  No results found for: Monico Clemons      Discussed with:      : Cesia Barrett MD  3/29/2017        [facility]:  [email or web address]  [place] office:  [address] [place] [address], [address] [place] [address],[address]  [address]  Phone: [phone]  Fax :     [phone]    [place] office:  [address]  Phone - [phone]  Fax - [phone]

## 2017-03-29 NOTE — PROGRESS NOTES
Bedside and Verbal shift change report given to Rosario Rodriguez (oncoming nurse) by Harriet Florentino (offgoing nurse). Report included the following information SBAR, Kardex and Recent Results.

## 2017-03-29 NOTE — INTERDISCIPLINARY ROUNDS
Interdisciplinary team rounds were held 3/27/2017 with the following team members:Care Management, Nursing, Nutrition, Physical Therapy, Physician, Clinical Coordinator and Unit Nurse Manager. Plan of care discussed. See clinical pathway and/or care plan for interventions and desired outcomes.     Anticipated discharge: today

## 2017-03-29 NOTE — ADT AUTH CERT NOTES
Utilization Review           Heart Failure - Care Day 3 (3/28/2017) by Faith García        Review Status Review Entered       Completed 3/28/2017       Details              Care Day: 3 Care Date: 3/28/2017 Level of Care: Telemetry       Guideline Day 1        Level Of Care       ( ) ICU [D] or intermediate care [E] after emergency treatment       3/28/2017 5:37 PM EDT by Ladarius Medley         TELE                     Clinical Status       ( ) * Clinical Indications met [F]              Routes       (X) Parenteral medications       (X) Oral hydration       (X) Low-salt diet       3/28/2017 5:37 PM EDT by Ladarius Medley         NPO AFTER MN                     Interventions       (X) Cardiac biomarkers, BNP       3/28/2017 5:37 PM EDT by Ladarius Medley         last troponin negative March 27.              (X) Echocardiogram       3/28/2017 5:37 PM EDT by Ladarius Medley         3/27- Ejection fraction was estimated in the range of 50 % to 55 %.              (X) Electrolytes              Medications       (X) IV diuretics       3/28/2017 5:37 PM EDT by Ladarius Medley         Meds; norvasc po , asa po , lasix po 20 mg  bid, hep sq q 8h. Labetalol po bid,  cozaar po,  Lasix iv 40 mg today.  nitrobid ointment TP x 3 today.                     3/28/2017 5:37 PM EDT by Ladarius Medley       Subject: Additional Clinical Information       vs--98.3  °F (36.8  °C) 78 163/87 -- At rest 16 99 % .         labs; bun 39, cr 2.81, ca 8.0, gfr non aa 17. hh 9.1/ 27. ---------------------Received 2 u *PRBC. One completed March 27 and second completed March 28.                                          * Milestone              Additional Notes       /87 (BP 1 Location: Left arm, BP Patient Position: At rest)  Pulse 78  Temp 98.3 °F (36.8 °C)  Resp 16  Ht 5' 1\" (1.549 m)  Wt 75.3 kg (166 lb 0.1 oz)  SpO2 99%  BMI 31.37 kg/m2.  ROOM AIR              HFpEF:ECHO on 3/28 reveals EF 50-55% with grade 2 diastolic dysfunction. proBNP is 8130 on admission. Dyspnea on admission likely related to pleural effusion, CKD, uncontrolled HTN, anemia . This is the first episode of such presentation. Trop were negative       - will continue diuresis with Furosemide 20mg iv. Will switch to oral        - Strict In's and Out's        - Cardiac diet with low sodium and potassium       - Continue Normodyne, Cozaar, Norvasc , hydralazine 10 prn if SBP> 170       - will need stress test as outpatient or here per cards, NPO at midnight for possible stress test tomorrow                At risk PATRICIA On Chronic Kidney Disease Stage 4 Cr 2.81; BL 2.11, GFR 22. Nephro following        - ok with diuresis: Lasix 20 mg                 Hypertension; Current BP: 166/80       -Continue home Norvasc, Normodyne, Cozaar and Lasix               Anemia:  S/p 2PRBCs. Iron studies shows anemia of chronic disease, likely CKD related       - Epo as outpatient                DM; HgA1C <3.5. POC Glucose 120. Patient states that she was treated for a time with metformin but was suspended. Last HgA1C was taken during a period of acute blood loss, for which is not very accurate.                 Hyperlipidemia; Lipid Panel 03/10/2017 Tchol: 125 LDL: 48.6 HDL: 71 Trigs: 27       -Continue Lipitor every day                FEN/GI - Cardiac Diet.       Activity - Ambulate with Assistance        DVT prophylaxis - Heparin        GI prophylaxis - None       Disposition - Plan to d/c to TBA.            Heart Failure - Clinical Indications for Admission to Inpatient Care by Noé Brooks        Review Status Review Entered       Completed 3/28/2017       Details              Clinical Indications for Admission to Inpatient Care       Most Recent : Reggie Alonzo Most Recent Date: 3/28/2017 5:18 PM EDT       ( ) Admission is indicated by 1 or more of the following  (1) (2) (3) (4) (5) (6) (7):          ( ) Inpatient admission required [B] rather than observation care (see Heart Failure: Observation           Care guideline as appropriate)  because of 1 or more of the following :             ( ) Acute renal failure             3/28/2017 5:18 PM EDT by Reggie Alozno               bun 39, cr 2.81, ca 8.0, gfr non aa 17.                                          Notes:               3/28/2017 5:18 PM EDT by Reggie Alonzo       Subject: Additional Clinical Information                                        Additional Notes       Physician Advisor note follows. ..              Principal diagnosis CHF exacerbation ([address]        Clinicals 62 y.o. y.o female hospitalized with above diagnosis        This pt is noted to have developed progression of her CRI. Her creatinine in 04/16 was around 1.0 and since then her creatinine has gone up. Earlier this month her creatinine was around 2.1 when she was [facility].        She is now readmitted with elevated Creatinine, pleural effusions and worsening heart failure.        Her GFR has gone down from 28% to 17% (Acute on CRF)        Mill[place] (MCG) criteria        Does apply Heart Failure       ORG: M-190 (ISC)       STATUS DETERMINATION Based on documented presenting clinical data, comorbid conditions, high risk of adverse events and deterioration, it is our recommendation that the patient's status should be upgraded from OBSERVATION to INPATIENT status.                The final decision of the patient's hospitalization status depends on the attending physician's judgment.                Additional comments         Payor: DIPTI / Plan: Hermes RODRIGUEZ SENIA / Product Type: SENIA /                Austin Jasmine MD MPH [facility]                Physician Advisor [facility].           March 27 Observation review by Noé Oliver Status Review Entered       In Primary 3/28/2017       Details         CARDIOLOGY CONSULT--admitted with SOB, edema.      1) Dyspnea, leg swelling: CHF exaceration - likely multifactorial with the following contributing to patient's sxs: HFpEF, pleural effusion, CKD, uncontrolled HTN, anemia. Echo today with EF 50-55% (improved from previous) and diastolic dysfunction. Followed by Dr. Danny Harvey as outpt, not on diuretics at home. - Diurese with lasix 40mg IV BID   - Requesting records from Dr. Cruz Abernathy - if no ischemia w/u in the last 12 months will plan for stress test later once underlying medical issues have improved  - Control BP (see below)  - Consider nephrology consult  - Daily weights, strict I&Os     2) HTN: Markedly elevated BP, received am BP meds   - Continue home BB, ARB, CCB  - Add Nitropaste 1\" q6hrs (hold for SBP < 110), will wean to 1/2\" q6h tomorrow  - Recommend prn hydralazine 10mg IV q4h prn if persistent SBP> 170 or DBP > 110     3) ? Acute on Chronic KD: recent baseline Cr 2.3-2.7 during early March hospitalization, was discharged home on ARB. Followed by Dr. Titi Blake as outpt. Renal failure may be a factor in controlling volume status. - Consider nephrology consult      4) Anemia: Recent baseline Hgb 7-8/ W/u per primary team  - Consider transfuse to keep > 8.0     5) DMII: diet controlled              Additional Notes       HH 7.1/ 21.7, BUN 36, CR 2.5, GFR NOn aa       24. Ld 256. TIBC 178, FERRITIN 440.              97. 9(36.6 °C) 87 -18, 190/80 -- -- 18 sat 97 RA.        MEDS; [PO meds- NORVASC, ASA,lipitor, labetalol,        Cozaar], nitrobid paste q 6h,lasix 40 mg iv

## 2017-03-29 NOTE — PROGRESS NOTES
[address] [facility] PROGRAM  PROGRESS NOTE     3/29/2017  PCP: Donald Penn MD     Assessment/Plan:   Anaya Perdomo is a 62 y.o. female who is admitted for CHF Decompensation.      HFpEF:ECHO on 3/28 reveals EF 50-55% with grade 2 diastolic dysfunction. proBNP is 8130 on admission. Dyspnea on admission likely related to pleural effusion, CKD, uncontrolled HTN, anemia . This is the first episode of such presentation. Trop were negative. - will continue diuresis with Furosemide 20mg po. - Strict In's and Out's   - Cardiac diet with low sodium and potassium  - Continue Normodyne, Cozaar, Norvasc , hydralazine 10 prn if SBP> 170  - stress test today      At risk PATRICIA  On Chronic Kidney Disease Stage 4 Improving-Cr 2.64; BL 2.11, GFR 22. Nephro following   - ok with diuresis: Lasix 20 mg        Hypertension: Stable  -Continue home Norvasc, Normodyne, Cozaar and Lasix    Anemia:  S/p 2PRBCs. Iron studies shows anemia of chronic disease, likely CKD related  - Epo as outpatient     DM; HgA1C <3.5. POC Glucose 120. Patient states that she was treated for a time with metformin but was suspended. Last HgA1C was taken during a period of acute blood loss, for which is not very accurate.       Hyperlipidemia; Lipid Panel 03/10/2017 Tchol: 125 LDL: 48.6 HDL: 71 Trigs: 27  -Continue Lipitor every day     mild epigastric pain: improved with Mylanta. Checked LFTs: ok, but mild elevation of ALP.     FEN/GI - Cardiac Diet. Activity - Ambulate with Assistance   DVT prophylaxis - Heparin   GI prophylaxis - None  Disposition - Plan to d/c to TBA.     CODE STATUS:  FULL CODE     Pt to be discussed with Dr. Dano Hirsch (on-call attending physician)     Subjective:   Pt was seen and examined at bedside. Afebrile and hemodynamically stable. Patient c/o mild epigastric pain radiating to the LUQ.  Pain improved      Objective:   Physical examination  Visit Vitals    /81 (BP 1 Location: Left arm, BP Patient Position: Sitting)    Pulse 74    Temp 97.8 °F (36.6 °C)    Resp 18    Ht 5' 1\" (1.549 m)    Wt 167 lb 1.7 oz (75.8 kg)    SpO2 100%    BMI 31.57 kg/m2      Temp (24hrs), Av.4 °F (36.9 °C), Min:97.8 °F (36.6 °C), Max:98.8 °F (37.1 °C)         O2 Device: Room air      Date 17 - 17 0659 17 - 17 0659   Shift 4813-4517 3466-5250 24 Hour Total 3511-0241 4964-3775 24 Hour Total   I  N  T  A  K  E   P.O. 480  480         P. O. 480  480       Shift Total  (mL/kg) 480  (6.4)  480  (6.4)      O  U  T  P  U  T   Urine  (mL/kg/hr) 1000  (1.1) 500  (0.6) 1500  (0.8)         Urine Voided 7697 066 3439       Shift Total  (mL/kg) 1000  (13.3) 500  (6.6) 1500  (19.9)      NET -520 -500 -1020      Weight (kg) 75.3 75.3 75.3 75.8 75.8 75.8         Last 3 shifts:    1901 -  0700  In: 480 [P.O.:480]  Out: 1850 [Urine:1850]    General:   Alert, cooperative, no acute distress   Head:   Atraumatic   Eyes:   Conjunctivae clear   ENT:  Oral mucosa normal   Neck:  Supple, trachea midline, no adenopathy   No JVD   Back:    No CVA tenderness    Chest wall:    No tenderness or deformities    Lungs:   Clear to auscultation bilaterally    Heart:   Regular rhythm, no murmur   Abdomen:    Soft, no epigastric tenderness, no cannon sign   No masses or organomegaly    Extremities:  No edema or DVT signs   Pulses:  Symmetric all extremities   Skin:  Warm and dry    No rashes or lesions   Neurologic:  Oriented   No focal deficits         Data Review:     Recent Labs      17   0511  17   0546  17   1159   WBC  8.2  6.9  6.9   HGB  9.5*  9.1*  7.1*   HCT  28.5*  27.0*  21.7*   PLT  168  167  177     Recent Labs      17   1445  17   0511  17   0546  17   0606   NA   --   139  140  140   K   --   4.3  4.1  4.2   CL   --   108  108  110*   CO2   --   22  22  20*   GLU   --   107*  95  95   BUN   --   40*  39*  36*   CREA   --   2.64*  2.81*  2.50*   CA   --   8.4* 8. 0*  8.0*   MG   --   1.8  1.7  1.8   PHOS   --   4.1  4.6  4.4   ALB  3.2*   --    --    --    TBILI  0.7   --    --    --    SGOT  21   --    --    --    ALT  47   --    --    --      Medications reviewed  Current Facility-Administered Medications   Medication Dose Route Frequency    hydrALAZINE (APRESOLINE) 20 mg/mL injection 10 mg  10 mg IntraVENous Q4H PRN    labetalol (NORMODYNE) tablet 400 mg  400 mg Oral BID    doxazosin (CARDURA) tablet 2 mg  2 mg Oral DAILY    furosemide (LASIX) tablet 20 mg  20 mg Oral BID    simethicone (MYLICON) tablet 80 mg  80 mg Oral QID PRN    0.9% sodium chloride infusion 250 mL  250 mL IntraVENous PRN    sodium chloride (NS) flush 5-10 mL  5-10 mL IntraVENous Q8H    sodium chloride (NS) flush 5-10 mL  5-10 mL IntraVENous PRN    heparin (porcine) injection 5,000 Units  5,000 Units SubCUTAneous Q8H    amLODIPine (NORVASC) tablet 10 mg  10 mg Oral DAILY    atorvastatin (LIPITOR) tablet 10 mg  10 mg Oral QHS    dorzolamide-timolol (COSOPT) 22.3-6.8 mg/mL ophthalmic solution 1 Drop  1 Drop Both Eyes BID    losartan (COZAAR) tablet 100 mg  100 mg Oral DAILY    aspirin delayed-release tablet 81 mg  81 mg Oral DAILY         Signed:   Amber Joseph MD   Resident, Family Medicine      Attending note: Attending note to follow. ..

## 2017-03-29 NOTE — PROGRESS NOTES
Cardiology Progress Note    5th floor      NAME:  Poncho Larsen   :   1955   MRN:   [id number]     Assessment/Plan:   Dyspnea -multifactorial- anemia, CKD, acute DHF -        -   HTN- elevated this am       - labetolol increased this am - cardura added        -  cont BB, ARB, CCB-         -prn hydralazine IV   HFpEF in setting of CKD, accel HTN, and anemia         - diuretics per renal-         CKD- per renal   LE edema- maybe aggravated by norvasc dose   Anemia - baseline Hgb 7-8 -      - keep Hgb > 8  T2DM        Pt personally seen and examined. Chart reviewed. Agree with advanced NP's history, exam and  A/P with changes/additons  Stress Nuc study - EF nml/No ischemia  No further cardiac testing at this time  Control BP/Volume per renal  F/u Dr Rosy Torres as OP  Will see prn. Plz call if needed. Stalin Bellamy MD, [facility] [place]      Subjective:   Poncho Larsen is a 62y.o. year old female w/ hx:  HTN, HLD, CHF, DMII, CKD, anemia, GI bleed, arthritis who presents with dyspnea and leg swelling. Ms. Pat Stacy c/o worsening dyspnea and sofie swelling x 4-5 days. Says her dyspnea was preceded by congestion, cough - thought she had a cold. She reports off and on LLE edema x 1-2 months, then new RLE edema in the last few days. + orthopnea. + BLE cramps, worsen with walking. LUQ vs L side pain that worsens with deep breathing x 2 weeks. Recently admitted to [facility] on 3/9 - 3/13/17 for anemia/GI bleed, found to have diverticulitis but no visualized source of bleeding. S.p transfusion 2 units  PRC     proBNP 8100   Trop (-) x 2   Overnight activities reviewed:    - BP elevated this am 190/85 range  - labetolol increased today, cardura added    - Tele: SR    - K+ 4.3  Mg++ 1.7 Cr  Stable 2.6     -  Hgb stable 9.5    - UO:1.5 L       I/O -1L  /24,               - standing wt 167 ( + 1.4 since yesterday 165.6)      CARDIAC EVALUATION   ECHO 2016: EF 45-50%, inf.  Inferoseptal HK, mild MR, RVSP 44mmHg  ECHO 3/28/2017: EF 50-55%, G2DD, mild MR/TR,RVSP 44 mmHg      Objective:     Visit Vitals    /85 (BP 1 Location: Left arm, BP Patient Position: Sitting)    Pulse 87    Temp 98.5 °F (36.9 °C)    Resp 18    Ht 5' 1\" (1.549 m)    Wt 167 lb 1.7 oz (75.8 kg)    SpO2 96%    BMI 31.57 kg/m2        O2 Device: Room air    Temp (24hrs), Av.4 °F (36.9 °C), Min:97.9 °F (36.6 °C), Max:98.8 °F (37.1 °C)        Intake/Output Summary (Last 24 hours) at 17 1012  Last data filed at 17 6762   Gross per 24 hour   Intake              480 ml   Output             1500 ml   Net            -1020 ml         HEENT - no JVD  CVS - S1 S2 RRR; 2/6 sys murmur+  RS - No rales/rhonchi  Abd - Soft/BS+  LE - 1+ edema      Data Review:   CMP:   Lab Results   Component Value Date/Time     2017 05:11 AM    K 4.3 2017 05:11 AM     2017 05:11 AM    CO2 22 2017 05:11 AM    AGAP 9 2017 05:11 AM     (H) 2017 05:11 AM    BUN 40 (H) 2017 05:11 AM    CREA 2.64 (H) 2017 05:11 AM    GFRAA 22 (L) 2017 05:11 AM    GFRNA 18 (L) 2017 05:11 AM    CA 8.4 (L) 2017 05:11 AM    MG 1.8 2017 05:11 AM    PHOS 4.1 2017 05:11 AM     CBC:   Lab Results   Component Value Date/Time    WBC 8.2 2017 05:11 AM    HGB 9.5 (L) 2017 05:11 AM    HCT 28.5 (L) 2017 05:11 AM     2017 05:11 AM     All Cardiac Markers in the last 24 hours: No results found for: CPK, CKMMB, CKMB, RCK3, CKMBT, CKNDX, CKND1, RAMAN, TROPT, TROIQ, IDANIA, TROPT, TNIPOC, BNP, BNPP  Recent Glucose Results:   Lab Results   Component Value Date/Time     (H) 2017 05:11 AM     ABG: No results found for: PH, PHI, PCO2, PCO2I, PO2, PO2I, HCO3, HCO3I, FIO2, FIO2I  LIPIDS:  Cholesterol, total   Date Value Ref Range Status   03/10/2017 125 <200 MG/DL Final     Triglyceride   Date Value Ref Range Status   03/10/2017 27 <150 MG/DL Final Comment:     Based on NCEP-ATP III:  Triglycerides <150 mg/dL  is considered normal, 150-199 mg/dL  borderline high,  200-499 mg/dL high and  greater than or equal to 500 mg/dL very high. HDL Cholesterol   Date Value Ref Range Status   03/10/2017 71 MG/DL Final     Comment:     Based on NCEP ATP III, HDL Cholesterol <40 mg/dL is considered low and >60 mg/dL is elevated.      LDL, calculated   Date Value Ref Range Status   03/10/2017 48.6 0 - 100 MG/DL Final     Comment:     Based on the NCEP-ATP: LDL-C concentrations are considered  optimal <100 mg/dL,  near optimal/above Normal 100-129 mg/dL  Borderline High: 130-159, High: 160-189 mg/dL  Very High: Greater than or equal to 190 mg/dL       VLDL, calculated   Date Value Ref Range Status   03/10/2017 5.4 MG/DL Final     CHOL/HDL Ratio   Date Value Ref Range Status   03/10/2017 1.8 0 - 5.0   Final       Medications reviewed  Current Facility-Administered Medications   Medication Dose Route Frequency    hydrALAZINE (APRESOLINE) 20 mg/mL injection 10 mg  10 mg IntraVENous Q4H PRN    labetalol (NORMODYNE) tablet 400 mg  400 mg Oral BID    doxazosin (CARDURA) tablet 2 mg  2 mg Oral DAILY    furosemide (LASIX) tablet 20 mg  20 mg Oral BID    simethicone (MYLICON) tablet 80 mg  80 mg Oral QID PRN    remove paste/gel/ointment 1 Each  1 Each TransDERmal QPM    0.9% sodium chloride infusion 250 mL  250 mL IntraVENous PRN    0.9% sodium chloride infusion 250 mL  250 mL IntraVENous PRN    sodium chloride (NS) flush 5-10 mL  5-10 mL IntraVENous Q8H    sodium chloride (NS) flush 5-10 mL  5-10 mL IntraVENous PRN    heparin (porcine) injection 5,000 Units  5,000 Units SubCUTAneous Q8H    amLODIPine (NORVASC) tablet 10 mg  10 mg Oral DAILY    atorvastatin (LIPITOR) tablet 10 mg  10 mg Oral QHS    dorzolamide-timolol (COSOPT) 22.3-6.8 mg/mL ophthalmic solution 1 Drop  1 Drop Both Eyes BID    losartan (COZAAR) tablet 100 mg  100 mg Oral DAILY    aspirin delayed-release tablet 81 mg  81 mg Oral DAILY         Rivka Lindsay RN, ACNP-BC, [facility]

## 2017-03-30 ENCOUNTER — PATIENT OUTREACH (OUTPATIENT)
Dept: FAMILY MEDICINE CLINIC | Age: 62
End: 2017-03-30

## 2017-03-30 NOTE — PROGRESS NOTES
[facility]      Date/Time:  3/30/2017 11:49 AM    Patient listed on discharge [facility]) report on 3/29/17. Patient discharged from [address] [place] [address] for Acute Pulmonary Edema. RRAT score: 20 High       Medical Record Reviewed    Medical History:     Past Medical History:   Diagnosis Date    Anemia     Arthritis     Calculus of kidney     Congestive heart failure ([address]     Diabetes ([facility] [address]     Hematuria        Nurse Navigator(NN) contacted the patient by telephone to perform post hospital discharge assessment. Verified  and address with patient as identifiers. Provided introduction to self, and explanation of the Nurses Navigator role. Diet:   Patient reports: No Added Salt    Activity:    Patient reports: mostly moving around the house    Medication:   Performed medication reconciliation with patient, and patient verbalizes understanding of administration of home medications. There were no barriers to obtaining medications identified at this time. Support system:  Family    Discharge Instructions :  Reviewed discharge instructions with patient. Patient verbalizes understanding of discharge instructions and follow-up care. Red Flags:  Report     Labs Reviewed:  Recent Labs      17   0511  17   0546  17   1159   WBC  8.2  6.9  6.9   HGB  9.5*  9.1*  7.1*   HCT  28.5*  27.0*  21.7*   PLT  168  167  177     Recent Labs      17   1445  17   0511  17   0546   NA   --   139  140   K   --   4.3  4.1   CL   --   108  108   CO2   --   22  22   GLU   --   107*  95   BUN   --   40*  39*   CREA   --   2.64*  2.81*   CA   --   8.4*  8.0*   MG   --   1.8  1.7   PHOS   --   4.1  4.6   ALB  3.2*   --    --    SGOT  21   --    --    ALT  47   --    --      No components found for: GLPOC  No results for input(s): PH, PCO2, PO2, HCO3, FIO2 in the last 72 hours. No results for input(s): INR in the last 72 hours.     No lab exists for component: INREXT    PCP/Specialist follow up: Patient scheduled to follow up with Saman Ward MD on 3/31/17 at 3:00pm.  Reviewed red flags with patient, and patient verbalizes understanding. Patient given an opportunity to ask questions. No other clinical/social/functional needs noted. The patient agrees to contact the PCP office for questions related to their healthcare. The patient expressed thanks, offered no additional questions and ended the call. Case management plan: Attempt to contact the patient by telephone or during office visit within the next 7-10 days. Will continue to follow as necessary for the next 30 days. Will reassess for case management needs prior to discharge from case management service on or about 30 days.

## 2017-03-31 ENCOUNTER — OFFICE VISIT (OUTPATIENT)
Dept: FAMILY MEDICINE CLINIC | Age: 62
End: 2017-03-31

## 2017-03-31 VITALS
WEIGHT: 168 LBS | SYSTOLIC BLOOD PRESSURE: 138 MMHG | HEART RATE: 72 BPM | TEMPERATURE: 97.4 F | RESPIRATION RATE: 18 BRPM | BODY MASS INDEX: 31.72 KG/M2 | OXYGEN SATURATION: 96 % | HEIGHT: 61 IN | DIASTOLIC BLOOD PRESSURE: 64 MMHG

## 2017-03-31 DIAGNOSIS — E11.8 CONTROLLED TYPE 2 DIABETES MELLITUS WITH COMPLICATION, WITHOUT LONG-TERM CURRENT USE OF INSULIN (HCC): ICD-10-CM

## 2017-03-31 DIAGNOSIS — Z09 HOSPITAL DISCHARGE FOLLOW-UP: ICD-10-CM

## 2017-03-31 DIAGNOSIS — D63.1 ANEMIA IN CHRONIC KIDNEY DISEASE(285.21): Primary | ICD-10-CM

## 2017-03-31 DIAGNOSIS — I10 ESSENTIAL HYPERTENSION: ICD-10-CM

## 2017-03-31 DIAGNOSIS — N18.9 ANEMIA IN CHRONIC KIDNEY DISEASE(285.21): Primary | ICD-10-CM

## 2017-03-31 NOTE — MR AVS SNAPSHOT
Visit Information Date & Time Provider Department Dept. Phone Encounter #  
 3/31/2017  3:00 PM Zaina LopezMatt [phone] [id number] Follow-up Instructions Return in about 3 months (around 6/30/2017) for diabetes check up or sooner as needed. Upcoming Health Maintenance Date Due Hepatitis C Screening 1955 FOOT EXAM Q1 10/23/1966 EYE EXAM RETINAL OR DILATED Q1 10/23/1966 Pneumococcal 19-64 Highest Risk (1 of 3 - PCV13) 10/23/1975 DTaP/Tdap/Td series (1 - Tdap) 10/23/1977 PAP AKA CERVICAL CYTOLOGY 10/23/1977 BREAST CANCER SCRN MAMMOGRAM 10/23/2006 ZOSTER VACCINE AGE 60> 10/23/2016 HEMOGLOBIN A1C Q6M 9/10/2017 MICROALBUMIN Q1 2/9/2018 FOBT Q 1 YEAR AGE 50-75 3/9/2018 LIPID PANEL Q1 3/10/2018 Allergies as of 3/31/2017  Review Complete On: 3/31/2017 By: Zaina Lopez MD  
  
 Severity Noted Reaction Type Reactions Ciprofloxacin  03/13/2017   Systemic Angioedema Current Immunizations  Reviewed on 9/19/2016 Name Date Influenza Vaccine (Quad) PF 9/19/2016  2:34 PM  
  
 Not reviewed this visit You Were Diagnosed With   
  
 Codes Comments Anemia in chronic kidney disease(285.21)    -  Primary ICD-10-CM: N18.9, D63.1 ICD-9-CM: 285.21 Essential hypertension     ICD-10-CM: I10 
ICD-9-CM: 401.9 Controlled type 2 diabetes mellitus with complication, without long-term current use of insulin (HCC)     ICD-10-CM: E11.8 ICD-9-CM: 250.90 Hospital discharge follow-up     ICD-10-CM: [facility] ICD-9-CM: V67.59 Vitals BP Pulse Temp Resp Height(growth percentile) Weight(growth percentile)  
 138/64 (BP 1 Location: Left arm, BP Patient Position: Sitting) 72 97.4 °F (36.3 °C) (Oral) 18 5' 1\" (1.549 m) 168 lb (76.2 kg) SpO2 BMI OB Status Smoking Status 96% 31.74 kg/m2 Postmenopausal Never Smoker BMI and BSA Data Body Mass Index Body Surface Area 31.74 kg/m 2 1.81 m 2 Preferred Pharmacy Pharmacy Name Phone [facility], [address] [facility] [phone] Your Updated Medication List  
  
   
This list is accurate as of: 3/31/17  3:47 PM.  Always use your most recent med list. amLODIPine 10 mg tablet Commonly known as:  Saturnino Aramis Take 10 mg by mouth daily. aspirin delayed-release 81 mg tablet  
take 2 tablets by mouth once daily  
  
 atorvastatin 10 mg tablet Commonly known as:  LIPITOR Take 1 Tab by mouth nightly. dorzolamide-timolol 22.3-6.8 mg/mL ophthalmic solution Commonly known as:  COSOPT Administer 1 Drop to both eyes two (2) times a day. doxazosin 2 mg tablet Commonly known as:  CARDURA Take 1 Tab by mouth daily. furosemide 20 mg tablet Commonly known as:  LASIX Take 1 Tab by mouth daily. labetalol 200 mg tablet Commonly known as:  Mark Anthony Venkata Take 200 mg by mouth two (2) times a day. losartan 100 mg tablet Commonly known as:  COZAAR Take 1 Tab by mouth daily. TYLENOL PM PO Take 2 Tabs by mouth nightly as needed (sleep). Follow-up Instructions Return in about 3 months (around 6/30/2017) for diabetes check up or sooner as needed. Patient Instructions Please schedule appointments for follow up with  
- Dr. Matthias Pineda (Nephrology) 
- Dr. Rosy Reddy (Cardiology) 
- Dr. Krys Sandoval  (Gastroenterology) A Healthy Lifestyle: Care Instructions Your Care Instructions A healthy lifestyle can help you feel good, stay at a healthy weight, and have plenty of energy for both work and play. A healthy lifestyle is something you can share with your whole family. A healthy lifestyle also can lower your risk for serious health problems, such as high blood pressure, heart disease, and diabetes. You can follow a few steps listed below to improve your health and the health of your family. Follow-up care is a key part of your treatment and safety. Be sure to make and go to all appointments, and call your doctor if you are having problems. Its also a good idea to know your test results and keep a list of the medicines you take. How can you care for yourself at home? · Do not eat too much sugar, fat, or fast foods. You can still have dessert and treats now and then. The goal is moderation. · Start small to improve your eating habits. Pay attention to portion sizes, drink less juice and soda pop, and eat more fruits and vegetables. ¨ Eat a healthy amount of food. A 3-ounce serving of meat, for example, is about the size of a deck of cards. Fill the rest of your plate with vegetables and whole grains. ¨ Limit the amount of soda and sports drinks you have every day. Drink more water when you are thirsty. ¨ Eat at least 5 servings of fruits and vegetables every day. It may seem like a lot, but it is not hard to reach this goal. A serving or helping is 1 piece of fruit, 1 cup of vegetables, or 2 cups of leafy, raw vegetables. Have an apple or some carrot sticks as an afternoon snack instead of a candy bar. Try to have fruits and/or vegetables at every meal. 
· Make exercise part of your daily routine. You may want to start with simple activities, such as walking, bicycling, or slow swimming. Try to be active 30 to 60 minutes every day. You do not need to do all 30 to 60 minutes all at once. For example, you can exercise 3 times a day for 10 or 20 minutes. Moderate exercise is safe for most people, but it is always a good idea to talk to your doctor before starting an exercise program. 
· Keep moving. Nery Bonds the lawn, work in the garden, or [employer]. Take the stairs instead of the elevator at work. · If you smoke, quit. People who smoke have an increased risk for heart attack, stroke, cancer, and other lung illnesses.  Quitting is hard, but there are ways to boost your chance of quitting tobacco for good. ¨ Use nicotine gum, patches, or lozenges. ¨ Ask your doctor about stop-smoking programs and medicines. ¨ Keep trying. In addition to reducing your risk of diseases in the future, you will notice some benefits soon after you stop using tobacco. If you have shortness of breath or asthma symptoms, they will likely get better within a few weeks after you quit. · Limit how much alcohol you drink. Moderate amounts of alcohol (up to 2 drinks a day for men, 1 drink a day for women) are okay. But drinking too much can lead to liver problems, high blood pressure, and other health problems. Family health If you have a family, there are many things you can do together to improve your health. · Eat meals together as a family as often as possible. · Eat healthy foods. This includes fruits, vegetables, lean meats and dairy, and whole grains. · Include your family in your fitness plan. Most people think of activities such as jogging or tennis as the way to fitness, but there are many ways you and your family can be more active. Anything that makes you breathe hard and gets your heart pumping is exercise. Here are some tips: 
¨ Walk to do errands or to take your child to school or the bus. ¨ Go for a family bike ride after dinner instead of watching TV. Where can you learn more? Go to http://milo-vania.info/. Enter M930 in the search box to learn more about \"A Healthy Lifestyle: Care Instructions. \" Current as of: July 26, 2016 Content Version: 11.2 © 6908-0676 [employer]. Care instructions adapted under license by [employer] (which disclaims liability or warranty for this information). If you have questions about a medical condition or this instruction, always ask your healthcare professional. [place] [address] any warranty or liability for your use of this information. Please provide this summary of care documentation to your next provider. Your primary care clinician is listed as Josh Walker. If you have any questions after today's visit, please call [phone].

## 2017-03-31 NOTE — PATIENT INSTRUCTIONS
Please schedule appointments for follow up with   - Dr. My Mckeon (Nephrology)  - Dr. Preston Ramos (Cardiology)  - Dr. Ivanna Reina  (Gastroenterology)          A Healthy Lifestyle: Care Instructions  Your Care Instructions  A healthy lifestyle can help you feel good, stay at a healthy weight, and have plenty of energy for both work and play. A healthy lifestyle is something you can share with your whole family. A healthy lifestyle also can lower your risk for serious health problems, such as high blood pressure, heart disease, and diabetes. You can follow a few steps listed below to improve your health and the health of your family. Follow-up care is a key part of your treatment and safety. Be sure to make and go to all appointments, and call your doctor if you are having problems. Its also a good idea to know your test results and keep a list of the medicines you take. How can you care for yourself at home? · Do not eat too much sugar, fat, or fast foods. You can still have dessert and treats now and then. The goal is moderation. · Start small to improve your eating habits. Pay attention to portion sizes, drink less juice and soda pop, and eat more fruits and vegetables. ¨ Eat a healthy amount of food. A 3-ounce serving of meat, for example, is about the size of a deck of cards. Fill the rest of your plate with vegetables and whole grains. ¨ Limit the amount of soda and sports drinks you have every day. Drink more water when you are thirsty. ¨ Eat at least 5 servings of fruits and vegetables every day. It may seem like a lot, but it is not hard to reach this goal. A serving or helping is 1 piece of fruit, 1 cup of vegetables, or 2 cups of leafy, raw vegetables. Have an apple or some carrot sticks as an afternoon snack instead of a candy bar. Try to have fruits and/or vegetables at every meal.  · Make exercise part of your daily routine.  You may want to start with simple activities, such as walking, bicycling, or slow swimming. Try to be active 30 to 60 minutes every day. You do not need to do all 30 to 60 minutes all at once. For example, you can exercise 3 times a day for 10 or 20 minutes. Moderate exercise is safe for most people, but it is always a good idea to talk to your doctor before starting an exercise program.  · Keep moving. Yael Tellez the lawn, work in the garden, or [employer]. Take the stairs instead of the elevator at work. · If you smoke, quit. People who smoke have an increased risk for heart attack, stroke, cancer, and other lung illnesses. Quitting is hard, but there are ways to boost your chance of quitting tobacco for good. ¨ Use nicotine gum, patches, or lozenges. ¨ Ask your doctor about stop-smoking programs and medicines. ¨ Keep trying. In addition to reducing your risk of diseases in the future, you will notice some benefits soon after you stop using tobacco. If you have shortness of breath or asthma symptoms, they will likely get better within a few weeks after you quit. · Limit how much alcohol you drink. Moderate amounts of alcohol (up to 2 drinks a day for men, 1 drink a day for women) are okay. But drinking too much can lead to liver problems, high blood pressure, and other health problems. Family health  If you have a family, there are many things you can do together to improve your health. · Eat meals together as a family as often as possible. · Eat healthy foods. This includes fruits, vegetables, lean meats and dairy, and whole grains. · Include your family in your fitness plan. Most people think of activities such as jogging or tennis as the way to fitness, but there are many ways you and your family can be more active. Anything that makes you breathe hard and gets your heart pumping is exercise. Here are some tips:  ¨ Walk to do errands or to take your child to school or the bus. ¨ Go for a family bike ride after dinner instead of watching TV. Where can you learn more?   Go to http://milo-vania.info/. Enter T365 in the search box to learn more about \"A Healthy Lifestyle: Care Instructions. \"  Current as of: July 26, 2016  Content Version: 11.2  © 4781-3497 [employer]. Care instructions adapted under license by [employer] (which disclaims liability or warranty for this information). If you have questions about a medical condition or this instruction, always ask your healthcare professional. [place] [address] any warranty or liability for your use of this information.

## 2017-03-31 NOTE — PROGRESS NOTES
Chief Complaint   Patient presents with   [address]     recent admission to [facility] for CHF     3/26/17 - 3/29/17     \"REVIEWED RECORD IN PREPARATION FOR VISIT AND HAVE OBTAINED THE NECESSARY DOCUMENTATION\"  1. Have you been to the ER, urgent care clinic since your last visit? Hospitalized since your last visit? Yes Where: see above    2. Have you seen or consulted any other health care providers outside of the [address] [place] [address] since your last visit? Include any pap smears or colon screening. No  Patient does not have advanced directives.

## 2017-03-31 NOTE — PROGRESS NOTES
Subjective:      Melissa Carrel is a 62 y.o. female here today for hospital discharge follow up. Hospitalized at [facility] 3/26/17 - 3/29/17 for CHF exacerbation. Work up with normal cardiac stress testing. Diuresed with Lasix which she tolerated well. Started on Cardura for better blood pressure control which she has been tolerating well. Did require 2 units of PRBC for anemia; has anemia of chronic disease and Nephrology note shows that she will need Epo injections as an outpatient. She reports that she has been doing okay since discharge. Denies fever, chills, dyspnea, chest pain or discomfort. Has noticed that her swelling in the LE has improved. She has not scheduled appointment with Dr. Bao Deshpande, Dr. Mathew Beasley or Dr. Sandie Bell for follow up. Current Outpatient Prescriptions   Medication Sig Dispense Refill    furosemide (LASIX) 20 mg tablet Take 1 Tab by mouth daily. 30 Tab 0    doxazosin (CARDURA) 2 mg tablet Take 1 Tab by mouth daily. 30 Tab 1    losartan (COZAAR) 100 mg tablet Take 1 Tab by mouth daily. 30 Tab 0    ACETAMINOPHEN/DIPHENHYDRAMINE (TYLENOL PM PO) Take 2 Tabs by mouth nightly as needed (sleep).  labetalol (NORMODYNE) 200 mg tablet Take 200 mg by mouth two (2) times a day.  dorzolamide-timolol (COSOPT) 22.3-6.8 mg/mL ophthalmic solution Administer 1 Drop to both eyes two (2) times a day.  atorvastatin (LIPITOR) 10 mg tablet Take 1 Tab by mouth nightly. 30 Tab 5    aspirin delayed-release 81 mg tablet take 2 tablets by mouth once daily 60 Tab 3    amLODIPine (NORVASC) 10 mg tablet Take 10 mg by mouth daily.   0       Allergies   Allergen Reactions    Ciprofloxacin Angioedema       Past Medical History:   Diagnosis Date    Anemia     Arthritis     Calculus of kidney     Congestive heart failure (HCC)     Diabetes (HCC)     Hematuria        Social History   Substance Use Topics    Smoking status: Never Smoker    Smokeless tobacco: Never Used    Alcohol use No        Review of Systems  Pertinent items are noted in HPI. Objective:     Visit Vitals    /64 (BP 1 Location: Left arm, BP Patient Position: Sitting)    Pulse 72    Temp 97.4 °F (36.3 °C) (Oral)    Resp 18    Ht 5' 1\" (1.549 m)    Wt 168 lb (76.2 kg)    SpO2 96%    BMI 31.74 kg/m2      General appearance - alert, well appearing, and in no distress  Eyes - pupils equal and reactive, extraocular eye movements intact, sclera anicteric  Oropharyngx - mucous membranes moist, pharynx normal without lesions  Neck - supple, no significant adenopathy  Chest - clear to auscultation, no wheezes, rales or rhonchi, symmetric air entry, no tachypnea, retractions or cyanosis  Heart - normal rate, regular rhythm, normal S1, S2, no murmurs, rubs, clicks or gallops  Abdomen - soft, mild tenderness in LUQ without rebound or guarding, nondistended, no masses or organomegaly  Neurological - alert, oriented, normal speech, no focal findings or movement disorder noted    Assessment/Plan:   Stephen Madrid is a 62 y.o. female seen for:     1. Anemia in chronic kidney disease: s/p 2 units of PRBC on 3/27/17. Will need Epo injections. To follow up with Dr. You Angulo. 2. Essential hypertension: BP better today than it has been previously. Continue with her current therapy. 3. Controlled type 2 diabetes mellitus with complication, without long-term current use of insulin ([facility] [address]): will need to wait 3 months from date of blood transfusion to check A1c.     4. Hospital discharge follow-up: encouraged to schedule follow up appointments with her specialists within the next 2 weeks. I have discussed the diagnosis with the patient and the intended plan as seen in the above orders. The patient has received an after-visit summary and questions were answered concerning future plans. I have discussed medication side effects and warnings with the patient as well.  Patient verbalizes understanding of plan of care and denies further questions or concerns at this time. Informed patient to return to the office if symptoms worsen or if new symptoms arise. Follow-up Disposition:  Return in about 3 months (around 6/30/2017) for diabetes check up or sooner as needed.

## 2017-04-28 ENCOUNTER — HOSPITAL ENCOUNTER (OUTPATIENT)
Dept: INFUSION THERAPY | Age: 62
Discharge: HOME | End: 2017-04-28

## 2017-05-05 ENCOUNTER — HOSPITAL ENCOUNTER (OUTPATIENT)
Dept: INFUSION THERAPY | Age: 62
Discharge: HOME | End: 2017-05-05
Payer: COMMERCIAL

## 2017-05-05 LAB
25(OH)D3 SERPL-MCNC: 13.3 NG/ML (ref 30–100)
ALBUMIN SERPL BCP-MCNC: 3.4 G/DL (ref 3.5–5)
ANION GAP BLD CALC-SCNC: 11 MMOL/L (ref 5–15)
BASOPHILS # BLD AUTO: 0 K/UL (ref 0–0.1)
BASOPHILS # BLD: 1 % (ref 0–1)
BUN SERPL-MCNC: 32 MG/DL (ref 6–20)
BUN/CREAT SERPL: 9 (ref 12–20)
CALCIUM SERPL-MCNC: 8.2 MG/DL (ref 8.5–10.1)
CALCIUM SERPL-MCNC: 8.3 MG/DL (ref 8.5–10.1)
CHLORIDE SERPL-SCNC: 109 MMOL/L (ref 97–108)
CO2 SERPL-SCNC: 22 MMOL/L (ref 21–32)
CREAT SERPL-MCNC: 3.41 MG/DL (ref 0.55–1.02)
EOSINOPHIL # BLD: 0.2 K/UL (ref 0–0.4)
EOSINOPHIL NFR BLD: 3 % (ref 0–7)
ERYTHROCYTE [DISTWIDTH] IN BLOOD BY AUTOMATED COUNT: 16.2 % (ref 11.5–14.5)
GLUCOSE SERPL-MCNC: 146 MG/DL (ref 65–100)
HCT VFR BLD AUTO: 22.7 % (ref 35–47)
HGB BLD-MCNC: 7 G/DL (ref 11.5–16)
IRON SATN MFR SERPL: 29 % (ref 20–50)
IRON SERPL-MCNC: 52 UG/DL (ref 35–150)
LYMPHOCYTES # BLD AUTO: 19 % (ref 12–49)
LYMPHOCYTES # BLD: 1.1 K/UL (ref 0.8–3.5)
MCH RBC QN AUTO: 26.2 PG (ref 26–34)
MCHC RBC AUTO-ENTMCNC: 30.8 G/DL (ref 30–36.5)
MCV RBC AUTO: 85 FL (ref 80–99)
MONOCYTES # BLD: 0.5 K/UL (ref 0–1)
MONOCYTES NFR BLD AUTO: 9 % (ref 5–13)
NEUTS SEG # BLD: 4 K/UL (ref 1.8–8)
NEUTS SEG NFR BLD AUTO: 68 % (ref 32–75)
PHOSPHATE SERPL-MCNC: 4.6 MG/DL (ref 2.6–4.7)
PLATELET # BLD AUTO: 175 K/UL (ref 150–400)
POTASSIUM SERPL-SCNC: 4.5 MMOL/L (ref 3.5–5.1)
PTH-INTACT SERPL-MCNC: 347.1 PG/ML (ref 14–72)
RBC # BLD AUTO: 2.67 M/UL (ref 3.8–5.2)
SODIUM SERPL-SCNC: 142 MMOL/L (ref 136–145)
TIBC SERPL-MCNC: 182 UG/DL (ref 250–450)
WBC # BLD AUTO: 5.9 K/UL (ref 3.6–11)

## 2017-05-05 PROCEDURE — 83970 ASSAY OF PARATHORMONE: CPT | Performed by: INTERNAL MEDICINE

## 2017-05-05 PROCEDURE — 80069 RENAL FUNCTION PANEL: CPT | Performed by: INTERNAL MEDICINE

## 2017-05-05 PROCEDURE — 82306 VITAMIN D 25 HYDROXY: CPT | Performed by: INTERNAL MEDICINE

## 2017-05-05 PROCEDURE — 83540 ASSAY OF IRON: CPT | Performed by: INTERNAL MEDICINE

## 2017-05-05 PROCEDURE — 96372 THER/PROPH/DIAG INJ SC/IM: CPT

## 2017-05-05 PROCEDURE — 85025 COMPLETE CBC W/AUTO DIFF WBC: CPT | Performed by: INTERNAL MEDICINE

## 2017-05-05 PROCEDURE — 74011250636 HC RX REV CODE- 250/636: Performed by: INTERNAL MEDICINE

## 2017-05-05 PROCEDURE — 36415 COLL VENOUS BLD VENIPUNCTURE: CPT | Performed by: INTERNAL MEDICINE

## 2017-05-05 RX ADMIN — ERYTHROPOIETIN 20000 UNITS: 20000 INJECTION, SOLUTION INTRAVENOUS; SUBCUTANEOUS at 15:38

## 2017-05-05 NOTE — DISCHARGE INSTRUCTIONS
Epoetin Waylon (By injection)   Epoetin Waylon (e-GLENN-e-tin AL-fa)  Treats anemia. Brand Name(s): Epogen, Procrit   There may be other brand names for this medicine. When This Medicine Should Not Be Used: This medicine is not right for everyone. You should not receive it if you had an allergic reaction to epoetin waylon or if you had pure red cell aplasia after receiving epoetin waylon or similar medicines. How to Use This Medicine:   Injectable  · Your doctor will prescribe your dose and schedule. This medicine is given as a shot under your skin or into a vein through a dialysis port. · A nurse or other health provider will give you this medicine. · You may be taught how to give your medicine at home. Make sure you understand all instructions before giving yourself an injection. Do not use more medicine or use it more often than your doctor tells you to. · Do not shake the medicine. Do not use the medicine if it is cloudy or discolored, or has particles in it. · You will be shown the body areas where this shot can be given. Use a different body area each time you give yourself a shot. Keep track of where you give each shot to make sure you rotate body areas. · Use a new needle and syringe each time you inject your medicine. · This medicine should come with a Medication Guide. Ask your pharmacist for a copy if you do not have one. · Missed dose: Call your doctor or pharmacist for instructions. · If you store this medicine at home, keep it in the refrigerator. Do not freeze. · Throw away used needles in a hard, closed container that the needles cannot poke through. Keep this container away from children and pets. Drugs and Foods to Avoid:      Ask your doctor or pharmacist before using any other medicine, including over-the-counter medicines, vitamins, and herbal products. Warnings While Using This Medicine:   · Tell your doctor if you are pregnant or breastfeeding.  This medicine contains benzyl alcohol, which can be harmful to infants and unborn babies. · Tell your doctor if you have heart failure, heart disease, high blood pressure, kidney disease, or history of cancer, blood clots, or seizures. Also tell your doctor if you are scheduled for any type of surgery. · This medicine may cause the following problems:   ¨ Serious heart and blood vessel problems, including heart attack, heart failure, stroke, and blood clots  ¨ For cancer patients, increased risk of the cancer worsening or coming back  ¨ Seizures  ¨ High blood pressure  · Your doctor will do lab tests at regular visits to check on the effects of this medicine. Keep all appointments. Your doctor will also monitor your blood pressure. · This medicine is made from donated human blood. All donated blood is tested for certain viruses. Although your risk for getting a virus from the medicine is very low, talk with your doctor if you have concerns. · Keep all medicine out of the reach of children. Never share your medicine with anyone.   Possible Side Effects While Using This Medicine:   Call your doctor right away if you notice any of these side effects:  · Allergic reaction: Itching or hives, swelling in your face or hands, swelling or tingling in your mouth or throat, chest tightness, trouble breathing  · Chest pain that may spread, trouble breathing, nausea, unusual sweating, fainting  · Fever, chills, cough, stuffy or runny nose, sore throat  · Numbness or weakness on one side of your body, sudden or severe headache, problems with vision, speech, or walking  · Numbness, tingling, or burning pain in your hands, arms, legs, or feet  · Pain or swelling in your lower leg (calf)  · Seizures  · Rapid weight gain, swelling in your hands, ankles, or feet  · Unusual bleeding, bruising, tiredness, or weakness  If you notice these less serious side effects, talk with your doctor:   · Nausea, vomiting  · Muscle or joint pain  · Pain, itching, burning, or swelling where the shot is given  If you notice other side effects that you think are caused by this medicine, tell your doctor. Call your doctor for medical advice about side effects. You may report side effects to FDA at 2-866-MTS-0416  © 2017 [facility] Information is for End User's use only and may not be sold, redistributed or otherwise used for commercial purposes. The above information is an  only. It is not intended as medical advice for individual conditions or treatments. Talk to your doctor, nurse or pharmacist before following any medical regimen to see if it is safe and effective for you.

## 2017-05-05 NOTE — PROGRESS NOTES
Problem: Anemia Care Plan (Adult and Pediatric)  Goal: *Tolerates increased activity  Outcome: Progressing Towards Goal  Pt given teaching about Procrit including handout, side effects.   Given the opportunity to ask questions

## 2017-05-05 NOTE — PROGRESS NOTES
[facility] short consult note:     Pt arrived to [facility] ambulatory and in no distress for 1st  Prorit. .  Denies any new complaints. Patient given education about medication, including side effects and was given the opportunity to ask questions. Medication given:  Procrit sc     Discharged home ambulatory and in no distress. Tolerated procedure well. Next appointment not yet scheduled.

## 2017-05-12 ENCOUNTER — APPOINTMENT (OUTPATIENT)
Dept: INFUSION THERAPY | Age: 62
End: 2017-05-12
Payer: COMMERCIAL

## 2017-05-18 NOTE — PROGRESS NOTES
[facility] Nursing Progress Note    1300 patient arrived ambulatory for parameter based procrit. Patient feels well, problem focused assessment unchanged from baseline. Labs drawn peripherally      [labs]   Recent Results (from the past 12 hour(s))   HGB & HCT    Collection Time: 05/19/17  1:14 PM   Result Value Ref Range    HGB 7.3 (L) 11.5 - 16.0 g/dL    HCT 22.8 (L) 35.0 - 47.0 %   \  Call to Dr. Madie Vaughn office spoke with nurse, Mark Anthony Gandhi to report H&H, will fax all results to office. [vs] Blood pressure 159/78, pulse 79, temperature 97.1 °F (36.2 °C), resp. rate 18.    1425 patient left ambulatory in no distress; aware of next appointment on 6/2/17 @ 1500.

## 2017-05-19 ENCOUNTER — HOSPITAL ENCOUNTER (INPATIENT)
Age: 62
LOS: 2 days | Discharge: HOME | DRG: 291 | End: 2017-05-23
Attending: EMERGENCY MEDICINE | Admitting: FAMILY MEDICINE
Payer: COMMERCIAL

## 2017-05-19 ENCOUNTER — APPOINTMENT (OUTPATIENT)
Dept: GENERAL RADIOLOGY | Age: 62
DRG: 291 | End: 2017-05-19
Attending: EMERGENCY MEDICINE
Payer: COMMERCIAL

## 2017-05-19 ENCOUNTER — HOSPITAL ENCOUNTER (OUTPATIENT)
Dept: INFUSION THERAPY | Age: 62
Discharge: HOME | End: 2017-05-19
Payer: COMMERCIAL

## 2017-05-19 VITALS
HEART RATE: 79 BPM | RESPIRATION RATE: 18 BRPM | DIASTOLIC BLOOD PRESSURE: 78 MMHG | SYSTOLIC BLOOD PRESSURE: 159 MMHG | TEMPERATURE: 97.1 F

## 2017-05-19 DIAGNOSIS — E87.70 HYPERVOLEMIA, UNSPECIFIED HYPERVOLEMIA TYPE: Primary | ICD-10-CM

## 2017-05-19 DIAGNOSIS — R06.02 SOB (SHORTNESS OF BREATH): ICD-10-CM

## 2017-05-19 DIAGNOSIS — N18.9 CHRONIC RENAL INSUFFICIENCY, UNSPECIFIED STAGE: ICD-10-CM

## 2017-05-19 LAB
ALBUMIN SERPL BCP-MCNC: 3.2 G/DL (ref 3.5–5)
ALBUMIN/GLOB SERPL: 0.8 {RATIO} (ref 1.1–2.2)
ALP SERPL-CCNC: 188 U/L (ref 45–117)
ALT SERPL-CCNC: 60 U/L (ref 12–78)
ANION GAP BLD CALC-SCNC: 10 MMOL/L (ref 5–15)
APPEARANCE UR: ABNORMAL
AST SERPL W P-5'-P-CCNC: 29 U/L (ref 15–37)
BASOPHILS # BLD AUTO: 0.1 K/UL (ref 0–0.1)
BASOPHILS # BLD: 1 % (ref 0–1)
BILIRUB DIRECT SERPL-MCNC: 0.1 MG/DL (ref 0–0.2)
BILIRUB SERPL-MCNC: 0.5 MG/DL (ref 0.2–1)
BILIRUB UR QL: NEGATIVE
BNP SERPL-MCNC: ABNORMAL PG/ML (ref 0–125)
BUN SERPL-MCNC: 40 MG/DL (ref 6–20)
BUN/CREAT SERPL: 13 (ref 12–20)
CALCIUM SERPL-MCNC: 8 MG/DL (ref 8.5–10.1)
CHLORIDE SERPL-SCNC: 108 MMOL/L (ref 97–108)
CK MB CFR SERPL CALC: 1.8 % (ref 0–2.5)
CK MB SERPL-MCNC: 3.5 NG/ML (ref 5–25)
CK SERPL-CCNC: 198 U/L (ref 26–192)
CO2 SERPL-SCNC: 21 MMOL/L (ref 21–32)
COLOR UR: ABNORMAL
CREAT SERPL-MCNC: 3.02 MG/DL (ref 0.55–1.02)
DIFFERENTIAL METHOD BLD: ABNORMAL
EOSINOPHIL # BLD: 0.1 K/UL (ref 0–0.4)
EOSINOPHIL NFR BLD: 2 % (ref 0–7)
ERYTHROCYTE [DISTWIDTH] IN BLOOD BY AUTOMATED COUNT: 15.9 % (ref 11.5–14.5)
GLOBULIN SER CALC-MCNC: 4.1 G/DL (ref 2–4)
GLUCOSE BLD STRIP.AUTO-MCNC: 121 MG/DL (ref 65–100)
GLUCOSE SERPL-MCNC: 130 MG/DL (ref 65–100)
GLUCOSE UR STRIP.AUTO-MCNC: 100 MG/DL
HCT VFR BLD AUTO: 22.8 % (ref 35–47)
HCT VFR BLD AUTO: 24.5 % (ref 35–47)
HEMOCCULT STL QL: NEGATIVE
HGB BLD-MCNC: 7.3 G/DL (ref 11.5–16)
HGB BLD-MCNC: 7.5 G/DL (ref 11.5–16)
HGB UR QL STRIP: ABNORMAL
KETONES UR QL STRIP.AUTO: NEGATIVE MG/DL
LEUKOCYTE ESTERASE UR QL STRIP.AUTO: NEGATIVE
LYMPHOCYTES # BLD AUTO: 10 % (ref 12–49)
LYMPHOCYTES # BLD: 0.5 K/UL (ref 0.8–3.5)
MAGNESIUM SERPL-MCNC: 1.8 MG/DL (ref 1.6–2.4)
MCH RBC QN AUTO: 26.7 PG (ref 26–34)
MCHC RBC AUTO-ENTMCNC: 30.6 G/DL (ref 30–36.5)
MCV RBC AUTO: 87.2 FL (ref 80–99)
MONOCYTES # BLD: 0.5 K/UL (ref 0–1)
MONOCYTES NFR BLD AUTO: 9 % (ref 5–13)
NEUTS SEG # BLD: 4.2 K/UL (ref 1.8–8)
NEUTS SEG NFR BLD AUTO: 78 % (ref 32–75)
NITRITE UR QL STRIP.AUTO: NEGATIVE
PH UR STRIP: 5.5 [PH] (ref 5–8)
PLATELET # BLD AUTO: 184 K/UL (ref 150–400)
POTASSIUM SERPL-SCNC: 4.9 MMOL/L (ref 3.5–5.1)
PROT SERPL-MCNC: 7.3 G/DL (ref 6.4–8.2)
PROT UR STRIP-MCNC: 300 MG/DL
RBC # BLD AUTO: 2.81 M/UL (ref 3.8–5.2)
RBC MORPH BLD: ABNORMAL
SERVICE CMNT-IMP: ABNORMAL
SODIUM SERPL-SCNC: 139 MMOL/L (ref 136–145)
SP GR UR REFRACTOMETRY: 1.02 (ref 1–1.03)
TROPONIN I SERPL-MCNC: <0.04 NG/ML
UROBILINOGEN UR QL STRIP.AUTO: 0.2 EU/DL (ref 0.2–1)
WBC # BLD AUTO: 5.4 K/UL (ref 3.6–11)

## 2017-05-19 PROCEDURE — 94762 N-INVAS EAR/PLS OXIMTRY CONT: CPT

## 2017-05-19 PROCEDURE — 36415 COLL VENOUS BLD VENIPUNCTURE: CPT | Performed by: INTERNAL MEDICINE

## 2017-05-19 PROCEDURE — 83735 ASSAY OF MAGNESIUM: CPT | Performed by: EMERGENCY MEDICINE

## 2017-05-19 PROCEDURE — 96372 THER/PROPH/DIAG INJ SC/IM: CPT

## 2017-05-19 PROCEDURE — 80076 HEPATIC FUNCTION PANEL: CPT | Performed by: EMERGENCY MEDICINE

## 2017-05-19 PROCEDURE — 71010 XR CHEST PORT: CPT

## 2017-05-19 PROCEDURE — 85025 COMPLETE CBC W/AUTO DIFF WBC: CPT | Performed by: EMERGENCY MEDICINE

## 2017-05-19 PROCEDURE — 80048 BASIC METABOLIC PNL TOTAL CA: CPT | Performed by: EMERGENCY MEDICINE

## 2017-05-19 PROCEDURE — 94640 AIRWAY INHALATION TREATMENT: CPT

## 2017-05-19 PROCEDURE — 82553 CREATINE MB FRACTION: CPT | Performed by: EMERGENCY MEDICINE

## 2017-05-19 PROCEDURE — 96374 THER/PROPH/DIAG INJ IV PUSH: CPT

## 2017-05-19 PROCEDURE — 93005 ELECTROCARDIOGRAM TRACING: CPT

## 2017-05-19 PROCEDURE — 82550 ASSAY OF CK (CPK): CPT | Performed by: EMERGENCY MEDICINE

## 2017-05-19 PROCEDURE — 74011250636 HC RX REV CODE- 250/636: Performed by: INTERNAL MEDICINE

## 2017-05-19 PROCEDURE — 99218 HC RM OBSERVATION: CPT

## 2017-05-19 PROCEDURE — 74011250636 HC RX REV CODE- 250/636: Performed by: EMERGENCY MEDICINE

## 2017-05-19 PROCEDURE — 81001 URINALYSIS AUTO W/SCOPE: CPT | Performed by: EMERGENCY MEDICINE

## 2017-05-19 PROCEDURE — 99284 EMERGENCY DEPT VISIT MOD MDM: CPT

## 2017-05-19 PROCEDURE — 85018 HEMOGLOBIN: CPT | Performed by: INTERNAL MEDICINE

## 2017-05-19 PROCEDURE — 82272 OCCULT BLD FECES 1-3 TESTS: CPT | Performed by: EMERGENCY MEDICINE

## 2017-05-19 PROCEDURE — 74011250637 HC RX REV CODE- 250/637: Performed by: FAMILY MEDICINE

## 2017-05-19 PROCEDURE — 84484 ASSAY OF TROPONIN QUANT: CPT | Performed by: EMERGENCY MEDICINE

## 2017-05-19 PROCEDURE — 83880 ASSAY OF NATRIURETIC PEPTIDE: CPT | Performed by: EMERGENCY MEDICINE

## 2017-05-19 PROCEDURE — 74011000250 HC RX REV CODE- 250: Performed by: EMERGENCY MEDICINE

## 2017-05-19 PROCEDURE — 82962 GLUCOSE BLOOD TEST: CPT

## 2017-05-19 RX ORDER — AMLODIPINE BESYLATE 5 MG/1
10 TABLET ORAL DAILY
Status: DISCONTINUED | OUTPATIENT
Start: 2017-05-19 | End: 2017-05-23 | Stop reason: HOSPADM

## 2017-05-19 RX ORDER — LABETALOL 200 MG/1
200 TABLET, FILM COATED ORAL EVERY 8 HOURS
Status: DISCONTINUED | OUTPATIENT
Start: 2017-05-19 | End: 2017-05-23 | Stop reason: HOSPADM

## 2017-05-19 RX ORDER — HYDRALAZINE HYDROCHLORIDE 20 MG/ML
10 INJECTION INTRAMUSCULAR; INTRAVENOUS
Status: DISCONTINUED | OUTPATIENT
Start: 2017-05-19 | End: 2017-05-23 | Stop reason: HOSPADM

## 2017-05-19 RX ORDER — SODIUM CHLORIDE 0.9 % (FLUSH) 0.9 %
5-10 SYRINGE (ML) INJECTION EVERY 8 HOURS
Status: DISCONTINUED | OUTPATIENT
Start: 2017-05-19 | End: 2017-05-23 | Stop reason: HOSPADM

## 2017-05-19 RX ORDER — ASPIRIN 81 MG/1
162 TABLET ORAL DAILY
Status: DISCONTINUED | OUTPATIENT
Start: 2017-05-20 | End: 2017-05-23 | Stop reason: HOSPADM

## 2017-05-19 RX ORDER — FUROSEMIDE 10 MG/ML
40 INJECTION INTRAMUSCULAR; INTRAVENOUS 2 TIMES DAILY
Status: DISCONTINUED | OUTPATIENT
Start: 2017-05-20 | End: 2017-05-22

## 2017-05-19 RX ORDER — ACETAMINOPHEN 500 MG
1000 TABLET ORAL
Status: DISCONTINUED | OUTPATIENT
Start: 2017-05-19 | End: 2017-05-23 | Stop reason: HOSPADM

## 2017-05-19 RX ORDER — DOXAZOSIN 2 MG/1
2 TABLET ORAL DAILY
Status: DISCONTINUED | OUTPATIENT
Start: 2017-05-20 | End: 2017-05-23 | Stop reason: HOSPADM

## 2017-05-19 RX ORDER — AMLODIPINE BESYLATE 5 MG/1
10 TABLET ORAL DAILY
Status: DISCONTINUED | OUTPATIENT
Start: 2017-05-20 | End: 2017-05-19

## 2017-05-19 RX ORDER — DIPHENHYDRAMINE HCL 25 MG
50 CAPSULE ORAL
Status: DISCONTINUED | OUTPATIENT
Start: 2017-05-19 | End: 2017-05-23 | Stop reason: HOSPADM

## 2017-05-19 RX ORDER — ERGOCALCIFEROL 1.25 MG/1
50000 CAPSULE ORAL
COMMUNITY
End: 2017-06-12

## 2017-05-19 RX ORDER — FUROSEMIDE 10 MG/ML
40 INJECTION INTRAMUSCULAR; INTRAVENOUS
Status: COMPLETED | OUTPATIENT
Start: 2017-05-19 | End: 2017-05-19

## 2017-05-19 RX ORDER — SODIUM CHLORIDE 0.9 % (FLUSH) 0.9 %
5-10 SYRINGE (ML) INJECTION AS NEEDED
Status: DISCONTINUED | OUTPATIENT
Start: 2017-05-19 | End: 2017-05-23 | Stop reason: HOSPADM

## 2017-05-19 RX ORDER — DORZOLAMIDE HYDROCHLORIDE AND TIMOLOL MALEATE 20; 5 MG/ML; MG/ML
1 SOLUTION/ DROPS OPHTHALMIC 2 TIMES DAILY
Status: DISCONTINUED | OUTPATIENT
Start: 2017-05-20 | End: 2017-05-23 | Stop reason: HOSPADM

## 2017-05-19 RX ORDER — LOSARTAN POTASSIUM 50 MG/1
100 TABLET ORAL DAILY
Status: DISCONTINUED | OUTPATIENT
Start: 2017-05-20 | End: 2017-05-23 | Stop reason: HOSPADM

## 2017-05-19 RX ADMIN — ALBUTEROL SULFATE 1 DOSE: 2.5 SOLUTION RESPIRATORY (INHALATION) at 21:09

## 2017-05-19 RX ADMIN — FUROSEMIDE 40 MG: 10 INJECTION, SOLUTION INTRAMUSCULAR; INTRAVENOUS at 23:09

## 2017-05-19 RX ADMIN — ERYTHROPOIETIN 20000 UNITS: 20000 INJECTION, SOLUTION INTRAVENOUS; SUBCUTANEOUS at 14:22

## 2017-05-19 RX ADMIN — AMLODIPINE BESYLATE 10 MG: 5 TABLET ORAL at 23:52

## 2017-05-19 RX ADMIN — LABETALOL HCL 200 MG: 200 TABLET, FILM COATED ORAL at 23:47

## 2017-05-19 NOTE — ED TRIAGE NOTES
Patient with shortness of breath, dry cough, states fluid is building up in bilateral legs but getting worse, and she has a weight gain of 10-12 lbs

## 2017-05-19 NOTE — IP AVS SNAPSHOT
[address] [place] [address] 104 1007 [facility] 
[phone] Patient: Juju Field MRN: [id number] :1955 You are allergic to the following Allergen Reactions Ciprofloxacin Angioedema Recent Documentation Height Weight Breastfeeding? BMI OB Status Smoking Status 1.549 m 77.3 kg No 32.2 kg/m2 Postmenopausal Never Smoker Unresulted Labs Order Current Status CULTURE, MRSA In process TYPE & CROSSMATCH Preliminary result Emergency Contacts Name Discharge Info Relation Home Work Mobile [address]  Spouse [3] [phone] [facility] DISCHARGE CAREGIVER [3] Child [2] [phone] About your hospitalization You were admitted on:  May 19, 2017 You last received care in the:  [facility] 5M1 MED SURG 1 You were discharged on:  May 23, 2017 Unit phone number:  [phone] Why you were hospitalized Your primary diagnosis was:  Chf Exacerbation (Hcc) Your diagnoses also included:  Diabetes Mellitus Type 2 With Complications (Hcc), Essential Hypertension, Chf (Congestive Heart Failure) (Hcc), Heladio (Acute Kidney Injury) (Hcc), Ckd (Chronic Kidney Disease) Stage 4, Gfr 15-29 Ml/Min (Hcc), Anemia Providers Seen During Your Hospitalizations Provider Role Specialty Primary office phone Jazmin Charles MD Attending Provider Emergency Medicine [phone] Margo Jack MD Attending Provider [facility] [phone] Danitza Devine MD Attending Provider [facility] [phone] Your Primary Care Physician (PCP) Primary Care Physician Office Phone Office Fax Adrienne Hunter [phone] Follow-up Information Follow up With Details Comments Contact Info Sung Bonilla MD Go on 2017 4 PM appointment for hospital follow up  1001 [facility] 70 [facility] 
[phone] Seth Anthony MD Go on 5/25/2017 2 PM appointment for hospital follow up  [address] 
[phone] Your Appointments Thursday May 25, 2017  2:00 PM EDT TRANSITIONAL CARE MANAGEMENT with Seth Anthony MD  
[address] 3170 [facility]) [place] [address]  
[phone] Friday June 02, 2017  3:00 PM EDT INFUSION 15 [place] with [place] INFUSION NURSE 3  
[facility] ([facility]) [address] [place] [address] 70 [facility] 99 [id number] [phone] Current Discharge Medication List  
  
START taking these medications Dose & Instructions Dispensing Information Comments Morning Noon Evening Bedtime  
 albuterol-ipratropium 2.5 mg-0.5 mg/3 ml Nebu Commonly known as:  Mariangel Ramírez Your last dose was: Your next dose is:    
   
   
 Dose:  3 mL  
3 mL by Nebulization route every four (4) hours as needed. Quantity:  30 Nebule Refills:  1  
     
   
   
   
  
 ferrous sulfate 325 mg (65 mg iron) tablet Your last dose was: Your next dose is:    
   
   
 Dose:  325 mg Take 1 Tab by mouth daily (with breakfast). Quantity:  30 Tab Refills:  3 CONTINUE these medications which have CHANGED Dose & Instructions Dispensing Information Comments Morning Noon Evening Bedtime  
 furosemide 40 mg tablet Commonly known as:  LASIX What changed:   
- medication strength 
- how much to take - when to take this Your last dose was: Your next dose is:    
   
   
 Dose:  40 mg Take 1 Tab by mouth two (2) times a day. Quantity:  80 Tab Refills:  0 CONTINUE these medications which have NOT CHANGED Dose & Instructions Dispensing Information Comments Morning Noon Evening Bedtime  
 amLODIPine 10 mg tablet Commonly known as:  Dary Singh Your last dose was: Your next dose is:    
   
   
 Dose:  10 mg Take 10 mg by mouth daily. Refills:  0  
     
   
   
   
  
 aspirin delayed-release 81 mg tablet Your last dose was: Your next dose is:    
   
   
 take 2 tablets by mouth once daily Quantity:  60 Tab Refills:  3  
     
   
   
   
  
 dorzolamide-timolol 22.3-6.8 mg/mL ophthalmic solution Commonly known as:  COSOPT Your last dose was: Your next dose is:    
   
   
 Dose:  1 Drop Administer 1 Drop to both eyes two (2) times a day. Refills:  0  
     
   
   
   
  
 doxazosin 2 mg tablet Commonly known as:  CARDURA Your last dose was: Your next dose is:    
   
   
 Dose:  2 mg Take 1 Tab by mouth daily. Quantity:  30 Tab Refills:  1  
     
   
   
   
  
 labetalol 200 mg tablet Commonly known as:  Mynor Sosa Your last dose was: Your next dose is:    
   
   
 Dose:  200 mg Take 200 mg by mouth every eight (8) hours. Refills:  0  
     
   
   
   
  
 losartan 100 mg tablet Commonly known as:  COZAAR Your last dose was: Your next dose is:    
   
   
 Dose:  100 mg Take 1 Tab by mouth daily. Quantity:  30 Tab Refills:  0  
     
   
   
   
  
 TYLENOL PM PO Your last dose was: Your next dose is:    
   
   
 Dose:  2 Tab Take 2 Tabs by mouth nightly as needed (sleep). Refills:  0  
     
   
   
   
  
 VITAMIN D2 50,000 unit capsule Generic drug:  ergocalciferol Your last dose was: Your next dose is:    
   
   
 Dose:  69360 Units Take 50,000 Units by mouth every fourteen (14) days. Refills:  0 Where to Get Your Medications These medications were sent to [facility] Rosey ARAGON [address]  [address] [facility] [address], 4[address] [facility] [address] Phone:  [phone]  
  albuterol-ipratropium 2.5 mg-0.5 mg/3 ml Nebu  
 ferrous sulfate 325 mg (65 mg iron) tablet Information on where to get these meds will be given to you by the nurse or doctor. ! Ask your nurse or doctor about these medications  
  furosemide 40 mg tablet Discharge Instructions HOME DISCHARGE INSTRUCTIONS Yesi Diego / [id number] : 1955 Admission date: 2017 Discharge date: 2017 1:49 PM  
 
Please bring this form with you to show your care provider at your follow-up appointment. Primary care provider:  Stevenson Hammer MD 
 
Discharging provider:  Quillian Apley, MD  - Family Medicine Resident Melanie Wang MD - Family Medicine Attending You have been admitted to the hospital with the following diagnoses: 
 
ACUTE DIAGNOSES: 
CHF exacerbation CHF exacerbation ([facility] [address] FOLLOW-UP CARE RECOMMENDATIONS: 
 
Appointments Follow-up Information Follow up With Details Comments Contact Info Flakito Welch MD Go on 2017 4 PM appointment for hospital follow up  [address] 
[phone] Stevenson Hammer MD Go on 2017 2 PM appointment for hospital follow up  [address] [place] [address] [address] [place] [address] 
[phone] Medication change:  
- Lasix 40 mg 2 times/ day Follow-up tests needed: - BMP Pending test results: At the time of your discharge the following test results are still pending: none Please make sure you review these results with your outpatient follow-up provider(s). Specific symptoms to watch for: chest pain, shortness of breath, fever, chills, nausea, vomiting, diarrhea, change in mentation, falling, weakness, bleeding. DIET/what to eat:  Cardiac diet ACTIVITY:  Activity as tolerated Wound care: none Equipment needed:  none What to do if new or unexpected symptoms occur? If you experience any of the above symptoms (or should other concerns or questions arise after discharge) please call your primary care physician. Return to the emergency room if you cannot get hold of your doctor. · It is very important that you keep your follow-up appointment(s). · Please bring discharge papers, medication list (and/or medication bottles) to your follow-up appointments for review by your outpatient provider(s). · Please check the list of medications and be sure it includes every medication (even non-prescription medications) that your provider wants you to take. · It is important that you take the medication exactly as they are prescribed. · Keep your medication in the bottles provided by the pharmacist and keep a list of the medication names, dosages, and times to be taken in your wallet. · Do not take other medications without consulting your doctor. · If you have any questions about your medications or other instructions, please talk to your nurse or care provider before you leave the hospital.  
 
Information obtained by:  
 
I understand that if any problems occur once I am at home I am to contact my physician. These instructions were explained to me and I had the opportunity to ask questions. I understand and acknowledge receipt of the instructions indicated above. Physician's or R.N.'s Signature                                                                  Date/Time Patient or Representative Signature Date/Time Avoiding Triggers With Heart Failure: Care Instructions Your Care Instructions Triggers are anything that make your heart failure flare up. A flare-up is also called \"sudden heart failure\" or \"acute heart failure. \" When you have a flare-up, fluid builds up in your lungs, and you have problems breathing. You might need to go to the hospital. By watching for changes in your condition and avoiding triggers, you can prevent heart failure flare-ups. Follow-up care is a key part of your treatment and safety. Be sure to make and go to all appointments, and call your doctor if you are having problems. It's also a good idea to know your test results and keep a list of the medicines you take. How can you care for yourself at home? Watch for changes in your weight and condition · Weigh yourself without clothing at the same time each day. Record your weight. Call your doctor if you gain 3 pounds or more in 2 to 3 days. A sudden weight gain may mean that your heart failure is getting worse. · Keep a daily record of your symptoms. Write down any changes in how you feel, such as new shortness of breath, cough, or problems eating. Also record if your ankles are more swollen than usual and if you have to urinate in the night more often. Note anything that you ate or did that could have triggered these changes. Limit sodium Sodium causes your body to hold on to extra water. This may cause your heart failure symptoms to get worse. People get most of their sodium from processed foods. Fast food and restaurant meals also tend to be very high in sodium. · Your doctor may suggest that you limit sodium to 2,000 milligrams (mg) a day or less. That is less than 1 teaspoon of salt a day, including all the salt you eat in cooking or in packaged foods. · Read food labels on cans and food packages. They tell you how much sodium you get in one serving. Check the serving size.  If you eat more than one serving, you are getting more sodium. · Be aware that sodium can come in forms other than salt, including monosodium glutamate (MSG), sodium citrate, and sodium bicarbonate (baking soda). MSG is often added to Asian food. You can sometimes ask for food without MSG or salt. · Slowly reducing salt will help you adjust to the taste. Take the salt shaker off the table. · Flavor your food with garlic, lemon juice, onion, vinegar, herbs, and spices instead of salt. Do not use soy sauce, steak sauce, onion salt, garlic salt, mustard, or ketchup on your food, unless it is labeled \"low-sodium\" or \"low-salt. \" 
· Make your own salad dressings, sauces, and ketchup without adding salt. · Use fresh or frozen ingredients, instead of canned ones, whenever you can. Choose low-sodium canned goods. · Eat less processed food and food from restaurants, including fast food. Exercise as directed Moderate, regular exercise is very good for your heart. It improves your blood flow and helps control your weight. But too much exercise can stress your heart and cause a heart failure flare-up. · Check with your doctor before you start an exercise program. 
· Walking is an easy way to get exercise. Start out slowly. Gradually increase the length and pace of your walk. Swimming, riding a bike, and using a treadmill are also good forms of exercise. · When you exercise, watch for signs that your heart is working too hard. You are pushing yourself too hard if you cannot talk while you are exercising. If you become short of breath or dizzy or have chest pain, stop, sit down, and rest. 
· Do not exercise when you do not feel well. Take medicines correctly · Take your medicines exactly as prescribed. Call your doctor if you think you are having a problem with your medicine. · Make a list of all the medicines you take.  Include those prescribed to you by other doctors and any over-the-counter medicines, vitamins, or supplements you take. Take this list with you when you go to any doctor. · Take your medicines at the same time every day. It may help you to post a list of all the medicines you take every day and what time of day you take them. · Make taking your medicine as simple as you can. Plan times to take your medicines when you are doing other things, such as eating a meal or getting ready for bed. This will make it easier to remember to take your medicines. · Get organized. Use helpful tools, such as daily or weekly pill containers. When should you call for help? Call 911 if you have symptoms of sudden heart failure such as: 
· You have severe trouble breathing. · You cough up pink, foamy mucus. · You have a new irregular or rapid heartbeat. Call your doctor now or seek immediate medical care if: 
· You have new or increased shortness of breath. · You are dizzy or lightheaded, or you feel like you may faint. · You have sudden weight gain, such as 3 pounds or more in 2 to 3 days. · You have increased swelling in your legs, ankles, or feet. · You are suddenly so tired or weak that you cannot do your usual activities. Watch closely for changes in your health, and be sure to contact your doctor if you develop new symptoms. Where can you learn more? Go to http://milo-vania.info/. Enter G282 in the search box to learn more about \"Avoiding Triggers With Heart Failure: Care Instructions. \" Current as of: November 15, 2016 Content Version: 11.2 © 6173-5326 [employer]. Care instructions adapted under license by [employer] (which disclaims liability or warranty for this information). If you have questions about a medical condition or this instruction, always ask your healthcare professional. [place] [address] any warranty or liability for your use of this information. Low Sodium Diet (2,000 Milligram): Care Instructions Your Care Instructions Too much sodium causes your body to hold on to extra water. This can raise your blood pressure and force your heart and kidneys to work harder. In very serious cases, this could cause you to be put in the hospital. It might even be life-threatening. By limiting sodium, you will feel better and lower your risk of serious problems. The most common source of sodium is salt. People get most of the salt in their diet from canned, prepared, and packaged foods. Fast food and restaurant meals also are very high in sodium. Your doctor will probably limit your sodium to less than 2,000 milligrams (mg) a day. This limit counts all the sodium in prepared and packaged foods and any salt you add to your food. Follow-up care is a key part of your treatment and safety. Be sure to make and go to all appointments, and call your doctor if you are having problems. It's also a good idea to know your test results and keep a list of the medicines you take. How can you care for yourself at home? Read food labels · Read labels on cans and food packages. The labels tell you how much sodium is in each serving. Make sure that you look at the serving size. If you eat more than the serving size, you have eaten more sodium. · Food labels also tell you the Percent Daily Value for sodium. Choose products with low Percent Daily Values for sodium. · Be aware that sodium can come in forms other than salt, including monosodium glutamate (MSG), sodium citrate, and sodium bicarbonate (baking soda). MSG is often added to Asian food. When you eat out, you can sometimes ask for food without MSG or added salt. Buy low-sodium foods · Buy foods that are labeled \"unsalted\" (no salt added), \"sodium-free\" (less than 5 mg of sodium per serving), or \"low-sodium\" (less than 140 mg of sodium per serving). Foods labeled \"reduced-sodium\" and \"light sodium\" may still have too much sodium. Be sure to read the label to see how much sodium you are getting. · Buy fresh vegetables, or frozen vegetables without added sauces. Buy low-sodium versions of canned vegetables, soups, and other canned goods. Prepare low-sodium meals · Cut back on the amount of salt you use in cooking. This will help you adjust to the taste. Do not add salt after cooking. One teaspoon of salt has about 2,300 mg of sodium. · Take the salt shaker off the table. · Flavor your food with garlic, lemon juice, onion, vinegar, herbs, and spices. Do not use soy sauce, lite soy sauce, steak sauce, onion salt, garlic salt, celery salt, mustard, or ketchup on your food. · Use low-sodium salad dressings, sauces, and ketchup. Or make your own salad dressings and sauces without adding salt. · Use less salt (or none) when recipes call for it. You can often use half the salt a recipe calls for without losing flavor. Other foods such as rice, pasta, and grains do not need added salt. · Rinse canned vegetables, and cook them in fresh water. This removes somebut not allof the salt. · Avoid water that is naturally high in sodium or that has been treated with water softeners, which add sodium. Call your local water company to find out the sodium content of your water supply. If you buy bottled water, read the label and choose a sodium-free brand. Avoid high-sodium foods · Avoid eating: ¨ Smoked, cured, salted, and canned meat, fish, and poultry. ¨ Ham, reyes, hot dogs, and luncheon meats. ¨ Regular, hard, and processed cheese and regular peanut butter. ¨ Crackers with salted tops, and other salted snack foods such as pretzels, chips, and salted popcorn. ¨ Frozen prepared meals, unless labeled low-sodium. ¨ Canned and dried soups, broths, and bouillon, unless labeled sodium-free or low-sodium. ¨ Canned vegetables, unless labeled sodium-free or low-sodium. ¨ Western Meli fries, pizza, tacos, and other fast foods.  
¨ Pickles, olives, ketchup, and other condiments, especially soy sauce, unless labeled sodium-free or low-sodium. Where can you learn more? Go to http://milo-vania.info/. Enter U359 in the search box to learn more about \"Low Sodium Diet (2,000 Milligram): Care Instructions. \" Current as of: July 26, 2016 Content Version: 11.2 © 8780-2649 [employer]. Care instructions adapted under license by [employer] (which disclaims liability or warranty for this information). If you have questions about a medical condition or this instruction, always ask your healthcare professional. [address] any warranty or liability for your use of this information. Discharge Instructions Attachments/References HEART FAILURE: LIMITING SODIUM AND FLUIDS (ENGLISH) Discharge Orders None [place] Announcement We are excited to announce that we are making your provider's discharge notes available to you in [place]. You will see these notes when they are completed and signed by the physician that discharged you from your recent hospital stay. If you have any questions or concerns about any information you see in [place], please call the Health Information Department where you were seen or reach out to your Primary Care Provider for more information about your plan of care. General Information Please provide this summary of care documentation to your next provider. Patient Signature:  ____________________________________________________________ Date:  ____________________________________________________________  
  
Bandar Shearer Provider Signature:  ____________________________________________________________ Date:  ____________________________________________________________ More Information Limiting Sodium and Fluids With Heart Failure: Care Instructions Your Care Instructions Sodium causes your body to hold on to extra water.  This may cause your heart failure symptoms to get worse. Limiting sodium may help you feel better and lower your risk of having to go to the hospital. 
People get most of their sodium from processed foods. Fast food and restaurant meals also tend to be very high in sodium. Your doctor may suggest that you limit sodium to 2,000 milligrams (mg) a day or less. That is less than 1 teaspoon of salt a day, including all the salt you eat in cooked or packaged foods. Usually, you have to limit the amount of liquids you drink only if your heart failure is severe. Limiting sodium alone often is enough to help your body get rid of extra fluids. However, your doctor may tell you to limit your fluid intake to a set amount each day. Follow-up care is a key part of your treatment and safety. Be sure to make and go to all appointments, and call your doctor if you are having problems. It's also a good idea to know your test results and keep a list of the medicines you take. How can you care for yourself at home? Read food labels · Read food labels on cans and food packages. The labels tell you how much sodium is in each serving. Make sure that you look at the serving size. If you eat more than the serving size, you have eaten more sodium than is listed for one serving. · Food labels also tell you the Percent Daily Value. If the Percent Daily Value says 50%, it means that you will get at least 50% of all the sodium you need for the entire day in one serving. Choose products with low Percent Daily Values for sodium. · Be aware that sodium can come in forms other than salt, including monosodium glutamate (MSG), sodium citrate, and sodium bicarbonate (baking soda). MSG is often added to Asian food. You can sometimes ask for food without MSG or salt. Buy low-sodium foods · Buy foods that are labeled \"unsalted\" (no salt added), \"sodium-free\" (less than 5 mg of sodium per serving), or \"low-sodium\" (less than 140 mg of sodium per serving). A food labeled \"light sodium\" has less than half of the full-sodium version of that food. Foods labeled \"reduced-sodium\" may still have too much sodium. · Buy fresh vegetables or plain, frozen vegetables. Buy low-sodium versions of canned vegetables, soups, and other canned goods. Prepare low-sodium meals · Use less salt each day when cooking. Reducing salt in this way will help you adjust to the taste. Do not add salt after cooking. Take the salt shaker off the table. · Flavor your food with garlic, lemon juice, onion, vinegar, herbs, and spices instead of salt. Do not use soy sauce, steak sauce, onion salt, garlic salt, mustard, or ketchup on your food. · Make your own salad dressings, sauces, and ketchup without adding salt. · Use less salt (or none) when recipes call for it. You can often use half the salt a recipe calls for without losing flavor. Other dishes like rice, pasta, and grains do not need added salt. · Rinse canned vegetables. This removes somebut not allof the salt. · Avoid water that has a naturally high sodium content or that has been treated with water softeners, which add sodium. Call your local water company to find out the sodium content of your water supply. If you buy bottled water, read the label and choose a sodium-free brand. Avoid high-sodium foods, such as: 
· Smoked, cured, salted, and canned meat, fish, and poultry. · Ham, reyes, hot dogs, and luncheon meats. · Regular, hard, and processed cheese and regular peanut butter. · Crackers with salted tops. · Frozen prepared meals. · Canned and dried soups, broths, and bouillon, unless labeled sodium-free or low-sodium. · Canned vegetables, unless labeled sodium-free or low-sodium. · Salted snack foods such as chips and pretzels. · Western Meli fries, pizza, tacos, and other fast foods. · Pickles, olives, ketchup, and other condiments, especially soy sauce, unless labeled sodium-free or low-sodium. If you cannot cook for yourself · Have family members or friends help you, or have someone cook low-sodium meals. · Check with your local senior nutrition program to find out where meals are served and whether they offer a low-sodium option. You can often find these programs through your local health department or hospital. 
· Have meals delivered to your home. Most [facility] have a Meals on [employer] [address]. These programs provide one hot meal a day for older adults, delivered to their homes. Ask whether these meals are low-sodium. Let them know that you are on a low-sodium diet. Limiting fluid intake · Find a method that works for you. You might simply write down how much you drink every time you do. Some people keep a container filled with the amount of fluid allowed for that day. If they drink from a source other than the container, then they pour out that amount. · Measure your regular drinking glasses to find out how much fluid each one holds. Once you know this, you will not have to measure every time. · Besides water, milk, juices, and other drinks, some foods have a lot of fluid. Count any foods that will melt (such as ice cream or gelatin dessert) or liquid foods (such as soup) as part of your fluid intake for the day. Where can you learn more? Go to http://milo-vania.info/. Enter A166 in the search box to learn more about \"Limiting Sodium and Fluids With Heart Failure: Care Instructions. \" Current as of: November 15, 2016 Content Version: 11.2 © 5981-8780 [employer] ([employer]). Care instructions adapted under license by [employer] (which disclaims liability or warranty for this information). If you have questions about a medical condition or this instruction, always ask your healthcare professional. [place] [address] any warranty or liability for your use of this information.

## 2017-05-19 NOTE — PROGRESS NOTES
Problem: Knowledge Deficit  Goal: *Verbalizes understanding of procedures and medications  Outcome: Progressing Towards Goal  PT arrived at [facility] ambulatory and in no distress for Procrit injection, PT tolerated this well.

## 2017-05-20 PROBLEM — N17.9 AKI (ACUTE KIDNEY INJURY) (HCC): Status: ACTIVE | Noted: 2017-05-20

## 2017-05-20 PROBLEM — N18.4 CKD (CHRONIC KIDNEY DISEASE) STAGE 4, GFR 15-29 ML/MIN (HCC): Chronic | Status: ACTIVE | Noted: 2017-05-20

## 2017-05-20 LAB
ANION GAP BLD CALC-SCNC: 10 MMOL/L (ref 5–15)
BUN SERPL-MCNC: 41 MG/DL (ref 6–20)
BUN/CREAT SERPL: 14 (ref 12–20)
CALCIUM SERPL-MCNC: 8 MG/DL (ref 8.5–10.1)
CHLORIDE SERPL-SCNC: 108 MMOL/L (ref 97–108)
CO2 SERPL-SCNC: 20 MMOL/L (ref 21–32)
CREAT SERPL-MCNC: 2.94 MG/DL (ref 0.55–1.02)
ERYTHROCYTE [DISTWIDTH] IN BLOOD BY AUTOMATED COUNT: 15.5 % (ref 11.5–14.5)
GLUCOSE SERPL-MCNC: 100 MG/DL (ref 65–100)
HCT VFR BLD AUTO: 21.8 % (ref 35–47)
HCT VFR BLD AUTO: 23.4 % (ref 35–47)
HGB BLD-MCNC: 7 G/DL (ref 11.5–16)
HGB BLD-MCNC: 7.2 G/DL (ref 11.5–16)
MCH RBC QN AUTO: 26.7 PG (ref 26–34)
MCHC RBC AUTO-ENTMCNC: 32.1 G/DL (ref 30–36.5)
MCV RBC AUTO: 83.2 FL (ref 80–99)
PLATELET # BLD AUTO: 193 K/UL (ref 150–400)
POTASSIUM SERPL-SCNC: 4.6 MMOL/L (ref 3.5–5.1)
RBC # BLD AUTO: 2.62 M/UL (ref 3.8–5.2)
SODIUM SERPL-SCNC: 138 MMOL/L (ref 136–145)
TROPONIN I SERPL-MCNC: <0.04 NG/ML
WBC # BLD AUTO: 5.5 K/UL (ref 3.6–11)

## 2017-05-20 PROCEDURE — 74011000250 HC RX REV CODE- 250: Performed by: FAMILY MEDICINE

## 2017-05-20 PROCEDURE — 36415 COLL VENOUS BLD VENIPUNCTURE: CPT | Performed by: FAMILY MEDICINE

## 2017-05-20 PROCEDURE — 80048 BASIC METABOLIC PNL TOTAL CA: CPT | Performed by: FAMILY MEDICINE

## 2017-05-20 PROCEDURE — 84484 ASSAY OF TROPONIN QUANT: CPT | Performed by: FAMILY MEDICINE

## 2017-05-20 PROCEDURE — 85027 COMPLETE CBC AUTOMATED: CPT | Performed by: FAMILY MEDICINE

## 2017-05-20 PROCEDURE — 99218 HC RM OBSERVATION: CPT

## 2017-05-20 PROCEDURE — 74011250637 HC RX REV CODE- 250/637: Performed by: FAMILY MEDICINE

## 2017-05-20 PROCEDURE — 93306 TTE W/DOPPLER COMPLETE: CPT

## 2017-05-20 PROCEDURE — 77030032490 HC SLV COMPR SCD KNE COVD -B

## 2017-05-20 PROCEDURE — 85018 HEMOGLOBIN: CPT | Performed by: FAMILY MEDICINE

## 2017-05-20 PROCEDURE — 86900 BLOOD TYPING SEROLOGIC ABO: CPT | Performed by: FAMILY MEDICINE

## 2017-05-20 PROCEDURE — 86920 COMPATIBILITY TEST SPIN: CPT | Performed by: FAMILY MEDICINE

## 2017-05-20 PROCEDURE — 74011250636 HC RX REV CODE- 250/636: Performed by: FAMILY MEDICINE

## 2017-05-20 RX ORDER — FLUTICASONE PROPIONATE 50 MCG
2 SPRAY, SUSPENSION (ML) NASAL
Status: DISCONTINUED | OUTPATIENT
Start: 2017-05-20 | End: 2017-05-23 | Stop reason: HOSPADM

## 2017-05-20 RX ADMIN — Medication 10 ML: at 15:30

## 2017-05-20 RX ADMIN — Medication 10 ML: at 00:29

## 2017-05-20 RX ADMIN — DIPHENHYDRAMINE HYDROCHLORIDE 50 MG: 25 CAPSULE ORAL at 21:15

## 2017-05-20 RX ADMIN — DOXAZOSIN 2 MG: 2 TABLET ORAL at 08:55

## 2017-05-20 RX ADMIN — Medication 10 ML: at 21:16

## 2017-05-20 RX ADMIN — LOSARTAN POTASSIUM 100 MG: 50 TABLET, FILM COATED ORAL at 08:55

## 2017-05-20 RX ADMIN — LABETALOL HCL 200 MG: 200 TABLET, FILM COATED ORAL at 15:29

## 2017-05-20 RX ADMIN — FUROSEMIDE 40 MG: 10 INJECTION, SOLUTION INTRAMUSCULAR; INTRAVENOUS at 19:00

## 2017-05-20 RX ADMIN — DORZOLAMIDE HYDROCHLORIDE AND TIMOLOL MALEATE 1 DROP: 20; 5 SOLUTION/ DROPS OPHTHALMIC at 08:53

## 2017-05-20 RX ADMIN — LABETALOL HCL 200 MG: 200 TABLET, FILM COATED ORAL at 21:03

## 2017-05-20 RX ADMIN — LABETALOL HCL 200 MG: 200 TABLET, FILM COATED ORAL at 07:06

## 2017-05-20 RX ADMIN — ACETAMINOPHEN 1000 MG: 500 TABLET, FILM COATED ORAL at 21:16

## 2017-05-20 RX ADMIN — FUROSEMIDE 40 MG: 10 INJECTION, SOLUTION INTRAMUSCULAR; INTRAVENOUS at 08:56

## 2017-05-20 RX ADMIN — Medication 10 ML: at 07:06

## 2017-05-20 RX ADMIN — ASPIRIN 162 MG: 81 TABLET, COATED ORAL at 08:55

## 2017-05-20 RX ADMIN — DORZOLAMIDE HYDROCHLORIDE AND TIMOLOL MALEATE 1 DROP: 20; 5 SOLUTION/ DROPS OPHTHALMIC at 19:01

## 2017-05-20 NOTE — ROUTINE PROCESS
TRANSFER - OUT REPORT:    Verbal report given to Chuck Turner RN (name) on Preston Gipson  being transferred to [address] [place] [address] (unit) for routine progression of care       Report consisted of patients Situation, Background, Assessment and   Recommendations(SBAR). Information from the following report(s) SBAR, ED Summary, Intake/Output, MAR, Recent Results and Cardiac Rhythm SR was reviewed with the receiving nurse. Lines:   Peripheral IV 05/19/17 Right Antecubital (Active)   Site Assessment Clean, dry, & intact 5/19/2017  9:59 PM   Phlebitis Assessment 0 5/19/2017  9:59 PM   Infiltration Assessment 0 5/19/2017  9:59 PM   Dressing Status Clean, dry, & intact 5/19/2017  9:59 PM   Dressing Type 4 X 4 5/19/2017  9:59 PM   Hub Color/Line Status Pink 5/19/2017  9:59 PM   Action Taken Catheter retaped 5/19/2017  9:59 PM        Opportunity for questions and clarification was provided.       Patient transported with:   Registered Nurse

## 2017-05-20 NOTE — CARDIO/PULMONARY
Cardiac Rehab: 5/20/2017 @ 0945:  Received cardiac rehab consult for HF education. Heart Failure education folder given to Radha Plasencia. Pt is familiar to CR RN from previous admission 3/26/17 for [facility]. PTA cardiac meds include:  Lasix, Cardura, losartan, labetalol, ASA. Amlodipine. Educated using teach back method. Discussed diagnosis definition and assessed patient understanding. Pt was still not clear regarding dHF dx even though this was explained at last admission. Reviewed importance of daily weight monitoring as pt still is not weighing daily. She reported normal weight is about 152-155 lbs and she was admitted with weight 174 lbs. Discussed discrepency and need for compliance with daily weight. Provided calendar for monitoring weights. Reviewed  low sodium diet (less than 1500 mg. daily). Pt reported dgt does most of cooking discussed importance of dgt being aware of dietitary requirements. Encouraged activity and rest periods within symptom limitations and as ordered by physician. Reviewed cardiac meds,  purpose of medication, potential side effects and what to do if dose is missed. Pt reproted she takes her meds as prescribed. Discussed importance of reporting signs and symptoms of exacerbation and when to report them to the doctor to prevent re-hospitalization. Radha Plasencia was encouraged to keep all appointments with doctor. Discussed ability to obtain prescription meds and encouraged conversations with physician if unable to do so. Smoking history assessed. Pt is a non- smoker. HF teach back questions answered by patient. Radha Plasencia could benefit from further education on the following HF topics: daily weights and low Na diet. .  5/22/2017 @ [phone]:  Received second consultation for CHF education. 5/23/2017 @ 0900:  Received third consult for HF education. Met with pt sitting up in bedside on 5th floor, M/S unit.   Purpose of visit was to f/u with HF goals and answer any question. Reviewed importance of heart healthy/low Na diet management and reinforcing with dgt who does most of the cooking. Reinforced importance of compliance with daily weights and monitoring for s/s of FVO. Pt reported she had not been weighed today. Weighed pt - 77.6 kg. Informed staff nurse Yuan Brock) of pt's standing scale weight and wrote on pt board. Also discussed importance of f/u appt with Dr. Ambar Flores. Discussed medications which have not change other then Lasix which has been increased to 40 mg. Pt reported compliance with meds. Pt verbalized understanding of information provided and all question answered. Pt for poss d/c home today.

## 2017-05-20 NOTE — PROGRESS NOTES
Bedside and Verbal shift change report given to Ria Downing RN (oncoming nurse) by [place] RN (offgoing nurse). Report included the following information SBAR, Kardex, ED Summary, Intake/Output, Recent Results, Med Rec Status and Cardiac Rhythm NSR. Bedside and Verbal shift change report given to Pk Garcia RN (oncoming nurse) by Ria Downing RN (offgoing nurse). Report included the following information SBAR, Kardex, ED Summary, Intake/Output, Recent Results, Med Rec Status and Cardiac Rhythm NSR.

## 2017-05-20 NOTE — PROGRESS NOTES
[facility]: MED RECONCILIATION    Comments/Recommendations:     Medications added:     · Vitamin D    Medications removed:    · Atorvastatin    Medications adjusted:    · Labetalol to q8H    Information obtained from: patient, who had bottles, Rqquery    Significant PMH/Disease States:   Past Medical History:   Diagnosis Date    Anemia     Arthritis     Calculus of kidney     Cong[facility] failure ([facility] [address]     Diabetes ([facility] [address]     Hematuria          Chief Complaint for this Admission:   Chief Complaint   Patient presents with    Shortness of Breath         Allergies: Ciprofloxacin    Prior to Admission Medications:     Medication Documentation Review Audit       Reviewed by Glo Winchester (Pharmacist) on 05/19/17 at 21          Medication Sig Documenting Provider Last Dose Status Taking? ACETAMINOPHEN/DIPHENHYDRAMINE (TYLENOL PM PO) Take 2 Tabs by mouth nightly as needed (sleep). Historical Provider Unknown Unknown time Active No    amLODIPine (NORVASC) 10 mg tablet Take 10 mg by mouth daily. Vickey Hassan MD 5/19/2017 Unknown time Active Yes             Med Note (Brea Brasher   Fri Mar 10, 2017  8:14 AM): .      aspirin delayed-release 81 mg tablet take 2 tablets by mouth once daily Brandi Deras MD 5/19/2017 Unknown time Active Yes    dorzolamide-timolol (COSOPT) 22.3-6.8 mg/mL ophthalmic solution Administer 1 Drop to both eyes two (2) times a day. Historical Provider 5/19/2017 Unknown time Active Yes    doxazosin (CARDURA) 2 mg tablet Take 1 Tab by mouth daily. Luiza Trejo MD 5/19/2017 Unknown time Active Yes    ergocalciferol (VITAMIN D2) 50,000 unit capsule Take 50,000 Units by mouth every fourteen (14) days. Historical Provider 5/14/2017 Active Yes    furosemide (LASIX) 20 mg tablet Take 1 Tab by mouth daily. Luiza Trejo MD 5/19/2017 Unknown time Active Yes    labetalol (NORMODYNE) 200 mg tablet Take 200 mg by mouth every eight (8) hours.  Historical Provider 5/19/2017 Unknown time Active Yes    losartan (COZAAR) 100 mg tablet Take 1 Tab by mouth daily.  Burgess Paige DO 5/19/2017 Unknown time Active Yes                        [address]: [phone]

## 2017-05-20 NOTE — ED PROVIDER NOTES
HPI Comments: 62 y.o. female with past medical history significant for DM, anemia, kidney failure, and CHF who presents from home with chief complaint of SOB. Pt c/o of SOB and LE edema that has been worsening over a month. Pt also reports right sided chest pain. Pt is currently on Lasix. Pt is not on dialysis. Pt reports that she was in the hospital in march. Pt reports that she recently used her father's breathing tx and it helped. Pt denies vomiting, diarrhea, and abdominal pain. There are no other acute medical concerns at this time. Social hx: nonsmoker, no EtOH use  PCP: Carolyne Jha MD  Nephrologist: Ramana Mason MD  Cardiologist: Princess Junior MD    Note written by Tati Alanis, as dictated by Alaina Miranda MD 8:40 PM        The history is provided by the patient. No  was used. Past Medical History:   Diagnosis Date    Anemia     Arthritis     Calculus of kidney     Congestive heart failure (HCC)     Diabetes (HCC)     Hematuria        Past Surgical History:   Procedure Laterality Date    COLONOSCOPY N/A 3/13/2017    COLON[facility] performed by [facility]rey [facility] at [facility]      left carpal tunnel    HX TUBAL LIGATION Bilateral          No family history on file. Social History     Social History    Marital status:      Spouse name: N/A    Number of children: N/A    Years of education: N/A     Occupational History    Not on file. Social History Main Topics    Smoking status: Never Smoker    Smokeless tobacco: Never Used    Alcohol use No    Drug use: No    Sexual activity: Not on file     Other Topics Concern    Not on file     Social History Narrative         ALLERGIES: Ciprofloxacin    Review of Systems   Constitutional: Negative for fever. HENT: Negative for facial swelling and nosebleeds. Eyes: Negative for pain. Respiratory: Positive for shortness of breath. Negative for cough and chest tightness. Cardiovascular: Positive for chest pain and leg swelling. Gastrointestinal: Negative for abdominal pain, diarrhea and vomiting. Endocrine: Negative for polyuria. Genitourinary: Negative for difficulty urinating and flank pain. Musculoskeletal: Negative for arthralgias and back pain. Skin: Negative for color change. Allergic/Immunologic: Negative for immunocompromised state. Neurological: Negative for dizziness and headaches. Hematological: Does not bruise/bleed easily. Psychiatric/Behavioral: Negative for agitation. All other systems reviewed and are negative. Vitals:    05/19/17 1957   BP: 198/88   Pulse: 80   Resp: 24   Temp: 97.6 °F (36.4 °C)   SpO2: 96%   Weight: 77.1 kg (170 lb)   Height: 5' 1\" (1.549 m)            Physical Exam   Constitutional: She is oriented to person, place, and time. She appears well-developed and well-nourished. Slightly obese;     HENT:   Head: Normocephalic and atraumatic. Right Ear: External ear normal.   Left Ear: External ear normal.   Nose: Nose normal.   Mouth/Throat: Oropharynx is clear and moist.   Eyes: EOM are normal. Pupils are equal, round, and reactive to light. No scleral icterus. Neck: Normal range of motion. Neck supple. No JVD present. No tracheal deviation present. No thyromegaly present. Cardiovascular: Normal rate, regular rhythm, normal heart sounds and intact distal pulses. Exam reveals no friction rub. No murmur heard. Pulmonary/Chest: Effort normal. No stridor. No respiratory distress. She has wheezes (B/L). She has no rales. She exhibits no tenderness. Oxygen saturations are at 100% at RA;     Abdominal: Soft. Bowel sounds are normal. She exhibits no distension. There is no tenderness. There is no rebound and no guarding. Musculoskeletal: Normal range of motion. She exhibits edema (2+ B/L LE ). She exhibits no tenderness. Lymphadenopathy:     She has no cervical adenopathy.    Neurological: She is alert and oriented to person, place, and time. She has normal reflexes. No cranial nerve deficit. Coordination normal.   Skin: Skin is warm and dry. No rash noted. No erythema. Psychiatric: She has a normal mood and affect. Her behavior is normal. Judgment and thought content normal.   Nursing note and vitals reviewed. Note written by Tati Quintero, as dictated by Angel Martins MD 8:42 PM    MDM  Number of Diagnoses or Management Options  Diagnosis management comments: 69-year-old  female presents to the emergency department with leg swelling and trouble breathing. Patient has a history of chronic kidney disease. Patient has swelling in her legs. We'll check chest x-ray, blood work, EKG. We'll reassess when testing is back       Amount and/or Complexity of Data Reviewed  Clinical lab tests: ordered and reviewed  Tests in the radiology section of CPT®: ordered and reviewed  Tests in the medicine section of CPT®: ordered and reviewed  Decide to obtain previous medical records or to obtain history from someone other than the patient: yes  Obtain history from someone other than the patient: yes  Review and summarize past medical records: yes  Independent visualization of images, tracings, or specimens: yes    Risk of Complications, Morbidity, and/or Mortality  Presenting problems: high  Diagnostic procedures: high  Management options: high      ED Course       Procedures    ED EKG interpretation:  Rhythm: normal sinus rhythm; and regular . Rate (approx.): 80; Axis: normal; ST/T wave: no ST elevations or depressions; normal intervals  Note written by Tati Quintero, as dictated by Angel Martins MD 8:34 PM    10:04 PM  Will give IV Lasix for volume overload. Will admit for dieresis.      BNP is 63357    CXR shows no pulm edema    Legs are swollen and pt is symptomatic with SOB and cannot lie flat very well     CONSULT NOTE:  10:05 PM Angel Martins MD spoke with [facility] Medicine Resident, Consult for Family Medicine. Discussed available diagnostic tests and clinical findings. He is in agreement with care plans as outlined. He will admit the pt.

## 2017-05-20 NOTE — PROGRESS NOTES
[facility]   Daily Progress Note    Date: 5/20/2017    Assessment/Plan:   Jarrett Esqueda is a 62 y.o. female who is admitted for CHF exacerbation. Overnight Events: No acute events.      HFpEF in acute exacerbation. Troponin negative x 2. SOB improved. - Lasix 40mg IV BID  - Daily weights  - Strict I&Os  - Low sodium diet     Hypertension: Uncontrolled initially and improved. - Continuing home medications of amlodipine 10mg every day, cardura 2mg every day, labetalol 200mg q8g, losartan 100mg every day. - Hydralazine PRN for SBP >180 or DBP >110.     PATRICIA on CKD4. Baseline Cr 2.6 - 3.0. Cr trend 3.02-->2.94.    - Monitor with CMP      Chronic anemia. Hb 7.5-->7.0. FOBT negative. Receives Epo infusions.  - Repeat H/H @ noon  - Type and screen and 2 units PRBCs ordered ahead      Allergic rhinitis. - Flonase daily     T2DM - diet controlled     FEN/GI - Cardiac diet. Activity - Ambulate as tolerated  DVT prophylaxis - SCDs  GI prophylaxis - Not indicated at this time  Fall prophylaxis - Not indicated at this time. Disposition - Admit to Telemetry. Plan to d/c to Home. Code Status - Full     Patient Jarrett Esqueda will be discussed Dr. Angie Pickens. Edmar Piedra MD  Family Medicine Resident         Subjective  No acute events overnight. Patient denies chills, headaches, chest pain, palpitations, abdominal pain, nausea and vomiting, and LE edema. SOB improved.        Inpatient Medications  Current Facility-Administered Medications   Medication Dose Route Frequency    diphenhydrAMINE (BENADRYL) capsule 50 mg  50 mg Oral QHS PRN    aspirin delayed-release tablet 162 mg  162 mg Oral DAILY    dorzolamide-timolol (COSOPT) 22.3-6.8 mg/mL ophthalmic solution 1 Drop  1 Drop Both Eyes BID    doxazosin (CARDURA) tablet 2 mg  2 mg Oral DAILY    labetalol (NORMODYNE) tablet 200 mg  200 mg Oral Q8H    losartan (COZAAR) tablet 100 mg  100 mg Oral DAILY    sodium chloride (NS) flush 5-10 mL  5-10 mL IntraVENous Q8H    sodium chloride (NS) flush 5-10 mL  5-10 mL IntraVENous PRN    amLODIPine (NORVASC) tablet 10 mg  10 mg Oral DAILY    hydrALAZINE (APRESOLINE) 20 mg/mL injection 10 mg  10 mg IntraVENous Q6H PRN    furosemide (LASIX) injection 40 mg  40 mg IntraVENous BID    acetaminophen (TYLENOL) tablet 1,000 mg  1,000 mg Oral QHS PRN         Allergies  Allergies   Allergen Reactions    Ciprofloxacin Angioedema         Objective  Vitals:  Patient Vitals for the past 8 hrs:   Temp Pulse Resp BP SpO2   05/20/17 0703 - 82 - - -   05/20/17 0358 97.6 °F (36.4 °C) 83 20 153/59 94 %   05/20/17 0005 97.6 °F (36.4 °C) 85 24 188/77 94 %         I/O:    Intake/Output Summary (Last 24 hours) at 05/20/17 0750  Last data filed at 05/20/17 0404   Gross per 24 hour   Intake                0 ml   Output              550 ml   Net             -550 ml     Last shift:       Last 3 shifts:    05/18 1901 - 05/20 0700  In: -   Out: 550 [Urine:550]    Physical Exam:  General Oriented to person, place, and time and well-developed. HENT Head Normocephalic and atraumatic. Eyes Conjunctivae are normal, no discharge. Nose Nose normal, clear turbinates. Oral Oropharynx is clear and moist.     Neck Supple   Cardio Normal rate, regular rhythm. Exam reveals no gallop and no friction rub. No murmur heard. No chest wall tenderness. Pulmonary CTAB, good air movement   Abdominal Soft. Bowel sounds normal. No distension. No tenderness.  Deferred. Extremities 2+ pitting edema in b/l LE    Neurological Alert and oriented to person, place, and time. Dermatology Skin is warm and dry.     Psychiatric Affect and judgment normal.       Laboratory Data  Recent Results (from the past 12 hour(s))   EKG, 12 LEAD, INITIAL    Collection Time: 05/19/17  8:18 PM   Result Value Ref Range    Ventricular Rate 80 BPM    Atrial Rate 80 BPM    P-R Interval 130 ms    QRS Duration 76 ms    Q-T Interval 372 ms    QTC Calculation (Bezet) 429 ms    Calculated P Axis 53 degrees    Calculated R Axis 33 degrees    Calculated T Axis 101 degrees    Diagnosis       Normal sinus rhythm  Nonspecific T wave abnormality  Abnormal ECG  When compared with ECG of 29-MAR-2017 10:49,  MANUAL COMPARISON REQUIRED, DATA IS UNCONFIRMED     CBC WITH AUTOMATED DIFF    Collection Time: 05/19/17  8:32 PM   Result Value Ref Range    WBC 5.4 3.6 - 11.0 K/uL    RBC 2.81 (L) 3.80 - 5.20 M/uL    HGB 7.5 (L) 11.5 - 16.0 g/dL    HCT 24.5 (L) 35.0 - 47.0 %    MCV 87.2 80.0 - 99.0 FL    MCH 26.7 26.0 - 34.0 PG    MCHC 30.6 30.0 - 36.5 g/dL    RDW 15.9 (H) 11.5 - 14.5 %    PLATELET 782 314 - 008 K/uL    NEUTROPHILS 78 (H) 32 - 75 %    LYMPHOCYTES 10 (L) 12 - 49 %    MONOCYTES 9 5 - 13 %    EOSINOPHILS 2 0 - 7 %    BASOPHILS 1 0 - 1 %    ABS. NEUTROPHILS 4.2 1.8 - 8.0 K/UL    ABS. LYMPHOCYTES 0.5 (L) 0.8 - 3.5 K/UL    ABS. MONOCYTES 0.5 0.0 - 1.0 K/UL    ABS. EOSINOPHILS 0.1 0.0 - 0.4 K/UL    ABS.  BASOPHILS 0.1 0.0 - 0.1 K/UL    DF MANUAL      RBC COMMENTS ANISOCYTOSIS  1+        RBC COMMENTS MICROCYTOSIS  PRESENT        RBC COMMENTS HYPOCHROMIA  1+       METABOLIC PANEL, BASIC    Collection Time: 05/19/17  8:32 PM   Result Value Ref Range    Sodium 139 136 - 145 mmol/L    Potassium 4.9 3.5 - 5.1 mmol/L    Chloride 108 97 - 108 mmol/L    CO2 21 21 - 32 mmol/L    Anion gap 10 5 - 15 mmol/L    Glucose 130 (H) 65 - 100 mg/dL    BUN 40 (H) 6 - 20 MG/DL    Creatinine 3.02 (H) 0.55 - 1.02 MG/DL    BUN/Creatinine ratio 13 12 - 20      GFR est AA 19 (L) >60 ml/min/1.73m2    GFR est non-AA 16 (L) >60 ml/min/1.73m2    Calcium 8.0 (L) 8.5 - 10.1 MG/DL   CK W/ REFLX CKMB    Collection Time: 05/19/17  8:32 PM   Result Value Ref Range     (H) 26 - 192 U/L   TROPONIN I    Collection Time: 05/19/17  8:32 PM   Result Value Ref Range    Troponin-I, Qt. <0.04 <0.05 ng/mL   MAGNESIUM    Collection Time: 05/19/17  8:32 PM   Result Value Ref Range    Magnesium 1.8 1.6 - 2.4 mg/dL   PRO-BNP    Collection Time: 05/19/17  8:32 PM   Result Value Ref Range    NT pro-BNP 86210 (H) 0 - 125 PG/ML   HEPATIC FUNCTION PANEL    Collection Time: 05/19/17  8:32 PM   Result Value Ref Range    Protein, total 7.3 6.4 - 8.2 g/dL    Albumin 3.2 (L) 3.5 - 5.0 g/dL    Globulin 4.1 (H) 2.0 - 4.0 g/dL    A-G Ratio 0.8 (L) 1.1 - 2.2      Bilirubin, total 0.5 0.2 - 1.0 MG/DL    Bilirubin, direct 0.1 0.0 - 0.2 MG/DL    Alk. phosphatase 188 (H) 45 - 117 U/L    AST (SGOT) 29 15 - 37 U/L    ALT (SGPT) 60 12 - 78 U/L   CK-MB,QUANT.     Collection Time: 05/19/17  8:32 PM   Result Value Ref Range    CK - MB 3.5 <3.6 NG/ML    CK-MB Index 1.8 0 - 2.5     URINALYSIS W/ RFLX MICROSCOPIC    Collection Time: 05/19/17  9:59 PM   Result Value Ref Range    Color YELLOW/STRAW      Appearance CLOUDY (A) CLEAR      Specific gravity 1.017 1.003 - 1.030      pH (UA) 5.5 5.0 - 8.0      Protein 300 (A) NEG mg/dL    Glucose 100 (A) NEG mg/dL    Ketone NEGATIVE  NEG mg/dL    Bilirubin NEGATIVE  NEG      Blood MODERATE (A) NEG      Urobilinogen 0.2 0.2 - 1.0 EU/dL    Nitrites NEGATIVE  NEG      Leukocyte Esterase NEGATIVE  NEG     OCCULT BLOOD, STOOL    Collection Time: 05/19/17 11:09 PM   Result Value Ref Range    Occult blood, stool NEGATIVE  NEG     GLUCOSE, POC    Collection Time: 05/19/17 11:54 PM   Result Value Ref Range    Glucose (POC) 121 (H) 65 - 100 mg/dL    Performed by Allyson Hernandez    CBC W/O DIFF    Collection Time: 05/20/17  2:43 AM   Result Value Ref Range    WBC 5.5 3.6 - 11.0 K/uL    RBC 2.62 (L) 3.80 - 5.20 M/uL    HGB 7.0 (L) 11.5 - 16.0 g/dL    HCT 21.8 (L) 35.0 - 47.0 %    MCV 83.2 80.0 - 99.0 FL    MCH 26.7 26.0 - 34.0 PG    MCHC 32.1 30.0 - 36.5 g/dL    RDW 15.5 (H) 11.5 - 14.5 %    PLATELET 794 606 - 795 K/uL   METABOLIC PANEL, BASIC    Collection Time: 05/20/17  2:43 AM   Result Value Ref Range    Sodium 138 136 - 145 mmol/L    Potassium 4.6 3.5 - 5.1 mmol/L    Chloride 108 97 - 108 mmol/L    CO2 20 (L) 21 - 32 mmol/L    Anion gap 10 5 - 15 mmol/L    Glucose 100 65 - 100 mg/dL    BUN 41 (H) 6 - 20 MG/DL    Creatinine 2.94 (H) 0.55 - 1.02 MG/DL    BUN/Creatinine ratio 14 12 - 20      GFR est AA 20 (L) >60 ml/min/1.73m2    GFR est non-AA 16 (L) >60 ml/min/1.73m2    Calcium 8.0 (L) 8.5 - 10.1 MG/DL   TROPONIN I    Collection Time: 05/20/17  2:43 AM   Result Value Ref Range    Troponin-I, Qt. <0.04 <0.05 ng/mL       Imaging      Hospital Problems:  Hospital Problems  Date Reviewed: 3/31/2017          Codes Class Noted POA    PATRICIA (acute kidney injury) ([facility] ICD-10-CM: N17.9  ICD-9-CM: 584.9  5/20/2017 Yes        CKD (chronic kidney disease) stage 4, GFR 15-29 ml/min (HCC) (Chronic) ICD-10-CM: N18.4  ICD-9-CM: 585.4  5/20/2017 Yes        CHF (congestive heart failure) ([facility] ICD-10-CM: I50.9  ICD-9-CM: 428.0  3/28/2017 Yes    Overview Signed 4/25/2017 12:10 PM by Brandi Deras MD     Diastolic heart failure. Cardiologist: Dr. Daxa Mireles              * (Principal)CHF exacerbation [facility]) ICD-10-CM: I50.9  ICD-9-CM: 428.0  3/27/2017 Yes        Diabetes mellitus type 2 with complications ([facility] FIN-86-OU: E11.8  ICD-9-CM: 250.90  7/28/2016 Yes        Essential hypertension ICD-10-CM: I10  ICD-9-CM: 401.9  7/28/2016 Yes        Anemia ICD-10-CM: D64.9  ICD-9-CM: 285.9  7/28/2016 Yes            [address] Medicine Residency Attending Addendum:  I saw and evaluated the patient, performing the key elements of the service. I discussed the findings, assessment and plan with the resident and agree with the resident's findings and plan as documented in the resident's note.     The patient was seen on 5/20/17    Vitals:    05/23/17 0557 05/23/17 0700 05/23/17 0942 05/23/17 0946   BP: 166/77  157/76    Pulse: 83 81 80    Resp: 19  18    Temp: 98.4 °F (36.9 °C)  98.5 °F (36.9 °C)    SpO2: 91%  92%    Weight:    170 lb 6.7 oz (77.3 kg)   Height:         nad      Assessment/Plan:   chf exarcebation  Continue diuretics, daily weights,low salt diet    Hospital Problems  Date Reviewed: 3/31/2017          Codes Class Noted POA    PATRICIA (acute kidney injury) ([facility] ICD-10-CM: N17.9  ICD-9-CM: 584.9  5/20/2017 Yes        CKD (chronic kidney disease) stage 4, GFR 15-29 ml/min (HCC) (Chronic) ICD-10-CM: N18.4  ICD-9-CM: 585.4  5/20/2017 Yes        CHF (congestive heart failure) ([facility] ICD-10-CM: I50.9  ICD-9-CM: 428.0  3/28/2017 Yes    Overview Signed 4/25/2017 12:10 PM by Sly Araya MD     Diastolic heart failure.    Cardiologist: Dr. Rodolfo York              * (Principal)CHF exacerbation [facility]) ICD-10-CM: I50.9  ICD-9-CM: 428.0  3/27/2017 Yes        Diabetes mellitus type 2 with complications ([facility] OCY-21-KK: E11.8  ICD-9-CM: 250.90  7/28/2016 Yes        Essential hypertension ICD-10-CM: I10  ICD-9-CM: 401.9  7/28/2016 Yes        Anemia ICD-10-CM: D64.9  ICD-9-CM: 285.9  7/28/2016 Yes

## 2017-05-20 NOTE — H&P
[address] [facility] [address] [place] [address]   Office [phone]  Fax [phone]       Admission H&P     Name: Anaya Perdomo MRN: [id number]  Sex: Female   YOB: 1955  Age: 62 y.o. PCP: Donald Penn MD     Source of Information: patient, medical records    Chief complaint: shortness of breath    History of Present Illness  Anaya Perdomo is a 62 y.o. female with known HFpEF, uncontrolled HTN, CKD4 and hx of GI bleed who presents to the ER complaining of shortness of breath. Symptoms started 2 weeks ago and is worse when walking or lying down. She had a recent hospital admission on 3/26/17 for CHF exacerbation. Patient states that she has been taking her lasix 20mg daily, though denies weighing herself daily. She denies increased consumption of sodium rich foods. Cardiology is Dr. Radha Cohen, whom she saw a little over 1 week ago. At that Cardiology visit, patient was doing well. In the ER, vital signs were remarkable for T 97.6F, HR 80, /88, RR 24. Labs were remarkable for pBNP. 35777, Cr 3.02, Hb 7.5. CXR bibasilar atelectasis/pleural effusions. Pt was treated with IV lasix 40mg. Home Medications   Prior to Admission medications    Medication Sig Start Date End Date Taking? Authorizing Provider   ergocalciferol (VITAMIN D2) 50,000 unit capsule Take 50,000 Units by mouth every fourteen (14) days. Yes Historical Provider   furosemide (LASIX) 20 mg tablet Take 1 Tab by mouth daily. 3/29/17  Yes Dayna Deluna MD   doxazosin (CARDURA) 2 mg tablet Take 1 Tab by mouth daily. 3/29/17  Yes Dayna Deluna MD   losartan (COZAAR) 100 mg tablet Take 1 Tab by mouth daily. 3/13/17  Yes Chip Major,    labetalol (NORMODYNE) 200 mg tablet Take 200 mg by mouth every eight (8) hours. Yes Historical Provider   dorzolamide-timolol (COSOPT) 22.3-6.8 mg/mL ophthalmic solution Administer 1 Drop to both eyes two (2) times a day.    Yes Historical Provider   aspirin delayed-release 81 mg tablet take 2 tablets by mouth once daily 1/3/17  Yes Fidelia Calderon MD   amLODIPine (NORVASC) 10 mg tablet Take 10 mg by mouth daily. 11/2/16  Yes Arturo Phillips MD   ACETAMINOPHEN/DIPHENHYDRAMINE (TYLENOL PM PO) Take 2 Tabs by mouth nightly as needed (sleep). Historical Provider       Allergies  Allergies   Allergen Reactions    Ciprofloxacin Angioedema       Past Medical History:   Diagnosis Date    Anemia     Arthritis     Calculus of kidney     Congestive heart failure (HCC)     Diabetes (HCC)     Hematuria        Past Surgical History:   Procedure Laterality Date    COLONOSCOPY N/A 3/13/2017    COLONOSCOPY performed [facility] Joseph [facility]akat MD at [facility]      left carpal tunnel    HX TUBAL LIGATION Bilateral        Social History  Alcohol history: Not at all  Smoking history: Non-smoker  Illicit drug history: Not at all  Living arrangement: patient lives with their spouse. Ambulates: Independently     Review of Systems     The following are negative unless bolded. General Fever, chills and unexpected weight change. HENT Ear pain, sinus pressure and sore throat. Eyes Visual disturbance. Respiratory Cough, chest tightness, shortness of breath and wheezing. Cardio Chest pain, palpitations and leg swelling. GI Nausea, vomiting, abd pain, diarrhea, constipation and blood in stool.  Dysuria. Extremities Myalgias and arthralgias. Skin Color change and pallor. Neuro Weakness and headaches. Behavioral Confusion, nervous, anxious. Physical Exam  Visit Vitals    BP (!) 201/79    Pulse (!) 103    Temp 97.6 °F (36.4 °C)    Resp 24    Ht 5' 1\" (1.549 m)    Wt 170 lb (77.1 kg)    SpO2 96%    BMI 32.12 kg/m2      O2 Device: Room air     Vitals Reviewed. General Oriented to person, place, and time and well-developed. HENT Head Normocephalic and atraumatic. Eyes Conjunctivae are normal, no discharge.    Nose Nose normal, clear turbinates. Oral Oropharynx is clear and moist.     Neck Supple   Cardio Normal rate, regular rhythm. Exam reveals no gallop and no friction rub. No murmur heard. No chest wall tenderness. Pulmonary Soft expiratory wheezes bilaterally, otherwise good air movement   Abdominal Soft. Bowel sounds normal. No distension. No tenderness.  Deferred. Extremities 2+ pitting edema in b/l LE    Neurological Alert and oriented to person, place, and time. Dermatology Skin is warm and dry. Psychiatric Affect and judgment normal.     Laboratory Data  Recent Results (from the past 8 hour(s))   EKG, 12 LEAD, INITIAL    Collection Time: 05/19/17  8:18 PM   Result Value Ref Range    Ventricular Rate 80 BPM    Atrial Rate 80 BPM    P-R Interval 130 ms    QRS Duration 76 ms    Q-T Interval 372 ms    QTC Calculation (Bezet) 429 ms    Calculated P Axis 53 degrees    Calculated R Axis 33 degrees    Calculated T Axis 101 degrees    Diagnosis       Normal sinus rhythm  Nonspecific T wave abnormality  Abnormal ECG  When compared with ECG of 29-MAR-2017 10:49,  MANUAL COMPARISON REQUIRED, DATA IS UNCONFIRMED     CBC WITH AUTOMATED DIFF    Collection Time: 05/19/17  8:32 PM   Result Value Ref Range    WBC 5.4 3.6 - 11.0 K/uL    RBC 2.81 (L) 3.80 - 5.20 M/uL    HGB 7.5 (L) 11.5 - 16.0 g/dL    HCT 24.5 (L) 35.0 - 47.0 %    MCV 87.2 80.0 - 99.0 FL    MCH 26.7 26.0 - 34.0 PG    MCHC 30.6 30.0 - 36.5 g/dL    RDW 15.9 (H) 11.5 - 14.5 %    PLATELET 903 845 - 501 K/uL    NEUTROPHILS 78 (H) 32 - 75 %    LYMPHOCYTES 10 (L) 12 - 49 %    MONOCYTES 9 5 - 13 %    EOSINOPHILS 2 0 - 7 %    BASOPHILS 1 0 - 1 %    ABS. NEUTROPHILS 4.2 1.8 - 8.0 K/UL    ABS. LYMPHOCYTES 0.5 (L) 0.8 - 3.5 K/UL    ABS. MONOCYTES 0.5 0.0 - 1.0 K/UL    ABS. EOSINOPHILS 0.1 0.0 - 0.4 K/UL    ABS.  BASOPHILS 0.1 0.0 - 0.1 K/UL    DF MANUAL      RBC COMMENTS ANISOCYTOSIS  1+        RBC COMMENTS MICROCYTOSIS  PRESENT        RBC COMMENTS HYPOCHROMIA  1+ METABOLIC PANEL, BASIC    Collection Time: 05/19/17  8:32 PM   Result Value Ref Range    Sodium 139 136 - 145 mmol/L    Potassium 4.9 3.5 - 5.1 mmol/L    Chloride 108 97 - 108 mmol/L    CO2 21 21 - 32 mmol/L    Anion gap 10 5 - 15 mmol/L    Glucose 130 (H) 65 - 100 mg/dL    BUN 40 (H) 6 - 20 MG/DL    Creatinine 3.02 (H) 0.55 - 1.02 MG/DL    BUN/Creatinine ratio 13 12 - 20      GFR est AA 19 (L) >60 ml/min/1.73m2    GFR est non-AA 16 (L) >60 ml/min/1.73m2    Calcium 8.0 (L) 8.5 - 10.1 MG/DL   CK W/ REFLX CKMB    Collection Time: 05/19/17  8:32 PM   Result Value Ref Range     (H) 26 - 192 U/L   TROPONIN I    Collection Time: 05/19/17  8:32 PM   Result Value Ref Range    Troponin-I, Qt. <0.04 <0.05 ng/mL   MAGNESIUM    Collection Time: 05/19/17  8:32 PM   Result Value Ref Range    Magnesium 1.8 1.6 - 2.4 mg/dL   PRO-BNP    Collection Time: 05/19/17  8:32 PM   Result Value Ref Range    NT pro-BNP 80876 (H) 0 - 125 PG/ML   HEPATIC FUNCTION PANEL    Collection Time: 05/19/17  8:32 PM   Result Value Ref Range    Protein, total 7.3 6.4 - 8.2 g/dL    Albumin 3.2 (L) 3.5 - 5.0 g/dL    Globulin 4.1 (H) 2.0 - 4.0 g/dL    A-G Ratio 0.8 (L) 1.1 - 2.2      Bilirubin, total 0.5 0.2 - 1.0 MG/DL    Bilirubin, direct 0.1 0.0 - 0.2 MG/DL    Alk. phosphatase 188 (H) 45 - 117 U/L    AST (SGOT) 29 15 - 37 U/L    ALT (SGPT) 60 12 - 78 U/L   CK-MB,QUANT.     Collection Time: 05/19/17  8:32 PM   Result Value Ref Range    CK - MB 3.5 <3.6 NG/ML    CK-MB Index 1.8 0 - 2.5     URINALYSIS W/ RFLX MICROSCOPIC    Collection Time: 05/19/17  9:59 PM   Result Value Ref Range    Color YELLOW/STRAW      Appearance CLOUDY (A) CLEAR      Specific gravity 1.017 1.003 - 1.030      pH (UA) 5.5 5.0 - 8.0      Protein 300 (A) NEG mg/dL    Glucose 100 (A) NEG mg/dL    Ketone NEGATIVE  NEG mg/dL    Bilirubin NEGATIVE  NEG      Blood MODERATE (A) NEG      Urobilinogen 0.2 0.2 - 1.0 EU/dL    Nitrites NEGATIVE  NEG      Leukocyte Esterase NEGATIVE  NEG Imaging  CXR Results  (Last 48 hours)               05/19/17 2051  XR CHEST PORT Final result    Impression:  IMPRESSION:       Diminished right-sided effusion/atelectasis. Slightly increased left-sided effusion and atelectasis. Cardiomegaly. Narrative:  Clinical history: Chest pain   INDICATION: Chest pain       COMPARISON: 3/26/2017       FINDINGS:    AP semiupright view of the chest is obtained . The cardiopericardial silhouette   is stable. Basilar dependent change. Slightly diminished right-sided effusion   and atelectasis. Slightly increased left-sided atelectasis and effusion. Persistent increased interstitial lung markings. Glenohumeral degenerative change bilaterally. Assessment and Plan     Juju Feild is a 62 y.o. female who is admitted for CHF exacerbation. HFpEF in acute exacerbation. Shortness of breath and LE edema. Slight weight increase 168 -> 170 lbs. pBNP 88371. CXR remarkable for bibasilar pleural effusions.   - Admit to Telemetry  - Lasix 40mg IV BID  - Daily weights  - Strict I&Os  - Low sodium diet  - Check troponin    Hypertension. Uncontrolled. - Continuing home medications of amlodipine 10mg every day, cardura 2mg every day, labetalol 200mg q8g, losartan 100mg every day. - Hydralazine PRN for SBP >180 or DBP >110. PATRICIA on CKD4. Baseline Cr 2.6 - 3.0. Has been increasing.   - Monitor BMP    Chronic anemia. Hb 7.5. Receives Epo infusions. - Check FOBT (hx of GI bleed)  - Repeat CBC    Allergic rhinitis. - Flonase daily    T2DM - diet controlled    FEN/GI - Cardiac diet. Activity - Ambulate as tolerated  DVT prophylaxis - SCDs  GI prophylaxis - Not indicated at this time  Fall prophylaxis - Not indicated at this time. Disposition - Admit to Telemetry. Plan to d/c to Home. Code Status - Full    Patient Juju Field will be discussed Dr. Audrey Khan.     11:20 PM, 05/19/17  Rogelio Shabazz MD  Family Medicine Resident       For Billing    Chief Complaint   Patient presents with    [facility]  Date Reviewed: 3/31/2017    None

## 2017-05-20 NOTE — CONSULTS
Neha Tavarez MD [facility] [address]  Office [phone]  Mobile [phone]    Date of  Admission: 5/19/2017  8:11 PM  PCP- Valarie Rojas MD    Marc Castle is a 62 y.o. female admitted for CHF exacerbation. Consult requested by Leena Paula MD    Assessment  · ADHF, diastolic dysfunction with bilateral pleural effusions  · Chronic diastolic HF in the setting of HTN, advanced CKD, anemia  · Stage 4 CKD  · Poorly controlled HTN  · Chronic anemia        Discussion/Recommendations:  Complex case. Suspect hypervolemia driving accelerated HTN. Needs intensified diuresis. Will likely come to RRT sooner than later. Is their underlying GI bleeding contributing to severity of anemia? Would check PA/lat CXR in 2 days    Repeat echo    Subjective:  Chronic diastolic dysfunction in the setting of advanced CKD, chronic severe HTN, anemia, last hosp 2 months ago with worsening HF, now admitted with same. Accelerated BP in ED. CXR with bilateral effusions. Troponins negative but proBNP higher than March. Notes progressive fatigue and MANZO but no chest pain. Fairly sedentary lifestyle. Does not monitor BP at home but apparently sees Dr Ambar Flores every 2 weeks? Has noted increased LE edema. Cardiac evaluation in March - EF 50%, LVH. Lexiscan cardiolite normal.    Cardiac testing  ECHO 2/2016: EF 45-50%, inf.  Inferoseptal HK, mild MR, RVSP 44mmHg  ECHO 3/28/2017: EF 50-55%, G2DD, mild MR/TR,RVSP 44 mmHg    Past Medical History:   Diagnosis Date    Anemia     Arthritis     Calculus of kidney     Congestive heart failure (HCC)     Diabetes (HCC)     Hematuria       Past Surgical History:   Procedure Laterality Date    COLONOSCOPY N/A 3/13/2017    COLONOSCOPY performed by Francia Shafer MD at [facility]      left carpal tunnel    HX TUBAL LIGATION Bilateral      Current Facility-Administered Medications on File Prior to Encounter   Medication Dose Route Frequency Provider Last Rate Last Dose    [COMPLETED] epoetin eliel (EPOGEN;PROCRIT) injection 20,000 Units  20,000 Units SubCUTAneous ONCE Adal Brunson MD   20,000 Units at 05/19/17 1422     Current Outpatient Prescriptions on File Prior to Encounter   Medication Sig Dispense Refill    furosemide (LASIX) 20 mg tablet Take 1 Tab by mouth daily. 30 Tab 0    doxazosin (CARDURA) 2 mg tablet Take 1 Tab by mouth daily. 30 Tab 1    losartan (COZAAR) 100 mg tablet Take 1 Tab by mouth daily. 30 Tab 0    labetalol (NORMODYNE) 200 mg tablet Take 200 mg by mouth every eight (8) hours.  dorzolamide-timolol (COSOPT) 22.3-6.8 mg/mL ophthalmic solution Administer 1 Drop to both eyes two (2) times a day.  aspirin delayed-release 81 mg tablet take 2 tablets by mouth once daily 60 Tab 3    amLODIPine (NORVASC) 10 mg tablet Take 10 mg by mouth daily. 0    ACETAMINOPHEN/DIPHENHYDRAMINE (TYLENOL PM PO) Take 2 Tabs by mouth nightly as needed (sleep). Allergies   Allergen Reactions    Ciprofloxacin Angioedema      . Not working       Review of Symptoms:  Constitutional: negative for fevers and chills  Respiratory: negative for sputum, hemoptysis or pleurisy/chest pain  Gastrointestinal: negative for dysphagia, odynophagia and abdominal pain  Musculoskeletal:negative for bone pain  Neurological: negative for dizziness, vertigo and seizures  Other systems reviewed and negative except as above.     Physical Exam    Visit Vitals    /66 (BP 1 Location: Left arm, BP Patient Position: At rest)    Pulse 85    Temp 98.4 °F (36.9 °C)    Resp 20    Ht 5' 1\" (1.549 m)    Wt 174 lb 2.6 oz (79 kg)    SpO2 94%    BMI 32.91 kg/m2     Visit Vitals    /66 (BP 1 Location: Left arm, BP Patient Position: At rest)    Pulse 85    Temp 98.4 °F (36.9 °C)    Resp 20    Ht 5' 1\" (1.549 m)    Wt 174 lb 2.6 oz (79 kg)    SpO2 94%    BMI 32.91 kg/m2     General Appearance: Well developed, well nourished,alert and oriented x 3, and individual in no acute distress. Ears/Nose/Mouth/Throat:   Hearing grossly normal.         Neck: Supple. + JVP at 45 degrees   Chest:   Lungs diminished BS at bases with dullness to percussion, faint rales   Cardiovascular:  Regular rate and rhythm, S1, S2 normal, no murmur. + S4   Abdomen:   Soft, non-tender, bowel sounds are active. Extremities: Trace ankle edema bilaterally. Skin: Warm and dry.                Cardiographics    Telemetry: normal sinus rhythm  ECG: normal sinus rhythm, nonspecific ST and T waves changes    Echocardiogram: Not done    Recent Results (from the past 12 hour(s))   GLUCOSE, POC    Collection Time: 05/19/17 11:54 PM   Result Value Ref Range    Glucose (POC) 121 (H) 65 - 100 mg/dL    Performed by Karen Mittal    CBC W/O DIFF    Collection Time: 05/20/17  2:43 AM   Result Value Ref Range    WBC 5.5 3.6 - 11.0 K/uL    RBC 2.62 (L) 3.80 - 5.20 M/uL    HGB 7.0 (L) 11.5 - 16.0 g/dL    HCT 21.8 (L) 35.0 - 47.0 %    MCV 83.2 80.0 - 99.0 FL    MCH 26.7 26.0 - 34.0 PG    MCHC 32.1 30.0 - 36.5 g/dL    RDW 15.5 (H) 11.5 - 14.5 %    PLATELET 790 936 - 033 K/uL   METABOLIC PANEL, BASIC    Collection Time: 05/20/17  2:43 AM   Result Value Ref Range    Sodium 138 136 - 145 mmol/L    Potassium 4.6 3.5 - 5.1 mmol/L    Chloride 108 97 - 108 mmol/L    CO2 20 (L) 21 - 32 mmol/L    Anion gap 10 5 - 15 mmol/L    Glucose 100 65 - 100 mg/dL    BUN 41 (H) 6 - 20 MG/DL    Creatinine 2.94 (H) 0.55 - 1.02 MG/DL    BUN/Creatinine ratio 14 12 - 20      GFR est AA 20 (L) >60 ml/min/1.73m2    GFR est non-AA 16 (L) >60 ml/min/1.73m2    Calcium 8.0 (L) 8.5 - 10.1 MG/DL   TROPONIN I    Collection Time: 05/20/17  2:43 AM   Result Value Ref Range    Troponin-I, Qt. <0.04 <0.05 ng/mL   HGB & HCT    Collection Time: 05/20/17 10:45 AM   Result Value Ref Range    HGB 7.2 (L) 11.5 - 16.0 g/dL    HCT 23.4 (L) 35.0 - 47.0 %

## 2017-05-20 NOTE — PROGRESS NOTES
Bedside and Verbal shift change report given to [place] (oncoming nurse) by Quinton Gowers (offgoing nurse). Report included the following information SBAR, Kardex, ED Summary, Intake/Output, MAR, Recent Results and Cardiac Rhythm nsr.    0824 orders noted for type & screen & H&H @ 1200. Confirmed with Dr Matt Hayward that both can be drawn at the same time. Request for packed RBCs. Flonase 50 mcg daily. 1030 blood drawn, sent to lab    1130 Dr Jayde Pozo at bedside    1350 order noted for echo    1511 echo at bedside    1530 /87, will give scheduled labetolol & recheck    1600 recheck 169/78    1715 pt c/o SOB after ambulating to bathroom. Satting  94% on room air. Placed on 2L for comfort. Bedside and Verbal shift change report given to Quinton Gowers (oncoming nurse) by [place] (offgoing nurse). Report included the following information SBAR, Kardex, Procedure Summary, Intake/Output, Recent Results and Cardiac Rhythm nsr.

## 2017-05-20 NOTE — PROGRESS NOTES
SOBEIDA, Kardex, ED Summary, Intake/Output, Recent Results, Med Rec Status and Cardiac Rhythm NSRRANSFER - IN REPORT:    Verbal report received from SALLY JENSEN RN(name) on Jarrod Valentine  being received from ED(unit) for routine progression of care      Report consisted of patients Situation, Background, Assessment and   Recommendations(SBAR). Information from the following report(s) SBAR, Kardex, ED Summary, Intake/Output, Recent Results, Med Rec Status and Cardiac Rhythm NSR with non-specific T-wave abnormality was reviewed with the receiving nurse. Opportunity for questions and clarification was provided. Assessment completed upon patients arrival to unit and care assumed. Progress notes:     0005: Pt admitted to room; alert, oriented, denies pain. ambulated to bathroom, tolerated fairly; dyspnea on exertion. Duel Skin Assessment\"    Primary Nurse Nuvia Yeung and Rosalia Colbert RN performed a dual skin assessment on this patient No impairment noted  Chandler score is 21     Bedside and Verbal shift change report given to Devi Lee RN (oncoming nurse) by Yamilet Amos RN (offgoing nurse). Report included the following information SBAR, Kardex, ED Summary, Intake/Output, Recent Results, Med Rec Status and Cardiac Rhythm NSR.

## 2017-05-21 LAB
ANION GAP BLD CALC-SCNC: 8 MMOL/L (ref 5–15)
ATRIAL RATE: 80 BPM
BUN SERPL-MCNC: 39 MG/DL (ref 6–20)
BUN/CREAT SERPL: 14 (ref 12–20)
CALCIUM SERPL-MCNC: 8.1 MG/DL (ref 8.5–10.1)
CALCULATED P AXIS, ECG09: 53 DEGREES
CALCULATED R AXIS, ECG10: 33 DEGREES
CALCULATED T AXIS, ECG11: 101 DEGREES
CHLORIDE SERPL-SCNC: 108 MMOL/L (ref 97–108)
CO2 SERPL-SCNC: 23 MMOL/L (ref 21–32)
CREAT SERPL-MCNC: 2.81 MG/DL (ref 0.55–1.02)
DIAGNOSIS, 93000: NORMAL
ERYTHROCYTE [DISTWIDTH] IN BLOOD BY AUTOMATED COUNT: 16 % (ref 11.5–14.5)
GLUCOSE SERPL-MCNC: 95 MG/DL (ref 65–100)
HCT VFR BLD AUTO: 23.2 % (ref 35–47)
HGB BLD-MCNC: 7.3 G/DL (ref 11.5–16)
MCH RBC QN AUTO: 27.2 PG (ref 26–34)
MCHC RBC AUTO-ENTMCNC: 31.5 G/DL (ref 30–36.5)
MCV RBC AUTO: 86.6 FL (ref 80–99)
P-R INTERVAL, ECG05: 130 MS
PLATELET # BLD AUTO: 175 K/UL (ref 150–400)
POTASSIUM SERPL-SCNC: 4.6 MMOL/L (ref 3.5–5.1)
Q-T INTERVAL, ECG07: 372 MS
QRS DURATION, ECG06: 76 MS
QTC CALCULATION (BEZET), ECG08: 429 MS
RBC # BLD AUTO: 2.68 M/UL (ref 3.8–5.2)
SODIUM SERPL-SCNC: 139 MMOL/L (ref 136–145)
VENTRICULAR RATE, ECG03: 80 BPM
WBC # BLD AUTO: 6 K/UL (ref 3.6–11)

## 2017-05-21 PROCEDURE — 94640 AIRWAY INHALATION TREATMENT: CPT

## 2017-05-21 PROCEDURE — 74011250637 HC RX REV CODE- 250/637: Performed by: FAMILY MEDICINE

## 2017-05-21 PROCEDURE — 36415 COLL VENOUS BLD VENIPUNCTURE: CPT | Performed by: FAMILY MEDICINE

## 2017-05-21 PROCEDURE — 65660000000 HC RM CCU STEPDOWN

## 2017-05-21 PROCEDURE — 74011000250 HC RX REV CODE- 250: Performed by: FAMILY MEDICINE

## 2017-05-21 PROCEDURE — 80048 BASIC METABOLIC PNL TOTAL CA: CPT | Performed by: FAMILY MEDICINE

## 2017-05-21 PROCEDURE — 99218 HC RM OBSERVATION: CPT

## 2017-05-21 PROCEDURE — 74011250636 HC RX REV CODE- 250/636: Performed by: FAMILY MEDICINE

## 2017-05-21 PROCEDURE — 85027 COMPLETE CBC AUTOMATED: CPT | Performed by: FAMILY MEDICINE

## 2017-05-21 RX ORDER — IPRATROPIUM BROMIDE AND ALBUTEROL SULFATE 2.5; .5 MG/3ML; MG/3ML
3 SOLUTION RESPIRATORY (INHALATION)
Status: COMPLETED | OUTPATIENT
Start: 2017-05-21 | End: 2017-05-21

## 2017-05-21 RX ORDER — IPRATROPIUM BROMIDE AND ALBUTEROL SULFATE 2.5; .5 MG/3ML; MG/3ML
3 SOLUTION RESPIRATORY (INHALATION)
Status: DISCONTINUED | OUTPATIENT
Start: 2017-05-21 | End: 2017-05-23 | Stop reason: HOSPADM

## 2017-05-21 RX ADMIN — Medication 10 ML: at 22:30

## 2017-05-21 RX ADMIN — LABETALOL HCL 200 MG: 200 TABLET, FILM COATED ORAL at 08:05

## 2017-05-21 RX ADMIN — IPRATROPIUM BROMIDE AND ALBUTEROL SULFATE 3 ML: .5; 3 SOLUTION RESPIRATORY (INHALATION) at 07:32

## 2017-05-21 RX ADMIN — LABETALOL HCL 200 MG: 200 TABLET, FILM COATED ORAL at 13:48

## 2017-05-21 RX ADMIN — DORZOLAMIDE HYDROCHLORIDE AND TIMOLOL MALEATE 1 DROP: 20; 5 SOLUTION/ DROPS OPHTHALMIC at 17:34

## 2017-05-21 RX ADMIN — DORZOLAMIDE HYDROCHLORIDE AND TIMOLOL MALEATE 1 DROP: 20; 5 SOLUTION/ DROPS OPHTHALMIC at 08:16

## 2017-05-21 RX ADMIN — ASPIRIN 162 MG: 81 TABLET, COATED ORAL at 08:15

## 2017-05-21 RX ADMIN — Medication 10 ML: at 08:05

## 2017-05-21 RX ADMIN — DOXAZOSIN 2 MG: 2 TABLET ORAL at 08:16

## 2017-05-21 RX ADMIN — LABETALOL HCL 200 MG: 200 TABLET, FILM COATED ORAL at 21:12

## 2017-05-21 RX ADMIN — FUROSEMIDE 40 MG: 10 INJECTION, SOLUTION INTRAMUSCULAR; INTRAVENOUS at 08:16

## 2017-05-21 RX ADMIN — Medication 10 ML: at 17:35

## 2017-05-21 RX ADMIN — LOSARTAN POTASSIUM 100 MG: 50 TABLET, FILM COATED ORAL at 08:15

## 2017-05-21 RX ADMIN — AMLODIPINE BESYLATE 10 MG: 5 TABLET ORAL at 08:15

## 2017-05-21 RX ADMIN — HYDRALAZINE HYDROCHLORIDE 10 MG: 20 INJECTION INTRAMUSCULAR; INTRAVENOUS at 04:27

## 2017-05-21 RX ADMIN — IPRATROPIUM BROMIDE AND ALBUTEROL SULFATE 3 ML: .5; 3 SOLUTION RESPIRATORY (INHALATION) at 21:15

## 2017-05-21 RX ADMIN — FUROSEMIDE 40 MG: 10 INJECTION, SOLUTION INTRAMUSCULAR; INTRAVENOUS at 17:35

## 2017-05-21 NOTE — PROGRESS NOTES
[facility] FAMILY MEDICINE [facility]IDENCY [facility]   Daily Progress Note    Date: 5/21/2017    Assessment/Plan:   Rina Mcnulty is a 62 y.o. female with hx of HFpEF, uncontrolled HTN, CKD4, hx GIB, who is admitted for CHF exacerbation. Overnight Events: No acute events.      HFpEF in acute exacerbation. Troponin negative x 2. SOB improved. On 2L o/n  - Lasix 40mg IV BID  - Daily weights  - Strict I&Os  - Low sodium diet  - duonebs Q4H PRN for wheezing.      Hypertension: Uncontrolled, but improved. 160s-190s/60s-80s o/n; last /60 this AM  - Continuing home medications of amlodipine 10mg daily, cardura 2mg daily, labetalol 200mg q8h, losartan 100mg daily. - if BP remains uncontrolled, can consider starting Clonidine or spironolactone   - Hydralazine PRN for SBP >180 or DBP >110.     PATRICIA on CKD4. Baseline Cr 2.6 - 3.0. Cr trend 3.02-->2.94.    - Monitor with CMP      Chronic anemia. Hb 7.5-->7.0. FOBT negative. Receives Epo infusions. - Type and screen and 2 units PRBCs ordered ahead      Allergic rhinitis. - Flonase daily     T2DM - diet controlled     FEN/GI - Cardiac diet. Activity - Ambulate as tolerated  DVT prophylaxis - SCDs  GI prophylaxis - Not indicated at this time  Fall prophylaxis - Not indicated at this time. Disposition - Admit to Telemetry. Plan to d/c to Home. Code Status - Full     Patient Rina Mcnulty will be discussed Dr. Eden Root, attending physician     Michela Hernandez MD  Family Medicine Resident         Subjective  No acute events overnight. Patient denies chills, headaches, chest pain, palpitations, abdominal pain, nausea and vomiting. SOB remains, but is improved from on admission.    BLE edema present, but improved from on admission; legs feel \"less tight\"  Endorsed Wheezing this AM      Inpatient Medications  Current Facility-Administered Medications   Medication Dose Route Frequency    fluticasone (FLONASE) 50 mcg/actuation nasal spray 2 Spray  2 Spray Both Nostrils DAILY PRN    diphenhydrAMINE (BENADRYL) capsule 50 mg  50 mg Oral QHS PRN    aspirin delayed-release tablet 162 mg  162 mg Oral DAILY    dorzolamide-timolol (COSOPT) 22.3-6.8 mg/mL ophthalmic solution 1 Drop  1 Drop Both Eyes BID    doxazosin (CARDURA) tablet 2 mg  2 mg Oral DAILY    labetalol (NORMODYNE) tablet 200 mg  200 mg Oral Q8H    losartan (COZAAR) tablet 100 mg  100 mg Oral DAILY    sodium chloride (NS) flush 5-10 mL  5-10 mL IntraVENous Q8H    sodium chloride (NS) flush 5-10 mL  5-10 mL IntraVENous PRN    amLODIPine (NORVASC) tablet 10 mg  10 mg Oral DAILY    hydrALAZINE (APRESOLINE) 20 mg/mL injection 10 mg  10 mg IntraVENous Q6H PRN    furosemide (LASIX) injection 40 mg  40 mg IntraVENous BID    acetaminophen (TYLENOL) tablet 1,000 mg  1,000 mg Oral QHS PRN         Allergies  Allergies   Allergen Reactions    Ciprofloxacin Angioedema         Objective  Vitals:  Patient Vitals for the past 8 hrs:   Temp Pulse Resp BP SpO2   05/21/17 0800 97.9 °F (36.6 °C) 87 23 163/60 -   05/21/17 0700 - 88 - - -   05/21/17 0345 98.2 °F (36.8 °C) 84 18 189/80 100 %   05/21/17 0058 98.5 °F (36.9 °C) 80 24 178/74 93 %         I/O:    Intake/Output Summary (Last 24 hours) at 05/21/17 0828  Last data filed at 05/21/17 0824   Gross per 24 hour   Intake              830 ml   Output             2930 ml   Net            -2100 ml     Last shift:    05/21 0701 - 05/21 1900  In: -   Out: 400 [Urine:400]  Last 3 shifts:    05/19 1901 - 05/21 0700  In: 830 [P.O.:830]  Out: 3080 [Urine:3080]    Physical Exam:  General Oriented to person, place, and time and well-developed. HENT Head Normocephalic and atraumatic. Eyes Conjunctivae are normal, no discharge. Nose Nose normal, clear turbinates. Oral Oropharynx is clear and moist.     Neck Supple   Cardio Normal rate, regular rhythm. Exam reveals no gallop and no friction rub. No murmur heard. No chest wall tenderness. Pulmonary Good air movement b/l. Scattered expiratory wheezes. Abdominal Soft. Bowel sounds normal. No distension. No tenderness.  Deferred. Extremities 1+ pitting edema in b/l LE    Neurological Alert and oriented to person, place, and time. Dermatology Skin is warm and dry. Psychiatric Affect and judgment normal.       Laboratory Data  Recent Results (from the past 12 hour(s))   CBC W/O DIFF    Collection Time: 05/21/17  4:25 AM   Result Value Ref Range    WBC 6.0 3.6 - 11.0 K/uL    RBC 2.68 (L) 3.80 - 5.20 M/uL    HGB 7.3 (L) 11.5 - 16.0 g/dL    HCT 23.2 (L) 35.0 - 47.0 %    MCV 86.6 80.0 - 99.0 FL    MCH 27.2 26.0 - 34.0 PG    MCHC 31.5 30.0 - 36.5 g/dL    RDW 16.0 (H) 11.5 - 14.5 %    PLATELET 695 749 - 769 K/uL   METABOLIC PANEL, BASIC    Collection Time: 05/21/17  4:25 AM   Result Value Ref Range    Sodium 139 136 - 145 mmol/L    Potassium 4.6 3.5 - 5.1 mmol/L    Chloride 108 97 - 108 mmol/L    CO2 23 21 - 32 mmol/L    Anion gap 8 5 - 15 mmol/L    Glucose 95 65 - 100 mg/dL    BUN 39 (H) 6 - 20 MG/DL    Creatinine 2.81 (H) 0.55 - 1.02 MG/DL    BUN/Creatinine ratio 14 12 - 20      GFR est AA 21 (L) >60 ml/min/1.73m2    GFR est non-AA 17 (L) >60 ml/min/1.73m2    Calcium 8.1 (L) 8.5 - 10.1 MG/DL       Imaging      Hospital Problems:  Hospital Problems  Date Reviewed: 3/31/2017          Codes Class Noted POA    PATRICIA (acute kidney injury) ([facility] ICD-10-CM: N17.9  ICD-9-CM: 584.9  5/20/2017 Yes        CKD (chronic kidney disease) stage 4, GFR 15-29 ml/min ([facility]) (Chronic) ICD-10-CM: N18.4  ICD-9-CM: 585.4  5/20/2017 Yes        CHF (congestive heart failure) ([facility]) ICD-10-CM: I50.9  ICD-9-CM: 428.0  3/28/2017 Yes    Overview Signed 4/25/2017 12:10 PM by Jose James MD     Diastolic heart failure.    Cardiologist: Dr. Darwin Messina              * (Principal)CHF exacerbation [facility]) ICD-10-CM: I50.9  ICD-9-CM: 428.0  3/27/2017 Yes        Diabetes mellitus type 2 with complications ([facility] AKX-53-OG: E11.8  ICD-9-CM: 250.90  7/28/2016 Yes Essential hypertension ICD-10-CM: I10  ICD-9-CM: 401.9  7/28/2016 Yes        Anemia ICD-10-CM: D64.9  ICD-9-CM: 285.9  7/28/2016 Yes            [address] Medicine Residency Attending Addendum:  I saw and evaluated the patient, performing the key elements of the service. I discussed the findings, assessment and plan with the resident and agree with the resident's findings and plan as documented in the resident's note. The patient was seen on 5/21/17    Vitals:    05/23/17 0557 05/23/17 0700 05/23/17 0942 05/23/17 0946   BP: 166/77  157/76    Pulse: 83 81 80    Resp: 19  18    Temp: 98.4 °F (36.9 °C)  98.5 °F (36.9 °C)    SpO2: 91%  92%    Weight:    170 lb 6.7 oz (77.3 kg)   Height:         nad      Assessment/Plan:   chf exarcebation  Continue diuretics  Appreciate cardiology help    Hospital Problems  Date Reviewed: 3/31/2017          Codes Class Noted POA    PATRICIA (acute kidney injury) ([facility] ICD-10-CM: N17.9  ICD-9-CM: 584.9  5/20/2017 Yes        CKD (chronic kidney disease) stage 4, GFR 15-29 ml/min (HCC) (Chronic) ICD-10-CM: N18.4  ICD-9-CM: 585.4  5/20/2017 Yes        CHF (congestive heart failure) ([facility] ICD-10-CM: I50.9  ICD-9-CM: 428.0  3/28/2017 Yes    Overview Signed 4/25/2017 12:10 PM by Leonard Angel MD     Diastolic heart failure.    Cardiologist: Dr. Mariya Crisostomo              * (Principal)CHF exacerbation [facility]) ICD-10-CM: I50.9  ICD-9-CM: 428.0  3/27/2017 Yes        Diabetes mellitus type 2 with complications ([facility] PRETTY-39-MR: E11.8  ICD-9-CM: 250.90  7/28/2016 Yes        Essential hypertension ICD-10-CM: I10  ICD-9-CM: 401.9  7/28/2016 Yes        Anemia ICD-10-CM: D64.9  ICD-9-CM: 285.9  7/28/2016 Yes

## 2017-05-21 NOTE — PROGRESS NOTES
Shift Summary     1900  Bedside and Verbal shift change report given to [address] [place] [address] Pkwmumtaz (oncoming nurse) by Abraham Parham RN (offgoing nurse). Report included the following information SBAR, Kardex, MAR, Recent Results and Cardiac Rhythm NSR. Patient up ad luther to restroom. Introduced self and allowed time for questions. Discussed plan for tonight with patient. Chest xray tomorrow morning and lab work to be done. 0500  Patient BP elevated. 0600 dose of labetalol given. BP slightly down.     0700  Bedside and Verbal shift change report given to Abraham Parham rn (oncoming nurse) by Gwendolyn Sherman rn (offgoing nurse). Report included the following information SBAR, Kardex, MAR, Recent Results and Cardiac Rhythm NSR.

## 2017-05-21 NOTE — PROGRESS NOTES
Fred Wesley MD [facility] [phone]  Office [phone]  Mobile [phone]            NAME:  Isidro Walker   :   1955   MRN:   [id number]     Assessment/Plan:   · ADHF, diastolic dysfunction with bilateral pleural effusions - good diuresis  · Chronic diastolic HF in the setting of HTN, advanced CKD, anemia  · Stage 4 CKD  · Poorly controlled HTN - better with diuresis  · Chronic anemia       Echo with diastolic dysfunction/restrictive pattern/LVH  Continue iv diuretic  CXR tomorrow  Subjective:   Cardiac ROS: improved dyspnea and edema. BP down. Patient denies any exertional chest pain, , palpitations, syncope, orthopnea, or paroxysmal nocturnal dyspnea. Review of Systems: No nausea, indigestion, vomiting, pain, cough, sputum. No bleeding. Taking po. Appetite good      Objective:     Visit Vitals    /69 (BP 1 Location: Right arm, BP Patient Position: At rest;Supine)    Pulse 75    Temp 98 °F (36.7 °C)    Resp 24    Ht 5' 1\" (1.549 m)    Wt 172 lb 14.4 oz (78.4 kg)    SpO2 97%    BMI 32.67 kg/m2    O2 Flow Rate (L/min): 2 l/min O2 Device: Nasal cannula    Temp (24hrs), Av.2 °F (36.8 °C), Min:97.9 °F (36.6 °C), Max:98.5 °F (36.9 °C)       07 - 1900  In: -   Out: 800 [Urine:800]    1901 -  0700  In: 065 [P.O.:830]  Out: 9478 [Urine:3080]    TELE: sinus    General: AAOx3 cooperative, no acute distress. HEENT: Atraumatic. Pink and moist.  Anicteric sclerae. Neck : Supple, no thyromegaly. Lungs: decreased BS at bases. Fine rales  Heart: Regular rhythm, no murmur, no rubs, no gallops. + JVD. No carotid bruits. Abdomen: Soft, non-distended, non-tender. + Bowel sounds. No bruits. Extremities: 1+ LE edema, no clubbing, no cyanosis. No calf tenderness  Neurologic: Grossly intact. Alert and oriented X 3. No acute neurological distress. Psych: Good insight. Not anxious nor agitated.     Additional comments: None    Care Plan discussed with:    Comments   Patient x    Family      RN     Care Manager                    Consultant:          Data Review:     EKG    Echo  EF 60%, LVH, restrictive filling pattern    No results for input(s): TNIPOC in the last 72 hours. No lab exists for component: ITNL   Recent Labs      05/20/17 0243 05/19/17 2032   CKMB   --   3.5   TROIQ  <0.04  <0.04     Recent Labs      05/21/17   0425  05/20/17   1045  05/20/17   0243  05/19/17 2032   NA  139   --   138  139   K  4.6   --   4.6  4.9   CL  108   --   108  108   CO2  23   --   20*  21   BUN  39*   --   41*  40*   CREA  2.81*   --   2.94*  3.02*   GLU  95   --   100  130*   MG   --    --    --   1.8   ALB   --    --    --   3.2*   WBC  6.0   --   5.5  5.4   HGB  7.3*  7.2*  7.0*  7.5*   HCT  23.2*  23.4*  21.8*  24.5*   PLT  175   --   193  184     No results for input(s): INR, PTP, APTT in the last 72 hours.     No lab exists for component: INREXT    Medications reviewed  Current Facility-Administered Medications   Medication Dose Route Frequency    albuterol-ipratropium (DUO-NEB) 2.5 MG-0.5 MG/3 ML  3 mL Nebulization Q4H PRN    fluticasone (FLONASE) 50 mcg/actuation nasal spray 2 Spray  2 Spray Both Nostrils DAILY PRN    diphenhydrAMINE (BENADRYL) capsule 50 mg  50 mg Oral QHS PRN    aspirin delayed-release tablet 162 mg  162 mg Oral DAILY    dorzolamide-timolol (COSOPT) 22.3-6.8 mg/mL ophthalmic solution 1 Drop  1 Drop Both Eyes BID    doxazosin (CARDURA) tablet 2 mg  2 mg Oral DAILY    labetalol (NORMODYNE) tablet 200 mg  200 mg Oral Q8H    losartan (COZAAR) tablet 100 mg  100 mg Oral DAILY    sodium chloride (NS) flush 5-10 mL  5-10 mL IntraVENous Q8H    sodium chloride (NS) flush 5-10 mL  5-10 mL IntraVENous PRN    amLODIPine (NORVASC) tablet 10 mg  10 mg Oral DAILY    hydrALAZINE (APRESOLINE) 20 mg/mL injection 10 mg  10 mg IntraVENous Q6H PRN    furosemide (LASIX) injection 40 mg  40 mg IntraVENous BID    acetaminophen (TYLENOL) tablet 1,000 mg  1,000 mg Oral QHS PRN         Monika Pappas MD

## 2017-05-21 NOTE — PROGRESS NOTES
1905  Bedside and Verbal shift change report given to [address] (oncoming nurse) by [facility] RN (offgoing nurse). Report included the following information SBAR, Kardex, Intake/Output, MAR, Recent Results and Cardiac Rhythm NSR.

## 2017-05-22 ENCOUNTER — APPOINTMENT (OUTPATIENT)
Dept: GENERAL RADIOLOGY | Age: 62
DRG: 291 | End: 2017-05-22
Attending: SPECIALIST
Payer: COMMERCIAL

## 2017-05-22 LAB
ANION GAP BLD CALC-SCNC: 8 MMOL/L (ref 5–15)
BUN SERPL-MCNC: 39 MG/DL (ref 6–20)
BUN/CREAT SERPL: 13 (ref 12–20)
CALCIUM SERPL-MCNC: 8 MG/DL (ref 8.5–10.1)
CHLORIDE SERPL-SCNC: 107 MMOL/L (ref 97–108)
CO2 SERPL-SCNC: 23 MMOL/L (ref 21–32)
CREAT SERPL-MCNC: 3.02 MG/DL (ref 0.55–1.02)
ERYTHROCYTE [DISTWIDTH] IN BLOOD BY AUTOMATED COUNT: 15.7 % (ref 11.5–14.5)
GLUCOSE SERPL-MCNC: 98 MG/DL (ref 65–100)
HCT VFR BLD AUTO: 23.5 % (ref 35–47)
HGB BLD-MCNC: 7.4 G/DL (ref 11.5–16)
MCH RBC QN AUTO: 26.5 PG (ref 26–34)
MCHC RBC AUTO-ENTMCNC: 31.5 G/DL (ref 30–36.5)
MCV RBC AUTO: 84.2 FL (ref 80–99)
PLATELET # BLD AUTO: 188 K/UL (ref 150–400)
POTASSIUM SERPL-SCNC: 4.6 MMOL/L (ref 3.5–5.1)
RBC # BLD AUTO: 2.79 M/UL (ref 3.8–5.2)
SODIUM SERPL-SCNC: 138 MMOL/L (ref 136–145)
WBC # BLD AUTO: 5.6 K/UL (ref 3.6–11)

## 2017-05-22 PROCEDURE — 97161 PT EVAL LOW COMPLEX 20 MIN: CPT

## 2017-05-22 PROCEDURE — 71020 XR CHEST PA LAT: CPT

## 2017-05-22 PROCEDURE — 97165 OT EVAL LOW COMPLEX 30 MIN: CPT

## 2017-05-22 PROCEDURE — 74011250637 HC RX REV CODE- 250/637: Performed by: FAMILY MEDICINE

## 2017-05-22 PROCEDURE — 65660000000 HC RM CCU STEPDOWN

## 2017-05-22 PROCEDURE — 94761 N-INVAS EAR/PLS OXIMETRY MLT: CPT

## 2017-05-22 PROCEDURE — 85027 COMPLETE CBC AUTOMATED: CPT

## 2017-05-22 PROCEDURE — 74011250636 HC RX REV CODE- 250/636: Performed by: FAMILY MEDICINE

## 2017-05-22 PROCEDURE — 80048 BASIC METABOLIC PNL TOTAL CA: CPT

## 2017-05-22 PROCEDURE — 36415 COLL VENOUS BLD VENIPUNCTURE: CPT

## 2017-05-22 RX ORDER — LANOLIN ALCOHOL/MO/W.PET/CERES
1 CREAM (GRAM) TOPICAL
Status: DISCONTINUED | OUTPATIENT
Start: 2017-05-22 | End: 2017-05-23 | Stop reason: HOSPADM

## 2017-05-22 RX ORDER — FUROSEMIDE 40 MG/1
40 TABLET ORAL 2 TIMES DAILY
Qty: 80 TAB | Refills: 0 | Status: SHIPPED | OUTPATIENT
Start: 2017-05-22 | End: 2017-06-12 | Stop reason: ALTCHOICE

## 2017-05-22 RX ORDER — FUROSEMIDE 40 MG/1
40 TABLET ORAL 2 TIMES DAILY
Qty: 80 TAB | Refills: 0 | Status: SHIPPED | OUTPATIENT
Start: 2017-05-22 | End: 2017-05-22

## 2017-05-22 RX ORDER — IPRATROPIUM BROMIDE AND ALBUTEROL SULFATE 2.5; .5 MG/3ML; MG/3ML
3 SOLUTION RESPIRATORY (INHALATION)
Qty: 30 NEBULE | Refills: 1 | Status: SHIPPED | OUTPATIENT
Start: 2017-05-22 | End: 2017-05-25 | Stop reason: SDUPTHER

## 2017-05-22 RX ORDER — LANOLIN ALCOHOL/MO/W.PET/CERES
325 CREAM (GRAM) TOPICAL
Qty: 30 TAB | Refills: 3 | Status: SHIPPED | OUTPATIENT
Start: 2017-05-22

## 2017-05-22 RX ORDER — FUROSEMIDE 40 MG/1
40 TABLET ORAL 2 TIMES DAILY
Status: DISCONTINUED | OUTPATIENT
Start: 2017-05-22 | End: 2017-05-23 | Stop reason: HOSPADM

## 2017-05-22 RX ADMIN — FUROSEMIDE 40 MG: 40 TABLET ORAL at 17:21

## 2017-05-22 RX ADMIN — DOXAZOSIN 2 MG: 2 TABLET ORAL at 08:30

## 2017-05-22 RX ADMIN — Medication 10 ML: at 21:03

## 2017-05-22 RX ADMIN — Medication 5 ML: at 14:00

## 2017-05-22 RX ADMIN — AMLODIPINE BESYLATE 10 MG: 5 TABLET ORAL at 08:30

## 2017-05-22 RX ADMIN — LABETALOL HCL 200 MG: 200 TABLET, FILM COATED ORAL at 13:19

## 2017-05-22 RX ADMIN — FERROUS SULFATE TAB 325 MG (65 MG ELEMENTAL FE) 325 MG: 325 (65 FE) TAB at 09:33

## 2017-05-22 RX ADMIN — FUROSEMIDE 40 MG: 10 INJECTION, SOLUTION INTRAMUSCULAR; INTRAVENOUS at 08:31

## 2017-05-22 RX ADMIN — DORZOLAMIDE HYDROCHLORIDE AND TIMOLOL MALEATE 1 DROP: 20; 5 SOLUTION/ DROPS OPHTHALMIC at 09:33

## 2017-05-22 RX ADMIN — ASPIRIN 162 MG: 81 TABLET, COATED ORAL at 08:30

## 2017-05-22 RX ADMIN — LABETALOL HCL 200 MG: 200 TABLET, FILM COATED ORAL at 05:25

## 2017-05-22 RX ADMIN — DORZOLAMIDE HYDROCHLORIDE AND TIMOLOL MALEATE 1 DROP: 20; 5 SOLUTION/ DROPS OPHTHALMIC at 17:21

## 2017-05-22 RX ADMIN — LABETALOL HCL 200 MG: 200 TABLET, FILM COATED ORAL at 08:08

## 2017-05-22 RX ADMIN — Medication 10 ML: at 05:26

## 2017-05-22 RX ADMIN — LOSARTAN POTASSIUM 100 MG: 50 TABLET, FILM COATED ORAL at 08:30

## 2017-05-22 NOTE — CDMP QUERY
2. Patient is noted to have a BMI of 32.78 kg. Please clarify if this patient is:     =>Obese (BMI: 30-39.9)  =>Overweight (BMI: 25-29.9)  =>Other explanation of clinical findings  =>Unable to determine (no explanation for clinical findings)    Presentation: 5'1\" 173 lbs = BMI 32.78 kg    REFERENCE:  The [address] [place] [address] has issued a statement indicating that, \"Individuals who are overweight, obese, or morbidly obese are at an increased risk for certain medical conditions when compared to persons of normal weight. Therefore, these conditions are always clinically significant and reportable when documented by the provider. Please clarify and document your clinical opinion in the progress notes and discharge summary, including the definitive and or presumptive diagnosis, (suspected or probable), related to the above clinical findings. Please include clinical findings supporting your diagnosis.     Thank you,  Nargis Gan, MSN, [facility] [place] [address]

## 2017-05-22 NOTE — TELEPHONE ENCOUNTER
Message forwarded from call center      Pt request refill of Aspirin 81 mg at [facility]  In [place] on file. Pt contact number is [phone].

## 2017-05-22 NOTE — CDMP QUERY
Documentation of Acute Kidney Injury on CKD 4\" is noted in the progress notes. Currently the patient does not meet RIFLE criteria (BSV approved) to support this diagnosis. If you are using another criteria to support this diagnosis, please document this in your progress note. Otherwise, please document in the progress notes the clinical indicators that support this diagnosis or state that the diagnosis has been ruled out.     Current documentation: Cardiology consult states CKD 4;   5/19 CR 3.02 GFR 16  5/20 CR 2.94 GFR 16  5/21 CR 2.81 GFR 17   Baseline renal function from 3/2017 CR 2.64-2.81 with GFR 17/18    RIFLE  (BSV Approved)    RISK:  Increased SCr x 1.5 or GFR decrease > 25% (within 7 days)    INJURY:  Increased SCr x 2.0 or GFR decreased > 50%    FAILURE:  Increased SCr x 3.0 or GFR decrease > 75% or SCr >4.0 mg/dL or acute increase >0.5 mg/dL    LOSS:  Persistent acute renal failure = complete loss of kidney function > 4 weeks    END STAGE:  End stage of kidney disease > 3 months      AKIN    STAGE  1:  Increase in SCr >/= 0.3 mg/dL or >/= 150% to 200% (1.5 to 2-fold) from baseline (within 48 hours)    STAGE  2:  Increase in SCr to more than 200% to 300% (>2-3 fold) from baseline    STAGE  3:  Increase in SCr to more than 300% (>3-fold) from baseline or SCr >/= 4.0 mg/dL with an acute increase of at least 0.5 mg/dL or initiation of renal replacement therapy      KDIGO    STAGE  1:  Increase in SCr by >/= 0.3 mg/dL within 48 hours or increase in SCr 1.5 to 1.9 times baseline which is known or presumed to have occurred within the prior 7 days    STAGE  2:  Increase in SCr to 2.0 to 2.9 times baseline    STAGE  3:  Increase in SCr to 3.0 times baseline or increase in SCr to >/= baseline or increase in SCr to >/= 4.0 mg/dL or initiation of renal replacement therapy      Please clarify and document your clinical opinion in the progress notes and discharge summary including the definitive and/or presumptive diagnosis, (suspected or probable), related to the above clinical findings. Please include clinical findings supporting your diagnosis.     Thank you,  Shyanne Garcia MSN, [address] [place] [address]

## 2017-05-22 NOTE — NURSE NAVIGATOR
Chart reviewed by Heart Failure Nurse Navigator. Heart Failure database completed. Most recent Echo shows  EF 55 to 60% with no regional wall motion abnormalities, wall thickness is mildly to moderately increased with grade 3 diastolic dysfunction. ACEi/ARB: losartan 100 mg daily   BB: labetalol 200 mg every 8 hours. CRT not indicated. NYHA Functional Class III symptoms with increasing MANZO and orthopnea. Heart Failure Teach Back in Patient Education. Heart Failure Avoiding Triggers on Discharge Instructions.   Usual cardiologist: Sari Avalos

## 2017-05-22 NOTE — PROGRESS NOTES
physical Therapy EVALUATION/DISCHARGE  Patient: Radha Plasencia (60 y.o. female)  Date: 5/22/2017  Primary Diagnosis: CHF exacerbation  CHF exacerbation ([facility] [address]        Precautions: fall     ASSESSMENT :  Based on the objective data described below, the patient presents with shortness of breath following admission for CHF exacerbation. Patient currently received up ad luther in room, on room air. Vitals stable. She was agreeable to PT. Prior to admission she lives with her  in a 1 story home with 4 steps to enter, she does not require any assistive device. She was Independent for all mobility, no loss of balance or instability with upright activity. She is currently at her baseline for mobility, she will not require any additional PT while in the hospital or at discharge. Documentation for home O2:     ROOM AIR    AT REST   O2 SATS  97% HR  75 bpm   ROOM AIR WITH ACTIVITY 02 SATS  95% HR  77 bpm       ) LITERS OF O2 WITH ACTIVITY O2 SATS   HR     (0    )LITERS OF 02 PATIENT LEFT COMFORTABLY  SITTING/SUPINE 02 SATS  96% HR  77 bpm           Skilled physical therapy is not indicated at this time.      PLAN :  Discharge Recommendations: None  Further Equipment Recommendations for Discharge: none     SUBJECTIVE:   Patient stated .    OBJECTIVE DATA SUMMARY:   HISTORY:    Past Medical History:   Diagnosis Date    Anemia     Arthritis     Calculus of kidney     [facility]est[facility] failure ([facility] [address]     Diabetes ([facility] [address]     Hematuria      Past Surgical History:   Procedure Laterality Date    COLONOSCOPY N/A 3/13/2017    COLONOSCOPY performed by Temitope Grover MD at [facility]      left carpal tunnel    HX TUBAL LIGATION Bilateral      Prior Level of Function/Home Situation: see above  Personal factors and/or comorbidities impacting plan of care:     Home Situation  Home Environment: Private residence  # Steps to Enter: 4  Rails to Enter: Yes  One/Two Story Residence: One story  Living Alone: No  Support Systems: Family member(s), Spouse/Significant Other/Partner  Patient Expects to be Discharged to[de-identified] Private residence  Current DME Used/Available at Home: Evertt Blow, straight  Tub or Shower Type: Shower    EXAMINATION/PRESENTATION/DECISION MAKING:   Critical Behavior:  Neurologic State: Alert  Orientation Level: Oriented X4  Cognition: Appropriate decision making, Appropriate for age attention/concentration, Appropriate safety awareness     Hearing: Auditory  Auditory Impairment: None  Skin:  All exposed intact  Edema: none noted  Range Of Motion:  AROM: Within functional limits           PROM: Within functional limits           Strength:    Strength: Within functional limits                    Tone & Sensation:   Tone: Normal                              Coordination:  Coordination: Within functional limits  Vision:   Tracking: Able to track stimulus in all quadrants w/o difficulty  Diplopia: No  Acuity: Within Defined Limits  Corrective Lenses: Glasses  Functional Mobility:  Bed Mobility:  Rolling: Independent  Supine to Sit: Independent     Scooting: Independent  Transfers:  Sit to Stand: Independent  Stand to Sit: Independent                       Balance:   Sitting: Intact  Standing: Intact  Ambulation/Gait Training:  Distance (ft): 250 Feet (ft)  Assistive Device: Gait belt  Ambulation - Level of Assistance: Independent     Gait Description (WDL): Exceptions to WDL                                              Stairs:                Therapeutic Exercises:       Functional Measure:  Tinetti test:    Sitting Balance: 1  Arises: 2  Attempts to Rise: 2  Immediate Standing Balance: 2  Standing Balance: 2  Nudged: 2  Eyes Closed: 1  Turn 360 Degrees - Continuous/Discontinuous: 1  Turn 360 Degrees - Steady/Unsteady: 1  Sitting Down: 2  Balance Score: 16  Indication of Gait: 1  R Step Length/Height: 1  L Step Length/Height: 1  R Foot Clearance: 1  L Foot Clearance: 1  Step Symmetry: 1  Step Continuity: 1  Path: 2  Trunk: 2  Walking Time: 1  Gait Score: 12  Total Score: 28       Tinetti Test and G-code impairment scale:  Percentage of Impairment CH    0%   CI    1-19% CJ    20-39% CK    40-59% CL    60-79% CM    80-99% CN     100%   Tinetti  Score 0-28 28 23-27 17-22 12-16 6-11 1-5 0       Tinetti Tool Score Risk of Falls  <19 = High Fall Risk  19-24 = Moderate Fall Risk  25-28 = Low Fall Risk  Tinetti ME. Performance-Oriented Assessment of Mobility Problems in Elderly Patients. [address]; [id number]. (Scoring Description: PT Bulletin Feb. 10, 1993)    Older adults: Alice Baker et al, 2009; n = 1000 [facility] elderly evaluated with ABC, RIVKA, ADL, and IADL)  · Mean RIVKA score for males aged 69-68 years = 26.21(3.40)  · Mean RIVKA score for females age 69-68 years = 25.16(4.30)  · Mean RIVKA score for males over 80 years = 23.29(6.02)  · Mean RIVKA score for females over 80 years = 17.20(8.32)       G codes: In compliance with CMSs Claims Based Outcome Reporting, the following G-code set was chosen for this patient based on their primary functional limitation being treated: The outcome measure chosen to determine the severity of the functional limitation was the Tinetti with a score of 28/28 which was correlated with the impairment scale.     ? Mobility - Walking and Moving Around:     - CURRENT STATUS: CH - 0% impaired, limited or restricted    - GOAL STATUS: CH - 0% impaired, limited or restricted    - D/C STATUS:  CH - 0% impaired, limited or restricted        Physical Therapy Evaluation Charge Determination   History Examination Presentation Decision-Making   MEDIUM  Complexity : 1-2 comorbidities / personal factors will impact the outcome/ POC  LOW Complexity : 1-2 Standardized tests and measures addressing body structure, function, activity limitation and / or participation in recreation  LOW Complexity : Stable, uncomplicated  Other outcome measures Tinetti  LOW       Based on the above components, the patient evaluation is determined to be of the following complexity level: LOW     Pain:  Pain Scale 1: Numeric (0 - 10)  Pain Intensity 1: 0     Activity Tolerance:   Good- no complications with upright activity  Please refer to the flowsheet for vital signs taken during this treatment. After treatment:   [x]   Patient left in no apparent distress sitting up in chair  []   Patient left in no apparent distress in bed  [x]   Call bell left within reach  [x]   Nursing notified  [x]   Caregiver present  []   Bed alarm activated    COMMUNICATION/EDUCATION:   Communication/Collaboration:  [x]   Fall prevention education was provided and the patient/caregiver indicated understanding. [x]   Patient/family have participated as able and agree with findings and recommendations. []   Patient is unable to participate in plan of care at this time.   Findings and recommendations were discussed with: Registered Nurse    Thank you for this referral.  Leeanne Cain, PT, DPT   Time Calculation: 9 mins

## 2017-05-22 NOTE — PROGRESS NOTES
Bedside and Verbal shift change report given to [address] (oncoming nurse) by Toni Summers RN (offgoing nurse). Report included the following information SBAR, Kardex, Intake/Output, MAR, Recent Results and Cardiac Rhythm NSR.

## 2017-05-22 NOTE — ADVANCED PRACTICE NURSE
Called Dr Diana Hinton office and left msg for  to call pt to confirm date/time of appt for CHF follow up.

## 2017-05-22 NOTE — PROGRESS NOTES
Occupational Therapy EVALUATION/discharge  Patient: Rhona To (33 y.o. female)  Date: 5/22/2017  Primary Diagnosis: CHF exacerbation  CHF exacerbation ([facility] [address]        Precautions: Universal       ASSESSMENT:   Based on the objective data described below, the patient presents at baseline for ADL tasks and transfers s/p CHF exacerbation and chronic anemia. Nursing cleared for therapy. Patient pleasant and agreeable to therapy. She lives at home with her  and granddaughter and is indep/mod indep for ADL tasks without AD>  She demonstrates ability to complete ADL tasks at indep-mod indep for self care transfers, UB dressing and LB dressing and denies any concerns regarding returning home in regards to self care. Patient with good carry over of cardiac education, reporting to this therapists she would be weighing herself daily and taking her BP daily. Further skilled acute occupational therapy is not indicated at this time. Discharge Recommendations: None  Further Equipment Recommendations for Discharge: none for OT      SUBJECTIVE:   Patient stated I seem to be doing better.     OBJECTIVE DATA SUMMARY:   HISTORY:   Past Medical History:   Diagnosis Date    Anemia     Arthritis     Calculus of kidney     Congestive heart failure (HCC)     Diabetes (HCC)     Hematuria      Past Surgical History:   Procedure Laterality Date    COLONOSCOPY N/A 3/13/2017    COLONOSCOPY performed by Lashawn Gaytan MD at [facility]      left carpal tunnel    HX TUBAL LIGATION Bilateral        Prior Level of Function/Home Situation: Home with . Indep with ADL tasks.   Denies falls within last three months    Home Situation  Home Environment: Private residence  # Steps to Enter: 4  Rails to Enter: Yes  One/Two Story Residence: One story  Living Alone: No  Support Systems: Family member(s), Spouse/Significant Other/Partner  Patient Expects to be Discharged to[de-identified] Private residence  Current DME Used/Available at Home: Cane, straight  Tub or Shower Type: Shower  [x]  Right hand dominant   []  Left hand dominant    EXAMINATION OF PERFORMANCE DEFICITS:  Cognitive/Behavioral Status:  Neurologic State: Alert  Orientation Level: Oriented X4  Cognition: Appropriate decision making; Appropriate for age attention/concentration; Appropriate safety awareness       Skin: uppers visible skin intact    Edema: uppers none    Hearing:       Vision/Perceptual:    Tracking: Able to track stimulus in all quadrants w/o difficulty                 Diplopia: No    Acuity: Within Defined Limits    Corrective Lenses: Glasses    Range of Motion:  BUE WDL       Strength:  BUE: WDL    Coordination:     Fine Motor Skills-Upper: Left Intact; Right Intact    Gross Motor Skills-Upper: Left Intact; Right Intact    Tone & Sensation:  Tone: BUE normal  Sensation: BUE intact       Balance:  Sitting: Intact  Standing: Intact    Functional Mobility and Transfers for ADLs:  Bed Mobility:  Rolling: Independent  Supine to Sit: Independent  Scooting: Independent    Transfers:  Sit to Stand: Independent  Stand to Sit: Independent    ADL Assessment:  Feeding: Independent  Oral Facial Hygiene/Grooming: Independent  Bathing: Setup  Upper Body Dressing: Independent  Lower Body Dressing: Modified independent; Additional time  Toileting: Independent                ADL Intervention and task modifications:  Feeding  Feeding Assistance: Independent       Lower Body Dressing Assistance  Socks: Modified independent    Functional Measure:  Barthel Index:    Bathin  Bladder: 10  Bowels: 10  Groomin  Dressing: 10  Feeding: 10  Mobility: 10  Stairs: 10  Toilet Use: 10  Transfer (Bed to Chair and Back): 15  Total: 95       Barthel and G-code impairment scale:  Percentage of impairment CH  0% CI  1-19% CJ  20-39% CK  40-59% CL  60-79% CM  80-99% CN  100%   Barthel Score 0-100 100 99-80 79-60 59-40 20-39 1-19   0   Barthel Score 0-20 20 17-19 13-16 9-12 5-8 1-4 0      The Barthel ADL Index: Guidelines  1. The index should be used as a record of what a patient does, not as a record of what a patient could do. 2. The main aim is to establish degree of independence from any help, physical or verbal, however minor and for whatever reason. 3. The need for supervision renders the patient not independent. 4. A patient's performance should be established using the best available evidence. Asking the patient, friends/relatives and nurses are the usual sources, but direct observation and common sense are also important. However direct testing is not needed. 5. Usually the patient's performance over the preceding 24-48 hours is important, but occasionally longer periods will be relevant. 6. Middle categories imply that the patient supplies over 50 per cent of the effort. 7. Use of aids to be independent is allowed. Simba Moreno., Barthel, DJA. (1840). Functional evaluation: the Barthel Index. [facility] (14)2. JESICA Valentin, Ian Davison., Caity Avilaer., [place], [address] [place] [address] (1999). Measuring the change indisability after inpatient rehabilitation; comparison of the responsiveness of the Barthel Index and Functional [place] Measure. Journal of Neurology, Neurosurgery, and Psychiatry, 66(4), 760-912. Starr Randolph, N.J.A, JEO Blackmon, & Velasquez Bender M.A. (2004.) Assessment of post-stroke quality of life in cost-effectiveness studies: The usefulness of the Barthel Index and the EuroQoL-5D. Quality of Life Research, 13, 210-90         G codes: In compliance with CMSs Claims Based Outcome Reporting, the following G-code set was chosen for this patient based on their primary functional limitation being treated: The outcome measure chosen to determine the severity of the functional limitation was the Barthel Index with a score of 95/100 which was correlated with the impairment scale. ?  Self Care:     - CURRENT STATUS: CI - 1%-19% impaired, limited or restricted    - GOAL STATUS: CI - 1%-19% impaired, limited or restricted    - D/C STATUS:  CI - 1%-19% impaired, limited or restricted     Occupational Therapy Evaluation Charge Determination   History Examination Decision-Making   LOW Complexity : Brief history review  LOW Complexity : 1-3 performance deficits relating to physical, cognitive , or psychosocial skils that result in activity limitations and / or participation restrictions  MEDIUM Complexity : Patient may present with comorbidities that affect occupational performnce. Miniml to moderate modification of tasks or assistance (eg, physical or verbal ) with assesment(s) is necessary to enable patient to complete evaluation       Based on the above components, the patient evaluation is determined to be of the following complexity level: LOW   Pain:  Pain Scale 1: Numeric (0 - 10)  Pain Intensity 1: 0      Activity Tolerance:   Good for light ADL tasks in room. Please refer to the flowsheet for vital signs taken during this treatment. After treatment:   [x]  Patient left in no apparent distress sitting up in chair  []  Patient left in no apparent distress in bed  [x]  Call bell left within reach  [x]  Nursing notified  []  Caregiver present  []  Bed alarm activated    COMMUNICATION/EDUCATION:   Communication/Collaboration:  [x]      Home safety education was provided and the patient/caregiver indicated understanding. [x]      Patient/family have participated as able and agree with findings and recommendations. []      Patient is unable to participate in plan of care at this time.   Findings and recommendations were discussed with: Registered Nurse and Patient    Matilde Turcios OT R/L  Time Calculation: 16 mins

## 2017-05-22 NOTE — PROGRESS NOTES
[facility]   Daily Progress Note    Date: 5/22/2017    Assessment/Plan:   Jarrett Esqueda is a 62 y.o. female with hx of HFpEF, uncontrolled HTN, CKD4, hx GIB, who is admitted for CHF exacerbation. Hospital Day: 4     Overnight Events: No acute events. SOB improved with neb treatment. Restarted on O2 for SOB this AM.      HFpEF in acute exacerbation. Possibly 2/2 requirement of higher lasix dosage at home. SOB improved, on RA overnight.   - Lasix 40mg IV BID  - f/u CXR today. If CXR PA/Lat improved, will transition to PO Lasix today   - Daily weights. Strict I&Os. Low sodium diet  - Duonebs Q4H PRN for wheezing.   - 6MW to assess for home O2 needs     Hypertension: still hypertensive, but improved. 140s-170/60s-70s o/n; last /63 this AM  - Continuing home medications of amlodipine 10mg daily, cardura 2mg daily, labetalol 200mg q8h, losartan 100mg daily. - if BP remains uncontrolled, can consider starting Clonidine or spironolactone   - Hydralazine PRN for SBP >180 or DBP >110.     Risk of PATRICIA on CKD4. Cr stable at 3.02 this AM. baseline Cr 2.6 - 3.0.    - Monitor with BMP      Chronic anemia. Hb stable, 7.4 this AM. FOBT negative. Receives Epo infusions. - Type and screen and 2 units PRBCs ordered ahead   - daily CBC      Allergic rhinitis. - Flonase daily     T2DM - diet controlled     FEN/GI - Cardiac diet. Activity - Ambulate as tolerated  DVT prophylaxis - SCDs  GI prophylaxis - Not indicated at this time  Fall prophylaxis - Not indicated at this time. Disposition - Admit to Telemetry. Plan to d/c to Home. Code Status - Full     Patient Jarrett Esqueda will be discussed , attending physician     Luis Fernando Brown MD  Family Medicine Resident         Subjective  No acute events overnight. Patient denies chills, headaches, chest pain, palpitations, abdominal pain, nausea and vomiting. SOB remains, but is improved from on admission.  On RA yesterday afternoon and overnight; restarted on O2 this AM for SOB. BLE edema present, but improved from on admission. Endorsed Wheezing this AM      Inpatient Medications  Current Facility-Administered Medications   Medication Dose Route Frequency    albuterol-ipratropium (DUO-NEB) 2.5 MG-0.5 MG/3 ML  3 mL Nebulization Q4H PRN    fluticasone (FLONASE) 50 mcg/actuation nasal spray 2 Spray  2 Spray Both Nostrils DAILY PRN    diphenhydrAMINE (BENADRYL) capsule 50 mg  50 mg Oral QHS PRN    aspirin delayed-release tablet 162 mg  162 mg Oral DAILY    dorzolamide-timolol (COSOPT) 22.3-6.8 mg/mL ophthalmic solution 1 Drop  1 Drop Both Eyes BID    doxazosin (CARDURA) tablet 2 mg  2 mg Oral DAILY    labetalol (NORMODYNE) tablet 200 mg  200 mg Oral Q8H    losartan (COZAAR) tablet 100 mg  100 mg Oral DAILY    sodium chloride (NS) flush 5-10 mL  5-10 mL IntraVENous Q8H    sodium chloride (NS) flush 5-10 mL  5-10 mL IntraVENous PRN    amLODIPine (NORVASC) tablet 10 mg  10 mg Oral DAILY    hydrALAZINE (APRESOLINE) 20 mg/mL injection 10 mg  10 mg IntraVENous Q6H PRN    furosemide (LASIX) injection 40 mg  40 mg IntraVENous BID    acetaminophen (TYLENOL) tablet 1,000 mg  1,000 mg Oral QHS PRN         Allergies  Allergies   Allergen Reactions    Ciprofloxacin Angioedema         Objective  Vitals:  Patient Vitals for the past 8 hrs:   Temp Pulse Resp BP SpO2   05/22/17 0827 98.2 °F (36.8 °C) 84 22 161/63 94 %   05/22/17 0700 - 80 - - -   05/22/17 0630 - 80 20 168/72 -   05/22/17 0438 98.1 °F (36.7 °C) 88 18 170/71 95 %         I/O:    Intake/Output Summary (Last 24 hours) at 05/22/17 0914  Last data filed at 05/22/17 0630   Gross per 24 hour   Intake              470 ml   Output             2350 ml   Net            -1880 ml     Last shift:       Last 3 shifts:    05/20 1901 - 05/22 0700  In: 820 [P.O.:820]  Out: 4150 [Urine:4150]    Physical Exam:  General Oriented to person, place, and time and well-developed.     HENT Head Normocephalic and atraumatic. Eyes Conjunctivae are normal, no discharge. Nose Nose normal, clear turbinates. Oral Oropharynx is clear and moist.     Neck Supple   Cardio Normal rate, regular rhythm. Exam reveals no gallop and no friction rub. No murmur heard. No chest wall tenderness. Pulmonary Good air movement b/l. Scattered expiratory wheezes. Abdominal Soft. Bowel sounds normal. No distension. No tenderness.  Deferred. Extremities 1+ pitting edema in b/l LE    Neurological Alert and oriented to person, place, and time. Dermatology Skin is warm and dry.     Psychiatric Affect and judgment normal.       Laboratory Data  Recent Results (from the past 12 hour(s))   CBC W/O DIFF    Collection Time: 05/22/17  4:38 AM   Result Value Ref Range    WBC 5.6 3.6 - 11.0 K/uL    RBC 2.79 (L) 3.80 - 5.20 M/uL    HGB 7.4 (L) 11.5 - 16.0 g/dL    HCT 23.5 (L) 35.0 - 47.0 %    MCV 84.2 80.0 - 99.0 FL    MCH 26.5 26.0 - 34.0 PG    MCHC 31.5 30.0 - 36.5 g/dL    RDW 15.7 (H) 11.5 - 14.5 %    PLATELET 624 247 - 723 K/uL   METABOLIC PANEL, BASIC    Collection Time: 05/22/17  4:38 AM   Result Value Ref Range    Sodium 138 136 - 145 mmol/L    Potassium 4.6 3.5 - 5.1 mmol/L    Chloride 107 97 - 108 mmol/L    CO2 23 21 - 32 mmol/L    Anion gap 8 5 - 15 mmol/L    Glucose 98 65 - 100 mg/dL    BUN 39 (H) 6 - 20 MG/DL    Creatinine 3.02 (H) 0.55 - 1.02 MG/DL    BUN/Creatinine ratio 13 12 - 20      GFR est AA 19 (L) >60 ml/min/1.73m2    GFR est non-AA 16 (L) >60 ml/min/1.73m2    Calcium 8.0 (L) 8.5 - 10.1 MG/DL       Imaging      Hospital Problems:  Hospital Problems  Date Reviewed: 3/31/2017          Codes Class Noted POA    [facility]ICIA (acute kidney [facility]) [facility] ICD-10-CM: N17.9  ICD-9-CM: 584.9  5/20/2017 Yes        CKD (chronic kidney disease) stage 4, GFR 15-29 ml/min (HCC) (Chronic) ICD-10-CM: N18.4  ICD-9-CM: 585.4  5/20/2017 Yes        CHF (congestive heart failure) (HCC) ICD-10-CM: I50.9  ICD-9-CM: 428.0  3/28/2017 Yes    Overview Signed 4/25/2017 12:10 PM by Cherelle Avina MD     Diastolic heart failure.    Cardiologist: Dr. Merrick Young              * (Principal)CHF exacerbation [facility]) ICD-10-CM: I50.9  ICD-9-CM: 428.0  3/27/2017 Yes        Diabetes mellitus type 2 with complications ([facility] [id number]-[id number]: E11.8  ICD-9-CM: 250.90  7/28/2016 Yes        Essential hypertension ICD-10-CM: I10  ICD-9-CM: 401.9  7/28/2016 Yes        Anemia ICD-10-CM: D64.9  ICD-9-CM: 285.9  7/28/2016 Yes

## 2017-05-22 NOTE — PROGRESS NOTES
5- CASE MANAGEMENT NOTE:  I met with the pt to determine potential discharge needs. The pt lives with her , Gaston Stanford ([phone]), and 21year old grand-daughter in a mobile home with 4 entry steps. She is independent with her ADL', has no DME but depends on others for transportation. An order was received for a nebulizer which I sent to [facility] thru [employer]. They accepted the referral and I gave the pt a nebulizer from the DME closet. Her PCP is Dr. Garfield Villar in [facility]. She has prescription drug coverage and gets her medications from [employer] in [facility] ([place], [place]). She is not anticipating any further discharge needs. Care Management Interventions  PCP Verified by CM: Yes (Dr. Garfield Villar)  Transition of Care Consult (CM Consult): Other (Nebulizer)  Discharge Durable Medical Equipment: Yes (Nebulizer from [facility])  Physical Therapy Consult: Yes  Occupational Therapy Consult: Yes  Current Support Network: Lives with Spouse, Own Home  Confirm Follow Up Transport: Family  Plan discussed with Pt/Family/Caregiver: Yes  Freedom of Choice Offered: Yes  Discharge Location  Discharge Placement:  (Home with  and grand-daughter)    [address] [facility], [place], [place]    .

## 2017-05-22 NOTE — PROGRESS NOTES
Received order to perform 6 minute walk to assess for need of home oxygen. The following are the results:  Prior to walk:  Saturation = 97% (room air), heart rate = 77  2 minutes:  Saturation = 95% (room air), heart rate = 80  4 minutes: Saturation = 93% (room air), heart rate = 80  6 minutes: Saturation = 93% (room air), heart rate = 80. Patient returned to room without difficulty. No SOB or acute respiratory distress noted.

## 2017-05-22 NOTE — PROGRESS NOTES
Cardiology Progress Note       [facility]  NAME:  Carrie Howe   :   1955   MRN:   [id number]     Assessment/Plan:   1. ADHF:  diastolic dysfunction with bilateral pleural effusions - good diuresis. Chest xray today. Consider change to po loops based on xray. Will likely need higher po lasix dose at discharge. Cardiac rehab to see . Daily weights, low na diet. 2.  Chronic diastolic HF in the setting of HTN, advanced CKD, anemia  3. Stage 4 CKD  4. HTN -better with diuresis Norvasc, labetalol, cozaar   5. Chronic anemia       Cardiology attending:  Pt seen and examined. CXR much improved. Good response to volume and BP with diuresis. Needs close f/u with Dr Tyler Nj to manage volume    Home anytime cardiac palm. Tim Montes MD    Subjective:   HPI:  Chronic diastolic dysfunction in the setting of advanced CKD, chronic severe HTN, anemia, last hosp 2 months ago with worsening HF, now admitted with same. Accelerated BP in ED. CXR with bilateral effusions. Troponins negative but proBNP higher than March. Notes progressive fatigue and MANZO but no chest pain. Fairly sedentary lifestyle. Does not monitor BP at home but apparently sees Dr Stevan Arias every 2 weeks? Has noted increased LE edema.           Cardiac testing  ECHO 2016: EF 45-50%, inf. Inferoseptal HK, mild MR, RVSP 44mmHg  ECHO 3/28/2017: EF 50-55%, G2DD, mild MR/TR,RVSP 44 mmHg  Cardiac evaluation in March - EF 50%, LVH. Lexiscan cardiolite normal.           Cardiac ROS: Patient denies any exertional chest pain, dyspnea, palpitations, syncope, orthopnea, edema or paroxysmal nocturnal dyspnea. Weight 173#  Diuresed 4 liters / 48 hours   Cr 3.02  K 4.6    Review of Systems: No nausea, indigestion, vomiting, pain, cough, sputum. No bleeding. Taking po. Appetite ok.            Objective:     Visit Vitals    /72    Pulse 80    Temp 98.1 °F (36.7 °C)    Resp 20    Ht 5' 1\" (1.549 m)    Wt 173 lb 8 oz (78.7 kg)    SpO2 95%    BMI 32.78 kg/m2    O2 Flow Rate (L/min): 2 l/min O2 Device: Room air    Temp (24hrs), Av °F (36.7 °C), Min:97.8 °F (36.6 °C), Max:98.2 °F (36.8 °C)            190 -  0700  In: 820 [P.O.:820]  Out: 0113 [Urine:4150]  TELE: SR     General: AAOx3 cooperative, no acute distress. HEENT: Atraumatic. Pink and moist.  Anicteric sclerae. Neck : Supple, no thyromegaly. Lungs: Exp wheezing. NP cough. Heart: Regular rhythm, no murmur, no rubs, no gallops. + JVD. No carotid bruits. Abdomen: Soft, non-distended, non-tender. + Bowel sounds. No bruits. Extremities: trace > + 1 LE  edema, no clubbing, no cyanosis. No calf tenderness  Neurologic: Grossly intact. Alert and oriented X 3. No acute neurological distress. Psych: Good insight. Not anxious or agitated. Care Plan discussed with:    Comments   Patient x    Family      RN x    Care Manager                    Consultant:          Data Review:     No lab exists for component: ITNL   Recent Labs      17   0243  05/19/17   2032   CKMB   --   3.5   TROIQ  <0.04  <0.04     Recent Labs      17   0438  17   0425  17   1045  17   0243  17   NA  138  139   --   138  139   K  4.6  4.6   --   4.6  4.9   CL  107  108   --   108  108   CO2  23  23   --   20*  21   BUN  39*  39*   --   41*  40*   CREA  3.02*  2.81*   --   2.94*  3.02*   GLU  98  95   --   100  130*   MG   --    --    --    --   1.8   ALB   --    --    --    --   3.2*   WBC  5.6  6.0   --   5.5  5.4   HGB  7.4*  7.3*  7.2*  7.0*  7.5*   HCT  23.5*  23.2*  23.4*  21.8*  24.5*   PLT  188  175   --   193  184     No results for input(s): INR, PTP, APTT in the last 72 hours.     No lab exists for component: INREXT    Medications reviewed  Current Facility-Administered Medications   Medication Dose Route Frequency    albuterol-ipratropium (DUO-NEB) 2.5 MG-0.5 MG/3 ML  3 mL Nebulization Q4H PRN    fluticasone (FLONASE) 50 mcg/actuation nasal spray 2 Spray  2 Spray Both Nostrils DAILY PRN    diphenhydrAMINE (BENADRYL) capsule 50 mg  50 mg Oral QHS PRN    aspirin delayed-release tablet 162 mg  162 mg Oral DAILY    dorzolamide-timolol (COSOPT) 22.3-6.8 mg/mL ophthalmic solution 1 Drop  1 Drop Both Eyes BID    doxazosin (CARDURA) tablet 2 mg  2 mg Oral DAILY    labetalol (NORMODYNE) tablet 200 mg  200 mg Oral Q8H    losartan (COZAAR) tablet 100 mg  100 mg Oral DAILY    sodium chloride (NS) flush 5-10 mL  5-10 mL IntraVENous Q8H    sodium chloride (NS) flush 5-10 mL  5-10 mL IntraVENous PRN    amLODIPine (NORVASC) tablet 10 mg  10 mg Oral DAILY    hydrALAZINE (APRESOLINE) 20 mg/mL injection 10 mg  10 mg IntraVENous Q6H PRN    furosemide (LASIX) injection 40 mg  40 mg IntraVENous BID    acetaminophen (TYLENOL) tablet 1,000 mg  1,000 mg Oral QHS PRN         Nicci Benz NP

## 2017-05-23 VITALS
TEMPERATURE: 98.5 F | RESPIRATION RATE: 18 BRPM | HEIGHT: 61 IN | OXYGEN SATURATION: 92 % | SYSTOLIC BLOOD PRESSURE: 157 MMHG | WEIGHT: 170.42 LBS | HEART RATE: 80 BPM | DIASTOLIC BLOOD PRESSURE: 76 MMHG | BODY MASS INDEX: 32.17 KG/M2

## 2017-05-23 LAB
ANION GAP BLD CALC-SCNC: 9 MMOL/L (ref 5–15)
BACTERIA SPEC CULT: NORMAL
BACTERIA SPEC CULT: NORMAL
BUN SERPL-MCNC: 42 MG/DL (ref 6–20)
BUN/CREAT SERPL: 14 (ref 12–20)
CALCIUM SERPL-MCNC: 8.1 MG/DL (ref 8.5–10.1)
CHLORIDE SERPL-SCNC: 108 MMOL/L (ref 97–108)
CO2 SERPL-SCNC: 22 MMOL/L (ref 21–32)
CREAT SERPL-MCNC: 3.08 MG/DL (ref 0.55–1.02)
ERYTHROCYTE [DISTWIDTH] IN BLOOD BY AUTOMATED COUNT: 15.8 % (ref 11.5–14.5)
GLUCOSE SERPL-MCNC: 98 MG/DL (ref 65–100)
HCT VFR BLD AUTO: 23.6 % (ref 35–47)
HGB BLD-MCNC: 7.5 G/DL (ref 11.5–16)
MAGNESIUM SERPL-MCNC: 1.7 MG/DL (ref 1.6–2.4)
MCH RBC QN AUTO: 26.8 PG (ref 26–34)
MCHC RBC AUTO-ENTMCNC: 31.8 G/DL (ref 30–36.5)
MCV RBC AUTO: 84.3 FL (ref 80–99)
PLATELET # BLD AUTO: 198 K/UL (ref 150–400)
POTASSIUM SERPL-SCNC: 4.6 MMOL/L (ref 3.5–5.1)
RBC # BLD AUTO: 2.8 M/UL (ref 3.8–5.2)
SERVICE CMNT-IMP: NORMAL
SODIUM SERPL-SCNC: 139 MMOL/L (ref 136–145)
WBC # BLD AUTO: 5.7 K/UL (ref 3.6–11)

## 2017-05-23 PROCEDURE — 74011250637 HC RX REV CODE- 250/637: Performed by: FAMILY MEDICINE

## 2017-05-23 PROCEDURE — 80048 BASIC METABOLIC PNL TOTAL CA: CPT | Performed by: NURSE PRACTITIONER

## 2017-05-23 PROCEDURE — 77010033678 HC OXYGEN DAILY

## 2017-05-23 PROCEDURE — 83735 ASSAY OF MAGNESIUM: CPT | Performed by: NURSE PRACTITIONER

## 2017-05-23 PROCEDURE — 36415 COLL VENOUS BLD VENIPUNCTURE: CPT | Performed by: NURSE PRACTITIONER

## 2017-05-23 PROCEDURE — 85027 COMPLETE CBC AUTOMATED: CPT | Performed by: NURSE PRACTITIONER

## 2017-05-23 RX ORDER — ASPIRIN 81 MG/1
TABLET ORAL
Qty: 60 TAB | Refills: 3 | Status: CANCELLED | OUTPATIENT
Start: 2017-05-23

## 2017-05-23 RX ORDER — IPRATROPIUM BROMIDE AND ALBUTEROL SULFATE 2.5; .5 MG/3ML; MG/3ML
3 SOLUTION RESPIRATORY (INHALATION)
Qty: 30 NEBULE | Refills: 1 | Status: SHIPPED | OUTPATIENT
Start: 2017-05-23 | End: 2018-12-04

## 2017-05-23 RX ADMIN — Medication 10 ML: at 06:11

## 2017-05-23 RX ADMIN — DOXAZOSIN 2 MG: 2 TABLET ORAL at 09:00

## 2017-05-23 RX ADMIN — FUROSEMIDE 40 MG: 40 TABLET ORAL at 09:36

## 2017-05-23 RX ADMIN — LOSARTAN POTASSIUM 100 MG: 50 TABLET, FILM COATED ORAL at 09:36

## 2017-05-23 RX ADMIN — FERROUS SULFATE TAB 325 MG (65 MG ELEMENTAL FE) 325 MG: 325 (65 FE) TAB at 09:36

## 2017-05-23 RX ADMIN — AMLODIPINE BESYLATE 10 MG: 5 TABLET ORAL at 09:36

## 2017-05-23 RX ADMIN — ASPIRIN 162 MG: 81 TABLET, COATED ORAL at 09:36

## 2017-05-23 RX ADMIN — LABETALOL HCL 200 MG: 200 TABLET, FILM COATED ORAL at 06:11

## 2017-05-23 NOTE — DISCHARGE INSTRUCTIONS
HOME DISCHARGE INSTRUCTIONS    Jacob Acosta / [id number] : 1955    Admission date: 2017 Discharge date: 2017 1:49 PM     Please bring this form with you to show your care provider at your follow-up appointment. Primary care provider:  Radha Florentino MD    Discharging provider:  Danae Campos MD  - Family Medicine Resident  Rosette Vivas MD - Family Medicine Attending      You have been admitted to the hospital with the following diagnoses:    ACUTE DIAGNOSES:  CHF exacerbation  CHF exacerbation [facility])      FOLLOW-UP CARE RECOMMENDATIONS:    Appointments  Follow-up Information     Follow up With Details Comments Price Edwards MD Go on 2017 4 PM appointment for hospital follow up  [facility]  Cardiology [place] [address]  [phone]      Radha Florentino MD Go on 2017 2 PM appointment for hospital follow up  [facility]  [phone]               Medication change:   - Lasix 40 mg 2 times/ day     Follow-up tests needed:   - BMP     Pending test results: At the time of your discharge the following test results are still pending: none  Please make sure you review these results with your outpatient follow-up provider(s). Specific symptoms to watch for: chest pain, shortness of breath, fever, chills, nausea, vomiting, diarrhea, change in mentation, falling, weakness, bleeding. DIET/what to eat:  Cardiac diet    ACTIVITY:  Activity as tolerated    Wound care: none     Equipment needed:  none    What to do if new or unexpected symptoms occur? If you experience any of the above symptoms (or should other concerns or questions arise after discharge) please call your primary care physician. Return to the emergency room if you cannot get hold of your doctor. · It is very important that you keep your follow-up appointment(s).   · Please bring discharge papers, medication list (and/or medication bottles) to your follow-up appointments for review by your outpatient provider(s). · Please check the list of medications and be sure it includes every medication (even non-prescription medications) that your provider wants you to take. · It is important that you take the medication exactly as they are prescribed. · Keep your medication in the bottles provided by the pharmacist and keep a list of the medication names, dosages, and times to be taken in your wallet. · Do not take other medications without consulting your doctor. · If you have any questions about your medications or other instructions, please talk to your nurse or care provider before you leave the hospital.     Information obtained by:     I understand that if any problems occur once I am at home I am to contact my physician. These instructions were explained to me and I had the opportunity to ask questions. I understand and acknowledge receipt of the instructions indicated above. Physician's or R.N.'s Signature                                                                  Date/Time                                                                                                                                              Patient or Representative Signature                                                          Date/Time           Avoiding Triggers With Heart Failure: Care Instructions  Your Care Instructions  Triggers are anything that make your heart failure flare up. A flare-up is also called \"sudden heart failure\" or \"acute heart failure. \" When you have a flare-up, fluid builds up in your lungs, and you have problems breathing.  You might need to go to the hospital. By watching for changes in your condition and avoiding triggers, you can prevent heart failure flare-ups. Follow-up care is a key part of your treatment and safety. Be sure to make and go to all appointments, and call your doctor if you are having problems. It's also a good idea to know your test results and keep a list of the medicines you take. How can you care for yourself at home? Watch for changes in your weight and condition  · Weigh yourself without clothing at the same time each day. Record your weight. Call your doctor if you gain 3 pounds or more in 2 to 3 days. A sudden weight gain may mean that your heart failure is getting worse. · Keep a daily record of your symptoms. Write down any changes in how you feel, such as new shortness of breath, cough, or problems eating. Also record if your ankles are more swollen than usual and if you have to urinate in the night more often. Note anything that you ate or did that could have triggered these changes. Limit sodium  Sodium causes your body to hold on to extra water. This may cause your heart failure symptoms to get worse. People get most of their sodium from processed foods. Fast food and restaurant meals also tend to be very high in sodium. · Your doctor may suggest that you limit sodium to 2,000 milligrams (mg) a day or less. That is less than 1 teaspoon of salt a day, including all the salt you eat in cooking or in packaged foods. · Read food labels on cans and food packages. They tell you how much sodium you get in one serving. Check the serving size. If you eat more than one serving, you are getting more sodium. · Be aware that sodium can come in forms other than salt, including monosodium glutamate (MSG), sodium citrate, and sodium bicarbonate (baking soda). MSG is often added to Asian food. You can sometimes ask for food without MSG or salt. · Slowly reducing salt will help you adjust to the taste. Take the salt shaker off the table. · Flavor your food with garlic, lemon juice, onion, vinegar, herbs, and spices instead of salt. Do not use soy sauce, steak sauce, onion salt, garlic salt, mustard, or ketchup on your food, unless it is labeled \"low-sodium\" or \"low-salt. \"  · Make your own salad dressings, sauces, and ketchup without adding salt. · Use fresh or frozen ingredients, instead of canned ones, whenever you can. Choose low-sodium canned goods. · Eat less processed food and food from restaurants, including fast food. Exercise as directed  Moderate, regular exercise is very good for your heart. It improves your blood flow and helps control your weight. But too much exercise can stress your heart and cause a heart failure flare-up. · Check with your doctor before you start an exercise program.  · Walking is an easy way to get exercise. Start out slowly. Gradually increase the length and pace of your walk. Swimming, riding a bike, and using a treadmill are also good forms of exercise. · When you exercise, watch for signs that your heart is working too hard. You are pushing yourself too hard if you cannot talk while you are exercising. If you become short of breath or dizzy or have chest pain, stop, sit down, and rest.  · Do not exercise when you do not feel well. Take medicines correctly  · Take your medicines exactly as prescribed. Call your doctor if you think you are having a problem with your medicine. · Make a list of all the medicines you take. Include those prescribed to you by other doctors and any over-the-counter medicines, vitamins, or supplements you take. Take this list with you when you go to any doctor. · Take your medicines at the same time every day. It may help you to post a list of all the medicines you take every day and what time of day you take them. · Make taking your medicine as simple as you can. Plan times to take your medicines when you are doing other things, such as eating a meal or getting ready for bed. This will make it easier to remember to take your medicines. · Get organized.  Use helpful tools, such as daily or weekly pill containers. When should you call for help? Call 911 if you have symptoms of sudden heart failure such as:  · You have severe trouble breathing. · You cough up pink, foamy mucus. · You have a new irregular or rapid heartbeat. Call your doctor now or seek immediate medical care if:  · You have new or increased shortness of breath. · You are dizzy or lightheaded, or you feel like you may faint. · You have sudden weight gain, such as 3 pounds or more in 2 to 3 days. · You have increased swelling in your legs, ankles, or feet. · You are suddenly so tired or weak that you cannot do your usual activities. Watch closely for changes in your health, and be sure to contact your doctor if you develop new symptoms. Where can you learn more? Go to http://milo-vania.info/. Enter P094 in the search box to learn more about \"Avoiding Triggers With Heart Failure: Care Instructions. \"  Current as of: November 15, 2016  Content Version: 11.2  © 7932-4586 [employer]. Care instructions adapted under license by [employer] (which disclaims liability or warranty for this information). If you have questions about a medical condition or this instruction, always ask your healthcare professional. [address] any warranty or liability for your use of this information. Low Sodium Diet (2,000 Milligram): Care Instructions  Your Care Instructions  Too much sodium causes your body to hold on to extra water. This can raise your blood pressure and force your heart and kidneys to work harder. In very serious cases, this could cause you to be put in the hospital. It might even be life-threatening. By limiting sodium, you will feel better and lower your risk of serious problems. The most common source of sodium is salt. People get most of the salt in their diet from canned, prepared, and packaged foods.  Fast food and restaurant meals also are very high in sodium. Your doctor will probably limit your sodium to less than 2,000 milligrams (mg) a day. This limit counts all the sodium in prepared and packaged foods and any salt you add to your food. Follow-up care is a key part of your treatment and safety. Be sure to make and go to all appointments, and call your doctor if you are having problems. It's also a good idea to know your test results and keep a list of the medicines you take. How can you care for yourself at home? Read food labels  · Read labels on cans and food packages. The labels tell you how much sodium is in each serving. Make sure that you look at the serving size. If you eat more than the serving size, you have eaten more sodium. · Food labels also tell you the Percent Daily Value for sodium. Choose products with low Percent Daily Values for sodium. · Be aware that sodium can come in forms other than salt, including monosodium glutamate (MSG), sodium citrate, and sodium bicarbonate (baking soda). MSG is often added to Asian food. When you eat out, you can sometimes ask for food without MSG or added salt. Buy low-sodium foods  · Buy foods that are labeled \"unsalted\" (no salt added), \"sodium-free\" (less than 5 mg of sodium per serving), or \"low-sodium\" (less than 140 mg of sodium per serving). Foods labeled \"reduced-sodium\" and \"light sodium\" may still have too much sodium. Be sure to read the label to see how much sodium you are getting. · Buy fresh vegetables, or frozen vegetables without added sauces. Buy low-sodium versions of canned vegetables, soups, and other canned goods. Prepare low-sodium meals  · Cut back on the amount of salt you use in cooking. This will help you adjust to the taste. Do not add salt after cooking. One teaspoon of salt has about 2,300 mg of sodium. · Take the salt shaker off the table. · Flavor your food with garlic, lemon juice, onion, vinegar, herbs, and spices.  Do not use soy sauce, lite soy sauce, steak sauce, onion salt, garlic salt, celery salt, mustard, or ketchup on your food. · Use low-sodium salad dressings, sauces, and ketchup. Or make your own salad dressings and sauces without adding salt. · Use less salt (or none) when recipes call for it. You can often use half the salt a recipe calls for without losing flavor. Other foods such as rice, pasta, and grains do not need added salt. · Rinse canned vegetables, and cook them in fresh water. This removes some--but not all--of the salt. · Avoid water that is naturally high in sodium or that has been treated with water softeners, which add sodium. Call your local water company to find out the sodium content of your water supply. If you buy bottled water, read the label and choose a sodium-free brand. Avoid high-sodium foods  · Avoid eating:  ¨ Smoked, cured, salted, and canned meat, fish, and poultry. ¨ Ham, reyes, hot dogs, and luncheon meats. ¨ Regular, hard, and processed cheese and regular peanut butter. ¨ Crackers with salted tops, and other salted snack foods such as pretzels, chips, and salted popcorn. ¨ Frozen prepared meals, unless labeled low-sodium. ¨ Canned and dried soups, broths, and bouillon, unless labeled sodium-free or low-sodium. ¨ Canned vegetables, unless labeled sodium-free or low-sodium. ¨ Western M[employer] fries, pizza, tacos, and other fast foods. ¨ Pickles, olives, ketchup, and other condiments, especially soy sauce, unless labeled sodium-free or low-sodium. Where can you learn more? Go to http://milo-vania.info/. Enter A922 in the search box to learn more about \"Low Sodium Diet (2,000 Milligram): Care Instructions. \"  Current as of: July 26, 2016  Content Version: 11.2  © 0175-7091 [employer]. Care instructions adapted under license by [employer] (which disclaims liability or warranty for this information).  If you have questions about a medical condition or this instruction, always ask your healthcare professional. [place] [address] any warranty or liability for your use of this information.

## 2017-05-23 NOTE — PROGRESS NOTES
2249 TRANSFER - OUT REPORT:    Verbal report given to [address] RN(name) on Jarrett Esqueda  being transferred to Remote Telemetry (unit) for routine progression of care       Report consisted of patients Situation, Background, Assessment and   Recommendations(SBAR). Information from the following report(s) SBAR, Kardex, Intake/Output, MAR, Recent Results and Cardiac Rhythm SR was reviewed with the receiving nurse. Lines:   Peripheral IV 05/22/17 Right Antecubital (Active)   Site Assessment Clean, dry, & intact 5/22/2017  8:11 PM   Phlebitis Assessment 0 5/22/2017  8:11 PM   Infiltration Assessment 0 5/22/2017  8:11 PM   Dressing Status Clean, dry, & intact 5/22/2017  8:11 PM   Dressing Type Tape;Transparent 5/22/2017  8:11 PM   Hub Color/Line Status Pink;Capped;Flushed;Patent 5/22/2017  8:11 PM   Action Taken Open ports on tubing capped 5/22/2017  8:11 PM   Alcohol Cap Used Yes 5/22/2017  8:11 PM        Opportunity for questions and clarification was provided.       Patient transported with:   Registered Nurse  Tech   Personal Belongings

## 2017-05-23 NOTE — DISCHARGE SUMMARY
[facility] [address] [place] [address]   Office [phone], Fax [phone]        Discharge Summary     Patient: Sana Steel       MRN: [id number]       YOB: 1955       Age: 62 y.o. Date of admission:  5/19/2017    Date of discharge:  5/23/2017    Primary care provider:  Hasmukh Nuñez MD     Admitting provider:  Panhco John MD    Discharging provider(s): Kacey Camacho MD - Family Medicine Resident  Heide Gerardo MD - Family Medicine Attending     Consultations  · Cardiology    Procedures  · None     Discharge destination: to home. The patient is stable for discharge. Admission diagnosis  · CHF exacerbation  · CHF exacerbation [facility])      Final discharge diagnoses and brief hospital course  As per admitting provider: Fernando Henriquez is a 62 y.o. female with known HFpEF, uncontrolled HTN, CKD4 and hx of GI bleed who presents to the ER complaining of shortness of breath. Symptoms started 2 weeks ago and is worse when walking or lying down. She had a recent hospital admission on 3/26/17 for CHF exacerbation. Patient states that she has been taking her lasix 20mg daily, though denies weighing herself daily. She denies increased consumption of sodium rich foods. Cardiology is Dr. Aditya Sims, whom she saw a little over 1 week ago. At that Cardiology visit, patient was doing well.      In the ER, vital signs were remarkable for T 97.6F, HR 80, /88, RR 24. Labs were remarkable for pBNP. 18350, Cr 3.02, Hb 7.5. CXR bibasilar atelectasis/pleural effusions. Pt was treated with IV lasix 40mg. \"      Conditions treated during this hospitalization:  Sana Steel is a 62 y.o. female with hx of HFpEF, uncontrolled HTN, CKD4, hx GIB, who is admitted for CHF exacerbation. Hospital Course:    HFpEF in acute exacerbation. Resolved. Cardiology consulted. Initially on IV Lasix and transitioned to PO Lasix with good UOP and improvement in breathing and edema.  No O2 requirement on 6MW. Duonebs PRN started for wheezing.   - Lasix 40 mg PO BID  - Duonebs PRN for wheezing      Hypertension: stable, on home amlodipine, cardura, labetalol, losartan. - amlodipine 10mg daily  - cardura 2mg daily  - labetalol 200mg q8h  - losartan 100mg daily.      Risk of PATRICIA on CKD4 - Slight increase in Cr, likely 2/2 IV diuresis. Encouraged PO fluids  - repeat BMP   - f/u with PCP       Chronic anemia - Hb stable. FOBT negative. No transfusions required      Allergic rhinitis - stable, on home flonase  - Flonase daily      T2DM - glucose stable, diet controlled. Labs/Imaging Needed on follow up:   - BMP    Pending test results: At the time of your discharge the following test results are still pending: MRSA culture   Please make sure you review these results with your outpatient follow-up provider(s). Specific symptoms to watch for: chest pain, shortness of breath, fever, chills, nausea, vomiting, diarrhea, change in mentation, falling, weakness, bleeding. DIET/what to eat:  Cardiac Diet and Diabetic Diet    ACTIVITY:  Activity as tolerated    Wound care: none    Equipment needed:  Nebulizer machine    Follow-up Care:    Follow-up Information     Follow up With Details Comments Price Edwards MD Go on 6/1/2017 4 PM appointment for hospital follow up  [address] [place] [address]  [phone]      Noemí Hull MD Go on 5/25/2017 2 PM appointment for hospital follow up  [facility]  [phone]            Physical examination at discharge  Visit Vitals    /76    Pulse 80    Temp 98.5 °F (36.9 °C)    Resp 18    Ht 5' 1\" (1.549 m)    Wt 170 lb 6.7 oz (77.3 kg)    SpO2 92%    Breastfeeding No    BMI 32.2 kg/m2      Physical Examination:   General appearance - alert, well appearing, and in no distress   Mental status - normal mood, behavior, speech, dress, motor activity, and thought processes  Chest - clear to auscultation, no wheezes, symmetric air entry  Heart - normal rate, regular rhythm, no murmurs   Abdomen - soft, nontender, nondistended, no rebound or guarding  Neurological - alert, oriented, normal speech, no focal findings or movement disorder noted  Extremities - peripheral pulses normal. 1+ edema, improved from on admission. Recent Results (from the past 24 hour(s))   CBC W/O DIFF    Collection Time: 05/23/17  4:56 AM   Result Value Ref Range    WBC 5.7 3.6 - 11.0 K/uL    RBC 2.80 (L) 3.80 - 5.20 M/uL    HGB 7.5 (L) 11.5 - 16.0 g/dL    HCT 23.6 (L) 35.0 - 47.0 %    MCV 84.3 80.0 - 99.0 FL    MCH 26.8 26.0 - 34.0 PG    MCHC 31.8 30.0 - 36.5 g/dL    RDW 15.8 (H) 11.5 - 14.5 %    PLATELET 118 670 - 981 K/uL   METABOLIC PANEL, BASIC    Collection Time: 05/23/17  4:56 AM   Result Value Ref Range    Sodium 139 136 - 145 mmol/L    Potassium 4.6 3.5 - 5.1 mmol/L    Chloride 108 97 - 108 mmol/L    CO2 22 21 - 32 mmol/L    Anion gap 9 5 - 15 mmol/L    Glucose 98 65 - 100 mg/dL    BUN 42 (H) 6 - 20 MG/DL    Creatinine 3.08 (H) 0.55 - 1.02 MG/DL    BUN/Creatinine ratio 14 12 - 20      GFR est AA 19 (L) >60 ml/min/1.73m2    GFR est non-AA 15 (L) >60 ml/min/1.73m2    Calcium 8.1 (L) 8.5 - 10.1 MG/DL   MAGNESIUM    Collection Time: 05/23/17  4:56 AM   Result Value Ref Range    Magnesium 1.7 1.6 - 2.4 mg/dL       Current Discharge Medication List      START taking these medications    Details   albuterol-ipratropium (DUO-NEB) 2.5 mg-0.5 mg/3 ml nebu 3 mL by Nebulization route every four (4) hours as needed. Qty: 30 Nebule, Refills: 1      ferrous sulfate 325 mg (65 mg iron) tablet Take 1 Tab by mouth daily (with breakfast). Qty: 30 Tab, Refills: 3         CONTINUE these medications which have CHANGED    Details   furosemide (LASIX) 40 mg tablet Take 1 Tab by mouth two (2) times a day.   Qty: 80 Tab, Refills: 0         CONTINUE these medications which have NOT CHANGED    Details   ergocalciferol (VITAMIN D2) 50,000 unit capsule Take 50,000 Units by mouth every fourteen (14) days. doxazosin (CARDURA) 2 mg tablet Take 1 Tab by mouth daily. Qty: 30 Tab, Refills: 1      losartan (COZAAR) 100 mg tablet Take 1 Tab by mouth daily. Qty: 30 Tab, Refills: 0      labetalol (NORMODYNE) 200 mg tablet Take 200 mg by mouth every eight (8) hours. dorzolamide-timolol (COSOPT) 22.3-6.8 mg/mL ophthalmic solution Administer 1 Drop to both eyes two (2) times a day. aspirin delayed-release 81 mg tablet take 2 tablets by mouth once daily  Qty: 60 Tab, Refills: 3      amLODIPine (NORVASC) 10 mg tablet Take 10 mg by mouth daily. Refills: 0      ACETAMINOPHEN/DIPHENHYDRAMINE (TYLENOL PM PO) Take 2 Tabs by mouth nightly as needed (sleep). Admission imaging studies:      Results from Hospital Encounter encounter on 05/19/17   XR CHEST PA LAT   Narrative Indication:  effusions, CHF, shortness of breath. Exam: PA and lateral views of the chest.    Direct comparison is made to prior CXR dated May 2017. Findings: Cardiomediastinal silhouette is stable and within normal limits. There  are small bilateral pleural effusions and bibasilar patchy airspace disease. There is no pneumothorax. Osseous structures are intact.          Impression IMPRESSION: Small bilateral pleural effusions and patchy bibasilar airspace  disease               -------------------------------------------------------------------------------------------------------------------    Chronic Diagnoses:    Problem List as of 5/23/2017  Date Reviewed: 3/31/2017          Codes Class Noted - Resolved    PATRICIA (acute kidney injury) ([facility] ICD-10-CM: N17.9  ICD-9-CM: 584.9  5/20/2017 - Present        CKD (chronic kidney disease) stage 4, GFR 15-29 ml/min (HCC) (Chronic) ICD-10-CM: N18.4  ICD-9-CM: 585.4  5/20/2017 - Present        CHF (congestive heart failure) ([facility] ICD-10-CM: I50.9  ICD-9-CM: 428.0  3/28/2017 - Present    Overview Signed 4/25/2017 12:10 PM by Stevenson Hammer MD     Diastolic heart failure.    Cardiologist: Dr. Digna Ordaz              * (Principal)CHF exacerbation [facility]) ICD-10-CM: I50.9  ICD-9-CM: 428.0  3/27/2017 - Present        GI bleed ICD-10-CM: K92.2  ICD-9-CM: 578.9  3/11/2017 - Present        GIB (gastrointestinal bleeding) ICD-10-CM: K92.2  ICD-9-CM: 578.9  3/9/2017 - Present        Calculus of gallbladder without cholecystitis ICD-10-CM: K80.20  ICD-9-CM: 574.20  3/9/2017 - Present        Diabetes mellitus type 2 with complications ([facility] MYX-21-OA: E11.8  ICD-9-CM: 250.90  7/28/2016 - Present        Essential hypertension ICD-10-CM: I10  ICD-9-CM: 401.9  7/28/2016 - Present        Anemia ICD-10-CM: D64.9  ICD-9-CM: 285.9  7/28/2016 - Present                Signed:      Quillian Apley, MD   Family Medicine Resident      5/23/2017     Melanie Wang MD   Family Medicine Attending

## 2017-05-23 NOTE — PROGRESS NOTES
Primary Nurse Tiffanie Bailey RN and Diamond Burdick RN performed a dual skin assessment on this patient No impairment noted  Chandler score is 21    0751- Bedside and Verbal shift change report given to Kita Christensen RN (oncoming nurse) by Dale Bailey RN (offgoing nurse). Report included the following information SBAR, Kardex, Intake/Output, MAR and Recent Results.

## 2017-05-23 NOTE — ADT AUTH CERT NOTES
Patient Demographics        Patient Name [address] Sex  Address Phone       Mackenzie Christianson [phone] Female 1955 [address]  [address] [phone] (Home)  [phone] (Mobile)           CSN:       [id number]           Admit Date: Admit Time Room Bed       May 19, 2017  8:11  [[id number]] 01 [[id number]]           Attending Providers        Provider Pager From To       Last Knott MD  17       Terry Coburn MD  17       Ruperto Bajwa MD  17            Emergency Contact(s)        Name Relation Home Work Mobile       [address] Spouse [phone]       [facility] [phone]           Utilization Review           Heart Failure - Care Day 3 (2017) by Gricelda Oliver Entered Review Status       2017 Completed       Details              Care Day: 3 Care Date: 2017 Level of Care: Telemetry       Guideline Day 1        Clinical Status       (X) * Clinical Indications met [F]       2017 1:41 PM EDT by Shira Jensen         failed Obs level of treatmet              (X) Tachypnea, edema, and dyspnea       2017 1:41 PM EDT by Shira Jensen         vs 98  °F (36.7  °C) 77 bp  154/69 -- At rest;Supine 24 sat 97 % 2 liters NC                     Routes       (X) Parenteral medications       (X) Oral hydration       (X) Low-salt diet              Interventions       (X) Oxygen       (X) Electrolytes       2017 1:41 PM EDT by Shira Jensen         bun 39, cr 2.81, ca 8.1, gfr non aa 17              (X) Weigh       2017 1:41 PM EDT by Shira Jensen         DAILY WT              (X) Possible fluid restriction       2017 1:41 PM EDT by Shira Jensen         1500 ML Fluid restrict                     Medications       (X) IV diuretics       2017 1:41 PM EDT by Shira Jensen         lasix 40 mg  iv bid,... other meds; duoneb, norvasc po , ASA po , cardura po , apresoline iv , labetalol po q 8h,  cozaar po                                          * Milestone              Additional Notes       Inpatient order May 21.        PER CARDIOLOGY; · ADHF, diastolic dysfunction with bilateral pleural effusions - good diuresis       · Chronic diastolic HF in the setting of HTN, advanced CKD, anemia       · Stage 4 CKD       · Poorly controlled HTN - better with diuresis       · Chronic anemia               Echo with diastolic dysfunction/restrictive pattern/LVH       Continue iv diuretic       CXR tomorrow       Subjective:       Cardiac ROS: improved dyspnea and edema. BP down.           Heart Failure - Clinical Indications for Admission to Inpatient Care by Asya Gaston        Review Entered Review Status       5/23/2017 Completed       Details              Clinical Indications for Admission to Inpatient Care       Most Recent : Dixie Dyson Most Recent Date: 5/23/2017 1:33 PM EDT       (X) Admission is indicated by 1 or more of the following  (1) (2) (3) (4) (5) (6) (7):          (X) Inpatient admission required [B] rather than observation care (see Heart Failure: Observation           Care guideline as appropriate)  because of 1 or more of the following :             (X) Other condition, treatment, or monitoring requiring inpatient admission             5/23/2017 1:33 PM EDT by Dixie Dyson               failed Obs status treatment                                          Notes:               5/23/2017 1:32 PM EDT by Dixie Dyson       Subject: Additional Clinical Information               hx of HFpEF, uncontrolled HTN, CKD4, hx GIB, who is admitted for CHF exacerbation. . SOB remains, but is improved from on admission. BLE edema present, but improved from on admission; legs feel \"less tight\"  Endorsed Wheezing this AM                            Additional Notes       Inpatient order May 21.

## 2017-05-24 RX ORDER — ASPIRIN 81 MG/1
TABLET ORAL
Qty: 60 TAB | Refills: 3 | Status: SHIPPED | OUTPATIENT
Start: 2017-05-24 | End: 2017-10-11 | Stop reason: SDUPTHER

## 2017-05-25 ENCOUNTER — OFFICE VISIT (OUTPATIENT)
Dept: FAMILY MEDICINE CLINIC | Age: 62
End: 2017-05-25

## 2017-05-25 VITALS
HEIGHT: 61 IN | OXYGEN SATURATION: 96 % | DIASTOLIC BLOOD PRESSURE: 57 MMHG | SYSTOLIC BLOOD PRESSURE: 122 MMHG | HEART RATE: 68 BPM | BODY MASS INDEX: 33.04 KG/M2 | RESPIRATION RATE: 16 BRPM | WEIGHT: 175 LBS | TEMPERATURE: 98.4 F

## 2017-05-25 DIAGNOSIS — I10 ESSENTIAL HYPERTENSION: ICD-10-CM

## 2017-05-25 DIAGNOSIS — E11.8 TYPE 2 DIABETES MELLITUS WITH COMPLICATION, WITHOUT LONG-TERM CURRENT USE OF INSULIN (HCC): Primary | ICD-10-CM

## 2017-05-25 DIAGNOSIS — I50.30 DIASTOLIC CONGESTIVE HEART FAILURE, UNSPECIFIED CONGESTIVE HEART FAILURE CHRONICITY: ICD-10-CM

## 2017-05-25 DIAGNOSIS — Z09 HOSPITAL DISCHARGE FOLLOW-UP: ICD-10-CM

## 2017-05-25 DIAGNOSIS — N18.4 CKD (CHRONIC KIDNEY DISEASE) STAGE 4, GFR 15-29 ML/MIN (HCC): ICD-10-CM

## 2017-05-25 RX ORDER — ATORVASTATIN CALCIUM 10 MG/1
10 TABLET, FILM COATED ORAL DAILY
Refills: 0 | COMMUNITY
Start: 2017-05-18 | End: 2017-08-21 | Stop reason: SDUPTHER

## 2017-05-25 RX ORDER — FUROSEMIDE 20 MG/1
TABLET ORAL
Refills: 0 | COMMUNITY
Start: 2017-04-26 | End: 2017-05-25 | Stop reason: DRUGHIGH

## 2017-05-25 NOTE — MR AVS SNAPSHOT
Visit Information Date & Time Provider Department Dept. Phone Encounter #  
 5/25/2017  2:00 PM Chris PerezMatt [phone] [id number] Follow-up Instructions Return in about 1 month (around 6/25/2017) for diabetes follow up or sooner as needed. Upcoming Health Maintenance Date Due Hepatitis C Screening 1955 FOOT EXAM Q1 10/23/1966 EYE EXAM RETINAL OR DILATED Q1 10/23/1966 Pneumococcal 19-64 Highest Risk (1 of 3 - PCV13) 10/23/1975 DTaP/Tdap/Td series (1 - Tdap) 10/23/1977 PAP AKA CERVICAL CYTOLOGY 10/23/1977 BREAST CANCER SCRN MAMMOGRAM 10/23/2006 ZOSTER VACCINE AGE 60> 10/23/2016 INFLUENZA AGE 9 TO ADULT 8/1/2017 HEMOGLOBIN A1C Q6M 9/10/2017 MICROALBUMIN Q1 2/9/2018 LIPID PANEL Q1 3/10/2018 FOBT Q 1 YEAR AGE 50-75 5/19/2018 Allergies as of 5/25/2017  Review Complete On: 5/25/2017 By: Chris Perez MD  
  
 Severity Noted Reaction Type Reactions Ciprofloxacin  03/13/2017   Systemic Angioedema Current Immunizations  Reviewed on 5/19/2017 Name Date Influenza Vaccine (Quad) PF 9/19/2016  2:34 PM  
  
 Not reviewed this visit You Were Diagnosed With   
  
 Codes Comments Type 2 diabetes mellitus with complication, without long-term current use of insulin (HCC)    -  Primary ICD-10-CM: E11.8 ICD-9-CM: 250.90 Essential hypertension     ICD-10-CM: I10 
ICD-9-CM: 401.9 CKD (chronic kidney disease) stage 4, GFR 15-29 ml/min (HCC)     ICD-10-CM: N18.4 ICD-9-CM: 585.4 Diastolic congestive heart failure, unspecified congestive heart failure chronicity     ICD-10-CM: I50.30 ICD-9-CM: 428.30, 428.0 Hospital discharge follow-up     ICD-10-CM: 593 [facility] ICD-9-CM: V67.59 Vitals BP Pulse Temp Resp Height(growth percentile) Weight(growth percentile)  122/57 (BP 1 Location: Left arm, BP Patient Position: Sitting) 68 98.4 °F (36.9 °C) (Oral) 16 5' 1\" (1.549 m) 175 lb (79.4 kg) SpO2 BMI OB Status Smoking Status 96% 33.07 kg/m2 Postmenopausal Never Smoker Vitals History BMI and BSA Data Body Mass Index Body Surface Area 33.07 kg/m 2 1.85 m 2 Preferred Pharmacy Pharmacy Name Phone [phone]  [phone] Your Updated Medication List  
  
   
This list is accurate as of: 5/25/17  3:09 PM.  Always use your most recent med list.  
  
  
  
  
 albuterol-ipratropium 2.5 mg-0.5 mg/3 ml Nebu Commonly known as:  DUO-NEB  
3 mL by Nebulization route every four (4) hours as needed. amLODIPine 10 mg tablet Commonly known as:  Herlene Pupa Take 10 mg by mouth daily. aspirin delayed-release 81 mg tablet  
take 2 tablets by mouth once daily  
  
 atorvastatin 10 mg tablet Commonly known as:  LIPITOR Take 10 mg by mouth daily. dorzolamide-timolol 22.3-6.8 mg/mL ophthalmic solution Commonly known as:  COSOPT Administer 1 Drop to both eyes two (2) times a day. doxazosin 2 mg tablet Commonly known as:  CARDURA Take 1 Tab by mouth daily. ferrous sulfate 325 mg (65 mg iron) tablet Take 1 Tab by mouth daily (with breakfast). furosemide 40 mg tablet Commonly known as:  LASIX Take 1 Tab by mouth two (2) times a day. labetalol 200 mg tablet Commonly known as:  Debroah Lente Take 200 mg by mouth every eight (8) hours. losartan 100 mg tablet Commonly known as:  COZAAR Take 1 Tab by mouth daily. TYLENOL PM PO Take 2 Tabs by mouth nightly as needed (sleep). VITAMIN D2 50,000 unit capsule Generic drug:  ergocalciferol Take 50,000 Units by mouth every fourteen (14) days. We Performed the Following METABOLIC PANEL, BASIC [73833 CPT(R)] Follow-up Instructions  Return in about 1 month (around 6/25/2017) for diabetes follow up or sooner as needed. To-Do List   
 06/02/2017 3:00 PM  
  Appointment with Joe Garcia 3 at [facility] 73 ([phone]) Patient Instructions Heart Failure: Care Instructions Your Care Instructions Heart failure occurs when your heart does not pump as much blood as the body needs. Failure does not mean that the heart has stopped pumping but rather that it is not pumping as well as it should. Over time, this causes fluid buildup in your lungs and other parts of your body. Fluid buildup can cause shortness of breath, fatigue, swollen ankles, and other problems. By taking medicines regularly, reducing sodium (salt) in your diet, checking your weight every day, and making lifestyle changes, you can feel better and live longer. Follow-up care is a key part of your treatment and safety. Be sure to make and go to all appointments, and call your doctor if you are having problems. It's also a good idea to know your test results and keep a list of the medicines you take. How can you care for yourself at home? Medicines · Be safe with medicines. Take your medicines exactly as prescribed. Call your doctor if you think you are having a problem with your medicine. · Do not take any vitamins, over-the-counter medicine, or herbal products without talking to your doctor first. Lena Solian not take ibuprofen (Advil or Motrin) and naproxen (Aleve) without talking to your doctor first. They could make your heart failure worse. · You may be taking some of the following medicine. ¨ Beta-blockers can slow heart rate, decrease blood pressure, and improve your condition. Taking a beta-blocker may lower your chance of needing to be hospitalized. ¨ Angiotensin-converting enzyme inhibitors (ACEIs) reduce the heart's workload, lower blood pressure, and reduce swelling. Taking an ACEI may lower your chance of needing to be hospitalized again. ¨ Angiotensin II receptor blockers (ARBs) work like ACEIs. Your doctor may prescribe them instead of ACEIs. ¨ Diuretics, also called water pills, reduce swelling. ¨ Potassium supplements replace this important mineral, which is sometimes lost with diuretics. ¨ Aspirin and other blood thinners prevent blood clots, which can cause a stroke or heart attack. You will get more details on the specific medicines your doctor prescribes. Diet · Your doctor may suggest that you limit sodium to 2,000 milligrams (mg) a day or less. That is less than 1 teaspoon of salt a day, including all the salt you eat in cooking or in packaged foods. People get most of their sodium from processed foods. Fast food and restaurant meals also tend to be very high in sodium. · Ask your doctor how much liquid you can drink each day. You may have to limit liquids. Weight · Weigh yourself without clothing at the same time each day. Record your weight. Call your doctor if you gain more than 3 pounds in 2 to 3 days. A sudden weight gain may mean that your heart failure is getting worse. Activity level · Start light exercise (if your doctor says it is okay). Even if you can only do a small amount, exercise will help you get stronger, have more energy, and manage your weight and your stress. Walking is an easy way to get exercise. Start out by walking a little more than you did before. Bit by bit, increase the amount you walk. · When you exercise, watch for signs that your heart is working too hard. You are pushing yourself too hard if you cannot talk while you are exercising. If you become short of breath or dizzy or have chest pain, stop, sit down, and rest. 
· If you feel \"wiped out\" the day after you exercise, walk slower or for a shorter distance until you can work up to a better pace. · Get enough rest at night. Sleeping with 1 or 2 pillows under your upper body and head may help you breathe easier. Lifestyle changes · Do not smoke. Smoking can make a heart condition worse. If you need help quitting, talk to your doctor about stop-smoking programs and medicines. These can increase your chances of quitting for good. Quitting smoking may be the most important step you can take to protect your heart. · Limit alcohol to 2 drinks a day for men and 1 drink a day for women. Too much alcohol can cause health problems. · Avoid getting sick from colds and the flu. Get a pneumococcal vaccine shot. If you have had one before, ask your doctor whether you need another dose. Get a flu shot each year. If you must be around people with colds or the flu, wash your hands often. When should you call for help? Call 911 if you have symptoms of sudden heart failure such as: 
· You have severe trouble breathing. · You cough up pink, foamy mucus. · You have a new irregular or rapid heartbeat. Call your doctor now or seek immediate medical care if: 
· You have new or increased shortness of breath. · You are dizzy or lightheaded, or you feel like you may faint. · You have sudden weight gain, such as 3 pounds or more in 2 to 3 days. · You have increased swelling in your legs, ankles, or feet. · You are suddenly so tired or weak that you cannot do your usual activities. Watch closely for changes in your health, and be sure to contact your doctor if: 
· You develop new symptoms. Where can you learn more? Go to http://milo-[place]ia.info/. Enter D203 in the search box to learn more about \"Heart Failure: Care Instructions. \" Current as of: January 27, 2016 Content Version: 11.2 © 3167-4236 [employer]. Care instructions adapted under license by [employer] (which disclaims liability or warranty for this information).  If you have questions about a medical condition or this instruction, always ask your healthcare professional. [employer] disclaims any warranty or liability for your use of this information. Please provide this summary of care documentation to your next provider. Your primary care clinician is listed as Noemí Hull. If you have any questions after today's visit, please call [phone].

## 2017-05-25 NOTE — PROGRESS NOTES
Chief Complaint   Patient presents with    Transitions Of Care     pt discharged from [facility] on 5/23/17 for CHF     \"REVIEWED RECORD IN PREPARATION FOR VISIT AND HAVE OBTAINED THE NECESSARY DOCUMENTATION\"  1. Have you been to the ER, urgent care clinic since your last visit? Hospitalized since your last visit? Yes Where: see above    2. Have you seen or consulted any other health care providers outside of the [address] [place] [address] since your last visit? Include any pap smears or colon screening. No  Patient does not have advanced directives.

## 2017-05-25 NOTE — PATIENT INSTRUCTIONS
Heart Failure: Care Instructions  Your Care Instructions    Heart failure occurs when your heart does not pump as much blood as the body needs. Failure does not mean that the heart has stopped pumping but rather that it is not pumping as well as it should. Over time, this causes fluid buildup in your lungs and other parts of your body. Fluid buildup can cause shortness of breath, fatigue, swollen ankles, and other problems. By taking medicines regularly, reducing sodium (salt) in your diet, checking your weight every day, and making lifestyle changes, you can feel better and live longer. Follow-up care is a key part of your treatment and safety. Be sure to make and go to all appointments, and call your doctor if you are having problems. It's also a good idea to know your test results and keep a list of the medicines you take. How can you care for yourself at home? Medicines  · Be safe with medicines. Take your medicines exactly as prescribed. Call your doctor if you think you are having a problem with your medicine. · Do not take any vitamins, over-the-counter medicine, or herbal products without talking to your doctor first. Mariely Meyerpeg not take ibuprofen (Advil or Motrin) and naproxen (Aleve) without talking to your doctor first. They could make your heart failure worse. · You may be taking some of the following medicine. ¨ Beta-blockers can slow heart rate, decrease blood pressure, and improve your condition. Taking a beta-blocker may lower your chance of needing to be hospitalized. ¨ Angiotensin-converting enzyme inhibitors (ACEIs) reduce the heart's workload, lower blood pressure, and reduce swelling. Taking an ACEI may lower your chance of needing to be hospitalized again. ¨ Angiotensin II receptor blockers (ARBs) work like ACEIs. Your doctor may prescribe them instead of ACEIs. ¨ Diuretics, also called water pills, reduce swelling.   ¨ Potassium supplements replace this important mineral, which is sometimes lost with diuretics. ¨ Aspirin and other blood thinners prevent blood clots, which can cause a stroke or heart attack. You will get more details on the specific medicines your doctor prescribes. Diet  · Your doctor may suggest that you limit sodium to 2,000 milligrams (mg) a day or less. That is less than 1 teaspoon of salt a day, including all the salt you eat in cooking or in packaged foods. People get most of their sodium from processed foods. Fast food and restaurant meals also tend to be very high in sodium. · Ask your doctor how much liquid you can drink each day. You may have to limit liquids. Weight  · Weigh yourself without clothing at the same time each day. Record your weight. Call your doctor if you gain more than 3 pounds in 2 to 3 days. A sudden weight gain may mean that your heart failure is getting worse. Activity level  · Start light exercise (if your doctor says it is okay). Even if you can only do a small amount, exercise will help you get stronger, have more energy, and manage your weight and your stress. Walking is an easy way to get exercise. Start out by walking a little more than you did before. Bit by bit, increase the amount you walk. · When you exercise, watch for signs that your heart is working too hard. You are pushing yourself too hard if you cannot talk while you are exercising. If you become short of breath or dizzy or have chest pain, stop, sit down, and rest.  · If you feel \"wiped out\" the day after you exercise, walk slower or for a shorter distance until you can work up to a better pace. · Get enough rest at night. Sleeping with 1 or 2 pillows under your upper body and head may help you breathe easier. Lifestyle changes  · Do not smoke. Smoking can make a heart condition worse. If you need help quitting, talk to your doctor about stop-smoking programs and medicines. These can increase your chances of quitting for good.  Quitting smoking may be the most important step you can take to protect your heart. · Limit alcohol to 2 drinks a day for men and 1 drink a day for women. Too much alcohol can cause health problems. · Avoid getting sick from colds and the flu. Get a pneumococcal vaccine shot. If you have had one before, ask your doctor whether you need another dose. Get a flu shot each year. If you must be around people with colds or the flu, wash your hands often. When should you call for help? Call 911 if you have symptoms of sudden heart failure such as:  · You have severe trouble breathing. · You cough up pink, foamy mucus. · You have a new irregular or rapid heartbeat. Call your doctor now or seek immediate medical care if:  · You have new or increased shortness of breath. · You are dizzy or lightheaded, or you feel like you may faint. · You have sudden weight gain, such as 3 pounds or more in 2 to 3 days. · You have increased swelling in your legs, ankles, or feet. · You are suddenly so tired or weak that you cannot do your usual activities. Watch closely for changes in your health, and be sure to contact your doctor if:  · You develop new symptoms. Where can you learn more? Go to http://milo-[place]ia.info/. Enter A621 in the search box to learn more about \"Heart Failure: Care Instructions. \"  Current as of: January 27, 2016  Content Version: 11.2  © 4799-8790 [employer]. Care instructions adapted under license by [employer] (which disclaims liability or warranty for this information). If you have questions about a medical condition or this instruction, always ask your healthcare professional. [place] [address] any warranty or liability for your use of this information.

## 2017-05-25 NOTE — PROGRESS NOTES
Subjective:      Harsh Carson is a 62 y.o. female here today for transitional care visit. She was hospitalized at [facility] 5/19/17 - 5/23/17 for CHF exacerbation. She currently on Lasix 40 mg twice daily which she reports compliance with. She has follow up appointment scheduled with Dr. Ronaldo Lomas on 6/1/17. Due to diuresis, she will need repeat BMP today. She reports swelling in ankles which she reports has improved from prior. Denies dyspnea, orthopnea, cough. She has been weighing herself and home and reports that her weight has been 169 pounds. Reports compliance with all her medications. Unsure when she is to see Dr. Blossom Catalan again, but believes that it is soon. Current Outpatient Prescriptions   Medication Sig Dispense Refill    atorvastatin (LIPITOR) 10 mg tablet Take 10 mg by mouth daily. 0    aspirin delayed-release 81 mg tablet take 2 tablets by mouth once daily 60 Tab 3    albuterol-ipratropium (DUO-NEB) 2.5 mg-0.5 mg/3 ml nebu 3 mL by Nebulization route every four (4) hours as needed. 30 Nebule 1    ferrous sulfate 325 mg (65 mg iron) tablet Take 1 Tab by mouth daily (with breakfast). 30 Tab 3    furosemide (LASIX) 40 mg tablet Take 1 Tab by mouth two (2) times a day. 80 Tab 0    ergocalciferol (VITAMIN D2) 50,000 unit capsule Take 50,000 Units by mouth every fourteen (14) days.  doxazosin (CARDURA) 2 mg tablet Take 1 Tab by mouth daily. 30 Tab 1    losartan (COZAAR) 100 mg tablet Take 1 Tab by mouth daily. 30 Tab 0    ACETAMINOPHEN/DIPHENHYDRAMINE (TYLENOL PM PO) Take 2 Tabs by mouth nightly as needed (sleep).  labetalol (NORMODYNE) 200 mg tablet Take 200 mg by mouth every eight (8) hours.  dorzolamide-timolol (COSOPT) 22.3-6.8 mg/mL ophthalmic solution Administer 1 Drop to both eyes two (2) times a day.  amLODIPine (NORVASC) 10 mg tablet Take 10 mg by mouth daily.   0       Allergies   Allergen Reactions    Ciprofloxacin Angioedema       Past Medical History:   Diagnosis Date    Anemia     Arthritis     Calculus of kidney     Congestive heart failure (HCC)     Diabetes (HCC)     Hematuria        Social History   Substance Use Topics    Smoking status: Never Smoker    Smokeless tobacco: Never Used    Alcohol use No        Review of Systems  Constitutional: negative for fevers and chills  Eyes: negative for visual disturbance and irritation  Ears, nose, mouth, throat, and face: negative for nasal congestion and sore throat  Respiratory: negative for cough, sputum or dyspnea on exertion  Cardiovascular: negative for chest pain, chest pressure/discomfort, palpitations, irregular heart beats     Objective:     Visit Vitals    /57 (BP 1 Location: Left arm, BP Patient Position: Sitting)    Pulse 68    Temp 98.4 °F (36.9 °C) (Oral)    Resp 16    Ht 5' 1\" (1.549 m)    Wt 175 lb (79.4 kg)    SpO2 96%    BMI 33.07 kg/m2      General appearance - alert, well appearing, and in no distress  Eyes - pupils equal and reactive, extraocular eye movements intact, sclera anicteric  Chest - clear to auscultation, no wheezes, rales or rhonchi, symmetric air entry, no tachypnea, retractions or cyanosis  Heart - normal rate, regular rhythm, normal S1, S2, no murmurs, rubs, clicks or gallops  Neurological - alert, oriented, normal speech, no focal findings or movement disorder noted  Extremities - trace - 1+ edema of the ankles and bilateral legs up to the mid-shin     Assessment/Plan:   Anaya Perdomo is a 62 y.o. female seen for:     1. Type 2 diabetes mellitus with complication, without long-term current use of insulin ([facility] [address]): currently diet controlled. Due for A1c but will need to await another month as previous measurement was taken after she received blood transfusion. 2. Essential hypertension: controlled at this time. She does have LE edema and Norvasc may be playing a role in her symptoms. Will defer to Cardiology/Nephrology concerning whether to stop therapy.    - METABOLIC PANEL, BASIC    3. CKD (chronic kidney disease) stage 4, GFR 15-29 ml/min (HCC)  - METABOLIC PANEL, BASIC    4. Diastolic congestive heart failure, unspecified congestive heart failure chronicity: weight today is different from reported home weight. She knows when she is to call her Cardiologist's office. Discussed importance of daily weights. Has follow up scheduled. 5. Hospital discharge follow-up    I have discussed the diagnosis with the patient and the intended plan as seen in the above orders. The patient has received an after-visit summary and questions were answered concerning future plans. I have discussed medication side effects and warnings with the patient as well. Patient verbalizes understanding of plan of care and denies further questions or concerns at this time. Informed patient to return to the office if symptoms worsen or if new symptoms arise. Follow-up Disposition:  Return in about 1 month (around 6/25/2017) for diabetes follow up or sooner as needed.

## 2017-05-26 LAB
BUN SERPL-MCNC: 37 MG/DL (ref 8–27)
BUN/CREAT SERPL: 12 (ref 12–28)
CALCIUM SERPL-MCNC: 8.4 MG/DL (ref 8.7–10.3)
CHLORIDE SERPL-SCNC: 106 MMOL/L (ref 96–106)
CO2 SERPL-SCNC: 20 MMOL/L (ref 18–29)
CREAT SERPL-MCNC: 3.14 MG/DL (ref 0.57–1)
GLUCOSE SERPL-MCNC: 103 MG/DL (ref 65–99)
INTERPRETATION: NORMAL
POTASSIUM SERPL-SCNC: 6 MMOL/L (ref 3.5–5.2)
SODIUM SERPL-SCNC: 141 MMOL/L (ref 134–144)

## 2017-06-01 ENCOUNTER — HOSPITAL ENCOUNTER (OUTPATIENT)
Dept: GENERAL RADIOLOGY | Age: 62
Discharge: HOME | End: 2017-06-01
Payer: COMMERCIAL

## 2017-06-01 DIAGNOSIS — R60.9 EDEMA: ICD-10-CM

## 2017-06-01 PROCEDURE — 71020 XR CHEST PA LAT: CPT

## 2017-06-02 ENCOUNTER — HOSPITAL ENCOUNTER (OUTPATIENT)
Dept: INFUSION THERAPY | Age: 62
Discharge: HOME | End: 2017-06-02

## 2017-06-02 NOTE — DISCHARGE INSTRUCTIONS
OUTPATIENT INFUSION CENTER DISCHARGE INSTRUCTIONS FOR:    PROCRIT INJECTION (EPOGEN/EPOETIN DIEGO): It is important that your injections be given according to schedule. Your physician may want you to take iron supplements or follow a special diet. You must have lab work drawn regularly while on this medication. All medications can potentially cause side effects. If these side effects should develop, contact your physicians office as needed. Possible side effects of Procrit may include the following:               *    mild headache             *    body aches             *    mild nausea   *    diarrhea   *    increase in blood pressure   *    burning, itching or tenderness at injection site   *    feelings of anxiety or trouble sleeping. Serious side effects or allergic reactions are rare, but may require immediate medical attention. Signs and symptoms may include one or more of the following:       Chest pain or sudden swelling of the hands, ankles or feet. Skin redness, itching, swelling, blistering, weeping, crusting, rash, eruptions, or;  Wheezing, chest tightness, cough, irregular heartbeat or shortness of breath;   Swelling of the face, eyelids, lips, tongue, or throat;   Stuffy nose, runny nose, sneezing, sore throat;   Red (bloodshot), itchy, swollen, or watery eyes;  Stomach pain, nausea, vomiting, severe diarrhea, or bloody stools; Severe or sudden headache, problems with vision, speech or walking;  Numbness or weakness in your arm, leg or on one side of your body; Severe tiredness, lightheadedness, dizziness, fainting or seizures; Change in how much you urinate or painful urination. Please contact your physicians office with any questions or concerns regarding your treatment. I have received the Procrit Medication Guide in its entirety.   Noah Shaikh  Patient/Representatives signature:  ___________________________    6/2/2017   Obey Acevedo RN

## 2017-06-12 ENCOUNTER — OFFICE VISIT (OUTPATIENT)
Dept: FAMILY MEDICINE CLINIC | Age: 62
End: 2017-06-12

## 2017-06-12 VITALS
WEIGHT: 175 LBS | OXYGEN SATURATION: 95 % | BODY MASS INDEX: 33.04 KG/M2 | HEART RATE: 90 BPM | DIASTOLIC BLOOD PRESSURE: 72 MMHG | RESPIRATION RATE: 16 BRPM | SYSTOLIC BLOOD PRESSURE: 140 MMHG | TEMPERATURE: 98.8 F | HEIGHT: 61 IN

## 2017-06-12 DIAGNOSIS — I10 ESSENTIAL HYPERTENSION: ICD-10-CM

## 2017-06-12 DIAGNOSIS — Z09 HOSPITAL DISCHARGE FOLLOW-UP: ICD-10-CM

## 2017-06-12 DIAGNOSIS — E11.8 TYPE 2 DIABETES MELLITUS WITH COMPLICATION, WITHOUT LONG-TERM CURRENT USE OF INSULIN (HCC): ICD-10-CM

## 2017-06-12 DIAGNOSIS — I50.30 DIASTOLIC CONGESTIVE HEART FAILURE, UNSPECIFIED CONGESTIVE HEART FAILURE CHRONICITY: Primary | ICD-10-CM

## 2017-06-12 RX ORDER — BUMETANIDE 2 MG/1
TABLET ORAL
Refills: 0 | COMMUNITY
Start: 2017-06-06 | End: 2017-11-12 | Stop reason: SDUPTHER

## 2017-06-12 NOTE — MR AVS SNAPSHOT
Visit Information Date & Time Provider Department Dept. Phone Encounter #  
 6/12/2017  3:30 PM Matt Barnes Patrick [phone] [id number] Follow-up Instructions Return in about 3 months (around 9/12/2017). Upcoming Health Maintenance Date Due Hepatitis C Screening 1955 FOOT EXAM Q1 10/23/1966 EYE EXAM RETINAL OR DILATED Q1 10/23/1966 Pneumococcal 19-64 Highest Risk (1 of 3 - PCV13) 10/23/1975 DTaP/Tdap/Td series (1 - Tdap) 10/23/1977 PAP AKA CERVICAL CYTOLOGY 10/23/1977 BREAST CANCER SCRN MAMMOGRAM 10/23/2006 ZOSTER VACCINE AGE 60> 10/23/2016 INFLUENZA AGE 9 TO ADULT 8/1/2017 HEMOGLOBIN A1C Q6M 9/10/2017 MICROALBUMIN Q1 2/9/2018 LIPID PANEL Q1 3/10/2018 FOBT Q 1 YEAR AGE 50-75 5/19/2018 Allergies as of 6/12/2017  Review Complete On: 6/12/2017 By: Kurt Das MD  
  
 Severity Noted Reaction Type Reactions Ciprofloxacin  03/13/2017   Systemic Angioedema Current Immunizations  Reviewed on 5/19/2017 Name Date Influenza Vaccine (Quad) PF 9/19/2016  2:34 PM  
  
 Not reviewed this visit You Were Diagnosed With   
  
 Codes Comments Diastolic congestive heart failure, unspecified congestive heart failure chronicity    -  Primary ICD-10-CM: I50.30 ICD-9-CM: 428.30, 428.0 Essential hypertension     ICD-10-CM: I10 
ICD-9-CM: 401.9 Type 2 diabetes mellitus with complication, without long-term current use of insulin (HCC)     ICD-10-CM: E11.8 ICD-9-CM: 250.90 Hospital discharge follow-up     ICD-10-CM: [facility] ICD-9-CM: V67.59 Vitals BP Pulse Temp Resp Height(growth percentile) Weight(growth percentile) 140/72 90 98.8 °F (37.1 °C) (Oral) 16 5' 1\" (1.549 m) 175 lb (79.4 kg) SpO2 BMI OB Status Smoking Status 95% 33.07 kg/m2 Postmenopausal Never Smoker BMI and BSA Data Body Mass Index Body Surface Area  33.07 kg/m 2 1.85 m 2  
  
  
 Preferred Pharmacy Pharmacy Name Phone [facility] [address] [facility] [phone] Your Updated Medication List  
  
   
This list is accurate as of: 6/12/17  3:58 PM.  Always use your most recent med list.  
  
  
  
  
 albuterol-ipratropium 2.5 mg-0.5 mg/3 ml Nebu Commonly known as:  DUO-NEB  
3 mL by Nebulization route every four (4) hours as needed. amLODIPine 10 mg tablet Commonly known as:  Dana Punter Take 10 mg by mouth daily. aspirin delayed-release 81 mg tablet  
take 2 tablets by mouth once daily  
  
 atorvastatin 10 mg tablet Commonly known as:  LIPITOR Take 10 mg by mouth daily. bumetanide 2 mg tablet Commonly known as:  BUMEX  
take 1 tablet by mouth once daily  
  
 dorzolamide-timolol 22.3-6.8 mg/mL ophthalmic solution Commonly known as:  COSOPT Administer 1 Drop to both eyes two (2) times a day. doxazosin 2 mg tablet Commonly known as:  CARDURA Take 1 Tab by mouth daily. ferrous sulfate 325 mg (65 mg iron) tablet Take 1 Tab by mouth daily (with breakfast). labetalol 200 mg tablet Commonly known as:  Glee Drought Take 200 mg by mouth every eight (8) hours. losartan 100 mg tablet Commonly known as:  COZAAR Take 1 Tab by mouth daily. TYLENOL PM PO Take 2 Tabs by mouth nightly as needed (sleep). Follow-up Instructions Return in about 3 months (around 9/12/2017). Patient Instructions A Healthy Lifestyle: Care Instructions Your Care Instructions A healthy lifestyle can help you feel good, stay at a healthy weight, and have plenty of energy for both work and play. A healthy lifestyle is something you can share with your whole family. A healthy lifestyle also can lower your risk for serious health problems, such as high blood pressure, heart disease, and diabetes. You can follow a few steps listed below to improve your health and the health of your family. Follow-up care is a key part of your treatment and safety. Be sure to make and go to all appointments, and call your doctor if you are having problems. Its also a good idea to know your test results and keep a list of the medicines you take. How can you care for yourself at home? · Do not eat too much sugar, fat, or fast foods. You can still have dessert and treats now and then. The goal is moderation. · Start small to improve your eating habits. Pay attention to portion sizes, drink less juice and soda pop, and eat more fruits and vegetables. ¨ Eat a healthy amount of food. A 3-ounce serving of meat, for example, is about the size of a deck of cards. Fill the rest of your plate with vegetables and whole grains. ¨ Limit the amount of soda and sports drinks you have every day. Drink more water when you are thirsty. ¨ Eat at least 5 servings of fruits and vegetables every day. It may seem like a lot, but it is not hard to reach this goal. A serving or helping is 1 piece of fruit, 1 cup of vegetables, or 2 cups of leafy, raw vegetables. Have an apple or some carrot sticks as an afternoon snack instead of a candy bar. Try to have fruits and/or vegetables at every meal. 
· Make exercise part of your daily routine. You may want to start with simple activities, such as walking, bicycling, or slow swimming. Try to be active 30 to 60 minutes every day. You do not need to do all 30 to 60 minutes all at once. For example, you can exercise 3 times a day for 10 or 20 minutes. Moderate exercise is safe for most people, but it is always a good idea to talk to your doctor before starting an exercise program. 
· Keep moving. Cari Gravely the lawn, work in the garden, or [employer]. Take the stairs instead of the elevator at work. · If you smoke, quit.  People who smoke have an increased risk for heart attack, stroke, cancer, and other lung illnesses. Quitting is hard, but there are ways to boost your chance of quitting tobacco for good. ¨ Use nicotine gum, patches, or lozenges. ¨ Ask your doctor about stop-smoking programs and medicines. ¨ Keep trying. In addition to reducing your risk of diseases in the future, you will notice some benefits soon after you stop using tobacco. If you have shortness of breath or asthma symptoms, they will likely get better within a few weeks after you quit. · Limit how much alcohol you drink. Moderate amounts of alcohol (up to 2 drinks a day for men, 1 drink a day for women) are okay. But drinking too much can lead to liver problems, high blood pressure, and other health problems. Family health If you have a family, there are many things you can do together to improve your health. · Eat meals together as a family as often as possible. · Eat healthy foods. This includes fruits, vegetables, lean meats and dairy, and whole grains. · Include your family in your fitness plan. Most people think of activities such as jogging or tennis as the way to fitness, but there are many ways you and your family can be more active. Anything that makes you breathe hard and gets your heart pumping is exercise. Here are some tips: 
¨ Walk to do errands or to take your child to school or the bus. ¨ Go for a family bike ride after dinner instead of watching TV. Where can you learn more? Go to http://milo-vania.info/. Enter D150 in the search box to learn more about \"A Healthy Lifestyle: Care Instructions. \" Current as of: July 26, 2016 Content Version: 11.2 © 5225-4854 [employer]. Care instructions adapted under license by [employer] (which disclaims liability or warranty for this information).  If you have questions about a medical condition or this instruction, always ask your healthcare professional. [employer] disclaims any warranty or liability for your use of this information. Please provide this summary of care documentation to your next provider. Your primary care clinician is listed as Jurgen Aburto. If you have any questions after today's visit, please call [phone].

## 2017-06-12 NOTE — PROGRESS NOTES
Identified pt with two pt identifiers(name and ). Chief Complaint   Patient presents with   [address]      Pt was in [facility] for pneumonia 17. Health Maintenance Due   Topic    Hepatitis C Screening     FOOT EXAM Q1     EYE EXAM RETINAL OR DILATED Q1     Pneumococcal 19-64 Highest Risk (1 of 3 - PCV13)    DTaP/Tdap/Td series (1 - Tdap)    PAP AKA CERVICAL CYTOLOGY     BREAST CANCER SCRN MAMMOGRAM     ZOSTER VACCINE AGE 60>        Wt Readings from Last 3 Encounters:   17 175 lb (79.4 kg)   17 175 lb (79.4 kg)   17 170 lb 6.7 oz (77.3 kg)     Temp Readings from Last 3 Encounters:   17 98.8 °F (37.1 °C) (Oral)   17 98.4 °F (36.9 °C) (Oral)   17 98.5 °F (36.9 °C)     BP Readings from Last 3 Encounters:   17 140/72   17 122/57   17 157/76     Pulse Readings from Last 3 Encounters:   17 90   17 68   17 80         Learning Assessment:  :     Learning Assessment 2016   PRIMARY LEARNER Patient   HIGHEST LEVEL OF EDUCATION - PRIMARY LEARNER  DID NOT GRADUATE HIGH SCHOOL   BARRIERS PRIMARY LEARNER NONE   CO-LEARNER CAREGIVER No   PRIMARY LANGUAGE ENGLISH   LEARNER PREFERENCE PRIMARY DEMONSTRATION   ANSWERED BY patient   RELATIONSHIP SELF       Depression Screening:  :     PHQ over the last two weeks 2017   Little interest or pleasure in doing things Not at all   Feeling down, depressed or hopeless Not at all   Total Score PHQ 2 0           Coordination of Care Questionnaire:  :     1) Have you been to an emergency room, urgent care clinic since your last visit? yes [facility]  Hospitalized since your last visit? yes         [facility] 17    2) Have you seen or consulted any other health care providers outside of [address] [place] [address] since your last visit?  yes  (Include any pap smears or colon screenings in this section.)    3) Do you have an Advance Directive on file? no  Are you interested in receiving information about Advance Directives? no    Patient is accompanied by self I have received verbal consent from Rina Mcnulty to discuss any/all medical information while they are present in the room. Reviewed record in preparation for visit and have obtained necessary documentation. Medication reconciliation up to date and corrected with patient at this time.

## 2017-06-12 NOTE — PROGRESS NOTES
Subjective:      Darylene John is a 62 y.o. female here transitional care follow up. She was admitted to [facility] 6/1/17 - 6/6/17 for CHF exacerbation and pneumonia. She has finished a course of levofloxacin. She states that she required 1 unit of blood products during this hospitalization. Hospital records not available for review at this time. Cardiology - has appointment scheduled for this Friday. Nephrology - has appointment scheduled for July 2017. She reports that she has been doing well since discharge. Has noticed improvement in her LE edema and she is not short of breath. She is weighing herself daily. Denies chest pain or discomfort. Has DMV paperwork for handicap placard today. Current Outpatient Prescriptions   Medication Sig Dispense Refill    atorvastatin (LIPITOR) 10 mg tablet Take 10 mg by mouth daily. 0    aspirin delayed-release 81 mg tablet take 2 tablets by mouth once daily 60 Tab 3    albuterol-ipratropium (DUO-NEB) 2.5 mg-0.5 mg/3 ml nebu 3 mL by Nebulization route every four (4) hours as needed. 30 Nebule 1    ferrous sulfate 325 mg (65 mg iron) tablet Take 1 Tab by mouth daily (with breakfast). 30 Tab 3    doxazosin (CARDURA) 2 mg tablet Take 1 Tab by mouth daily. 30 Tab 1    losartan (COZAAR) 100 mg tablet Take 1 Tab by mouth daily. 30 Tab 0    ACETAMINOPHEN/DIPHENHYDRAMINE (TYLENOL PM PO) Take 2 Tabs by mouth nightly as needed (sleep).  labetalol (NORMODYNE) 200 mg tablet Take 200 mg by mouth every eight (8) hours.  dorzolamide-timolol (COSOPT) 22.3-6.8 mg/mL ophthalmic solution Administer 1 Drop to both eyes two (2) times a day.  amLODIPine (NORVASC) 10 mg tablet Take 10 mg by mouth daily.   0    bumetanide (BUMEX) 2 mg tablet take 1 tablet by mouth once daily  0       Allergies   Allergen Reactions    Ciprofloxacin Angioedema       Past Medical History:   Diagnosis Date    Anemia     Arthritis     Calculus of kidney     Congestive heart failure (HCC)     Diabetes ([facility] [place] [address]     Hematuria        Social History   Substance Use Topics    Smoking status: Never Smoker    Smokeless tobacco: Never Used    Alcohol use No        Review of Systems  Pertinent items are noted in HPI. Objective:     Visit Vitals    /72    Pulse 90    Temp 98.8 °F (37.1 °C) (Oral)    Resp 16    Ht 5' 1\" (1.549 m)    Wt 175 lb (79.4 kg)    SpO2 95%    BMI 33.07 kg/m2      General appearance - alert, well appearing, and in no distress  Eyes - pupils equal and reactive, extraocular eye movements intact, sclera anicteric  Oropharyngx - mucous membranes moist, pharynx normal without lesions  Neck - supple, no significant adenopathy  Chest - clear to auscultation, no wheezes, rales or rhonchi, symmetric air entry, no tachypnea, retractions or cyanosis  Heart - normal rate, regular rhythm, normal S1, S2, no murmurs, rubs, clicks or gallops  Neurological - alert, oriented, normal speech, no focal findings or movement disorder noted  Extremities - 1-2+ pitting edema of bilateral lower extremities     Assessment/Plan:   Donna De La Cruz is a 62 y.o. female seen for:     1. Diastolic congestive heart failure, unspecified congestive heart failure chronicity: with recent hospitalization for exacerbation. She has follow up with Cardiology scheduled for this week. 2. Essential hypertension: managed by Cardiology and Nephrology. 3. Type 2 diabetes mellitus with complication, without long-term current use of insulin ([facility] [place] [address]): diet controlled. As she has just had another blood transfusion, checking A1c now would not be reliable. Will plan for recheck in 3 months. 4. Hospital discharge follow-up: will request recent discharge summary. I have discussed the diagnosis with the patient and the intended plan as seen in the above orders. The patient has received an after-visit summary and questions were answered concerning future plans.  I have discussed medication side effects and warnings with the patient as well. Patient verbalizes understanding of plan of care and denies further questions or concerns at this time. Informed patient to return to the office if symptoms worsen or if new symptoms arise. Follow-up Disposition:  Return in about 3 months (around 9/12/2017).

## 2017-06-12 NOTE — PATIENT INSTRUCTIONS

## 2017-06-28 ENCOUNTER — TELEPHONE (OUTPATIENT)
Dept: FAMILY MEDICINE CLINIC | Age: 62
End: 2017-06-28

## 2017-06-28 NOTE — TELEPHONE ENCOUNTER
The pt is calling saying her cardiologist is anting her to get some basic metabolic panel and magnesium levels. He is wanting her to get this drawn at her pcp office.

## 2017-08-21 RX ORDER — ATORVASTATIN CALCIUM 10 MG/1
TABLET, FILM COATED ORAL
Qty: 30 TAB | Refills: 5 | Status: SHIPPED | OUTPATIENT
Start: 2017-08-21 | End: 2018-01-16 | Stop reason: SDUPTHER

## 2017-08-22 RX ORDER — ATORVASTATIN CALCIUM 10 MG/1
TABLET, FILM COATED ORAL
Qty: 30 TAB | Refills: 5 | OUTPATIENT
Start: 2017-08-22

## 2017-08-29 ENCOUNTER — OFFICE VISIT (OUTPATIENT)
Dept: FAMILY MEDICINE CLINIC | Age: 62
End: 2017-08-29

## 2017-08-29 VITALS
WEIGHT: 170 LBS | HEART RATE: 77 BPM | TEMPERATURE: 98.4 F | SYSTOLIC BLOOD PRESSURE: 144 MMHG | OXYGEN SATURATION: 94 % | BODY MASS INDEX: 32.1 KG/M2 | DIASTOLIC BLOOD PRESSURE: 64 MMHG | HEIGHT: 61 IN | RESPIRATION RATE: 18 BRPM

## 2017-08-29 DIAGNOSIS — I10 ESSENTIAL HYPERTENSION: ICD-10-CM

## 2017-08-29 DIAGNOSIS — E11.8 TYPE 2 DIABETES MELLITUS WITH COMPLICATION, WITHOUT LONG-TERM CURRENT USE OF INSULIN (HCC): ICD-10-CM

## 2017-08-29 DIAGNOSIS — H26.9 CATARACT OF BOTH EYES, UNSPECIFIED CATARACT TYPE: Primary | ICD-10-CM

## 2017-08-29 DIAGNOSIS — Z01.818 PREOPERATIVE EXAMINATION: ICD-10-CM

## 2017-08-29 PROBLEM — H40.1190 PRIMARY OPEN ANGLE GLAUCOMA: Status: ACTIVE | Noted: 2017-04-04

## 2017-08-29 PROBLEM — E11.311 DIABETIC MACULAR EDEMA(362.07): Status: ACTIVE | Noted: 2017-04-04

## 2017-08-29 PROBLEM — E11.3599 PROLIFERATIVE DIABETIC RETINOPATHY ASSOCIATED WITH TYPE 2 DIABETES MELLITUS (HCC): Status: ACTIVE | Noted: 2017-04-04

## 2017-08-29 RX ORDER — GENTAMICIN SULFATE 3 MG/ML
SOLUTION/ DROPS OPHTHALMIC
Refills: 0 | COMMUNITY
Start: 2017-07-20 | End: 2017-11-03

## 2017-08-29 RX ORDER — LOSARTAN POTASSIUM AND HYDROCHLOROTHIAZIDE 25; 100 MG/1; MG/1
TABLET ORAL
Refills: 0 | COMMUNITY
Start: 2017-08-09 | End: 2018-08-01 | Stop reason: ALTCHOICE

## 2017-08-29 RX ORDER — DUREZOL 0.5 MG/ML
EMULSION OPHTHALMIC
Refills: 0 | COMMUNITY
Start: 2017-07-19 | End: 2020-01-01

## 2017-08-29 RX ORDER — NEPAFENAC 3 MG/ML
SUSPENSION OPHTHALMIC
Refills: 0 | COMMUNITY
Start: 2017-07-20 | End: 2018-12-04 | Stop reason: ALTCHOICE

## 2017-08-29 NOTE — PROGRESS NOTES
Preoperative Evaluation    Date of Exam: 2017    Sea Lechuga is a 62 y.o. female (:1955) who presents for preoperative evaluation. Procedure/Surgery: Cataract extraction   Date of Procedure/Surgery: 17 (left cataract), 17 (right cataract)  Surgeon: Dr. Bains [place]: [facility]   Primary Physician: Celedonio Boeck, MD  Latex Allergy: no    Medical History:     Past Medical History:   Diagnosis Date    Anemia     Arthritis     Calculus of kidney     [facility] ([facility]     Diabetes ([facility]     Hematuria      Allergies: Allergies   Allergen Reactions    Ciprofloxacin Angioedema      Medications:     Current Outpatient Prescriptions   Medication Sig    DUREZOL 0.05 % ophthalmic emulsion instill 1 drop into left eye twice a day START AFTER CATARACT SURGERY    ILEVRO 0.3 % drps     gentamicin (GARAMYCIN) 0.3 % ophthalmic solution instill 1 drop into left eye four times a day START 2 DAYS PRIOR TO SURGERY    losartan-hydroCHLOROthiazide (HYZAAR) 100-25 mg per tablet take 1 tablet by mouth once daily    atorvastatin (LIPITOR) 10 mg tablet take 1 tablet by mouth at bedtime    bumetanide (BUMEX) 2 mg tablet take 1 tablet by mouth once daily    aspirin delayed-release 81 mg tablet take 2 tablets by mouth once daily    albuterol-ipratropium (DUO-NEB) 2.5 mg-0.5 mg/3 ml nebu 3 mL by Nebulization route every four (4) hours as needed.  ferrous sulfate 325 mg (65 mg iron) tablet Take 1 Tab by mouth daily (with breakfast).  doxazosin (CARDURA) 2 mg tablet Take 1 Tab by mouth daily.  losartan (COZAAR) 100 mg tablet Take 1 Tab by mouth daily.  ACETAMINOPHEN/DIPHENHYDRAMINE (TYLENOL PM PO) Take 2 Tabs by mouth nightly as needed (sleep).  labetalol (NORMODYNE) 200 mg tablet Take 200 mg by mouth every eight (8) hours.     dorzolamide-timolol (COSOPT) 22.3-6.8 mg/mL ophthalmic solution Administer 1 Drop to both eyes two (2) times a day.  amLODIPine (NORVASC) 10 mg tablet Take 10 mg by mouth daily. No current facility-administered medications for this visit.       Social History:     Social History     Social History    Marital status:      Spouse name: N/A    Number of children: N/A    Years of education: N/A     Social History Main Topics    Smoking status: Never Smoker    Smokeless tobacco: Never Used    Alcohol use No    Drug use: No    Sexual activity: Not Asked     Other Topics Concern    None     Social History Narrative       Recent use of: ASA    Tetanus up to date: tetanus status unknown to the patient    Anesthesia Complications: None  History of abnormal bleeding : None  History of Blood Transfusions: yes  Health Care Directive or Living Will: no    REVIEW OF SYSTEMS:  Constitutional: negative for fevers and chills  Eyes: positive for visual disturbance  Ears, nose, mouth, throat, and face: negative for nasal congestion and sore throat  Respiratory: negative for cough, sputum or dyspnea on exertion  Cardiovascular: positive for lower extremity edema, negative for chest pain, chest pressure/discomfort, palpitations, irregular heart beats  Gastrointestinal: negative for nausea, vomiting, change in bowel habits and abdominal pain  Neurological: negative for headaches, dizziness and paresthesia      EXAM:   Visit Vitals    /64 (BP 1 Location: Right arm)    Pulse 77    Temp 98.4 °F (36.9 °C) (Oral)    Resp 18    Ht 5' 1\" (1.549 m)    Wt 170 lb (77.1 kg)    SpO2 94%    BMI 32.12 kg/m2     General appearance - alert, well appearing, and in no distress  Eyes - pupils equal and reactive, extraocular eye movements intact, sclera anicteric  Oropharyngeal exam - mucous membranes moist, pharynx normal without lesions  Neck - supple, no significant adenopathy, carotids upstroke normal bilaterally, no bruits  Chest - clear to auscultation, no wheezes, rales or rhonchi, symmetric air entry  Heart - normal rate, regular rhythm, normal S1, S2, no murmurs, rubs, clicks or gallops  Extremities - bilateral lower extremity pitting edema   Skin exam - normal coloration and turgor, no rashes, no suspicious skin lesions noted  Neurological exam reveals alert, oriented, normal speech, no focal findings or movement disorder noted    DIAGNOSTICS:   1. EKG: EKG FINDINGS - not needed  2. CXR: not needed  3. Labs: not needed    IMPRESSION: Steven Wallace is a 62 y.o. female with history of NIIDM, hypertension, CHF, CKD seen today for preoperative evaluation for bilateral cataract surgery. She is doing well at this time. She has an acceptable risk for surgery and may proceed with planned procedure without contraindication. H&P form completed and faxed to surgeon on patient's behalf. ICD-10-CM ICD-9-CM    1. Cataract of both eyes, unspecified cataract type H26.9 366.9    2. Essential hypertension I10 401.9    3. Type 2 diabetes mellitus with complication, without long-term current use of insulin (HCC) E11.8 250.90    4.  Preoperative examination Z01.818 V72.84        Romana Field, MD   8/29/2017

## 2017-08-29 NOTE — PROGRESS NOTES
Identified pt with two pt identifiers(name and ). Chief Complaint   Patient presents with    Pre-op Exam     [facility]        Health Maintenance Due   Topic    Hepatitis C Screening     FOOT EXAM Q1     Pneumococcal 19-64 Highest Risk (1 of 3 - PCV13)    DTaP/Tdap/Td series (1 - Tdap)    PAP AKA CERVICAL CYTOLOGY     BREAST CANCER SCRN MAMMOGRAM     ZOSTER VACCINE AGE 60>     INFLUENZA AGE 9 TO ADULT     HEMOGLOBIN A1C Q6M        Wt Readings from Last 3 Encounters:   17 170 lb (77.1 kg)   17 175 lb (79.4 kg)   17 175 lb (79.4 kg)     Temp Readings from Last 3 Encounters:   17 98.4 °F (36.9 °C) (Oral)   17 98.8 °F (37.1 °C) (Oral)   17 98.4 °F (36.9 °C) (Oral)     BP Readings from Last 3 Encounters:   17 144/64   17 140/72   17 122/57     Pulse Readings from Last 3 Encounters:   17 77   17 90   17 68         Learning Assessment:  :     Learning Assessment 2016   PRIMARY LEARNER Patient   HIGHEST LEVEL OF EDUCATION - PRIMARY LEARNER  DID NOT GRADUATE HIGH SCHOOL   BARRIERS PRIMARY LEARNER NONE   CO-LEARNER CAREGIVER No   PRIMARY LANGUAGE ENGLISH   LEARNER PREFERENCE PRIMARY DEMONSTRATION   ANSWERED BY patient   RELATIONSHIP SELF       Depression Screening:  :     PHQ over the last two weeks 2017   Little interest or pleasure in doing things Not at all   Feeling down, depressed or hopeless Not at all   Total Score PHQ 2 0       Fall Risk Assessment:  :     No flowsheet data found. Abuse Screening:  :     No flowsheet data found. Coordination of Care Questionnaire:  :     1) Have you been to an emergency room, urgent care clinic since your last visit? no   Hospitalized since your last visit? no             2) Have you seen or consulted any other health care providers outside of [address] [place] [address] since your last visit?  yes [address] [facility] (Include any pap smears or colon screenings in this section.)    3) Do you have an Advance Directive on file? no  Are you interested in receiving information about Advance Directives? no    Reviewed record in preparation for visit and have obtained necessary documentation. Medication reconciliation up to date and corrected with patient at this time.

## 2017-09-20 RX ORDER — LABETALOL 200 MG/1
TABLET, FILM COATED ORAL
Qty: 90 TAB | Refills: 5 | Status: SHIPPED | OUTPATIENT
Start: 2017-09-20 | End: 2019-06-04 | Stop reason: SDUPTHER

## 2017-10-05 ENCOUNTER — OFFICE VISIT (OUTPATIENT)
Dept: FAMILY MEDICINE CLINIC | Age: 62
End: 2017-10-05

## 2017-10-05 VITALS
WEIGHT: 155 LBS | OXYGEN SATURATION: 95 % | RESPIRATION RATE: 18 BRPM | BODY MASS INDEX: 29.27 KG/M2 | SYSTOLIC BLOOD PRESSURE: 181 MMHG | HEIGHT: 61 IN | DIASTOLIC BLOOD PRESSURE: 79 MMHG | TEMPERATURE: 97.5 F | HEART RATE: 82 BPM

## 2017-10-05 DIAGNOSIS — E11.22 TYPE 2 DIABETES MELLITUS WITH STAGE 4 CHRONIC KIDNEY DISEASE, WITHOUT LONG-TERM CURRENT USE OF INSULIN (HCC): Primary | ICD-10-CM

## 2017-10-05 DIAGNOSIS — I10 ESSENTIAL HYPERTENSION: ICD-10-CM

## 2017-10-05 DIAGNOSIS — Z23 ENCOUNTER FOR IMMUNIZATION: ICD-10-CM

## 2017-10-05 DIAGNOSIS — N18.4 TYPE 2 DIABETES MELLITUS WITH STAGE 4 CHRONIC KIDNEY DISEASE, WITHOUT LONG-TERM CURRENT USE OF INSULIN (HCC): Primary | ICD-10-CM

## 2017-10-05 DIAGNOSIS — Z11.59 NEED FOR HEPATITIS C SCREENING TEST: ICD-10-CM

## 2017-10-05 DIAGNOSIS — Z12.39 SCREENING FOR BREAST CANCER: ICD-10-CM

## 2017-10-05 RX ORDER — DOXAZOSIN 2 MG/1
2 TABLET ORAL DAILY
Qty: 90 TAB | Refills: 1 | Status: SHIPPED | OUTPATIENT
Start: 2017-10-05

## 2017-10-05 NOTE — PATIENT INSTRUCTIONS
A Healthy Lifestyle: Care Instructions  Your Care Instructions  A healthy lifestyle can help you feel good, stay at a healthy weight, and have plenty of energy for both work and play. A healthy lifestyle is something you can share with your whole family. A healthy lifestyle also can lower your risk for serious health problems, such as high blood pressure, heart disease, and diabetes. You can follow a few steps listed below to improve your health and the health of your family. Follow-up care is a key part of your treatment and safety. Be sure to make and go to all appointments, and call your doctor if you are having problems. Its also a good idea to know your test results and keep a list of the medicines you take. How can you care for yourself at home? · Do not eat too much sugar, fat, or fast foods. You can still have dessert and treats now and then. The goal is moderation. · Start small to improve your eating habits. Pay attention to portion sizes, drink less juice and soda pop, and eat more fruits and vegetables. ¨ Eat a healthy amount of food. A 3-ounce serving of meat, for example, is about the size of a deck of cards. Fill the rest of your plate with vegetables and whole grains. ¨ Limit the amount of soda and sports drinks you have every day. Drink more water when you are thirsty. ¨ Eat at least 5 servings of fruits and vegetables every day. It may seem like a lot, but it is not hard to reach this goal. A serving or helping is 1 piece of fruit, 1 cup of vegetables, or 2 cups of leafy, raw vegetables. Have an apple or some carrot sticks as an afternoon snack instead of a candy bar. Try to have fruits and/or vegetables at every meal.  · Make exercise part of your daily routine. You may want to start with simple activities, such as walking, bicycling, or slow swimming. Try to be active 30 to 60 minutes every day. You do not need to do all 30 to 60 minutes all at once.  For example, you can exercise 3 times a day for 10 or 20 minutes. Moderate exercise is safe for most people, but it is always a good idea to talk to your doctor before starting an exercise program.  · Keep moving. Betty Folk the lawn, work in the garden, or [employer]. Take the stairs instead of the elevator at work. · If you smoke, quit. People who smoke have an increased risk for heart attack, stroke, cancer, and other lung illnesses. Quitting is hard, but there are ways to boost your chance of quitting tobacco for good. ¨ Use nicotine gum, patches, or lozenges. ¨ Ask your doctor about stop-smoking programs and medicines. ¨ Keep trying. In addition to reducing your risk of diseases in the future, you will notice some benefits soon after you stop using tobacco. If you have shortness of breath or asthma symptoms, they will likely get better within a few weeks after you quit. · Limit how much alcohol you drink. Moderate amounts of alcohol (up to 2 drinks a day for men, 1 drink a day for women) are okay. But drinking too much can lead to liver problems, high blood pressure, and other health problems. Family health  If you have a family, there are many things you can do together to improve your health. · Eat meals together as a family as often as possible. · Eat healthy foods. This includes fruits, vegetables, lean meats and dairy, and whole grains. · Include your family in your fitness plan. Most people think of activities such as jogging or tennis as the way to fitness, but there are many ways you and your family can be more active. Anything that makes you breathe hard and gets your heart pumping is exercise. Here are some tips:  ¨ Walk to do errands or to take your child to school or the bus. ¨ Go for a family bike ride after dinner instead of watching TV. Where can you learn more? Go to http://milo-vania.info/. Enter U443 in the search box to learn more about \"A Healthy Lifestyle: Care Instructions. \"  Current as of: July 26, 2016  Content Version: 11.3  © 8019-7585 [employer]. Care instructions adapted under license by [employer] (which disclaims liability or warranty for this information). If you have questions about a medical condition or this instruction, always ask your healthcare professional. [address] any warranty or liability for your use of this information. Influenza (Flu) Vaccine (Inactivated or Recombinant): What You Need to Know  Why get vaccinated? Influenza (\"flu\") is a contagious disease that spreads around the [place] every winter, usually between October and May. Flu is caused by influenza viruses and is spread mainly by coughing, sneezing, and close contact. Anyone can get flu. Flu strikes suddenly and can last several days. Symptoms vary by age, but can include:  · Fever/chills. · Sore throat. · Muscle aches. · Fatigue. · Cough. · Headache. · Runny or stuffy nose. Flu can also lead to pneumonia and blood infections, and cause diarrhea and seizures in children. If you have a medical condition, such as heart or lung disease, flu can make it worse. Flu is more dangerous for some people. Infants and young children, people 72years of age and older, pregnant women, and people with certain health conditions or a weakened immune system are at greatest risk. Each year thousands of people in the [facility] die from flu, and many more are hospitalized. Flu vaccine can:  · Keep you from getting flu. · Make flu less severe if you do get it. · Keep you from spreading flu to your family and other people. Inactivated and recombinant flu vaccines  A dose of flu vaccine is recommended every flu season. Children 6 months through 6years of age may need two doses during the same flu season. Everyone else needs only one dose each flu season.   Some inactivated flu vaccines contain a very small amount of a mercury-based preservative called thimerosal. Studies have not shown thimerosal in vaccines to be harmful, but flu vaccines that do not contain thimerosal are available. There is no live flu virus in flu shots. They cannot cause the flu. There are many flu viruses, and they are always changing. Each year a new flu vaccine is made to protect against three or four viruses that are likely to cause disease in the upcoming flu season. But even when the vaccine doesn't exactly match these viruses, it may still provide some protection. Flu vaccine cannot prevent:  · Flu that is caused by a virus not covered by the vaccine. · Illnesses that look like flu but are not. Some people should not get this vaccine  Tell the person who is giving you the vaccine:  · If you have any severe (life-threatening) allergies. If you ever had a life-threatening allergic reaction after a dose of flu vaccine, or have a severe allergy to any part of this vaccine, you may be advised not to get vaccinated. Most, but not all, types of flu vaccine contain a small amount of egg protein. · If you ever had Guillain-Barré syndrome (also called GBS) Some people with a history of GBS should not get this vaccine. This should be discussed with your doctor. · If you are not feeling well. It is usually okay to get flu vaccine when you have a mild illness, but you might be asked to come back when you feel better. Risks of a vaccine reaction  With any medicine, including vaccines, there is a chance of reactions. These are usually mild and go away on their own, but serious reactions are also possible. Most people who get a flu shot do not have any problems with it. Minor problems following a flu shot include:  · Soreness, redness, or swelling where the shot was given  · Hoarseness  · Sore, red or itchy eyes  · Cough  · Fever  · Aches  · Headache  · Itching  · Fatigue  If these problems occur, they usually begin soon after the shot and last 1 or 2 days.   More serious problems following a flu shot can include the following:  · There may be a small increased risk of Guillain-Barré Syndrome (GBS) after inactivated flu vaccine. This risk has been estimated at 1 or 2 additional cases per million people vaccinated. This is much lower than the risk of severe complications from flu, which can be prevented by flu vaccine. · [facility] who get the flu shot along with pneumococcal vaccine (PCV13) and/or DTaP vaccine at the same time might be slightly more likely to have a seizure caused by fever. Ask your doctor for more information. Tell your doctor if a child who is getting flu vaccine has ever had a seizure  Problems that could happen after any injected vaccine:  · People sometimes faint after a medical procedure, including vaccination. Sitting or lying down for about 15 minutes can help prevent fainting, and injuries caused by a fall. Tell your doctor if you feel dizzy, or have vision changes or ringing in the ears. · Some people get severe pain in the shoulder and have difficulty moving the arm where a shot was given. This happens very rarely. · Any medication can cause a severe allergic reaction. Such reactions from a vaccine are very rare, estimated at about 1 in a million doses, and would happen within a few minutes to a few hours after the vaccination. As with any medicine, there is a very remote chance of a vaccine causing a serious injury or death. The safety of vaccines is always being monitored. For more information, visit: www.cdc.gov/vaccinesafety/. What if there is a serious reaction? What should I look for? · Look for anything that concerns you, such as signs of a severe allergic reaction, very high fever, or unusual behavior. Signs of a severe allergic reaction can include hives, swelling of the face and throat, difficulty breathing, a fast heartbeat, dizziness, and weakness  usually within a few minutes to a few hours after the vaccination. What should I do?   · If you think it is a severe allergic reaction or other emergency that can't wait, call 9-1-1 and get the person to the nearest hospital. Otherwise, call your doctor. · Reactions should be reported to the \"Vaccine Adverse Event Reporting System\" (VAERS). Your doctor should file this report, or you can do it yourself through the VAERS website at www.vaers. [facility].gov, or by calling 1-824.985.1899. VAERS does not give medical advice. The National Vaccine Injury Compensation Program  The National Vaccine Injury Compensation Program (VICP) is a federal program that was created to compensate people who may have been injured by certain vaccines. Persons who believe they may have been injured by a vaccine can learn about the program and about filing a claim by calling 0-171.337.5599 or visiting the [employer] website at www.[facility].gov/vaccinecompensation. There is a time limit to file a claim for compensation. How can I learn more? · Ask your healthcare provider. He or she can give you the vaccine package insert or suggest other sources of information. · Call your local or state health department. · Contact the Centers for Disease Control and Prevention (CDC):  ¨ Call 7-259.668.9886 (1-800-CDC-INFO) or  ¨ Visit CDC's website at www.cdc.gov/flu  Vaccine Information Statement  Inactivated Influenza Vaccine  8/7/2015)  42 HECTOR Monreal Conquest [id number]-[id number]  Department of Health and Human Services  Centers for Disease Control and Prevention  Many Vaccine Information Statements are available in [place] and other languages. See www.immunize.org/vis. Muchas hojas de información sobre vacunas están disponibles en español y en otros idiomas. Visite www.immunize.org/vis. Care instructions adapted under license by [employer] (which disclaims liability or warranty for this information).  If you have questions about a medical condition or this instruction, always ask your healthcare professional. Delia Huerta disclaims any warranty or liability for your use of this information.

## 2017-10-05 NOTE — PROGRESS NOTES
Identified pt with two pt identifiers(name and ). Chief Complaint   Patient presents with    Diabetes        Health Maintenance Due   Topic    Hepatitis C Screening     FOOT EXAM Q1     Pneumococcal 19-64 Highest Risk (1 of 3 - PCV13)    DTaP/Tdap/Td series (1 - Tdap)    PAP AKA CERVICAL CYTOLOGY     BREAST CANCER SCRN MAMMOGRAM     ZOSTER VACCINE AGE 60>     INFLUENZA AGE 9 TO ADULT     HEMOGLOBIN A1C Q6M        Wt Readings from Last 3 Encounters:   10/05/17 155 lb (70.3 kg)   17 170 lb (77.1 kg)   17 175 lb (79.4 kg)     Temp Readings from Last 3 Encounters:   10/05/17 97.5 °F (36.4 °C) (Oral)   17 98.4 °F (36.9 °C) (Oral)   17 98.8 °F (37.1 °C) (Oral)     BP Readings from Last 3 Encounters:   10/05/17 181/79   17 144/64   17 140/72     Pulse Readings from Last 3 Encounters:   10/05/17 82   17 77   17 90         Learning Assessment:  :     Learning Assessment 2016   PRIMARY LEARNER Patient   HIGHEST LEVEL OF EDUCATION - PRIMARY LEARNER  DID NOT GRADUATE HIGH SCHOOL   BARRIERS PRIMARY LEARNER NONE   CO-LEARNER CAREGIVER No   PRIMARY LANGUAGE ENGLISH   LEARNER PREFERENCE PRIMARY DEMONSTRATION   ANSWERED BY patient   RELATIONSHIP SELF       Depression Screening:  :     PHQ over the last two weeks 2017   Little interest or pleasure in doing things Not at all   Feeling down, depressed or hopeless Not at all   Total Score PHQ 2 0       Fall Risk Assessment:  :     No flowsheet data found. Abuse Screening:  :     No flowsheet data found. Coordination of Care Questionnaire:  :     1) Have you been to an emergency room, urgent care clinic since your last visit? no   Hospitalized since your last visit? no             2) Have you seen or consulted any other health care providers outside of [address] [place] [address] since your last visit?  yes  Opthalmology (Include any pap smears or colon screenings in this section.)    3) Do you have an Advance Directive on file? no  Are you interested in receiving information about Advance Directives? no    Reviewed record in preparation for visit and have obtained necessary documentation. Medication reconciliation up to date and corrected with patient at this time.

## 2017-10-05 NOTE — PROGRESS NOTES
Subjective:      Soila Beck is a 62 y.o. female here today for diabetes follow up and labs. Diabetes mellitus: currently diet controlled. - Intermittent home BG monitoring: reports BG last week was 120.  - Denies polyuria, polyphagia, polydipsia. No hypoglycemic episodes. - On ACE or ARB: Y  - On statin: Y  - On aspirin: Y    Hypertension: She has not taken her morning blood pressure medications today. Current Outpatient Prescriptions   Medication Sig Dispense Refill    labetalol (NORMODYNE) 200 mg tablet take 1 tablet by mouth every 8 hours 90 Tab 5    DUREZOL 0.05 % ophthalmic emulsion instill 1 drop into left eye twice a day START AFTER CATARACT SURGERY  0    ILEVRO 0.3 % drps   0    gentamicin (GARAMYCIN) 0.3 % ophthalmic solution instill 1 drop into left eye four times a day START 2 DAYS PRIOR TO SURGERY  0    losartan-hydroCHLOROthiazide (HYZAAR) 100-25 mg per tablet take 1 tablet by mouth once daily  0    atorvastatin (LIPITOR) 10 mg tablet take 1 tablet by mouth at bedtime 30 Tab 5    bumetanide (BUMEX) 2 mg tablet take 1 tablet by mouth once daily  0    aspirin delayed-release 81 mg tablet take 2 tablets by mouth once daily 60 Tab 3    albuterol-ipratropium (DUO-NEB) 2.5 mg-0.5 mg/3 ml nebu 3 mL by Nebulization route every four (4) hours as needed. 30 Nebule 1    ferrous sulfate 325 mg (65 mg iron) tablet Take 1 Tab by mouth daily (with breakfast). 30 Tab 3    doxazosin (CARDURA) 2 mg tablet Take 1 Tab by mouth daily. 30 Tab 1    ACETAMINOPHEN/DIPHENHYDRAMINE (TYLENOL PM PO) Take 2 Tabs by mouth nightly as needed (sleep).  dorzolamide-timolol (COSOPT) 22.3-6.8 mg/mL ophthalmic solution Administer 1 Drop to both eyes two (2) times a day.  amLODIPine (NORVASC) 10 mg tablet Take 10 mg by mouth daily.   0       Allergies   Allergen Reactions    Ciprofloxacin Angioedema       Past Medical History:   Diagnosis Date    Anemia     Arthritis     Calculus of kidney     Congestive heart failure (HCC)     Diabetes ([facility]     Hematuria        Social History   Substance Use Topics    Smoking status: Never Smoker    Smokeless tobacco: Never Used    Alcohol use No        Review of Systems  Constitutional: negative for fevers and chills  Respiratory: negative for cough, sputum or dyspnea on exertion  Cardiovascular: negative for chest pain, chest pressure/discomfort, palpitations, irregular heart beats  Gastrointestinal: negative for nausea, vomiting, change in bowel habits and abdominal pain  Genitourinary:negative for frequency, dysuria and hematuria     Objective:     Visit Vitals    /79 (BP 1 Location: Right arm, BP Patient Position: Sitting)    Pulse 82    Temp 97.5 °F (36.4 °C) (Oral)    Resp 18    Ht 5' 1\" (1.549 m)    Wt 155 lb (70.3 kg)    SpO2 95%    BMI 29.29 kg/m2      General appearance - alert, well appearing, and in no distress  Eyes - pupils equal and reactive, extraocular eye movements intact  Oropharyngx - mucous membranes moist, pharynx normal without lesions  Neck - supple, no significant adenopathy, carotids upstroke normal bilaterally, no bruits  Chest - clear to auscultation, no wheezes, rales or rhonchi, symmetric air entry, no tachypnea, retractions or cyanosis  Heart - normal rate, regular rhythm, normal S1, S2, no murmurs, rubs, clicks or gallops  Neurological - alert, oriented, normal speech, no focal findings or movement disorder noted  Extremities - bilateral LE edema      Assessment/Plan:   Vicky Washington is a 62 y.o. female seen for:     1. Type 2 diabetes mellitus with stage 4 chronic kidney disease, without long-term current use of insulin ([facility] [address]): diet controlled. Previously treated with metformin which was discontinued due to renal function. Due for A1c (reports no recent blood transfusions). - HEMOGLOBIN A1C WITH EAG  -  DIABETES FOOT EXAM  - LIPID PANEL    2.  Essential hypertension: elevated here today, but she has not taken her medications today. No change in current therapies. Follow up with Nephrology and Cardiology. Check labs. - RENAL FUNCTION PANEL  - MAGNESIUM  - doxazosin (CARDURA) 2 mg tablet; Take 1 Tab by mouth daily. Dispense: 90 Tab; Refill: 1    3. Encounter for immunization: Influenza vaccine administered, VIS provided. - INFLUENZA VIRUS VACCINE QUADRIVALENT, PRESERVATIVE FREE SYRINGE (02360)  - [place] IMMUNIZ ADMIN,1 SINGLE/COMB VAC/TOXOID    4. Screening for breast cancer: has never had screening mammography, ordered. - [facility] MAMMO BI SCREENING INCL CAD; Future    5. Need for hepatitis C screening test: will screen based upon her birth year (1955). - HEPATITIS C AB    I have discussed the diagnosis with the patient and the intended plan as seen in the above orders. The patient has received an after-visit summary and questions were answered concerning future plans. I have discussed medication side effects and warnings with the patient as well. Patient verbalizes understanding of plan of care and denies further questions or concerns at this time. Informed patient to return to the office if symptoms worsen or if new symptoms arise. Follow-up Disposition:  Return in about 3 months (around 1/5/2018) for follow up or sooner as needed.

## 2017-10-05 NOTE — MR AVS SNAPSHOT
Visit Information Date & Time Provider Department Dept. Phone Encounter #  
 10/5/2017  9:00 AM Hugo NobleMatt [phone] [id number] Follow-up Instructions Return in about 3 months (around 1/5/2018) for follow up or sooner as needed. Upcoming Health Maintenance Date Due Hepatitis C Screening 1955 FOOT EXAM Q1 10/23/1966 Pneumococcal 19-64 Highest Risk (1 of 3 - PCV13) 10/23/1975 DTaP/Tdap/Td series (1 - Tdap) 10/23/1977 PAP AKA CERVICAL CYTOLOGY 10/23/1977 BREAST CANCER SCRN MAMMOGRAM 10/23/2006 ZOSTER VACCINE AGE 60> 8/23/2016 INFLUENZA AGE 9 TO ADULT 8/1/2017 HEMOGLOBIN A1C Q6M 9/10/2017 MICROALBUMIN Q1 2/9/2018 LIPID PANEL Q1 3/10/2018 FOBT Q 1 YEAR AGE 50-75 5/19/2018 EYE EXAM RETINAL OR DILATED Q1 8/23/2018 Allergies as of 10/5/2017  Review Complete On: 10/5/2017 By: Hugo Noble MD  
  
 Severity Noted Reaction Type Reactions Ciprofloxacin  03/13/2017   Systemic Angioedema Current Immunizations  Reviewed on 5/19/2017 Name Date Influenza Vaccine (Quad) PF 10/5/2017, 9/19/2016  2:34 PM  
  
 Not reviewed this visit You Were Diagnosed With   
  
 Codes Comments Type 2 diabetes mellitus with stage 4 chronic kidney disease, without long-term current use of insulin (HCC)    -  Primary ICD-10-CM: E11.22, N18.4 ICD-9-CM: 250.40, 585.4 Essential hypertension     ICD-10-CM: I10 
ICD-9-CM: 401.9 Encounter for immunization     ICD-10-CM: L34 ICD-9-CM: V03.89 Screening for breast cancer     ICD-10-CM: Z12.31 
ICD-9-CM: V76.10 Need for hepatitis C screening test     ICD-10-CM: Z11.59 
ICD-9-CM: V73.89 Vitals BP Pulse Temp Resp Height(growth percentile) Weight(growth percentile) 181/79 (BP 1 Location: Right arm, BP Patient Position: Sitting) 82 97.5 °F (36.4 °C) (Oral) 18 5' 1\" (1.549 m) 155 lb (70.3 kg) SpO2 BMI OB Status Smoking Status 95% 29.29 kg/m2 Postmenopausal Never Smoker Vitals History BMI and BSA Data Body Mass Index Body Surface Area  
 29.29 kg/m 2 1.74 m 2 Preferred Pharmacy Pharmacy Name Phone [facility] [phone] Your Updated Medication List  
  
   
This list is accurate as of: 10/5/17  9:41 AM.  Always use your most recent med list.  
  
  
  
  
 albuterol-ipratropium 2.5 mg-0.5 mg/3 ml Nebu Commonly known as:  DUO-NEB  
3 mL by Nebulization route every four (4) hours as needed. amLODIPine 10 mg tablet Commonly known as:  Stefani Pace Take 10 mg by mouth daily. aspirin delayed-release 81 mg tablet  
take 2 tablets by mouth once daily  
  
 atorvastatin 10 mg tablet Commonly known as:  LIPITOR  
take 1 tablet by mouth at bedtime  
  
 bumetanide 2 mg tablet Commonly known as:  BUMEX  
take 1 tablet by mouth once daily  
  
 dorzolamide-timolol 22.3-6.8 mg/mL ophthalmic solution Commonly known as:  COSOPT Administer 1 Drop to both eyes two (2) times a day. doxazosin 2 mg tablet Commonly known as:  CARDURA Take 1 Tab by mouth daily. DUREZOL 0.05 % ophthalmic emulsion Generic drug:  Difluprednate  
instill 1 drop into left eye twice a day START AFTER CATARACT SURGERY  
  
 ferrous sulfate 325 mg (65 mg iron) tablet Take 1 Tab by mouth daily (with breakfast). gentamicin 0.3 % ophthalmic solution Commonly known as:  GARAMYCIN  
instill 1 drop into left eye four times a day START 2 DAYS PRIOR TO SURGERY ILEVRO 0.3 % Drps Generic drug:  nepafenac  
  
 labetalol 200 mg tablet Commonly known as:  Drusilla Starring  
take 1 tablet by mouth every 8 hours  
  
 losartan-hydroCHLOROthiazide 100-25 mg per tablet Commonly known as:  HYZAAR  
take 1 tablet by mouth once daily TYLENOL PM PO Take 2 Tabs by mouth nightly as needed (sleep). Prescriptions Sent to Pharmacy Refills  
 doxazosin (CARDURA) 2 mg tablet 1 Sig: Take 1 Tab by mouth daily. Class: Normal  
 Pharmacy: [id number] [address] [facility] #: [phone] Route: Oral  
  
We Performed the Following HEMOGLOBIN A1C WITH EAG [00010 CPT(R)] HEPATITIS C AB [97555 CPT(R)] HM DIABETES FOOT EXAM [HM7 Custom] INFLUENZA VIRUS VAC QUAD,SPLIT,PRESV FREE SYRINGE IM [id number] CPT(R)] LIPID PANEL [37039 CPT(R)] MAGNESIUM [id number] CPT(R)] [place] IMMUNIZ ADMIN,1 SINGLE/COMB VAC/TOXOID R105893 CPT(R)] RENAL FUNCTION PANEL [07270 CPT(R)] Follow-up Instructions Return in about 3 months (around 1/5/2018) for follow up or sooner as needed. To-Do List   
 10/05/2017 Imaging:  [facility] MAMMO BI SCREENING INCL CAD Patient Instructions A Healthy Lifestyle: Care Instructions Your Care Instructions A healthy lifestyle can help you feel good, stay at a healthy weight, and have plenty of energy for both work and play. A healthy lifestyle is something you can share with your whole family. A healthy lifestyle also can lower your risk for serious health problems, such as high blood pressure, heart disease, and diabetes. You can follow a few steps listed below to improve your health and the health of your family. Follow-up care is a key part of your treatment and safety. Be sure to make and go to all appointments, and call your doctor if you are having problems. Its also a good idea to know your test results and keep a list of the medicines you take. How can you care for yourself at home? · Do not eat too much sugar, fat, or fast foods. You can still have dessert and treats now and then. The goal is moderation. · Start small to improve your eating habits. Pay attention to portion sizes, drink less juice and soda pop, and eat more fruits and vegetables. ¨ Eat a healthy amount of food. A 3-ounce serving of meat, for example, is about the size of a deck of cards. Fill the rest of your plate with vegetables and whole grains. ¨ Limit the amount of soda and sports drinks you have every day. Drink more water when you are thirsty. ¨ Eat at least 5 servings of fruits and vegetables every day. It may seem like a lot, but it is not hard to reach this goal. A serving or helping is 1 piece of fruit, 1 cup of vegetables, or 2 cups of leafy, raw vegetables. Have an apple or some carrot sticks as an afternoon snack instead of a candy bar. Try to have fruits and/or vegetables at every meal. 
· Make exercise part of your daily routine. You may want to start with simple activities, such as walking, bicycling, or slow swimming. Try to be active 30 to 60 minutes every day. You do not need to do all 30 to 60 minutes all at once. For example, you can exercise 3 times a day for 10 or 20 minutes. Moderate exercise is safe for most people, but it is always a good idea to talk to your doctor before starting an exercise program. 
· Keep moving. Luciano Bonilla the lawn, work in the garden, or [employer]. Take the stairs instead of the elevator at work. · If you smoke, quit. People who smoke have an increased risk for heart attack, stroke, cancer, and other lung illnesses. Quitting is hard, but there are ways to boost your chance of quitting tobacco for good. ¨ Use nicotine gum, patches, or lozenges. ¨ Ask your doctor about stop-smoking programs and medicines. ¨ Keep trying. In addition to reducing your risk of diseases in the future, you will notice some benefits soon after you stop using tobacco. If you have shortness of breath or asthma symptoms, they will likely get better within a few weeks after you quit. · Limit how much alcohol you drink. Moderate amounts of alcohol (up to 2 drinks a day for men, 1 drink a day for women) are okay.  But drinking too much can lead to liver problems, high blood pressure, and other health problems. Family health If you have a family, there are many things you can do together to improve your health. · Eat meals together as a family as often as possible. · Eat healthy foods. This includes fruits, vegetables, lean meats and dairy, and whole grains. · Include your family in your fitness plan. Most people think of activities such as jogging or tennis as the way to fitness, but there are many ways you and your family can be more active. Anything that makes you breathe hard and gets your heart pumping is exercise. Here are some tips: 
¨ Walk to do errands or to take your child to school or the bus. ¨ Go for a family bike ride after dinner instead of watching TV. Where can you learn more? Go to http://[facility].info/. Enter K828 in the search box to learn more about \"A Healthy Lifestyle: Care Instructions. \" Current as of: July 26, 2016 Content Version: 11.3 © 4030-2055 [employer]. Care instructions adapted under license by [employer] (which disclaims liability or warranty for this information). If you have questions about a medical condition or this instruction, always ask your healthcare professional. [address] any warranty or liability for your use of this information. Influenza (Flu) Vaccine (Inactivated or Recombinant): What You Need to Know Why get vaccinated? Influenza (\"flu\") is a contagious disease that spreads around the [place] every winter, usually between October and May. Flu is caused by influenza viruses and is spread mainly by coughing, sneezing, and close contact. Anyone can get flu. Flu strikes suddenly and can last several days. Symptoms vary by age, but can include: · Fever/chills. · Sore throat. · Muscle aches. · Fatigue. · Cough. · Headache. · Runny or stuffy nose. Flu can also lead to pneumonia and blood infections, and cause diarrhea and seizures in children. If you have a medical condition, such as heart or lung disease, flu can make it worse. Flu is more dangerous for some people. Infants and young children, people 72years of age and older, pregnant women, and people with certain health conditions or a weakened immune system are at greatest risk. Each year thousands of people in the [facility] die from flu, and many more are hospitalized. Flu vaccine can: · Keep you from getting flu. · Make flu less severe if you do get it. · Keep you from spreading flu to your family and other people. Inactivated and recombinant flu vaccines A dose of flu vaccine is recommended every flu season. Children 6 months through 6years of age may need two doses during the same flu season. Everyone else needs only one dose each flu season. Some inactivated flu vaccines contain a very small amount of a mercury-based preservative called thimerosal. Studies have not shown thimerosal in vaccines to be harmful, but flu vaccines that do not contain thimerosal are available. There is no live flu virus in flu shots. They cannot cause the flu. There are many flu viruses, and they are always changing. Each year a new flu vaccine is made to protect against three or four viruses that are likely to cause disease in the upcoming flu season. But even when the vaccine doesn't exactly match these viruses, it may still provide some protection. Flu vaccine cannot prevent: · Flu that is caused by a virus not covered by the vaccine. · Illnesses that look like flu but are not. Some people should not get this vaccine Tell the person who is giving you the vaccine: · If you have any severe (life-threatening) allergies.  If you ever had a life-threatening allergic reaction after a dose of flu vaccine, or have a severe allergy to any part of this vaccine, you may be advised not to get vaccinated. Most, but not all, types of flu vaccine contain a small amount of egg protein. · If you ever had Guillain-Barré syndrome (also called GBS) Some people with a history of GBS should not get this vaccine. This should be discussed with your doctor. · If you are not feeling well. It is usually okay to get flu vaccine when you have a mild illness, but you might be asked to come back when you feel better. Risks of a vaccine reaction With any medicine, including vaccines, there is a chance of reactions. These are usually mild and go away on their own, but serious reactions are also possible. Most people who get a flu shot do not have any problems with it. Minor problems following a flu shot include: · Soreness, redness, or swelling where the shot was given · Hoarseness · Sore, red or itchy eyes · Cough · Fever · Aches · Headache · Itching · Fatigue If these problems occur, they usually begin soon after the shot and last 1 or 2 days. More serious problems following a flu shot can include the following: · There may be a small increased risk of Guillain-Barré Syndrome (GBS) after inactivated flu vaccine. This risk has been estimated at 1 or 2 additional cases per million people vaccinated. This is much lower than the risk of severe complications from flu, which can be prevented by flu vaccine. · Pozo Riff children who get the flu shot along with pneumococcal vaccine (PCV13) and/or DTaP vaccine at the same time might be slightly more likely to have a seizure caused by fever. Ask your doctor for more information. Tell your doctor if a child who is getting flu vaccine has ever had a seizure Problems that could happen after any injected vaccine: · People sometimes faint after a medical procedure, including vaccination. Sitting or lying down for about 15 minutes can help prevent fainting, and injuries caused by a fall. Tell your doctor if you feel dizzy, or have vision changes or ringing in the ears. · Some people get severe pain in the shoulder and have difficulty moving the arm where a shot was given. This happens very rarely. · Any medication can cause a severe allergic reaction. Such reactions from a vaccine are very rare, estimated at about 1 in a million doses, and would happen within a few minutes to a few hours after the vaccination. As with any medicine, there is a very remote chance of a vaccine causing a serious injury or death. The safety of vaccines is always being monitored. For more information, visit: www.cdc.gov/vaccinesafety/. What if there is a serious reaction? What should I look for? · Look for anything that concerns you, such as signs of a severe allergic reaction, very high fever, or unusual behavior. Signs of a severe allergic reaction can include hives, swelling of the face and throat, difficulty breathing, a fast heartbeat, dizziness, and weakness  usually within a few minutes to a few hours after the vaccination. What should I do? · If you think it is a severe allergic reaction or other emergency that can't wait, call 9-1-1 and get the person to the nearest hospital. Otherwise, call your doctor. · Reactions should be reported to the \"Vaccine Adverse Event Reporting System\" (VAERS). Your doctor should file this report, or you can do it yourself through the VAERS website at www.vaers. hhs.gov, or by calling 3-895.315.9706. VAERS does not give medical advice. The National Vaccine Injury Compensation Program 
The National Vaccine Injury Compensation Program (VICP) is a federal program that was created to compensate people who may have been injured by certain vaccines. Persons who believe they may have been injured by a vaccine can learn about the program and about filing a claim by calling 5-901.376.1555 or visiting the [employer] website at www.[facility].gov/vaccinecompensation. There is a time limit to file a claim for compensation. How can I learn more? · Ask your healthcare provider. He or she can give you the vaccine package insert or suggest other sources of information. · Call your local or state health department. · Contact the Centers for Disease Control and Prevention (CDC): 
¨ Call 0-577.180.8098 (1-800-CDC-INFO) or ¨ Visit CDC's website at www.cdc.gov/flu Vaccine Information Statement Inactivated Influenza Vaccine 8/7/2015) 42 [address] [place] 779JE-19 Department of Health and [place] Centers for Disease Control and Prevention Many Vaccine Information Statements are available in Welsh and other languages. See www.immunize.org/vis. Muchas hojas de información sobre vacunas están disponibles en español y en otros idiomas. Visite www.immunize.org/vis. Care instructions adapted under license by [employer] (which disclaims liability or warranty for this information). If you have questions about a medical condition or this instruction, always ask your healthcare professional. [place] [address] any warranty or liability for your use of this information. Please provide this summary of care documentation to your next provider. Your primary care clinician is listed as Isauro Barillas. If you have any questions after today's visit, please call [phone].

## 2017-10-06 LAB
ALBUMIN SERPL-MCNC: 4 G/DL (ref 3.6–4.8)
BUN SERPL-MCNC: 76 MG/DL (ref 8–27)
BUN/CREAT SERPL: 16 (ref 12–28)
CALCIUM SERPL-MCNC: 8.4 MG/DL (ref 8.7–10.3)
CHLORIDE SERPL-SCNC: 104 MMOL/L (ref 96–106)
CHOLEST SERPL-MCNC: 164 MG/DL (ref 100–199)
CO2 SERPL-SCNC: 17 MMOL/L (ref 18–29)
CREAT SERPL-MCNC: 4.78 MG/DL (ref 0.57–1)
EST. AVERAGE GLUCOSE BLD GHB EST-MCNC: <74 MG/DL
GLUCOSE SERPL-MCNC: 94 MG/DL (ref 65–99)
HBA1C MFR BLD: <4.2 % (ref 4.8–5.6)
HCV AB S/CO SERPL IA: <0.1 S/CO RATIO (ref 0–0.9)
HDLC SERPL-MCNC: 78 MG/DL
INTERPRETATION, 910389: NORMAL
INTERPRETATION: NORMAL
LDLC SERPL CALC-MCNC: 78 MG/DL (ref 0–99)
Lab: NORMAL
MAGNESIUM SERPL-MCNC: 1.9 MG/DL (ref 1.6–2.3)
PDF IMAGE, 910387: NORMAL
PHOSPHATE SERPL-MCNC: 4.9 MG/DL (ref 2.5–4.5)
POTASSIUM SERPL-SCNC: 4.9 MMOL/L (ref 3.5–5.2)
SODIUM SERPL-SCNC: 139 MMOL/L (ref 134–144)
TRIGL SERPL-MCNC: 38 MG/DL (ref 0–149)
VLDLC SERPL CALC-MCNC: 8 MG/DL (ref 5–40)

## 2017-10-12 RX ORDER — ASPIRIN 81 MG/1
TABLET ORAL
Qty: 60 TAB | Refills: 3 | Status: SHIPPED | OUTPATIENT
Start: 2017-10-12

## 2017-10-16 ENCOUNTER — HOSPITAL ENCOUNTER (OUTPATIENT)
Dept: MAMMOGRAPHY | Age: 62
Discharge: HOME | End: 2017-10-16
Attending: FAMILY MEDICINE
Payer: COMMERCIAL

## 2017-10-16 DIAGNOSIS — Z12.39 SCREENING FOR BREAST CANCER: ICD-10-CM

## 2017-10-16 PROCEDURE — 77067 SCR MAMMO BI INCL CAD: CPT

## 2017-11-03 ENCOUNTER — OFFICE VISIT (OUTPATIENT)
Dept: FAMILY MEDICINE CLINIC | Age: 62
End: 2017-11-03

## 2017-11-03 VITALS
OXYGEN SATURATION: 95 % | HEIGHT: 61 IN | SYSTOLIC BLOOD PRESSURE: 146 MMHG | WEIGHT: 161 LBS | HEART RATE: 67 BPM | RESPIRATION RATE: 18 BRPM | DIASTOLIC BLOOD PRESSURE: 68 MMHG | TEMPERATURE: 97.5 F | BODY MASS INDEX: 30.4 KG/M2

## 2017-11-03 DIAGNOSIS — H43.9: Primary | ICD-10-CM

## 2017-11-03 DIAGNOSIS — E11.8 TYPE 2 DIABETES MELLITUS WITH COMPLICATION, WITHOUT LONG-TERM CURRENT USE OF INSULIN (HCC): ICD-10-CM

## 2017-11-03 DIAGNOSIS — N18.4 CKD (CHRONIC KIDNEY DISEASE) STAGE 4, GFR 15-29 ML/MIN (HCC): Chronic | ICD-10-CM

## 2017-11-03 DIAGNOSIS — E11.3593 PROLIFERATIVE DIABETIC RETINOPATHY OF BOTH EYES ASSOCIATED WITH TYPE 2 DIABETES MELLITUS, UNSPECIFIED PROLIFERATIVE RETINOPATHY TYPE (HCC): ICD-10-CM

## 2017-11-03 DIAGNOSIS — I50.30 DIASTOLIC CONGESTIVE HEART FAILURE, UNSPECIFIED CONGESTIVE HEART FAILURE CHRONICITY: ICD-10-CM

## 2017-11-03 DIAGNOSIS — Z01.818 PREOPERATIVE EXAMINATION: ICD-10-CM

## 2017-11-03 PROBLEM — H40.10X0 OPEN-ANGLE GLAUCOMA OF BOTH EYES: Status: ACTIVE | Noted: 2017-04-04

## 2017-11-03 PROBLEM — H40.039 ANATOMICAL NARROW ANGLE GLAUCOMA: Status: ACTIVE | Noted: 2017-04-04

## 2017-11-03 PROBLEM — I50.9 CHF EXACERBATION (HCC): Status: RESOLVED | Noted: 2017-03-27 | Resolved: 2017-11-03

## 2017-11-03 PROBLEM — N17.9 AKI (ACUTE KIDNEY INJURY) (HCC): Status: RESOLVED | Noted: 2017-05-20 | Resolved: 2017-11-03

## 2017-11-03 RX ORDER — GENTAMICIN SULFATE 3 MG/ML
SOLUTION/ DROPS OPHTHALMIC
Refills: 0 | COMMUNITY
Start: 2017-10-27 | End: 2018-12-04 | Stop reason: ALTCHOICE

## 2017-11-03 RX ORDER — PREDNISOLONE ACETATE 10 MG/ML
SUSPENSION/ DROPS OPHTHALMIC
Refills: 0 | COMMUNITY
Start: 2017-10-30 | End: 2018-12-04 | Stop reason: ALTCHOICE

## 2017-11-03 RX ORDER — FUROSEMIDE 20 MG/1
TABLET ORAL
Refills: 0 | COMMUNITY
Start: 2017-10-20 | End: 2017-12-20 | Stop reason: SDDI

## 2017-11-03 NOTE — ASSESSMENT & PLAN NOTE
This condition is managed by Specialist. Opthalmologist: Dr. Steven Major Medications     Patient is on no antihyperglycemic meds.         Other Key Diabetic Medications             losartan-hydroCHLOROthiazide (HYZAAR) 100-25 mg per tablet  (Taking) take 1 tablet by mouth once daily    atorvastatin (LIPITOR) 10 mg tablet  (Taking) take 1 tablet by mouth at bedtime        Lab Results   Component Value Date/Time    Hemoglobin A1c <4.2 10/05/2017 09:53 AM    Glucose 94 10/05/2017 09:53 AM    Creatinine 4.78 10/05/2017 09:53 AM    Microalbumin/creat ratio (POC) 300 02/09/2017 11:10 AM    Cholesterol, total 164 10/05/2017 09:53 AM    HDL Cholesterol 78 10/05/2017 09:53 AM    LDL, calculated 78 10/05/2017 09:53 AM    Triglyceride 38 10/05/2017 09:53 AM     Diabetic Foot and Eye Exam HM Status   Topic Date Due    Eye Exam  08/23/2018    Diabetic Foot Care  10/05/2018

## 2017-11-03 NOTE — PROGRESS NOTES
Preoperative Evaluation    Date of Exam: 11/3/2017    Soila Beck is a 65 y.o. female (:1955) who presents for preoperative evaluation. Procedure/Surgery: Vitrectomy of left eye   Date of Procedure/Surgery: 17  Surgeon: Dr. Deb Hassan: [facility]   Primary Physician: Kayy gOden MD  Latex Allergy: no    Medical History:     Past Medical History:   Diagnosis Date    Anemia     Arthritis     Calculus of kidney     Congestive heart failure ([address]     Diabetes [facility]     Hematuria        Allergies: Allergies   Allergen Reactions    Ciprofloxacin Angioedema        Medications:     Current Outpatient Prescriptions   Medication Sig    furosemide (LASIX) 20 mg tablet     prednisoLONE acetate (PRED FORTE) 1 % ophthalmic suspension instill 1 drop into left eye four times a day    aspirin delayed-release 81 mg tablet take 2 tablets by mouth once daily    doxazosin (CARDURA) 2 mg tablet Take 1 Tab by mouth daily.  labetalol (NORMODYNE) 200 mg tablet take 1 tablet by mouth every 8 hours    DUREZOL 0.05 % ophthalmic emulsion instill 1 drop into left eye twice a day START AFTER CATARACT SURGERY    ILEVRO 0.3 % drps     losartan-hydroCHLOROthiazide (HYZAAR) 100-25 mg per tablet take 1 tablet by mouth once daily    atorvastatin (LIPITOR) 10 mg tablet take 1 tablet by mouth at bedtime    bumetanide (BUMEX) 2 mg tablet take 1 tablet by mouth once daily    albuterol-ipratropium (DUO-NEB) 2.5 mg-0.5 mg/3 ml nebu 3 mL by Nebulization route every four (4) hours as needed.  ferrous sulfate 325 mg (65 mg iron) tablet Take 1 Tab by mouth daily (with breakfast).  ACETAMINOPHEN/DIPHENHYDRAMINE (TYLENOL PM PO) Take 2 Tabs by mouth nightly as needed (sleep).  dorzolamide-timolol (COSOPT) 22.3-6.8 mg/mL ophthalmic solution Administer 1 Drop to both eyes two (2) times a day.  amLODIPine (NORVASC) 10 mg tablet Take 10 mg by mouth daily.     gentamicin (GARAMYCIN) 0.3 % ophthalmic solution      No current facility-administered medications for this visit.       Social History:     Social History     Social History    Marital status:      Spouse name: N/A    Number of children: N/A    Years of education: N/A     Social History Main Topics    Smoking status: Never Smoker    Smokeless tobacco: Never Used    Alcohol use No    Drug use: No    Sexual activity: Not Asked     Other Topics Concern    None     Social History Narrative       Recent use of: ASA    Tetanus up to date: tetanus status unknown to the patient    Anesthesia Complications: None  History of abnormal bleeding : None  History of Blood Transfusions: yes  Health Care Directive or Living Will: no    REVIEW OF SYSTEMS:  Constitutional: negative for fevers and chills  Eyes: positive for visual disturbance  Ears, nose, mouth, throat, and face: negative for nasal congestion and sore throat  Respiratory: negative for cough, sputum or dyspnea on exertion  Cardiovascular: positive for lower extremity edema, negative for chest pain, chest pressure/discomfort, palpitations, irregular heart beats  Gastrointestinal: negative for nausea, vomiting, change in bowel habits and abdominal pain  Neurological: negative for headaches, dizziness and paresthesia      EXAM:   Vitals:    11/03/17 0804 11/03/17 0832   BP: 152/63 146/68   Pulse: 67    Resp: 18    Temp: 97.5 °F (36.4 °C)    TempSrc: Oral    SpO2: 95%    Weight: 161 lb (73 kg)    Height: 5' 1\" (1.549 m)        General appearance - alert, well appearing, and in no distress  Eyes - pupils equal and reactive, extraocular eye movements intact, sclera anicteric  Oropharyngeal exam - mucous membranes moist, pharynx normal without lesions  Neck - supple, no significant adenopathy, carotids upstroke normal bilaterally, no bruits  Chest - clear to auscultation, no wheezes, rales or rhonchi, symmetric air entry  Heart - normal rate, regular rhythm, normal S1, S2, no murmurs, rubs, clicks or gallops  Extremities - bilateral lower extremity pitting edema   Skin exam - normal coloration and turgor, no rashes, no suspicious skin lesions noted  Neurological exam reveals alert, oriented, normal speech, no focal findings or movement disorder noted    DIAGNOSTICS:   1. EKG: EKG FINDINGS - not needed  2. CXR: not needed  3. Labs: not needed    IMPRESSION: Barbara Iyer is a 65 y.o. female with history of NIIDM, hypertension, CHF, CKD seen today for preoperative evaluation for vitrectomy of left eye She is doing well at this time. She has an acceptable risk for surgery and may proceed with planned procedure without contraindication. H&P form completed and faxed to surgeon on patient's behalf. ICD-10-CM ICD-9-CM    1. Vitreous disorder H43.9 379.29    2. Preoperative examination Z01.818 V72.84    3. Type 2 diabetes mellitus with complication, without long-term current use of insulin ([facility]) E11.8 250.90    4. Proliferative diabetic retinopathy of both eyes associated with type 2 diabetes mellitus, unspecified proliferative retinopathy type ([facility] E11.3593 250.50      362.02    5.  Diastolic congestive heart failure, unspecified congestive heart failure chronicity ([facility]) I50.30 428.30      428.0    6. CKD (chronic kidney disease) stage 4, GFR 15-29 ml/min ([facility] N18.4 585.4        Michael Belcher MD   11/3/2017

## 2017-11-03 NOTE — LETTER
11/3/2017 8:29 AM 
 
Ms. Beryle Hart [address] [facility] Current Outpatient Prescriptions Medication Sig  furosemide (LASIX) 20 mg tablet  prednisoLONE acetate (PRED FORTE) 1 % ophthalmic suspension instill 1 drop into left eye four times a day  aspirin delayed-release 81 mg tablet take 2 tablets by mouth once daily  doxazosin (CARDURA) 2 mg tablet Take 1 Tab by mouth daily.  labetalol (NORMODYNE) 200 mg tablet take 1 tablet by mouth every 8 hours  DUREZOL 0.05 % ophthalmic emulsion instill 1 drop into left eye twice a day START AFTER CATARACT SURGERY  
 ILEVRO 0.3 % drps  losartan-hydroCHLOROthiazide (HYZAAR) 100-25 mg per tablet take 1 tablet by mouth once daily  atorvastatin (LIPITOR) 10 mg tablet take 1 tablet by mouth at bedtime  bumetanide (BUMEX) 2 mg tablet take 1 tablet by mouth once daily  albuterol-ipratropium (DUO-NEB) 2.5 mg-0.5 mg/3 ml nebu 3 mL by Nebulization route every four (4) hours as needed.  ferrous sulfate 325 mg (65 mg iron) tablet Take 1 Tab by mouth daily (with breakfast).  ACETAMINOPHEN/DIPHENHYDRAMINE (TYLENOL PM PO) Take 2 Tabs by mouth nightly as needed (sleep).  dorzolamide-timolol (COSOPT) 22.3-6.8 mg/mL ophthalmic solution Administer 1 Drop to both eyes two (2) times a day.  amLODIPine (NORVASC) 10 mg tablet Take 10 mg by mouth daily.  gentamicin (GARAMYCIN) 0.3 % ophthalmic solution No current facility-administered medications for this visit.

## 2017-11-03 NOTE — PROGRESS NOTES
Identified pt with two pt identifiers(name and ). Chief Complaint   Patient presents with    Pre-op Exam     eye surgery        Health Maintenance Due   Topic    Pneumococcal 19-64 Highest Risk (1 of 3 - PCV13)    DTaP/Tdap/Td series (1 - Tdap)    PAP AKA CERVICAL CYTOLOGY     ZOSTER VACCINE AGE 60>        Wt Readings from Last 3 Encounters:   17 161 lb (73 kg)   10/05/17 155 lb (70.3 kg)   17 170 lb (77.1 kg)     Temp Readings from Last 3 Encounters:   17 97.5 °F (36.4 °C) (Oral)   10/05/17 97.5 °F (36.4 °C) (Oral)   17 98.4 °F (36.9 °C) (Oral)     BP Readings from Last 3 Encounters:   17 152/63   10/05/17 181/79   17 144/64     Pulse Readings from Last 3 Encounters:   17 67   10/05/17 82   17 77         Learning Assessment:  :     Learning Assessment 2016   PRIMARY LEARNER Patient   HIGHEST LEVEL OF EDUCATION - PRIMARY LEARNER  DID NOT GRADUATE HIGH SCHOOL   BARRIERS PRIMARY LEARNER NONE   CO-LEARNER CAREGIVER No   PRIMARY LANGUAGE ENGLISH   LEARNER PREFERENCE PRIMARY DEMONSTRATION   ANSWERED BY patient   RELATIONSHIP SELF       Depression Screening:  :     PHQ over the last two weeks 2017   Little interest or pleasure in doing things Not at all   Feeling down, depressed or hopeless Not at all   Total Score PHQ 2 0       Fall Risk Assessment:  :     No flowsheet data found. Abuse Screening:  :     No flowsheet data found. Coordination of Care Questionnaire:  :     1) Have you been to an emergency room, urgent care clinic since your last visit? no   Hospitalized since your last visit? no             2) Have you seen or consulted any other health care providers outside of [address] [place] [address] since your last visit? [facility]  (Include any pap smears or colon screenings in this section.)    3) Do you have an Advance Directive on file? no  Are you interested in receiving information about Advance Directives?  no    Reviewed record in preparation for visit and have obtained necessary documentation. Medication reconciliation up to date and corrected with patient at this time.

## 2017-11-03 NOTE — ASSESSMENT & PLAN NOTE
This condition is managed by Specialist. Cardiologist - Dr. Gerard Noe. Olson CAD CHF Meds             furosemide (LASIX) 20 mg tablet  (Taking)     aspirin delayed-release 81 mg tablet  (Taking) take 2 tablets by mouth once daily    doxazosin (CARDURA) 2 mg tablet  (Taking) Take 1 Tab by mouth daily. labetalol (NORMODYNE) 200 mg tablet  (Taking) take 1 tablet by mouth every 8 hours    losartan-hydroCHLOROthiazide (HYZAAR) 100-25 mg per tablet  (Taking) take 1 tablet by mouth once daily    bumetanide (BUMEX) 2 mg tablet  (Taking) take 1 tablet by mouth once daily    amLODIPine (NORVASC) 10 mg tablet  (Taking) Take 10 mg by mouth daily.         Key Antihyperlipidemia Meds             atorvastatin (LIPITOR) 10 mg tablet  (Taking) take 1 tablet by mouth at bedtime        Lab Results   Component Value Date/Time    Sodium 139 10/05/2017 09:53 AM    Potassium 4.9 10/05/2017 09:53 AM    Cholesterol, total 164 10/05/2017 09:53 AM    HDL Cholesterol 78 10/05/2017 09:53 AM    LDL, calculated 78 10/05/2017 09:53 AM    Triglyceride 38 10/05/2017 09:53 AM    INR 1.0 03/10/2017 02:21 PM    Prothrombin time 10.5 03/10/2017 02:21 PM

## 2017-11-03 NOTE — ASSESSMENT & PLAN NOTE
This condition is managed by Specialist. Nephrologist: Dr. Emerald Hi. Olson CKD Meds             furosemide (LASIX) 20 mg tablet  (Taking)     losartan-hydroCHLOROthiazide (HYZAAR) 100-25 mg per tablet  (Taking) take 1 tablet by mouth once daily    bumetanide (BUMEX) 2 mg tablet  (Taking) take 1 tablet by mouth once daily    ferrous sulfate 325 mg (65 mg iron) tablet  (Taking) Take 1 Tab by mouth daily (with breakfast). Lab Results   Component Value Date/Time    GFR est non-AA 9 10/05/2017 09:53 AM    GFR est AA 11 10/05/2017 09:53 AM    Creatinine 4.78 10/05/2017 09:53 AM    BUN 76 10/05/2017 09:53 AM    Sodium 139 10/05/2017 09:53 AM    Potassium 4.9 10/05/2017 09:53 AM    Chloride 104 10/05/2017 09:53 AM    CO2 17 10/05/2017 09:53 AM    Calcium 8.4 10/05/2017 09:53 AM    Magnesium 1.9 10/05/2017 09:53 AM    Phosphorus 4.9 10/05/2017 09:53 AM    Vitamin D 25-Hydroxy 13.3 05/05/2017 02:22 PM    PTH, Intact 347.1 05/05/2017 02:22 PM     Estimated Creatinine Clearance: 11.3 mL/min (based on Cr of 4.78).

## 2017-11-03 NOTE — PATIENT INSTRUCTIONS

## 2017-11-03 NOTE — ASSESSMENT & PLAN NOTE
Well Controlled, based on history, physical exam and review of pertinent labs, studies and medications; meds reconciled; continue current treatment plan. Diabetes is currently diet controlled. Key Antihyperglycemic Medications     Patient is on no antihyperglycemic meds.         Other Key Diabetic Medications             losartan-hydroCHLOROthiazide (HYZAAR) 100-25 mg per tablet  (Taking) take 1 tablet by mouth once daily    atorvastatin (LIPITOR) 10 mg tablet  (Taking) take 1 tablet by mouth at bedtime        Lab Results   Component Value Date/Time    Hemoglobin A1c <4.2 10/05/2017 09:53 AM    Glucose 94 10/05/2017 09:53 AM    Creatinine 4.78 10/05/2017 09:53 AM    Microalbumin/creat ratio (POC) 300 02/09/2017 11:10 AM    Cholesterol, total 164 10/05/2017 09:53 AM    HDL Cholesterol 78 10/05/2017 09:53 AM    LDL, calculated 78 10/05/2017 09:53 AM    Triglyceride 38 10/05/2017 09:53 AM     Diabetic Foot and Eye Exam HM Status   Topic Date Due    Eye Exam  08/23/2018    Diabetic Foot Care  10/05/2018

## 2017-11-03 NOTE — MR AVS SNAPSHOT
Visit Information Date & Time Provider Department Dept. Phone Encounter #  
 11/3/2017  8:00 AM Matt Avendaño Patrick [phone] [id number] Follow-up Instructions Return if symptoms worsen or fail to improve. Upcoming Health Maintenance Date Due Pneumococcal 19-64 Highest Risk (1 of 3 - PCV13) 10/23/1975 DTaP/Tdap/Td series (1 - Tdap) 10/23/1977 PAP AKA CERVICAL CYTOLOGY 10/23/1977 ZOSTER VACCINE AGE 60> 8/23/2016 MICROALBUMIN Q1 2/9/2018 HEMOGLOBIN A1C Q6M 4/5/2018 FOBT Q 1 YEAR AGE 50-75 5/19/2018 EYE EXAM RETINAL OR DILATED Q1 8/23/2018 FOOT EXAM Q1 10/5/2018 LIPID PANEL Q1 10/5/2018 BREAST CANCER SCRN MAMMOGRAM 10/16/2019 Allergies as of 11/3/2017  Review Complete On: 11/3/2017 By: Pippa Stanford MD  
  
 Severity Noted Reaction Type Reactions Ciprofloxacin  03/13/2017   Systemic Angioedema Current Immunizations  Reviewed on 5/19/2017 Name Date Influenza Vaccine (Quad) PF 10/5/2017, 9/19/2016  2:34 PM  
  
 Not reviewed this visit You Were Diagnosed With   
  
 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 4 Vitals BP Pulse Temp Resp Height(growth percentile) Weight(growth percentile) 146/68 67 97.5 °F (36.4 °C) (Oral) 18 5' 1\" (1.549 m) 161 lb (73 kg) SpO2 BMI OB Status Smoking Status 95% 30.42 kg/m2 Postmenopausal Never Smoker Vitals History BMI and BSA Data Body Mass Index Body Surface Area  
 30.42 kg/m 2 1.77 m 2 Preferred Pharmacy Pharmacy Name Phone [facility]  [phone] Your Updated Medication List  
  
   
This list is accurate as of: 11/3/17  8:34 AM.  Always use your most recent med list.  
  
  
  
  
 albuterol-ipratropium 2.5 mg-0.5 mg/3 ml Nebu Commonly known as:  DUO-NEB  
3 mL by Nebulization route every four (4) hours as needed. amLODIPine 10 mg tablet Commonly known as:  Lavern Cater Take 10 mg by mouth daily. aspirin delayed-release 81 mg tablet  
take 2 tablets by mouth once daily  
  
 atorvastatin 10 mg tablet Commonly known as:  LIPITOR  
take 1 tablet by mouth at bedtime  
  
 bumetanide 2 mg tablet Commonly known as:  BUMEX  
take 1 tablet by mouth once daily  
  
 dorzolamide-timolol 22.3-6.8 mg/mL ophthalmic solution Commonly known as:  COSOPT Administer 1 Drop to both eyes two (2) times a day. doxazosin 2 mg tablet Commonly known as:  CARDURA Take 1 Tab by mouth daily. DUREZOL 0.05 % ophthalmic emulsion Generic drug:  Difluprednate  
instill 1 drop into left eye twice a day START AFTER CATARACT SURGERY  
  
 ferrous sulfate 325 mg (65 mg iron) tablet Take 1 Tab by mouth daily (with breakfast). furosemide 20 mg tablet Commonly known as:  LASIX  
  
 gentamicin 0.3 % ophthalmic solution Commonly known as:  GARAMYCIN  
  
 ILEVRO 0.3 % Drps Generic drug:  nepafenac  
  
 labetalol 200 mg tablet Commonly known as:  Oren Cardozo  
take 1 tablet by mouth every 8 hours  
  
 losartan-hydroCHLOROthiazide 100-25 mg per tablet Commonly known as:  HYZAAR  
take 1 tablet by mouth once daily prednisoLONE acetate 1 % ophthalmic suspension Commonly known as:  PRED FORTE  
instill 1 drop into left eye four times a day TYLENOL PM PO Take 2 Tabs by mouth nightly as needed (sleep). Follow-up Instructions Return if symptoms worsen or fail to improve. Patient Instructions A Healthy Lifestyle: Care Instructions Your Care Instructions A healthy lifestyle can help you feel good, stay at a healthy weight, and have plenty of energy for both work and play. A healthy lifestyle is something you can share with your whole family. A healthy lifestyle also can lower your risk for serious health problems, such as high blood pressure, heart disease, and diabetes. You can follow a few steps listed below to improve your health and the health of your family. Follow-up care is a key part of your treatment and safety. Be sure to make and go to all appointments, and call your doctor if you are having problems. It's also a good idea to know your test results and keep a list of the medicines you take. How can you care for yourself at home? · Do not eat too much sugar, fat, or fast foods. You can still have dessert and treats now and then. The goal is moderation. · Start small to improve your eating habits. Pay attention to portion sizes, drink less juice and soda pop, and eat more fruits and vegetables. ¨ Eat a healthy amount of food. A 3-ounce serving of meat, for example, is about the size of a deck of cards. Fill the rest of your plate with vegetables and whole grains. ¨ Limit the amount of soda and sports drinks you have every day. Drink more water when you are thirsty. ¨ Eat at least 5 servings of fruits and vegetables every day. It may seem like a lot, but it is not hard to reach this goal. A serving or helping is 1 piece of fruit, 1 cup of vegetables, or 2 cups of leafy, raw vegetables.  Have an apple or some carrot sticks as an afternoon snack instead of a candy bar. Try to have fruits and/or vegetables at every meal. 
· Make exercise part of your daily routine. You may want to start with simple activities, such as walking, bicycling, or slow swimming. Try to be active 30 to 60 minutes every day. You do not need to do all 30 to 60 minutes all at once. For example, you can exercise 3 times a day for 10 or 20 minutes. Moderate exercise is safe for most people, but it is always a good idea to talk to your doctor before starting an exercise program. 
· Keep moving. Julieta Dove the lawn, work in the garden, or [employer]. Take the stairs instead of the elevator at work. · If you smoke, quit. People who smoke have an increased risk for heart attack, stroke, cancer, and other lung illnesses. Quitting is hard, but there are ways to boost your chance of quitting tobacco for good. ¨ Use nicotine gum, patches, or lozenges. ¨ Ask your doctor about stop-smoking programs and medicines. ¨ Keep trying. In addition to reducing your risk of diseases in the future, you will notice some benefits soon after you stop using tobacco. If you have shortness of breath or asthma symptoms, they will likely get better within a few weeks after you quit. · Limit how much alcohol you drink. Moderate amounts of alcohol (up to 2 drinks a day for men, 1 drink a day for women) are okay. But drinking too much can lead to liver problems, high blood pressure, and other health problems. Family health If you have a family, there are many things you can do together to improve your health. · Eat meals together as a family as often as possible. · Eat healthy foods. This includes fruits, vegetables, lean meats and dairy, and whole grains. · Include your family in your fitness plan. Most people think of activities such as jogging or tennis as the way to fitness, but there are many ways you and your family can be more active.  Anything that makes you breathe hard and gets your heart pumping is exercise. Here are some tips: 
¨ Walk to do errands or to take your child to school or the bus. ¨ Go for a family bike ride after dinner instead of watching TV. Where can you learn more? Go to http://milo-vania.info/. Enter H670 in the search box to learn more about \"A Healthy Lifestyle: Care Instructions. \" Current as of: May 12, 2017 Content Version: 11.4 © 6610-8178 [employer]. Care instructions adapted under license by [employer] (which disclaims liability or warranty for this information). If you have questions about a medical condition or this instruction, always ask your healthcare professional. [address] any warranty or liability for your use of this information. Please provide this summary of care documentation to your next provider. Your primary care clinician is listed as Jonel Tavares. If you have any questions after today's visit, please call [phone].

## 2017-11-16 RX ORDER — BUMETANIDE 2 MG/1
TABLET ORAL
Qty: 30 TAB | Refills: 2 | Status: SHIPPED | OUTPATIENT
Start: 2017-11-16 | End: 2020-01-01

## 2017-12-20 ENCOUNTER — OFFICE VISIT (OUTPATIENT)
Dept: FAMILY MEDICINE CLINIC | Age: 62
End: 2017-12-20

## 2017-12-20 VITALS
WEIGHT: 158 LBS | DIASTOLIC BLOOD PRESSURE: 60 MMHG | SYSTOLIC BLOOD PRESSURE: 162 MMHG | TEMPERATURE: 97.5 F | BODY MASS INDEX: 29.83 KG/M2 | HEIGHT: 61 IN | OXYGEN SATURATION: 97 % | RESPIRATION RATE: 18 BRPM | HEART RATE: 71 BPM

## 2017-12-20 DIAGNOSIS — I10 ESSENTIAL HYPERTENSION: ICD-10-CM

## 2017-12-20 DIAGNOSIS — E11.22 TYPE 2 DIABETES MELLITUS WITH CHRONIC KIDNEY DISEASE, WITHOUT LONG-TERM CURRENT USE OF INSULIN, UNSPECIFIED CKD STAGE (HCC): Primary | ICD-10-CM

## 2017-12-20 DIAGNOSIS — Z23 NEED FOR SHINGLES VACCINE: ICD-10-CM

## 2017-12-20 DIAGNOSIS — R53.83 FATIGUE, UNSPECIFIED TYPE: ICD-10-CM

## 2017-12-20 DIAGNOSIS — G47.62 NOCTURNAL LEG CRAMPS: ICD-10-CM

## 2017-12-20 DIAGNOSIS — E55.9 VITAMIN D DEFICIENCY: ICD-10-CM

## 2017-12-20 RX ORDER — CIPROFLOXACIN HYDROCHLORIDE 3.5 MG/ML
SOLUTION/ DROPS TOPICAL
COMMUNITY
Start: 2017-11-22 | End: 2017-12-20

## 2017-12-20 NOTE — PROGRESS NOTES
Subjective:      Barbara Iyer is a 65 y.o. female here today for diabetes follow up and labs. Leg cramps: onset approximately a week ago. Worse at night. She stretches and rubs her legs with minimal relief. She has swelling in her legs and states \"I feel like they're gonna give out on me\". She is on diuretic therapy. Has happened previously for which she has eaten bananas with improvement. No recent injury or falls reported. She has been tired and reports no energy over the past few months. Diabetes mellitus: currently diet controlled. - Intermittent home BG monitoring: reports BG last week was 111 after eating lunch. - Denies polyuria, polyphagia, polydipsia. No hypoglycemic episodes. - On ACE or ARB: Y  - On statin: Y  - On aspirin: Y  - Pneumococcal vaccine: 3/2015     Hypertension: She has not taken her morning blood pressure medications today. She had follow up with Cardiology scheduled for 12/26/17. Current Outpatient Prescriptions   Medication Sig Dispense Refill    bumetanide (BUMEX) 2 mg tablet take 1 tablet by mouth once daily ONLY IF WEIGHT GOES UP BY 4LBS 30 Tab 2    prednisoLONE acetate (PRED FORTE) 1 % ophthalmic suspension instill 1 drop into left eye four times a day  0    gentamicin (GARAMYCIN) 0.3 % ophthalmic solution   0    aspirin delayed-release 81 mg tablet take 2 tablets by mouth once daily 60 Tab 3    doxazosin (CARDURA) 2 mg tablet Take 1 Tab by mouth daily.  90 Tab 1    labetalol (NORMODYNE) 200 mg tablet take 1 tablet by mouth every 8 hours 90 Tab 5    DUREZOL 0.05 % ophthalmic emulsion instill 1 drop into left eye twice a day START AFTER CATARACT SURGERY  0    ILEVRO 0.3 % drps   0    losartan-hydroCHLOROthiazide (HYZAAR) 100-25 mg per tablet take 1 tablet by mouth once daily  0    atorvastatin (LIPITOR) 10 mg tablet take 1 tablet by mouth at bedtime 30 Tab 5    albuterol-ipratropium (DUO-NEB) 2.5 mg-0.5 mg/3 ml nebu 3 mL by Nebulization route every four (4) hours as needed. 30 Nebule 1    ferrous sulfate 325 mg (65 mg iron) tablet Take 1 Tab by mouth daily (with breakfast). 30 Tab 3    ACETAMINOPHEN/DIPHENHYDRAMINE (TYLENOL PM PO) Take 2 Tabs by mouth nightly as needed (sleep).  dorzolamide-timolol (COSOPT) 22.3-6.8 mg/mL ophthalmic solution Administer 1 Drop to both eyes two (2) times a day.  amLODIPine (NORVASC) 10 mg tablet Take 10 mg by mouth daily.   0       Allergies   Allergen Reactions    Ciprofloxacin Angioedema       Past Medical History:   Diagnosis Date    Anatomical narrow angle of both eyes     Anemia     Arthritis     Calculus of kidney     Chronic open angle glaucoma     Congestive heart failure (HCC)     Diabetes (HCC)     Hematuria     Macular edema     Proliferative diabetic retinopathy of both eyes (HCC)        Social History   Substance Use Topics    Smoking status: Never Smoker    Smokeless tobacco: Never Used    Alcohol use No        Review of Systems  Constitutional: negative for fevers and chills  Respiratory: negative for cough, sputum or dyspnea on exertion  Cardiovascular: negative for chest pain, chest pressure/discomfort, palpitations, irregular heart beats  Gastrointestinal: negative for nausea, vomiting, change in bowel habits and abdominal pain  Genitourinary:negative for frequency, dysuria and hematuria     Objective:     Visit Vitals    /60 (BP 1 Location: Right arm, BP Patient Position: Sitting)  Comment: manual    Pulse 71    Temp 97.5 °F (36.4 °C) (Oral)    Resp 18    Ht 5' 1\" (1.549 m)    Wt 158 lb (71.7 kg)    SpO2 97%    BMI 29.85 kg/m2      General appearance - alert, well appearing, and in no distress  Eyes - pupils equal and reactive, extraocular eye movements intact  Oropharyngx - mucous membranes moist, pharynx normal without lesions  Neck - supple, no significant adenopathy, carotids upstroke normal bilaterally, no bruits  Chest - clear to auscultation, no wheezes, rales or rhonchi, symmetric air entry, no tachypnea, retractions or cyanosis  Heart - normal rate, regular rhythm, normal S1, S2, no murmurs, rubs, clicks or gallops  Neurological - alert, oriented, normal speech, no focal findings or movement disorder noted  Extremities - bilateral LE edema    Assessment/Plan:   Nixon Ramirez is a 65 y.o. female seen for:     1. Type 2 diabetes mellitus with chronic kidney disease, without long-term current use of insulin, unspecified CKD stage ([facility] [address]): diet controlled. Check A1c.   - HEMOGLOBIN A1C WITH EAG    2. Essential hypertension: elevated here today. She is closely followed by Cardiology and Nephrology. Will continue with current medications. Check labs. - METABOLIC PANEL, COMPREHENSIVE  - MAGNESIUM    3. Nocturnal leg cramps: will check labs. 4. Fatigue, unspecified type  - THYROID CASCADE PROFILE    5. Vitamin D deficiency  - VITAMIN D, 25 HYDROXY    6. Need for shingles vaccine: prescription provided and she is encouraged to have administered at her local pharmacy. - varicella zoster vaccine live (VARICELLA-ZOSTER VACINE LIVE) 19,400 unit/0.65 mL susr injection; 1 Vial by SubCUTAneous route once for 1 dose. Indications: PREVENTION OF HERPES ZOSTER  Dispense: 0.65 mL; Refill: 0    I have discussed the diagnosis with the patient and the intended plan as seen in the above orders. The patient has received an after-visit summary and questions were answered concerning future plans. I have discussed medication side effects and warnings with the patient as well. Patient verbalizes understanding of plan of care and denies further questions or concerns at this time. Informed patient to return to the office if symptoms worsen or if new symptoms arise. Follow-up Disposition:  Return in about 4 months (around 4/20/2018).

## 2017-12-20 NOTE — PROGRESS NOTES
Identified pt with two pt identifiers(name and ). Chief Complaint   Patient presents with    Labs    Diabetes    Leg Pain     cramps        Health Maintenance Due   Topic    Pneumococcal 19-64 Highest Risk (1 of 3 - PCV13)    DTaP/Tdap/Td series (1 - Tdap)    PAP AKA CERVICAL CYTOLOGY     ZOSTER VACCINE AGE 60>        Wt Readings from Last 3 Encounters:   17 158 lb (71.7 kg)   17 161 lb (73 kg)   10/05/17 155 lb (70.3 kg)     Temp Readings from Last 3 Encounters:   17 97.5 °F (36.4 °C) (Oral)   17 97.5 °F (36.4 °C) (Oral)   10/05/17 97.5 °F (36.4 °C) (Oral)     BP Readings from Last 3 Encounters:   17 162/60   17 146/68   10/05/17 181/79     Pulse Readings from Last 3 Encounters:   17 71   17 67   10/05/17 82         Learning Assessment:  :     Learning Assessment 2016   PRIMARY LEARNER Patient   HIGHEST LEVEL OF EDUCATION - PRIMARY LEARNER  DID NOT GRADUATE HIGH SCHOOL   BARRIERS PRIMARY LEARNER NONE   CO-LEARNER CAREGIVER No   PRIMARY LANGUAGE ENGLISH   LEARNER PREFERENCE PRIMARY DEMONSTRATION   ANSWERED BY patient   RELATIONSHIP SELF       Depression Screening:  :     PHQ over the last two weeks 2017   Little interest or pleasure in doing things Not at all   Feeling down, depressed or hopeless Not at all   Total Score PHQ 2 0       Fall Risk Assessment:  :     No flowsheet data found. Abuse Screening:  :     No flowsheet data found. Coordination of Care Questionnaire:  :     1) Have you been to an emergency room, urgent care clinic since your last visit? no   Hospitalized since your last visit? no             2) Have you seen or consulted any other health care providers outside of [address] [place] [address] since your last visit? no  (Include any pap smears or colon screenings in this section.)    3) Do you have an Advance Directive on file? no  Are you interested in receiving information about Advance Directives?  no    Reviewed record in preparation for visit and have obtained necessary documentation. Medication reconciliation up to date and corrected with patient at this time.

## 2017-12-20 NOTE — PATIENT INSTRUCTIONS
Nighttime Leg Cramps: Care Instructions  Your Care Instructions    Nighttime leg cramps happen when a leg muscle tightens up suddenly. This most often happens in the calf. But cramps in the thigh or foot are also common. Cramps often occur just as you fall asleep or wake up. Leg cramps can be painful. They can last a few seconds to a few minutes. Though they are common, experts don't know exactly what causes them. To treat muscle cramps, you can stretch and massage the muscle. If cramps keep coming back, your doctor may prescribe medicine that relaxes your muscles. Follow-up care is a key part of your treatment and safety. Be sure to make and go to all appointments, and call your doctor if you are having problems. It's also a good idea to know your test results and keep a list of the medicines you take. How can you care for yourself at home? · To stop a leg cramp, sit down and straighten your leg as you bend your foot up toward your knee. It may help to place a rolled towel under the ball of your foot and, while you hold the towel at both ends, gently pull the towel toward you while you keep your knee straight. This stretches the calf muscles. The cramp usually goes away after a few minutes. · Take a warm shower or bath to relax the muscle. Some people find that a heating pad placed on the muscle can also help. Others get relief by rubbing the calf with an ice pack. · Stretch your muscles every day, especially before and after exercise and at bedtime. Regular stretching can relax your muscles and may prevent cramps. · Do not suddenly increase the amount of exercise you get. Increase your exercise a little each week. · If your doctor prescribes medicine, take it exactly as prescribed. Call your doctor if you think you are having a problem with your medicine.   · Ask your doctor if you can take an over-the-counter pain medicine, such as acetaminophen (Tylenol), ibuprofen (Advil, Motrin), or naproxen (Aleve). Be safe with medicines. Read and follow all instructions on the label. · Drink plenty of fluids. If you have kidney, heart, or liver disease and have to limit fluids, talk with your doctor before you increase the amount of fluids you drink. When should you call for help? Watch closely for changes in your health, and be sure to contact your doctor if:  ? · You often have muscle cramps that do not go away after home treatment. ? · Your muscle cramps often wake you up at night. ? · You do not get better as expected. Where can you learn more? Go to http://milo-[place].info/. Enter S910 in the search box to learn more about \"Nighttime Leg Cramps: Care Instructions. \"  Current as of: October 14, 2016  Content Version: 11.4  © 8708-8958 [employer]. Care instructions adapted under license by [employer] (which disclaims liability or warranty for this information). If you have questions about a medical condition or this instruction, always ask your healthcare professional. [address] any warranty or liability for your use of this information.

## 2017-12-20 NOTE — MR AVS SNAPSHOT
Visit Information Date & Time Provider Department Dept. Phone Encounter #  
 12/20/2017  9:45 AM Matt Red Patrick [phone] [id number] Follow-up Instructions Return in about 4 months (around 4/20/2018). Upcoming Health Maintenance Date Due Pneumococcal 19-64 Highest Risk (1 of 3 - PCV13) 10/23/1975 DTaP/Tdap/Td series (1 - Tdap) 10/23/1977 PAP AKA CERVICAL CYTOLOGY 10/23/1977 ZOSTER VACCINE AGE 60> 8/23/2016 MICROALBUMIN Q1 2/9/2018 HEMOGLOBIN A1C Q6M 4/5/2018 FOBT Q 1 YEAR AGE 50-75 5/19/2018 EYE EXAM RETINAL OR DILATED Q1 8/23/2018 FOOT EXAM Q1 10/5/2018 LIPID PANEL Q1 10/5/2018 Allergies as of 12/20/2017  Review Complete On: 12/20/2017 By: Osmani Field MD  
  
 Severity Noted Reaction Type Reactions Ciprofloxacin  03/13/2017   Systemic Angioedema Current Immunizations  Reviewed on 12/20/2017 Name Date Influenza Vaccine (Quad) PF 10/5/2017, 9/19/2016  2:34 PM  
  
 Reviewed by Osmani Field MD on 12/20/2017 at 10:56 AM  
You Were Diagnosed With   
  
 Codes Comments Type 2 diabetes mellitus with chronic kidney disease, without long-term current use of insulin, unspecified CKD stage ([facility] [address]    -  Primary ICD-10-CM: E11.22 
ICD-9-CM: 250.40, 585.9 Essential hypertension     ICD-10-CM: I10 
ICD-9-CM: 401.9 Nocturnal leg cramps     ICD-10-CM: D11.61 
ICD-9-CM: 327.52 Fatigue, unspecified type     ICD-10-CM: R53.83 ICD-9-CM: 780.79 Vitamin D deficiency     ICD-10-CM: E55.9 ICD-9-CM: 268.9 Need for shingles vaccine     ICD-10-CM: H84 ICD-9-CM: V04.89 Vitals BP Pulse Temp Resp Height(growth percentile) Weight(growth percentile) 162/60 (BP 1 Location: Right arm, BP Patient Position: Sitting) 71 97.5 °F (36.4 °C) (Oral) 18 5' 1\" (1.549 m) 158 lb (71.7 kg) SpO2 BMI OB Status Smoking Status 97% 29.85 kg/m2 Postmenopausal Never Smoker Vitals History BMI and BSA Data Body Mass Index Body Surface Area  
 29.85 kg/m 2 1.76 m 2 Your Updated Medication List  
  
   
This list is accurate as of: 12/20/17 11:21 AM.  Always use your most recent med list.  
  
  
  
  
 albuterol-ipratropium 2.5 mg-0.5 mg/3 ml Nebu Commonly known as:  DUO-NEB  
3 mL by Nebulization route every four (4) hours as needed. amLODIPine 10 mg tablet Commonly known as:  Ferreira Mullet Take 10 mg by mouth daily. aspirin delayed-release 81 mg tablet  
take 2 tablets by mouth once daily  
  
 atorvastatin 10 mg tablet Commonly known as:  LIPITOR  
take 1 tablet by mouth at bedtime  
  
 bumetanide 2 mg tablet Commonly known as:  BUMEX  
take 1 tablet by mouth once daily ONLY IF WEIGHT GOES UP BY 4LBS  
  
 dorzolamide-timolol 22.3-6.8 mg/mL ophthalmic solution Commonly known as:  COSOPT Administer 1 Drop to both eyes two (2) times a day. doxazosin 2 mg tablet Commonly known as:  CARDURA Take 1 Tab by mouth daily. DUREZOL 0.05 % ophthalmic emulsion Generic drug:  Difluprednate  
instill 1 drop into left eye twice a day START AFTER CATARACT SURGERY  
  
 ferrous sulfate 325 mg (65 mg iron) tablet Take 1 Tab by mouth daily (with breakfast). gentamicin 0.3 % ophthalmic solution Commonly known as:  GARAMYCIN  
  
 ILEVRO 0.3 % Drps Generic drug:  nepafenac  
  
 labetalol 200 mg tablet Commonly known as:  Chyrel Ching  
take 1 tablet by mouth every 8 hours  
  
 losartan-hydroCHLOROthiazide 100-25 mg per tablet Commonly known as:  HYZAAR  
take 1 tablet by mouth once daily  
  
 prednisoLONE acetate 1 % ophthalmic suspension Commonly known as:  PRED FORTE  
instill 1 drop into left eye four times a day TYLENOL PM PO Take 2 Tabs by mouth nightly as needed (sleep). varicella zoster vaccine live 19,400 unit/0.65 mL Susr injection Commonly known as:  varicella-zoster vacine live 1 Vial by SubCUTAneous route once for 1 dose. Indications: PREVENTION OF HERPES ZOSTER Prescriptions Printed Refills  
 varicella zoster vaccine live (VARICELLA-ZOSTER VACINE LIVE) 19,400 unit/0.65 mL susr injection 0 Si Vial by SubCUTAneous route once for 1 dose. Indications: PREVENTION OF HERPES ZOSTER Class: Print Route: SubCUTAneous We Performed the Following HEMOGLOBIN A1C WITH EAG [19420 CPT(R)] MAGNESIUM [id number] CPT(R)] METABOLIC PANEL, COMPREHENSIVE [87921 CPT(R)] THYROID CASCADE PROFILE [[id number] Custom] VITAMIN D, 25 HYDROXY [id number] CPT(R)] Follow-up Instructions Return in about 4 months (around 2018). Patient Instructions Nighttime Leg Cramps: Care Instructions Your Care Instructions Nighttime leg cramps happen when a leg muscle tightens up suddenly. This most often happens in the calf. But cramps in the thigh or foot are also common. Cramps often occur just as you fall asleep or wake up. Leg cramps can be painful. They can last a few seconds to a few minutes. Though they are common, experts don't know exactly what causes them. To treat muscle cramps, you can stretch and massage the muscle. If cramps keep coming back, your doctor may prescribe medicine that relaxes your muscles. Follow-up care is a key part of your treatment and safety. Be sure to make and go to all appointments, and call your doctor if you are having problems. It's also a good idea to know your test results and keep a list of the medicines you take. How can you care for yourself at home? · To stop a leg cramp, sit down and straighten your leg as you bend your foot up toward your knee. It may help to place a rolled towel under the ball of your foot and, while you hold the towel at both ends, gently pull the towel toward you while you keep your knee straight. This stretches the calf muscles. The cramp usually goes away after a few minutes. · Take a warm shower or bath to relax the muscle. Some people find that a heating pad placed on the muscle can also help. Others get relief by rubbing the calf with an ice pack. · Stretch your muscles every day, especially before and after exercise and at bedtime. Regular stretching can relax your muscles and may prevent cramps. · Do not suddenly increase the amount of exercise you get. Increase your exercise a little each week. · If your doctor prescribes medicine, take it exactly as prescribed. Call your doctor if you think you are having a problem with your medicine. · Ask your doctor if you can take an over-the-counter pain medicine, such as acetaminophen (Tylenol), ibuprofen (Advil, Motrin), or naproxen (Aleve). Be safe with medicines. Read and follow all instructions on the label. · Drink plenty of fluids. If you have kidney, heart, or liver disease and have to limit fluids, talk with your doctor before you increase the amount of fluids you drink. When should you call for help? Watch closely for changes in your health, and be sure to contact your doctor if: 
? · You often have muscle cramps that do not go away after home treatment. ? · Your muscle cramps often wake you up at night. ? · You do not get better as expected. Where can you learn more? Go to http://milo-[place].info/. Enter S910 in the search box to learn more about \"Nighttime Leg Cramps: Care Instructions. \" Current as of: October 14, 2016 Content Version: 11.4 © 9226-7870 [employer]. Care instructions adapted under license by [employer] (which disclaims liability or warranty for this information). If you have questions about a medical condition or this instruction, always ask your healthcare professional. [address] any warranty or liability for your use of this information. Please provide this summary of care documentation to your next provider. Your primary care clinician is listed as Tristen Chen. If you have any questions after today's visit, please call [phone].

## 2017-12-21 LAB
25(OH)D3+25(OH)D2 SERPL-MCNC: 9 NG/ML (ref 30–100)
ALBUMIN SERPL-MCNC: 4.2 G/DL (ref 3.6–4.8)
ALBUMIN/GLOB SERPL: 1.5 {RATIO} (ref 1.2–2.2)
ALP SERPL-CCNC: 99 IU/L (ref 39–117)
ALT SERPL-CCNC: 16 IU/L (ref 0–32)
AST SERPL-CCNC: 16 IU/L (ref 0–40)
BILIRUB SERPL-MCNC: 0.4 MG/DL (ref 0–1.2)
BUN SERPL-MCNC: 75 MG/DL (ref 8–27)
BUN/CREAT SERPL: 15 (ref 12–28)
CALCIUM SERPL-MCNC: 8.4 MG/DL (ref 8.7–10.3)
CHLORIDE SERPL-SCNC: 108 MMOL/L (ref 96–106)
CO2 SERPL-SCNC: 17 MMOL/L (ref 18–29)
CREAT SERPL-MCNC: 4.84 MG/DL (ref 0.57–1)
EST. AVERAGE GLUCOSE BLD GHB EST-MCNC: 97 MG/DL
GLOBULIN SER CALC-MCNC: 2.8 G/DL (ref 1.5–4.5)
GLUCOSE SERPL-MCNC: 96 MG/DL (ref 65–99)
HBA1C MFR BLD: 5 % (ref 4.8–5.6)
INTERPRETATION: NORMAL
Lab: NORMAL
MAGNESIUM SERPL-MCNC: 1.7 MG/DL (ref 1.6–2.3)
POTASSIUM SERPL-SCNC: 4.6 MMOL/L (ref 3.5–5.2)
PROT SERPL-MCNC: 7 G/DL (ref 6–8.5)
SODIUM SERPL-SCNC: 144 MMOL/L (ref 134–144)
TSH SERPL DL<=0.005 MIU/L-ACNC: 2.99 UIU/ML (ref 0.45–4.5)

## 2018-01-05 ENCOUNTER — TELEPHONE (OUTPATIENT)
Dept: FAMILY MEDICINE CLINIC | Age: 63
End: 2018-01-05

## 2018-01-05 DIAGNOSIS — E55.9 VITAMIN D DEFICIENCY: Primary | ICD-10-CM

## 2018-01-05 RX ORDER — ERGOCALCIFEROL 1.25 MG/1
50000 CAPSULE ORAL
Qty: 12 CAP | Refills: 0 | Status: SHIPPED | OUTPATIENT
Start: 2018-01-05 | End: 2018-03-24

## 2018-01-05 NOTE — TELEPHONE ENCOUNTER
Patient called and results discussed. Start ergocalciferol 61420 international units for vitamin D deficiency. Orders Placed This Encounter    ergocalciferol (ERGOCALCIFEROL) 50,000 unit capsule     Sig: Take 1 Cap by mouth every seven (7) days for 12 doses.      Dispense:  12 Cap     Refill:  0

## 2018-08-01 ENCOUNTER — OFFICE VISIT (OUTPATIENT)
Dept: FAMILY MEDICINE CLINIC | Age: 63
End: 2018-08-01

## 2018-08-01 VITALS
OXYGEN SATURATION: 97 % | BODY MASS INDEX: 27.94 KG/M2 | RESPIRATION RATE: 18 BRPM | HEART RATE: 70 BPM | WEIGHT: 148 LBS | DIASTOLIC BLOOD PRESSURE: 68 MMHG | SYSTOLIC BLOOD PRESSURE: 170 MMHG | TEMPERATURE: 97.7 F | HEIGHT: 61 IN

## 2018-08-01 DIAGNOSIS — I50.30 DIASTOLIC CONGESTIVE HEART FAILURE, UNSPECIFIED HF CHRONICITY (HCC): ICD-10-CM

## 2018-08-01 DIAGNOSIS — N18.4 CKD (CHRONIC KIDNEY DISEASE) STAGE 4, GFR 15-29 ML/MIN (HCC): Chronic | ICD-10-CM

## 2018-08-01 DIAGNOSIS — Z01.818 PREOPERATIVE EXAMINATION: ICD-10-CM

## 2018-08-01 DIAGNOSIS — H65.03 BILATERAL ACUTE SEROUS OTITIS MEDIA, RECURRENCE NOT SPECIFIED: Primary | ICD-10-CM

## 2018-08-01 DIAGNOSIS — N18.6 ESRD (END STAGE RENAL DISEASE) ON DIALYSIS (HCC): ICD-10-CM

## 2018-08-01 DIAGNOSIS — Z99.2 ESRD (END STAGE RENAL DISEASE) ON DIALYSIS (HCC): ICD-10-CM

## 2018-08-01 DIAGNOSIS — E11.3593 PROLIFERATIVE DIABETIC RETINOPATHY OF BOTH EYES ASSOCIATED WITH TYPE 2 DIABETES MELLITUS, UNSPECIFIED PROLIFERATIVE RETINOPATHY TYPE (HCC): ICD-10-CM

## 2018-08-01 DIAGNOSIS — I10 ESSENTIAL HYPERTENSION: ICD-10-CM

## 2018-08-01 PROBLEM — E11.21 TYPE 2 DIABETES WITH NEPHROPATHY (HCC): Status: ACTIVE | Noted: 2018-08-01

## 2018-08-01 RX ORDER — CLONIDINE HYDROCHLORIDE 0.1 MG/1
1 TABLET ORAL 3 TIMES DAILY
Refills: 6 | COMMUNITY
Start: 2018-07-10

## 2018-08-01 RX ORDER — CEFDINIR 300 MG/1
300 CAPSULE ORAL EVERY OTHER DAY
Qty: 5 CAP | Refills: 0 | Status: SHIPPED | OUTPATIENT
Start: 2018-08-01 | End: 2018-08-11

## 2018-08-01 RX ORDER — FUROSEMIDE 40 MG/1
1 TABLET ORAL DAILY
Refills: 11 | COMMUNITY
Start: 2018-07-10 | End: 2018-12-04

## 2018-08-01 RX ORDER — CALCIUM ACETATE 667 MG/1
2 CAPSULE ORAL 3 TIMES DAILY
Refills: 11 | COMMUNITY
Start: 2018-07-10 | End: 2020-01-01

## 2018-08-01 NOTE — ASSESSMENT & PLAN NOTE
This condition is managed by Specialist.  Nephrologist: Dr. Jose Bone. Olson CKD Meds   
    
  
 calcium acetate (PHOSLO) 667 mg cap  (Taking) Take 2 Caps by mouth three (3) times daily. And one with snack  
 furosemide (LASIX) 40 mg tablet  (Taking) Take 1 Tab by mouth daily. bumetanide (BUMEX) 2 mg tablet  (Taking) take 1 tablet by mouth once daily ONLY IF WEIGHT GOES UP BY 4LBS  
 ferrous sulfate 325 mg (65 mg iron) tablet  (Taking) Take 1 Tab by mouth daily (with breakfast). Lab Results Component Value Date/Time GFR est non-AA 9 12/20/2017 11:46 AM  
 GFR est AA 10 12/20/2017 11:46 AM  
 Creatinine 4.84 12/20/2017 11:46 AM  
 BUN 75 12/20/2017 11:46 AM  
 Sodium 144 12/20/2017 11:46 AM  
 Potassium 4.6 12/20/2017 11:46 AM  
 Chloride 108 12/20/2017 11:46 AM  
 CO2 17 12/20/2017 11:46 AM  
 Calcium 8.4 12/20/2017 11:46 AM  
 Magnesium 1.7 12/20/2017 11:46 AM  
 Phosphorus 4.9 10/05/2017 09:53 AM  
 Vitamin D 25-Hydroxy 13.3 05/05/2017 02:22 PM  
 VITAMIN D, 25-HYDROXY 9.0 12/20/2017 11:46 AM  
 PTH, Intact 347.1 05/05/2017 02:22 PM  
 
CrCl cannot be calculated (Patient's most recent sCr result is older than the maximum 180 days allowed. ).

## 2018-08-01 NOTE — LETTER
8/1/2018 Ms. Josh Whiteside [address] [facility] Current Outpatient Prescriptions Medication Sig  
 calcium acetate (PHOSLO) 667 mg cap Take 2 Caps by mouth three (3) times daily. And one with snack  furosemide (LASIX) 40 mg tablet Take 1 Tab by mouth daily.  cloNIDine HCl (CATAPRES) 0.1 mg tablet Take 1 Tab by mouth three (3) times daily.  cefdinir (OMNICEF) 300 mg capsule Take 1 Cap by mouth every other day for 10 days.  atorvastatin (LIPITOR) 10 mg tablet TAKE 1 TABLET BY MOUTH ONCE DAILY AT BEDTIME.  bumetanide (BUMEX) 2 mg tablet take 1 tablet by mouth once daily ONLY IF WEIGHT GOES UP BY 4LBS  prednisoLONE acetate (PRED FORTE) 1 % ophthalmic suspension instill 1 drop into left eye four times a day  gentamicin (GARAMYCIN) 0.3 % ophthalmic solution  aspirin delayed-release 81 mg tablet take 2 tablets by mouth once daily  doxazosin (CARDURA) 2 mg tablet Take 1 Tab by mouth daily.  labetalol (NORMODYNE) 200 mg tablet take 1 tablet by mouth every 8 hours  DUREZOL 0.05 % ophthalmic emulsion instill 1 drop into left eye twice a day START AFTER CATARACT SURGERY  
 ILEVRO 0.3 % drps  albuterol-ipratropium (DUO-NEB) 2.5 mg-0.5 mg/3 ml nebu 3 mL by Nebulization route every four (4) hours as needed.  ferrous sulfate 325 mg (65 mg iron) tablet Take 1 Tab by mouth daily (with breakfast).  ACETAMINOPHEN/DIPHENHYDRAMINE (TYLENOL PM PO) Take 2 Tabs by mouth nightly as needed (sleep).  dorzolamide-timolol (COSOPT) 22.3-6.8 mg/mL ophthalmic solution Administer 1 Drop to both eyes two (2) times a day.  amLODIPine (NORVASC) 10 mg tablet Take 10 mg by mouth daily. No current facility-administered medications for this visit.

## 2018-08-01 NOTE — ASSESSMENT & PLAN NOTE
This condition is managed by Specialist. 
Key Antihyperglycemic Medications Patient is on no antihyperglycemic meds. Other Key Diabetic Medications   
    
  
 atorvastatin (LIPITOR) 10 mg tablet  (Taking) TAKE 1 TABLET BY MOUTH ONCE DAILY AT BEDTIME. Lab Results Component Value Date/Time Hemoglobin A1c 5.0 12/20/2017 11:46 AM  
 Glucose 96 12/20/2017 11:46 AM  
 Creatinine 4.84 12/20/2017 11:46 AM  
 Microalbumin/creat ratio (POC) 300 02/09/2017 11:10 AM  
 Cholesterol, total 164 10/05/2017 09:53 AM  
 HDL Cholesterol 78 10/05/2017 09:53 AM  
 LDL, calculated 78 10/05/2017 09:53 AM  
 Triglyceride 38 10/05/2017 09:53 AM  
 
Diabetic Foot and Eye Exam HM Status Topic Date Due  
 Diabetic Foot Care  10/05/2018 Al[facility] Eye Exam  12/07/2018

## 2018-08-01 NOTE — ASSESSMENT & PLAN NOTE
This condition is managed by Specialist. Cardiologist: Dr. Cliff Patterson. Olson CAD CHF Meds   
    
  
 furosemide (LASIX) 40 mg tablet  (Taking) Take 1 Tab by mouth daily. cloNIDine HCl (CATAPRES) 0.1 mg tablet  (Taking) Take 1 Tab by mouth three (3) times daily. atorvastatin (LIPITOR) 10 mg tablet  (Taking) TAKE 1 TABLET BY MOUTH ONCE DAILY AT BEDTIME.  
 bumetanide (BUMEX) 2 mg tablet  (Taking) take 1 tablet by mouth once daily ONLY IF WEIGHT GOES UP BY 4LBS  
 aspirin delayed-release 81 mg tablet  (Taking) take 2 tablets by mouth once daily  
 doxazosin (CARDURA) 2 mg tablet  (Taking) Take 1 Tab by mouth daily. labetalol (NORMODYNE) 200 mg tablet  (Taking) take 1 tablet by mouth every 8 hours  
 amLODIPine (NORVASC) 10 mg tablet  (Taking) Take 10 mg by mouth daily. [id number](BNP,BNPP,BNPPPOC,HSCRP,NA,NAPOC,K,KPOCT,CHOL,CHOLPOCT,HDL,HDLPOC,LDLCHOL,LDLPOCT,LDLCPOC,LDLC,LDL,LDLCEXT,TRIGL,TGLPOCT,INR,INREXT,INRPOC,PTP,PTINR,PTEXT,DIG)@

## 2018-08-01 NOTE — PROGRESS NOTES
Identified pt with two pt identifiers(name and ). Chief Complaint Patient presents with  Pre-op Exam  
  Left eye Health Maintenance Due Topic  Pneumococcal 19-64 Medium Risk (1 of 1 - PPSV23)  DTaP/Tdap/Td series (1 - Tdap)  PAP AKA CERVICAL CYTOLOGY  ZOSTER VACCINE AGE 60>   
 MICROALBUMIN Q1   
 FOBT Q 1 YEAR AGE 50-75  HEMOGLOBIN A1C Q6M   
 Influenza Age 5 to Adult Wt Readings from Last 3 Encounters:  
18 148 lb (67.1 kg) 17 158 lb (71.7 kg) 17 161 lb (73 kg) Temp Readings from Last 3 Encounters:  
18 97.7 °F (36.5 °C) (Oral) 17 97.5 °F (36.4 °C) (Oral) 17 97.5 °F (36.4 °C) (Oral) BP Readings from Last 3 Encounters:  
18 170/68  
17 162/60  
17 146/68 Pulse Readings from Last 3 Encounters:  
18 70  
17 71  
17 67 Learning Assessment: 
:  
 
Learning Assessment 2016 PRIMARY LEARNER Patient HIGHEST LEVEL OF EDUCATION - PRIMARY LEARNER  DID NOT GRADUATE HIGH SCHOOL  
BARRIERS PRIMARY LEARNER NONE  
CO-LEARNER CAREGIVER No  
PRIMARY LANGUAGE ENGLISH  
LEARNER PREFERENCE PRIMARY DEMONSTRATION  
ANSWERED BY patient RELATIONSHIP SELF Depression Screening: 
:  
 
PHQ over the last two weeks 2018 Little interest or pleasure in doing things Not at all Feeling down, depressed, irritable, or hopeless Not at all Total Score PHQ 2 0 Fall Risk Assessment: 
:  
 
No flowsheet data found. Abuse Screening: 
:  
 
Abuse Screening Questionnaire 2018 Do you ever feel afraid of your partner? Paty Richard Are you in a relationship with someone who physically or mentally threatens you? Paty Richard Is it safe for you to go home? Yaniv Haque Coordination of Care Questionnaire: 
:  
 
1) Have you been to an emergency room, urgent care clinic since your last visit? no  
Hospitalized since your last visit? no          
 
2) Have you seen or consulted any other health care providers outside of [address] [place] [address] since your last visit? no  (Include any pap smears or colon screenings in this section.) 3) Do you have an Advance Directive on file? no 
Are you interested in receiving information about Advance Directives? no 
 
Reviewed record in preparation for visit and have obtained necessary documentation. Medication reconciliation up to date and corrected with patient at this time.

## 2018-08-01 NOTE — ASSESSMENT & PLAN NOTE
This condition is managed by Specialist. Nephrologist: Dr. Kaylee Roberts. Olson CKD Meds   
    
  
 calcium acetate (PHOSLO) 667 mg cap  (Taking) Take 2 Caps by mouth three (3) times daily. And one with snack  
 furosemide (LASIX) 40 mg tablet  (Taking) Take 1 Tab by mouth daily. bumetanide (BUMEX) 2 mg tablet  (Taking) take 1 tablet by mouth once daily ONLY IF WEIGHT GOES UP BY 4LBS  
 ferrous sulfate 325 mg (65 mg iron) tablet  (Taking) Take 1 Tab by mouth daily (with breakfast). Lab Results Component Value Date/Time GFR est non-AA 9 12/20/2017 11:46 AM  
 GFR est AA 10 12/20/2017 11:46 AM  
 Creatinine 4.84 12/20/2017 11:46 AM  
 BUN 75 12/20/2017 11:46 AM  
 Sodium 144 12/20/2017 11:46 AM  
 Potassium 4.6 12/20/2017 11:46 AM  
 Chloride 108 12/20/2017 11:46 AM  
 CO2 17 12/20/2017 11:46 AM  
 Calcium 8.4 12/20/2017 11:46 AM  
 Magnesium 1.7 12/20/2017 11:46 AM  
 Phosphorus 4.9 10/05/2017 09:53 AM  
 Vitamin D 25-Hydroxy 13.3 05/05/2017 02:22 PM  
 VITAMIN D, 25-HYDROXY 9.0 12/20/2017 11:46 AM  
 PTH, Intact 347.1 05/05/2017 02:22 PM  
 
CrCl cannot be calculated (Patient's most recent sCr result is older than the maximum 180 days allowed. ).

## 2018-08-01 NOTE — PROGRESS NOTES
Preoperative Evaluation Date of Exam: 2018 Kelsey Howard is a 65 y.o. female (:1955) who presents for preoperative evaluation. Scheduled for silicone oil removal from left eye under regional anesthesia with Dr. Narayan Lorenzo. She has started hemodialysis in 2018 and goes 3 times weekly. She complains of bilateral ear fullness and pressure, nasal congestion, headache for the past few weeks. Reports being treated for bronchitis at the end of . Cough resolved, but ear symptoms have not. No fever, chills, sinus pain or pressure reported. Latex Allergy: no 
 
Medical History:    
Past Medical History:  
Diagnosis Date  Anatomical narrow angle of both eyes  Anemia  Arthritis  Calculus of kidney  Chronic open angle glaucoma  Congestive heart failure ([address]  Diabetes ([address]  Hematuria  Hemodialysis status ([address] 3 days per week (, Thu, Sat), started in 2018  Macular edema  Proliferative diabetic retinopathy of both eyes ([address] Allergies: Allergies Allergen Reactions  Ciprofloxacin Angioedema Medications:    
Current Outpatient Prescriptions Medication Sig  
 calcium acetate (PHOSLO) 667 mg cap Take 2 Caps by mouth three (3) times daily. And one with snack  furosemide (LASIX) 40 mg tablet Take 1 Tab by mouth daily.  cloNIDine HCl (CATAPRES) 0.1 mg tablet Take 1 Tab by mouth three (3) times daily.  atorvastatin (LIPITOR) 10 mg tablet TAKE 1 TABLET BY MOUTH ONCE DAILY AT BEDTIME.  bumetanide (BUMEX) 2 mg tablet take 1 tablet by mouth once daily ONLY IF WEIGHT GOES UP BY 4LBS  prednisoLONE acetate (PRED FORTE) 1 % ophthalmic suspension instill 1 drop into left eye four times a day  gentamicin (GARAMYCIN) 0.3 % ophthalmic solution  aspirin delayed-release 81 mg tablet take 2 tablets by mouth once daily  doxazosin (CARDURA) 2 mg tablet Take 1 Tab by mouth daily.   
 labetalol (NORMODYNE) 200 mg tablet take 1 tablet by mouth every 8 hours  DUREZOL 0.05 % ophthalmic emulsion instill 1 drop into left eye twice a day START AFTER CATARACT SURGERY  
 ILEVRO 0.3 % drps  albuterol-ipratropium (DUO-NEB) 2.5 mg-0.5 mg/3 ml nebu 3 mL by Nebulization route every four (4) hours as needed.  ferrous sulfate 325 mg (65 mg iron) tablet Take 1 Tab by mouth daily (with breakfast).  ACETAMINOPHEN/DIPHENHYDRAMINE (TYLENOL PM PO) Take 2 Tabs by mouth nightly as needed (sleep).  dorzolamide-timolol (COSOPT) 22.3-6.8 mg/mL ophthalmic solution Administer 1 Drop to both eyes two (2) times a day.  amLODIPine (NORVASC) 10 mg tablet Take 10 mg by mouth daily. No current facility-administered medications for this visit. Social History:    
Social History Social History  Marital status:  Spouse name: N/A  
 Number of children: N/A  
 Years of education: N/A Social History Main Topics  Smoking status: Never Smoker  Smokeless tobacco: Never Used  Alcohol use No  
 Drug use: No  
 Sexual activity: Not Asked Other Topics Concern  None Social History Narrative Anesthesia Complications: None History of abnormal bleeding : None History of Blood Transfusions: yes Health Care Directive or Living Will: no 
 
Review of Systems:  
Respiratory: negative for cough, sputum or dyspnea on exertion Cardiovascular: negative for chest pain, chest pressure/discomfort, palpitations, irregular heart beats, lower extremity edema Gastrointestinal: negative for nausea, vomiting, change in bowel habits and abdominal pain Genitourinary:negative for frequency, dysuria and hematuria Objective:  
/68 (BP 1 Location: Right arm, BP Patient Position: Sitting) Comment: Manual  Pulse 70  Temp 97.7 °F (36.5 °C) (Oral)  Comment: .  Resp 18  Ht 5' 1\" (1.549 m)  Wt 148 lb (67.1 kg)  SpO2 97%  BMI 27.96 kg/m2 General appearance - alert, well appearing, and in no distress Eyes - pupils equal and reactive, extraocular eye movements intact, sclera anicteric Ears - yellow serous fluid noted bilateral TMs, external canals normal  
Mouth - mucous membranes moist, pharynx normal without lesions Neck - supple, no significant adenopathy Chest - clear to auscultation, no wheezes, rales or rhonchi, symmetric air entry, respirations are unlabored Heart - normal rate, regular rhythm, normal S1, S2, no murmurs, rubs, clicks or gallops Abdomen - soft, nontender, nondistended, normoactive bowel sounds Extremities - (+) bilateral LE edema, fistula of left arm with palpable thrill Skin - normal coloration and turgor, no rashes, no suspicious skin lesions noted IMPRESSION: Casper Humphries is a 65 y.o. female with hypertension, ESRD on hemodialysis, anemia of chronic disease, hyperlipidemia seen today for preoperative examination. She has an acceptable risk for surgery and may proceed at this time. History and Physical form completed and will be faxed to [facility]. Will treat serous otitis media with Cefdinir renally dose - advised to take after dialysis on dialysis days. ICD-10-CM ICD-9-CM 1. Bilateral acute serous otitis media, recurrence not specified H65.03 381.01 cefdinir (OMNICEF) 300 mg capsule 2. Preoperative examination Z01.818 V72.84   
3. Essential hypertension I10 401.9 4. ESRD (end stage renal disease) on dialysis ([facility]) N18.6 585.6 Z99.2 V45.11   
5. CKD (chronic kidney disease) stage 4, GFR 15-29 ml/min ([facility]) N18.4 585.4 6. Diastolic congestive heart failure, unspecified HF chronicity ([facility]) I50.30 428.30   
  428.0 7. Proliferative diabetic retinopathy of both eyes associated with type 2 diabetes mellitus, unspecified proliferative retinopathy type ([address] L32.0862 250.50   
  362.02 Saman Ward MD  
8/1/2018

## 2018-08-23 ENCOUNTER — OFFICE VISIT (OUTPATIENT)
Dept: FAMILY MEDICINE CLINIC | Age: 63
End: 2018-08-23

## 2018-08-23 VITALS
HEIGHT: 61 IN | BODY MASS INDEX: 27.56 KG/M2 | RESPIRATION RATE: 18 BRPM | WEIGHT: 146 LBS | TEMPERATURE: 98.1 F | DIASTOLIC BLOOD PRESSURE: 58 MMHG | HEART RATE: 70 BPM | SYSTOLIC BLOOD PRESSURE: 150 MMHG | OXYGEN SATURATION: 97 %

## 2018-08-23 DIAGNOSIS — G47.00 INSOMNIA, UNSPECIFIED TYPE: Primary | ICD-10-CM

## 2018-08-23 DIAGNOSIS — R61 NIGHT SWEATS: ICD-10-CM

## 2018-08-23 DIAGNOSIS — I73.9 INTERMITTENT CLAUDICATION (HCC): ICD-10-CM

## 2018-08-23 RX ORDER — ZOLPIDEM TARTRATE 5 MG/1
5 TABLET ORAL
Qty: 30 TAB | Refills: 1 | Status: SHIPPED | OUTPATIENT
Start: 2018-08-23 | End: 2019-07-30

## 2018-08-23 NOTE — MR AVS SNAPSHOT
[address] [facility] 
 
 
 [facility] [address] [facility] 
[phone] Patient: Elisabet Garcia MRN: [id number] :1955 Visit Information Date & Time Provider Department Dept. Phone Encounter #  
 2018  2:45 PM Alanna Estrada Matt Patrick [phone] [id number] Follow-up Instructions Return if symptoms worsen or fail to improve. Upcoming Health Maintenance Date Due Pneumococcal 19-64 Highest Risk (1 of 3 - PCV13) 10/23/1975 DTaP/Tdap/Td series (1 - Tdap) 10/23/1977 PAP AKA CERVICAL CYTOLOGY 10/23/1977 ZOSTER VACCINE AGE 60> 2016 MICROALBUMIN Q1 2018 FOBT Q 1 YEAR AGE 50-75 2018 HEMOGLOBIN A1C Q6M 2018 MEDICARE YEARLY EXAM 2018 Influenza Age 5 to Adult 2018 FOOT EXAM Q1 10/5/2018 LIPID PANEL Q1 10/5/2018 EYE EXAM RETINAL OR DILATED Q1 2018 BREAST CANCER SCRN MAMMOGRAM 10/16/2019 Allergies as of 2018  Review Complete On: 2018 By: Alanna Estrada MD  
  
 Severity Noted Reaction Type Reactions Ciprofloxacin  2017   Systemic Angioedema Current Immunizations  Reviewed on 2017 Name Date Influenza Vaccine (Quad) PF 10/5/2017, 2016  2:34 PM  
  
 Not reviewed this visit You Were Diagnosed With   
  
 Codes Comments Insomnia, unspecified type    -  Primary ICD-10-CM: G47.00 ICD-9-CM: 780.52 Intermittent claudication (HCC)     ICD-10-CM: I73.9 ICD-9-CM: 443.9 Night sweats     ICD-10-CM: R61 
ICD-9-CM: 780.8 Vitals BP Pulse Temp Resp Height(growth percentile) Weight(growth percentile) 150/58 (BP 1 Location: Right arm, BP Patient Position: Sitting) 70 98.1 °F (36.7 °C) (Oral) 18 5' 1\" (1.549 m) 146 lb (66.2 kg) SpO2 BMI OB Status Smoking Status 97% 27.59 kg/m2 Postmenopausal Never Smoker Vitals History BMI and BSA Data Body Mass Index Body Surface Area  
 27.59 kg/m 2 1.69 m 2 Preferred Pharmacy Pharmacy Name Phone [facility], [phone] [phone] Your Updated Medication List  
  
   
This list is accurate as of 8/23/18  3:42 PM.  Always use your most recent med list.  
  
  
  
  
 albuterol-ipratropium 2.5 mg-0.5 mg/3 ml Nebu Commonly known as:  DUO-NEB  
3 mL by Nebulization route every four (4) hours as needed. amLODIPine 10 mg tablet Commonly known as:  [place] Take 10 mg by mouth daily. aspirin delayed-release 81 mg tablet  
take 2 tablets by mouth once daily  
  
 atorvastatin 10 mg tablet Commonly known as:  LIPITOR  
TAKE 1 TABLET BY MOUTH ONCE DAILY AT BEDTIME.  
  
 bumetanide 2 mg tablet Commonly known as:  BUMEX  
take 1 tablet by mouth once daily ONLY IF WEIGHT GOES UP BY 4LBS  
  
 calcium acetate 667 mg Cap Commonly known as:  PHOSLO Take 2 Caps by mouth three (3) times daily. And one with snack  
  
 cloNIDine HCl 0.1 mg tablet Commonly known as:  CATAPRES Take 1 Tab by mouth three (3) times daily. dorzolamide-timolol 22.3-6.8 mg/mL ophthalmic solution Commonly known as:  COSOPT Administer 1 Drop to both eyes two (2) times a day. doxazosin 2 mg tablet Commonly known as:  CARDURA Take 1 Tab by mouth daily. DUREZOL 0.05 % ophthalmic emulsion Generic drug:  Difluprednate  
instill 1 drop into left eye twice a day START AFTER CATARACT SURGERY  
  
 ferrous sulfate 325 mg (65 mg iron) tablet Take 1 Tab by mouth daily (with breakfast). furosemide 40 mg tablet Commonly known as:  LASIX Take 1 Tab by mouth daily. gentamicin 0.3 % ophthalmic solution Commonly known as:  GARAMYCIN  
  
 ILEVRO 0.3 % Drps Generic drug:  nepafenac  
  
 labetalol 200 mg tablet Commonly known as:  Evelyn Rice  
take 1 tablet by mouth every 8 hours  
  
 prednisoLONE acetate 1 % ophthalmic suspension Commonly known as:  PRED FORTE  
instill 1 drop into left eye four times a day TYLENOL PM PO Take 2 Tabs by mouth nightly as needed (sleep). zolpidem 5 mg tablet Commonly known as:  AMBIEN Take 1 Tab by mouth nightly as needed for Sleep. Max Daily Amount: 5 mg. Prescriptions Printed Refills  
 zolpidem (AMBIEN) 5 mg tablet 1 Sig: Take 1 Tab by mouth nightly as needed for Sleep. Max Daily Amount: 5 mg. Class: Print Route: Oral  
  
Follow-up Instructions Return if symptoms worsen or fail to improve. To-Do List   
 08/23/2018 Imaging:  ANKLE BRACHIAL INDEX Patient Instructions Insomnia: Care Instructions Your Care Instructions Insomnia is the inability to sleep well. It is a common problem for most people at some time. Insomnia may make it hard for you to get to sleep, stay asleep, or sleep as long as you need to. This can make you tired and grouchy during the day. It can also make you forgetful, less effective at work, and unhappy. Insomnia can be caused by conditions such as depression or anxiety. Pain can also affect your ability to sleep. When these problems are solved, the insomnia usually clears up. But sometimes bad sleep habits can cause insomnia. If insomnia is affecting your work or your enjoyment of life, you can take steps to improve your sleep. Follow-up care is a key part of your treatment and safety. Be sure to make and go to all appointments, and call your doctor if you are having problems. It's also a good idea to know your test results and keep a list of the medicines you take. How can you care for yourself at home? What to avoid · Do not have drinks with caffeine, such as coffee or black tea, for 8 hours before bed. · Do not smoke or use other types of tobacco near bedtime. Nicotine is a stimulant and can keep you awake.  
· Avoid drinking alcohol late in the evening, because it can cause you to wake in the middle of the night. · Do not eat a big meal close to bedtime. If you are hungry, eat a light snack. · Do not drink a lot of water close to bedtime, because the need to urinate may wake you up during the night. · Do not read or watch TV in bed. Use the bed only for sleeping and sexual activity. What to try · Go to bed at the same time every night, and wake up at the same time every morning. Do not take naps during the day. · Keep your bedroom quiet, dark, and cool. · Sleep on a comfortable pillow and mattress. · If watching the clock makes you anxious, turn it facing away from you so you cannot see the time. · If you worry when you lie down, start a worry book. Well before bedtime, write down your worries, and then set the book and your concerns aside. · Try meditation or other relaxation techniques before you go to bed. · If you cannot fall asleep, get up and go to another room until you feel sleepy. Do something relaxing. Repeat your bedtime routine before you go to bed again. · Make your house quiet and calm about an hour before bedtime. Turn down the lights, turn off the TV, log off the computer, and turn down the volume on music. This can help you relax after a busy day. When should you call for help? Watch closely for changes in your health, and be sure to contact your doctor if: 
  · Your efforts to improve your sleep do not work.  
  · Your insomnia gets worse.  
  · You have been feeling down, depressed, or hopeless or have lost interest in things that you usually enjoy. Where can you learn more? Go to http://milo-vania.info/. Enter P513 in the search box to learn more about \"Insomnia: Care Instructions. \" Current as of: October 10, 2017 Content Version: 11.7 © 0793-5000 [employer].  Care instructions adapted under license by [employer] (which disclaims liability or warranty for this information). If you have questions about a medical condition or this instruction, always ask your healthcare professional. [address] any warranty or liability for your use of this information. Introducing [facility] SERVICES! Dear Kell Berman: Thank you for requesting a [employer] account. Our records indicate that you have previously registered for a [employer] account but its currently inactive. Please call our [employer] support line at [phone]. Additional Information If you have questions, please visit the Frequently Asked Questions section of the [employer] website at [email or web address] Remember, [employer] is NOT to be used for urgent needs. For medical emergencies, dial 911. Now available from your iPhone and Android! Please provide this summary of care documentation to your next provider. Your primary care clinician is listed as Leandra Weaver. If you have any questions after today's visit, please call [phone].

## 2018-08-23 NOTE — PATIENT INSTRUCTIONS

## 2018-08-23 NOTE — PROGRESS NOTES
Subjective:      Phillip Pickard is a 65 y.o. female here with complaint of night sweats and leg pain. Reports that she breaks out in a sweat once per week at which time she has to change clothes and sheets. Denies cough, dyspnea    Reports that she has pain in the legs that is worse when she is walking around. Onset was a few months ago and has gradually worsening. Pain relieved with rest.     She has not been sleeping well since she has started dialysis. Reports that she is sleeping for about an hour before she is awakens and unable to fall back to sleep. Is occurring nightly and is unsure of the last time she had a good night's rest. She does not sleep with the lights or TV on. Room is dark and cool. Current Outpatient Prescriptions   Medication Sig Dispense Refill    calcium acetate (PHOSLO) 667 mg cap Take 2 Caps by mouth three (3) times daily. And one with snack  11    furosemide (LASIX) 40 mg tablet Take 1 Tab by mouth daily. 11    cloNIDine HCl (CATAPRES) 0.1 mg tablet Take 1 Tab by mouth three (3) times daily. 6    atorvastatin (LIPITOR) 10 mg tablet TAKE 1 TABLET BY MOUTH ONCE DAILY AT BEDTIME. 90 Tab 0    bumetanide (BUMEX) 2 mg tablet take 1 tablet by mouth once daily ONLY IF WEIGHT GOES UP BY 4LBS 30 Tab 2    prednisoLONE acetate (PRED FORTE) 1 % ophthalmic suspension instill 1 drop into left eye four times a day  0    gentamicin (GARAMYCIN) 0.3 % ophthalmic solution   0    aspirin delayed-release 81 mg tablet take 2 tablets by mouth once daily 60 Tab 3    doxazosin (CARDURA) 2 mg tablet Take 1 Tab by mouth daily. 90 Tab 1    labetalol (NORMODYNE) 200 mg tablet take 1 tablet by mouth every 8 hours 90 Tab 5    DUREZOL 0.05 % ophthalmic emulsion instill 1 drop into left eye twice a day START AFTER CATARACT SURGERY  0    ILEVRO 0.3 % drps   0    albuterol-ipratropium (DUO-NEB) 2.5 mg-0.5 mg/3 ml nebu 3 mL by Nebulization route every four (4) hours as needed.  30 Nebule 1    ferrous sulfate 325 mg (65 mg iron) tablet Take 1 Tab by mouth daily (with breakfast). 30 Tab 3    ACETAMINOPHEN/DIPHENHYDRAMINE (TYLENOL PM PO) Take 2 Tabs by mouth nightly as needed (sleep).  dorzolamide-timolol (COSOPT) 22.3-6.8 mg/mL ophthalmic solution Administer 1 Drop to both eyes two (2) times a day.  amLODIPine (NORVASC) 10 mg tablet Take 10 mg by mouth daily. 0       Allergies   Allergen Reactions    Ciprofloxacin Angioedema       Past Medical History:   Diagnosis Date    Anatomical narrow angle of both eyes     Anemia     Arthritis     Calculus of kidney     Chronic open angle glaucoma     Congestive heart failure (HCC)     Diabetes (HCC)     [facility]     HD Tue/Thu/Sat, started 4/2018    Hematuria     Macular edema     Proliferative diabetic retinopathy of both eyes ([facility])        Social History   Substance Use Topics    Smoking status: Never Smoker    Smokeless tobacco: Never Used    Alcohol use No        Review of Systems  Pertinent items are noted in HPI. Objective:     Visit Vitals    /58 (BP 1 Location: Right arm, BP Patient Position: Sitting)  Comment: Manual    Pulse 70    Temp 98.1 °F (36.7 °C) (Oral)  Comment: .  Resp 18    Ht 5' 1\" (1.549 m)    Wt 146 lb (66.2 kg)    SpO2 97%    BMI 27.59 kg/m2      General appearance - alert, well appearing, and in no distress  Chest - clear to auscultation, no wheezes, rales or rhonchi, symmetric air entry, no tachypnea, retractions or cyanosis  Heart - normal rate, regular rhythm, normal S1, S2, no murmurs, rubs, clicks or gallops  Extremities - peripheral pulses normal, no pedal edema, no clubbing or cyanosis, no edema, redness or tenderness in the calves    Assessment/Plan:   Nixon Ramirez is a 65 y.o. female seen for:     1. Insomnia, unspecified type  - zolpidem (AMBIEN) 5 mg tablet; Take 1 Tab by mouth nightly as needed for Sleep. Max Daily Amount: 5 mg.   Dispense: 30 Tab; Refill: 1  - good sleep hygiene discussed, discussed that mediation to be taken PRN     2. Intermittent claudication [facility]): check ABIs. - ANKLE BRACHIAL INDEX; Future    3. Night sweats: only once per week with no accompanying symptoms. Observe. I have discussed the diagnosis with the patient and the intended plan as seen in the above orders. The patient has received an after-visit summary and questions were answered concerning future plans. I have discussed medication side effects and warnings with the patient as well. Patient verbalizes understanding of plan of care and denies further questions or concerns at this time. Informed patient to return to the office if symptoms worsen or if new symptoms arise. Follow-up Disposition:  Return if symptoms worsen or fail to improve.

## 2018-08-23 NOTE — PROGRESS NOTES
Identified pt with two pt identifiers(name and ). Chief Complaint   Patient presents with    Sleep Problem     cannot sleep through night and has night sweats at least once a week. Health Maintenance Due   Topic    Pneumococcal 19-64 Highest Risk (1 of 3 - PCV13)    DTaP/Tdap/Td series (1 - Tdap)    PAP AKA CERVICAL CYTOLOGY     ZOSTER VACCINE AGE 60>     MICROALBUMIN Q1     FOBT Q 1 YEAR AGE 50-75     HEMOGLOBIN A1C Q6M     MEDICARE YEARLY EXAM     Influenza Age 5 to Adult        Wt Readings from Last 3 Encounters:   18 146 lb (66.2 kg)   18 148 lb (67.1 kg)   17 158 lb (71.7 kg)     Temp Readings from Last 3 Encounters:   18 98.1 °F (36.7 °C) (Oral)   18 97.7 °F (36.5 °C) (Oral)   17 97.5 °F (36.4 °C) (Oral)     BP Readings from Last 3 Encounters:   18 150/58   18 170/68   17 162/60     Pulse Readings from Last 3 Encounters:   18 70   18 70   17 71         Learning Assessment:  :     Learning Assessment 2016   PRIMARY LEARNER Patient   HIGHEST LEVEL OF EDUCATION - PRIMARY LEARNER  DID NOT GRADUATE HIGH SCHOOL   BARRIERS PRIMARY LEARNER NONE   CO-LEARNER CAREGIVER No   PRIMARY LANGUAGE ENGLISH   LEARNER PREFERENCE PRIMARY DEMONSTRATION   ANSWERED BY patient   RELATIONSHIP SELF       Depression Screening:  :     PHQ over the last two weeks 2018   Little interest or pleasure in doing things Not at all   Feeling down, depressed, irritable, or hopeless Not at all   Total Score PHQ 2 0       Fall Risk Assessment:  :     No flowsheet data found. Abuse Screening:  :     Abuse Screening Questionnaire 2018   Do you ever feel afraid of your partner? N   Are you in a relationship with someone who physically or mentally threatens you? N   Is it safe for you to go home?  Y       Coordination of Care Questionnaire:  :     1) Have you been to an emergency room, urgent care clinic since your last visit? no   Hospitalized since your last visit? no             2) Have you seen or consulted any other health care providers outside of [facility] since your last visit? yes [facility]  (Include any pap smears or colon screenings in this section.)    3) Do you have an Advance Directive on file? no  Are you interested in receiving information about Advance Directives? no    Reviewed record in preparation for visit and have obtained necessary documentation. Medication reconciliation up to date and corrected with patient at this time.

## 2018-08-27 ENCOUNTER — HOSPITAL ENCOUNTER (OUTPATIENT)
Dept: VASCULAR SURGERY | Age: 63
Discharge: HOME | End: 2018-08-27
Attending: FAMILY MEDICINE
Payer: MEDICARE

## 2018-08-27 DIAGNOSIS — I73.9 INTERMITTENT CLAUDICATION (HCC): ICD-10-CM

## 2018-08-27 PROCEDURE — 93922 UPR/L XTREMITY ART 2 LEVELS: CPT

## 2018-08-27 NOTE — PROCEDURES
[facility]  *** FINAL REPORT ***    Name: Alva Herrera  MRN: [id number]    Outpatient  : 23 Oct 1955  HIS Order #: [id number]  [address] [facility] Visit #: [id number]  Date: 27 Aug 2018    TYPE OF TEST: Peripheral Arterial Testing    REASON FOR TEST  Claudication (both sides), Intermittant    Right Leg  Segmentals: Noncompressible                     mmHg  Brachial         214  High thigh  Low thigh  Calf  Posterior tibial 215  Dorsalis pedis  Peroneal  Metatarsal  Toe pressure     128  Doppler:    Normal  PVR:        Normal  Ankle/Brachial: 1.00    Left Leg  Segmentals: Normal                     mmHg  Brachial  High thigh  Low thigh  Calf  Posterior tibial 214  Dorsalis pedis   243  Peroneal  Metatarsal  Toe pressure     123  Doppler:    Normal  PVR:        Normal  Ankle/Brachial: 1.14    INTERPRETATION/FINDINGS  PROCEDURE:  Evaluation of lower extremity arteries with systolic blood   pressure measurement at the ankle and brachial level and calculation  of the ankle/brachial pressure index (DARIN). The exam may also include   pulse volume recording (PVR) plethysmography at the ankle level. CONCLUSION:  1. No evidence of significant peripheral arterial disease at rest in  the left leg. 2. The right ankle/brachial index is 1.00 and the left ankle/brachial  index is 1.14.  3. The right toe/brachial index is 0.60 and the left toe/brachial  index is 0.57. ADDITIONAL COMMENTS    I have personally reviewed the data relevant to the interpretation of  this  study. TECHNOLOGIST: TERRANCE Collado  Signed: 2018 09:55 AM    PHYSICIAN: Brianne Romano.  Ray King MD  Signed: 2018 07:29 AM

## 2018-08-31 ENCOUNTER — TELEPHONE (OUTPATIENT)
Dept: FAMILY MEDICINE CLINIC | Age: 63
End: 2018-08-31

## 2018-08-31 NOTE — TELEPHONE ENCOUNTER
The pt's daughter ( She is on HIPPA) is calling about her moms recent test results.      Please advise    [phone]

## 2018-12-04 ENCOUNTER — OFFICE VISIT (OUTPATIENT)
Dept: FAMILY MEDICINE CLINIC | Age: 63
End: 2018-12-04

## 2018-12-04 ENCOUNTER — HOSPITAL ENCOUNTER (OUTPATIENT)
Dept: LAB | Age: 63
Discharge: HOME | End: 2018-12-04
Payer: MEDICARE

## 2018-12-04 VITALS
WEIGHT: 144.4 LBS | HEART RATE: 72 BPM | DIASTOLIC BLOOD PRESSURE: 68 MMHG | TEMPERATURE: 97.9 F | HEIGHT: 61 IN | BODY MASS INDEX: 27.26 KG/M2 | SYSTOLIC BLOOD PRESSURE: 180 MMHG | RESPIRATION RATE: 20 BRPM | OXYGEN SATURATION: 96 %

## 2018-12-04 DIAGNOSIS — Z12.4 SCREENING FOR MALIGNANT NEOPLASM OF CERVIX: Primary | ICD-10-CM

## 2018-12-04 DIAGNOSIS — Z01.419 WELL WOMAN EXAM WITH ROUTINE GYNECOLOGICAL EXAM: ICD-10-CM

## 2018-12-04 PROCEDURE — 88175 CYTOPATH C/V AUTO FLUID REDO: CPT

## 2018-12-04 PROCEDURE — 87624 HPV HI-RISK TYP POOLED RSLT: CPT

## 2018-12-04 RX ORDER — FUROSEMIDE 40 MG/1
TABLET ORAL
Refills: 11 | COMMUNITY
Start: 2018-11-29

## 2018-12-04 RX ORDER — VITAMIN E 268 MG
CAPSULE ORAL DAILY
COMMUNITY
End: 2019-07-30

## 2018-12-04 RX ORDER — NIFEDIPINE 30 MG/1
TABLET, EXTENDED RELEASE ORAL
Refills: 11 | COMMUNITY
Start: 2018-11-29 | End: 2019-07-30

## 2018-12-04 NOTE — PROGRESS NOTES
Subjective:  
  
Andreina Crews is a 59 y.o. female here for gynecological exam and Pap smear. She is currently being evaluated for renal transplant and will need to have Pap performed. Reports last Pap performed in 2007 and denies h/o abnormal Pap smears. No LMP recorded. Patient is postmenopausal. Entered menopause in her 42's. Denies vaginal discharge or vaginal bleeding. Current Outpatient Medications Medication Sig Dispense Refill  furosemide (LASIX) 40 mg tablet TAKE 1 TABLET BY MOUTH ONCE DAILY  11  
 NIFEdipine ER (PROCARDIA XL) 30 mg ER tablet TAKE 1  ONE  TABLET BY MOUTH TWO TIMES A DAY  11  
 vitamin E (AQUA GEMS) 400 unit capsule Take  by mouth daily.  zolpidem (AMBIEN) 5 mg tablet Take 1 Tab by mouth nightly as needed for Sleep. Max Daily Amount: 5 mg. 30 Tab 1  calcium acetate (PHOSLO) 667 mg cap Take 2 Caps by mouth three (3) times daily. And one with snack  11  
 cloNIDine HCl (CATAPRES) 0.1 mg tablet Take 1 Tab by mouth three (3) times daily. 6  
 atorvastatin (LIPITOR) 10 mg tablet TAKE 1 TABLET BY MOUTH ONCE DAILY AT BEDTIME. 90 Tab 0  
 bumetanide (BUMEX) 2 mg tablet take 1 tablet by mouth once daily ONLY IF WEIGHT GOES UP BY 4LBS 30 Tab 2  
 aspirin delayed-release 81 mg tablet take 2 tablets by mouth once daily 60 Tab 3  
 doxazosin (CARDURA) 2 mg tablet Take 1 Tab by mouth daily. 90 Tab 1  
 labetalol (NORMODYNE) 200 mg tablet take 1 tablet by mouth every 8 hours 90 Tab 5  DUREZOL 0.05 % ophthalmic emulsion instill 1 drop into left eye twice a day START AFTER CATARACT SURGERY  0  
 ferrous sulfate 325 mg (65 mg iron) tablet Take 1 Tab by mouth daily (with breakfast). 30 Tab 3  ACETAMINOPHEN/DIPHENHYDRAMINE (TYLENOL PM PO) Take 2 Tabs by mouth nightly as needed (sleep).  dorzolamide-timolol (COSOPT) 22.3-6.8 mg/mL ophthalmic solution Administer 1 Drop to both eyes two (2) times a day. Allergies Allergen Reactions  Ciprofloxacin Angioedema Past Medical History:  
Diagnosis Date  Anatomical narrow angle of both eyes  Anemia  Arthritis  Calculus of kidney  Chronic open angle glaucoma  Congestive [facility] failure ([facility]  Diabetes ([facility]  ESRD (end stage renal disease) on dialysis ([facility] HD Tue/Thu/Sat, started 4/2018  Hematuria  Macular edema  Proliferative diabetic retinopathy of both eyes ([facility] Social History Tobacco Use  Smoking status: Never Smoker  Smokeless tobacco: Never Used Substance Use Topics  Alcohol use: No  
  
 
Review of Systems Pertinent items are noted in HPI. Objective:  
 
Visit Vitals /68 (BP 1 Location: Left arm, BP Patient Position: Sitting) Pulse 72 Temp 97.9 °F (36.6 °C) (Oral) Resp 20 Ht 5' 1\" (1.549 m) Wt 144 lb 6.4 oz (65.5 kg) SpO2 96% BMI 27.28 kg/m² General appearance - alert, well appearing, and in no distress Chest - clear to auscultation, no wheezes, rales or rhonchi, symmetric air entry, no tachypnea, retractions or cyanosis Heart - normal rate, regular rhythm, normal S1, S2, no murmurs, rubs, clicks or gallops Pelvic exam: VULVA: normal appearing vulva with no masses, tenderness or lesions, VAGINA: normal appearing vagina with normal color and discharge, no lesions, CERVIX: normal appearing cervix without discharge or lesions Assessment/Plan:  
Idania Darden is a 59 y.o. female seen for:  
 
1. Screening for malignant neoplasm of cervix - PAP IG, APTIMA HPV AND RFX [id number] (200464) 2. Well woman exam with routine gynecological exam 
- PAP IG, APTIMA HPV AND RFX 16/18,45 (365189) I have discussed the diagnosis with the patient and the intended plan as seen in the above orders. The patient has received an after-visit summary and questions were answered concerning future plans. I have discussed medication side effects and warnings with the patient as well.  Patient verbalizes understanding of plan of care and denies further questions or concerns at this time. Informed patient to return to the office if symptoms worsen or if new symptoms arise. Follow-up Disposition: Not on File

## 2018-12-04 NOTE — PROGRESS NOTES
Identified pt with two pt identifiers(name and ). Chief Complaint Patient presents with [place]ella [place] Exam  
  
 
Health Maintenance Due Topic  Pneumococcal 19-64 Highest Risk (1 of 3 - PCV13)  DTaP/Tdap/Td series (1 - Tdap)  PAP AKA CERVICAL CYTOLOGY  Shingrix Vaccine Age 50> (1 of 2)  MICROALBUMIN Q1   
 FOBT Q 1 YEAR AGE 50-75  HEMOGLOBIN A1C Q6M   
 MEDICARE YEARLY EXAM   
 Influenza Age 5 to Adult  FOOT EXAM Q1   
 LIPID PANEL Q1   
 EYE EXAM RETINAL OR DILATED Q1 Wt Readings from Last 3 Encounters:  
18 146 lb (66.2 kg) 18 148 lb (67.1 kg) 17 158 lb (71.7 kg) Temp Readings from Last 3 Encounters:  
18 98.1 °F (36.7 °C) (Oral) 18 97.7 °F (36.5 °C) (Oral) 17 97.5 °F (36.4 °C) (Oral) BP Readings from Last 3 Encounters:  
18 150/58  
18 170/68  
17 162/60 Pulse Readings from Last 3 Encounters:  
18 70  
18 70  
17 71 Learning Assessment: 
:  
 
Learning Assessment 2016 PRIMARY LEARNER Patient HIGHEST LEVEL OF EDUCATION - PRIMARY LEARNER  DID NOT GRADUATE HIGH SCHOOL  
BARRIERS PRIMARY LEARNER NONE  
CO-LEARNER CAREGIVER No  
PRIMARY LANGUAGE ENGLISH  
LEARNER PREFERENCE PRIMARY DEMONSTRATION  
ANSWERED BY patient RELATIONSHIP SELF Depression Screening: 
:  
 
PHQ over the last two weeks 2018 Little interest or pleasure in doing things Not at all Feeling down, depressed, irritable, or hopeless Not at all Total Score PHQ 2 0 Fall Risk Assessment: 
:  
 
No flowsheet data found. Abuse Screening: 
:  
 
Abuse Screening Questionnaire 2018 Do you ever feel afraid of your partner? [employer] Are you in a relationship with someone who physically or mentally threatens you? [employer] Is it safe for you to go home? Willow Mike Coordination of Care Questionnaire: 
:  
 
1) Have you been to an emergency room, urgent care clinic since your last visit? no  
 Hospitalized since your last visit? no          
 
2) Have you seen or consulted any other health care providers outside of [address] [place] [address] since your last visit? yes [facility] (Include any pap smears or colon screenings in this section.) 3) Do you have an Advance Directive on file? no 
Are you interested in receiving information about Advance Directives? no 
 
Reviewed record in preparation for visit and have obtained necessary documentation. Medication reconciliation up to date and corrected with patient at this time.

## 2018-12-04 NOTE — PATIENT INSTRUCTIONS
[facility] Phone: [address] Borrego[address]aj[address]ifer Iyer [place] 
· Dr. Jamaal Fierro 
· Dr. Yaquelin Sykes MD  
· Dr. Jamari Weir [facility]  Phone: [phone] 
[address], [address]  
? Felicity Price MD 
 
[employer] Phone: [phone] . [facility] [address], [facility]  
? Ana Hernandez M.D. ? Deepa Silva M.D. 
? Rayna Bermudez M.D. 
? Vamshi Acosta M.D. [facility]. 
Phone: [phone] [address], [address] [facility] [address] [place] [address], [facility] [address], [facility] 
? Edmund Pak MD 
? Linh Hernandez MD 
? Cande Cervantes NP 
? Isis Miles NP

## 2018-12-06 ENCOUNTER — HOSPITAL ENCOUNTER (OUTPATIENT)
Dept: MAMMOGRAPHY | Age: 63
Discharge: HOME | End: 2018-12-06
Attending: FAMILY MEDICINE
Payer: MEDICARE

## 2018-12-06 DIAGNOSIS — Z12.39 SCREENING BREAST EXAMINATION: ICD-10-CM

## 2018-12-06 PROCEDURE — 77067 SCR MAMMO BI INCL CAD: CPT

## 2018-12-07 NOTE — ED NOTES
Progress Notes by Jannie Ruiz MD at 08/18/17 04:28 PM     Author:  Jannie Ruiz MD Service:  (none) Author Type:  Physician     Filed:  08/18/17 05:09 PM Encounter Date:  8/18/2017 Status:  Signed     :  Jannie Ruiz MD (Physician)              12 Year Well Child Visit        Roomed by: Xenia El MA 4:28 PM     Accompanied by:[JE1.1T] Mother[JE1.1M]   [phone] (cell)[JE1.1T] ok to leave a message[JE1.1M]      DEVELOPMENT:  School Hx: presently is[JE1.1T] in the 6th grade[JE1.1M].  School Progress:[JE1.1T] average grades[JE1.1M]  Activities:[JE1.1T] active[JE1.1M]    Informed parent of indicated lab:[JE1.1T] Yes[JE1.1M]    TB History[JE1.1T]  No[JE1.1M] -- Is there a family history of TB?[JE1.1T]   No[JE1.1M] -- Has the child been exposed to anyone who was in a correctional facility or works in one in the past 5 years?[JE1.1T]    No[JE1.1M] -- Is the child an immigrant or adopted from an endemic country - ([place], [place],    [place] or [place][JE1.1T]    No[JE1.1M] -- Is there a travel history to an endemic country ([place], [place], [place] or [place] with exposure to the local population?[JE1.1T]  No[JE1.1M] -- Is there a patient history of HIV or family member with HIV?[JE1.1T]  No[JE1.1M] -- Has the child been exposed to a  resident of a nursing home, institutionalized adolescent, foster home or homeless shelter?   TB skin test indicated:[JE1.1T] Not at this time[RT1.1M]    Diabetic Screening[JE1.1T]  No[JE1.1M] -- Diabetes screening: family history[JE1.1T]  [JE1.1M] -- Ethnic background[JE1.1T]  No[RT1.1T] -- Signs of insulin resistance (hypertension, dyslipidemia, polycystic ovarian syndrome).    Medical History:  On going medical problems?    Patient Active Problem List    Diagnosis    • Routine infant or child health check   • Furuncle     Significant illnesses in the past?[JE1.1T] No[JE1.1M]  Any serious injuries related to sports?[JE1.1T]    [facility] with [facility]. None[JE1.1M]  Any known deformities (scoliosis, heart, absent paired organ)?[JE1.1T]   No[JE1.1M]  Family Hx of bleeding or heart disease before age 50, etc.?[JE1.1T]  No[JE1.1M]  Any fainting or dizziness while exercising?[JE1.1T]  No[JE1.1M]  Any loss of consciousness, concussion or head injury?[JE1.1T]  No[JE1.1M]    SUBJECTIVE:   Present health:[JE1.1T] Good[JE1.1M]  Hearing & vision:[JE1.1T] No Problems[RT1.1T]  Dental:[JE1.1T] No Problems[RT1.1T]    Diet:[JE1.1T] appropriate diet[RT1.1T]  Elimination:   BM s:[JE1.1T] No problems[RT1.1T]  Urine:[JE1.1T] No problems[RT1.1T]  Sleep:[JE1.1T] No problems[RT1.1T]  Menstrual hx:[JE1.1T]  not started[RT1.1M]    ROS  All other systems reviewed were negative      Allergies:  Omnicef [omnicef]     Current Outpatient Prescriptions     Medication  Sig   • albuterol (PROVENTIL) (2.5 MG/3ML) 0.083% nebulizer solution One vial three times a day for 5 days       Past Medical history:[JE1.1T] No change in past medical history[RT1.1M]     Family history:[JE1.1T] No change in family history[RT1.1M]    Social history:[JE1.1T]  Attends school[RT1.1M]     OBJECTIVE:   Physical exam  /62  Pulse 84  Temp 98.4 °F (36.9 °C) (Temporal)   Resp 20  Ht 4' 8.75\" (1.441 m)  Wt 72 lb 3.2 oz (32.7 kg)  BMI 15.76 kg/m2    GENERAL: Evaluation of appearance.  Findings:[JE1.1T] Normal[RT1.1T]    HEAD & SCALP: Evaluation for lesions, swelling, tenderness and abnormalities.  Findings:[JE1.1T] Normal[RT1.1T]    EYES: Evaluation for redness, swelling, drainage and abnormalities.  Findings:[JE1.1T] Both eyes normal[RT1.1T]    EARS: Evaluation of  external ears, canals, and tm's  Findings:[JE1.1T] Normal[RT1.1T]    NOSE: Evaluation for swelling and drainage  Findings:[JE1.1T] Normal[RT1.1T]    MOUTH: Evaluation of tongue and mucosal membranes  Findings:[JE1.1T] Normal[RT1.1T]    THROAT: Evaluation for redness and lesions  Findings:[JE1.1T] Normal[RT1.1T]    NECK: Evaluation for masses,  swelling and soreness  Findings:[JE1.1T] Normal[RT1.1T]    CHEST: Evaluation - auscultation and observation  Findings:[JE1.1T] Normal[RT1.1T]. BREAST:[JE1.1T] Normal[RT1.1T].  JULIA SCALE:[JE1.1T] Stage 2: Breast - budding[RT1.1M]     CARDIO: Evaluation by auscultation   Findings:[JE1.1T] Normal[RT1.1T]    ABDOMEN: Evaluation for bowel sounds, organomegaly, masses and tenderness  Findings:[JE1.1T] Normal - soft, no tenderness, no masses[RT1.1T]    : Evaluation by inspections.  Findings:[JE1.1T] normal female[RT1.1T].  JULIA SCALE:[JE1.1T] Stage 2: Pubic hair - long downy.[RT1.1M]     MUS-SKEL: Evaluation for swelling, edema, range of motion or other abnormalities.  Findings:[JE1.1T] Normal[RT1.1T].      SKIN: Evaluation for rashes, acne and lesions  Findings:[JE1.1T] Normal[RT1.1T]    NEURO: Evaluation by observation.  Findings:[JE1.1T] Normal[RT1.1T]       ASSESSMENT:[JE1.1T]   Normal Health Maintenance Visit[RT1.1T]    PLAN:  Medication changes:[JE1.1T] No[RT1.1M]  Immunizations given today?[JE1.1T] Yes.  Vaccine counseling including benefits, risks and adverse reactions were provided by myself during the visit.[RT1.1M]  See Orders  Discussed anticipatory guidance for screen time activity   Reviewed Discussion and Guidance Topics:[JE1.1T] healthy eating habits, physical activity,[RT1.1T] diet: fast food, fluoride, sleep: regular bed time, discipline: chores, limits, school: study time, limit TV, social: peers, safety: seat belt, bike helmet, firearms, sunscreen, prevention: drugs, alcohol, smoking, development: puberty/body changes, periods-girls, School form signed and returned to parent/patient and Sports form signed and returned to patient/patient.[RT1.1M]    Nutrition Assessment and Counseling  Diet is[JE1.1T] appropriate for age[RT1.1T].  Discussed healthy eating habits:[JE1.1T] Yes[RT1.1T]    Physical Activity Assessment and Counseling  Patient[JE1.1T] participates in physical  activity[RT1.1T].  Discussed physical activity:[JE1.1T] Yes[RT1.1T]    Screen Time Assessment and Counseling  Number of hours of screen time per day:[JE1.1T] 0-1 hours[RT1.1M].  Discussed anticipatory guidance for screen time activity:[JE1.1T] Yes[RT1.1T]      Blood Pressure  Blood pressures is[JE1.1T] appropriate for age[RT1.1T].       Next Well Child Checkup in 1 year.  Call if questions or problems.[JE1.1T]    Electronically Signed by:    Jannie Riuz MD , 8/18/2017[RT1.1T]        Revision History        User Key Date/Time User Provider Type Action    > RT1.1 08/18/17 05:09 PM Jannie Ruiz MD Physician Sign     JE1.1 08/18/17 04:28 PM Xenia El CMA Medical Assistant Sign at close encounter    M - Manual, T - Template

## 2019-01-03 ENCOUNTER — OFFICE VISIT (OUTPATIENT)
Dept: FAMILY MEDICINE CLINIC | Age: 64
End: 2019-01-03

## 2019-01-03 VITALS
TEMPERATURE: 97.7 F | HEART RATE: 76 BPM | HEIGHT: 61 IN | BODY MASS INDEX: 27.56 KG/M2 | DIASTOLIC BLOOD PRESSURE: 70 MMHG | WEIGHT: 146 LBS | RESPIRATION RATE: 17 BRPM | OXYGEN SATURATION: 98 % | SYSTOLIC BLOOD PRESSURE: 178 MMHG

## 2019-01-03 DIAGNOSIS — Z99.2 ESRD (END STAGE RENAL DISEASE) ON DIALYSIS (HCC): ICD-10-CM

## 2019-01-03 DIAGNOSIS — N18.6 ESRD (END STAGE RENAL DISEASE) ON DIALYSIS (HCC): ICD-10-CM

## 2019-01-03 DIAGNOSIS — I50.30 DIASTOLIC CONGESTIVE HEART FAILURE, UNSPECIFIED HF CHRONICITY (HCC): ICD-10-CM

## 2019-01-03 DIAGNOSIS — E11.3593 PROLIFERATIVE DIABETIC RETINOPATHY OF BOTH EYES ASSOCIATED WITH TYPE 2 DIABETES MELLITUS, UNSPECIFIED PROLIFERATIVE RETINOPATHY TYPE (HCC): ICD-10-CM

## 2019-01-03 RX ORDER — AMLODIPINE BESYLATE 10 MG/1
1 TABLET ORAL DAILY
Refills: 11 | COMMUNITY
Start: 2018-12-27 | End: 2019-01-03

## 2019-01-03 NOTE — PROGRESS NOTES
Identified pt with two pt identifiers(name and ). Chief Complaint   Patient presents with    Pre-op Exam     Eye Surgery. Health Maintenance Due   Topic    Pneumococcal 19-64 Highest Risk (1 of 3 - PCV13)    DTaP/Tdap/Td series (1 - Tdap)    Shingrix Vaccine Age 50> (1 of 2)    MICROALBUMIN Q1     FOBT Q 1 YEAR AGE 50-75     HEMOGLOBIN A1C Q6M     MEDICARE YEARLY EXAM     Influenza Age 5 to Adult     FOOT EXAM Q1     LIPID PANEL Q1        Wt Readings from Last 3 Encounters:   19 146 lb (66.2 kg)   18 144 lb 6.4 oz (65.5 kg)   18 146 lb (66.2 kg)     Temp Readings from Last 3 Encounters:   18 97.9 °F (36.6 °C) (Oral)   18 98.1 °F (36.7 °C) (Oral)     BP Readings from Last 3 Encounters:   18 180/68   18 150/58   18 170/68     Pulse Readings from Last 3 Encounters:   18 72   18 70   18 70         Learning Assessment:  :     Learning Assessment 2016   PRIMARY LEARNER Patient   HIGHEST LEVEL OF EDUCATION - PRIMARY LEARNER  DID NOT GRADUATE HIGH SCHOOL   BARRIERS PRIMARY LEARNER NONE   CO-LEARNER CAREGIVER No   PRIMARY LANGUAGE ENGLISH   LEARNER PREFERENCE PRIMARY DEMONSTRATION   ANSWERED BY patient   RELATIONSHIP SELF       Depression Screening:  :     PHQ over the last two weeks 2018   Little interest or pleasure in doing things Not at all   Feeling down, depressed, irritable, or hopeless Not at all   Total Score PHQ 2 0       Fall Risk Assessment:  :     No flowsheet data found. Abuse Screening:  :     Abuse Screening Questionnaire 2018   Do you ever feel afraid of your partner? N   Are you in a relationship with someone who physically or mentally threatens you? N   Is it safe for you to go home?  Y       Coordination of Care Questionnaire:  :     1) Have you been to an emergency room, urgent care clinic since your last visit? no   Hospitalized since your last visit? no             2) Have you seen or consulted any other health care providers outside of [address] [place] [address] since your last visit? no  (Include any pap smears or colon screenings in this section.)    3) Do you have an Advance Directive on file? no  Are you interested in receiving information about Advance Directives? no    Reviewed record in preparation for visit and have obtained necessary documentation. Medication reconciliation up to date and corrected with patient at this time.

## 2019-01-03 NOTE — LETTER
1/4/2019 Ms. Cathy Hood 64 Current Outpatient Medications Medication Sig  furosemide (LASIX) 40 mg tablet TAKE 1 TABLET BY MOUTH ONCE DAILY  NIFEdipine ER (PROCARDIA XL) 30 mg ER tablet TAKE 1  ONE  TABLET BY MOUTH TWO TIMES A DAY  vitamin E (AQUA GEMS) 400 unit capsule Take  by mouth daily.  zolpidem (AMBIEN) 5 mg tablet Take 1 Tab by mouth nightly as needed for Sleep. Max Daily Amount: 5 mg.  calcium acetate (PHOSLO) 667 mg cap Take 2 Caps by mouth three (3) times daily. And one with snack  cloNIDine HCl (CATAPRES) 0.1 mg tablet Take 1 Tab by mouth three (3) times daily.  atorvastatin (LIPITOR) 10 mg tablet TAKE 1 TABLET BY MOUTH ONCE DAILY AT BEDTIME.  bumetanide (BUMEX) 2 mg tablet take 1 tablet by mouth once daily ONLY IF WEIGHT GOES UP BY 4LBS  aspirin delayed-release 81 mg tablet take 2 tablets by mouth once daily  doxazosin (CARDURA) 2 mg tablet Take 1 Tab by mouth daily.  labetalol (NORMODYNE) 200 mg tablet take 1 tablet by mouth every 8 hours  DUREZOL 0.05 % ophthalmic emulsion instill 1 drop into left eye twice a day START AFTER CATARACT SURGERY  ferrous sulfate 325 mg (65 mg iron) tablet Take 1 Tab by mouth daily (with breakfast).  ACETAMINOPHEN/DIPHENHYDRAMINE (TYLENOL PM PO) Take 2 Tabs by mouth nightly as needed (sleep). No current facility-administered medications for this visit.

## 2019-01-03 NOTE — PROGRESS NOTES
Preoperative Evaluation    Date of Exam: 1/3/2019    Mat Gaines is a 59 y.o. female (:1955) who presents for preoperative evaluation. Scheduled for vitrectomy of left eye with Dr. Meg Flores on 19. She reports that she has been doing well. Currently being evaluated at [facility] for renal transplant. [address] - has received influenza and pneumococcal vaccines. Latex Allergy: no    Medical History:     Past Medical History:   Diagnosis Date    Anatomical narrow angle of both eyes     Anemia     Arthritis     Calculus of kidney     Chronic open angle glaucoma     [facility]     Diabetes ([facility]     ESRD (end stage renal disease) on dialysis ([facility]     Mon/Wed/Fri, HD started 2018    Hematuria     Macular edema     Proliferative diabetic retinopathy of both eyes (HCC)        Allergies: Allergies   Allergen Reactions    Ciprofloxacin Angioedema        Medications:     Current Outpatient Medications   Medication Sig    furosemide (LASIX) 40 mg tablet TAKE 1 TABLET BY MOUTH ONCE DAILY    NIFEdipine ER (PROCARDIA XL) 30 mg ER tablet TAKE 1  ONE  TABLET BY MOUTH TWO TIMES A DAY    vitamin E (AQUA GEMS) 400 unit capsule Take  by mouth daily.  zolpidem (AMBIEN) 5 mg tablet Take 1 Tab by mouth nightly as needed for Sleep. Max Daily Amount: 5 mg.  calcium acetate (PHOSLO) 667 mg cap Take 2 Caps by mouth three (3) times daily. And one with snack    cloNIDine HCl (CATAPRES) 0.1 mg tablet Take 1 Tab by mouth three (3) times daily.  atorvastatin (LIPITOR) 10 mg tablet TAKE 1 TABLET BY MOUTH ONCE DAILY AT BEDTIME.  bumetanide (BUMEX) 2 mg tablet take 1 tablet by mouth once daily ONLY IF WEIGHT GOES UP BY 4LBS    aspirin delayed-release 81 mg tablet take 2 tablets by mouth once daily    doxazosin (CARDURA) 2 mg tablet Take 1 Tab by mouth daily.     labetalol (NORMODYNE) 200 mg tablet take 1 tablet by mouth every 8 hours    DUREZOL 0.05 % ophthalmic emulsion instill 1 drop into left eye twice a day START AFTER CATARACT SURGERY    ferrous sulfate 325 mg (65 mg iron) tablet Take 1 Tab by mouth daily (with breakfast).  ACETAMINOPHEN/DIPHENHYDRAMINE (TYLENOL PM PO) Take 2 Tabs by mouth nightly as needed (sleep). No current facility-administered medications for this visit.         Social History:     Social History     Socioeconomic History    Marital status:      Spouse name: Not on file    Number of children: Not on file    Years of education: Not on file    Highest education level: Not on file   Tobacco Use    Smoking status: Never Smoker    Smokeless tobacco: Never Used   Substance and Sexual Activity    Alcohol use: No    Drug use: No    Sexual activity: Yes     Birth control/protection: None       Anesthesia Complications: None  History of abnormal bleeding : None  History of Blood Transfusions: Yes   Health Care Directive or Living Will: Yes     Review of Systems:   Respiratory: negative for cough, sputum or dyspnea on exertion  Cardiovascular: negative for chest pain, chest pressure/discomfort, palpitations, irregular heart beats, lower extremity edema  Gastrointestinal: negative for nausea, vomiting, change in bowel habits and abdominal pain  Genitourinary:negative for frequency, dysuria and hematuria    Objective:   /70 (BP 1 Location: Right arm, BP Patient Position: Sitting) Comment: Manual  Pulse 76   Temp 97.7 °F (36.5 °C) (Oral) Comment: .  Resp 17   Ht 5' 1\" (1.549 m)   Wt 146 lb (66.2 kg)   SpO2 98%   BMI 27.59 kg/m²     General appearance - alert, well appearing, and in no distress  Eyes - pupils equal and reactive, extraocular eye movements intact, sclera anicteric  Ears - yellow serous fluid noted bilateral TMs, external canals normal   Mouth - mucous membranes moist, pharynx normal without lesions  Neck - supple, no significant adenopathy   Chest - clear to auscultation, no wheezes, rales or rhonchi, symmetric air entry, respirations are unlabored   Heart - normal rate, regular rhythm, normal S1, S2, no murmurs, rubs, clicks or gallops  Abdomen - soft, nontender, nondistended, normoactive bowel sounds   Extremities - (+) bilateral LE edema, fistula of left arm with palpable thrill   Skin - normal coloration and turgor, no rashes, no suspicious skin lesions noted      IMPRESSION: Carter Mcrae is a 59 y.o. female with hypertension, ESRD on hemodialysis, anemia of chronic disease, hyperlipidemia seen today for preoperative examination. She has an acceptable risk for surgery and may proceed at this time. History and Physical form completed and will be faxed to I. ICD-10-CM ICD-9-CM    1. ESRD (end stage renal disease) on dialysis (HCC) N18.6 585.6     Z99.2 V45.11    2. Proliferative diabetic retinopathy of both eyes associated with type 2 diabetes mellitus, unspecified proliferative retinopathy type ([facility] E11.3593 250.50      362.02    3.  Diastolic congestive heart failure, unspecified HF chronicity ([facility] I50.30 428.30      428.0          Momo Montalvo MD   1/3/2019

## 2019-02-25 ENCOUNTER — TELEPHONE (OUTPATIENT)
Dept: FAMILY MEDICINE CLINIC | Age: 64
End: 2019-02-25

## 2019-02-25 NOTE — TELEPHONE ENCOUNTER
Christina Renita intake group with [facility] wants to know if the pt had a pap smear done 8/1/18 and if so results need to be faxed over to #[phone] - pt has an appt tomorrow 2/25/19 and they need to report

## 2019-02-25 NOTE — TELEPHONE ENCOUNTER
Pt had pap done on 12/04/18 and it was normal. Per review of her chart [facility] faxed a copy to [phone] on 02/22/2019. I have faxed this over again today.

## 2019-06-04 RX ORDER — LABETALOL 200 MG/1
TABLET, FILM COATED ORAL
Qty: 90 TAB | Refills: 5 | Status: SHIPPED | OUTPATIENT
Start: 2019-06-04

## 2019-07-30 ENCOUNTER — OFFICE VISIT (OUTPATIENT)
Dept: FAMILY MEDICINE CLINIC | Age: 64
End: 2019-07-30

## 2019-07-30 ENCOUNTER — HOSPITAL ENCOUNTER (OUTPATIENT)
Dept: LAB | Age: 64
Discharge: HOME | End: 2019-07-30
Payer: MEDICARE

## 2019-07-30 VITALS
OXYGEN SATURATION: 99 % | DIASTOLIC BLOOD PRESSURE: 72 MMHG | HEIGHT: 61 IN | WEIGHT: 144 LBS | RESPIRATION RATE: 18 BRPM | SYSTOLIC BLOOD PRESSURE: 128 MMHG | TEMPERATURE: 98.3 F | BODY MASS INDEX: 27.19 KG/M2 | HEART RATE: 72 BPM

## 2019-07-30 DIAGNOSIS — N18.6 TYPE 2 DIABETES MELLITUS WITH CHRONIC KIDNEY DISEASE ON CHRONIC DIALYSIS, WITHOUT LONG-TERM CURRENT USE OF INSULIN (HCC): Primary | ICD-10-CM

## 2019-07-30 DIAGNOSIS — Z99.2 TYPE 2 DIABETES MELLITUS WITH CHRONIC KIDNEY DISEASE ON CHRONIC DIALYSIS, WITHOUT LONG-TERM CURRENT USE OF INSULIN (HCC): Primary | ICD-10-CM

## 2019-07-30 DIAGNOSIS — R25.2 MUSCLE CRAMPS: ICD-10-CM

## 2019-07-30 DIAGNOSIS — E55.9 VITAMIN D DEFICIENCY: ICD-10-CM

## 2019-07-30 DIAGNOSIS — M79.2 NEUROPATHIC PAIN OF FOOT, UNSPECIFIED LATERALITY: ICD-10-CM

## 2019-07-30 DIAGNOSIS — E11.22 TYPE 2 DIABETES MELLITUS WITH CHRONIC KIDNEY DISEASE ON CHRONIC DIALYSIS, WITHOUT LONG-TERM CURRENT USE OF INSULIN (HCC): Primary | ICD-10-CM

## 2019-07-30 PROBLEM — N18.4 CKD (CHRONIC KIDNEY DISEASE) STAGE 4, GFR 15-29 ML/MIN (HCC): Chronic | Status: RESOLVED | Noted: 2017-05-20 | Resolved: 2019-07-30

## 2019-07-30 PROCEDURE — 80048 BASIC METABOLIC PNL TOTAL CA: CPT

## 2019-07-30 PROCEDURE — 83036 HEMOGLOBIN GLYCOSYLATED A1C: CPT

## 2019-07-30 PROCEDURE — 80061 LIPID PANEL: CPT

## 2019-07-30 PROCEDURE — 36415 COLL VENOUS BLD VENIPUNCTURE: CPT

## 2019-07-30 PROCEDURE — 82306 VITAMIN D 25 HYDROXY: CPT

## 2019-07-30 RX ORDER — NIFEDIPINE 30 MG/1
TABLET, EXTENDED RELEASE ORAL
Refills: 11 | COMMUNITY
Start: 2019-07-03 | End: 2020-01-01

## 2019-07-30 RX ORDER — AMLODIPINE BESYLATE 10 MG/1
1 TABLET ORAL DAILY
Refills: 11 | COMMUNITY
Start: 2019-07-03 | End: 2019-07-30

## 2019-07-30 RX ORDER — VENLAFAXINE HYDROCHLORIDE 37.5 MG/1
37.5 CAPSULE, EXTENDED RELEASE ORAL DAILY
Qty: 30 CAP | Refills: 5 | Status: SHIPPED | OUTPATIENT
Start: 2019-07-30 | End: 2020-01-01

## 2019-07-30 NOTE — PATIENT INSTRUCTIONS
A Healthy Lifestyle: Care Instructions  Your Care Instructions    A healthy lifestyle can help you feel good, stay at a healthy weight, and have plenty of energy for both work and play. A healthy lifestyle is something you can share with your whole family. A healthy lifestyle also can lower your risk for serious health problems, such as high blood pressure, heart disease, and diabetes. You can follow a few steps listed below to improve your health and the health of your family. Follow-up care is a key part of your treatment and safety. Be sure to make and go to all appointments, and call your doctor if you are having problems. It's also a good idea to know your test results and keep a list of the medicines you take. How can you care for yourself at home? · Do not eat too much sugar, fat, or fast foods. You can still have dessert and treats now and then. The goal is moderation. · Start small to improve your eating habits. Pay attention to portion sizes, drink less juice and soda pop, and eat more fruits and vegetables. ? Eat a healthy amount of food. A 3-ounce serving of meat, for example, is about the size of a deck of cards. Fill the rest of your plate with vegetables and whole grains. ? Limit the amount of soda and sports drinks you have every day. Drink more water when you are thirsty. ? Eat at least 5 servings of fruits and vegetables every day. It may seem like a lot, but it is not hard to reach this goal. A serving or helping is 1 piece of fruit, 1 cup of vegetables, or 2 cups of leafy, raw vegetables. Have an apple or some carrot sticks as an afternoon snack instead of a candy bar. Try to have fruits and/or vegetables at every meal.  · Make exercise part of your daily routine. You may want to start with simple activities, such as walking, bicycling, or slow swimming. Try to be active 30 to 60 minutes every day. You do not need to do all 30 to 60 minutes all at once.  For example, you can exercise 3 times a day for 10 or 20 minutes. Moderate exercise is safe for most people, but it is always a good idea to talk to your doctor before starting an exercise program.  · Keep moving. [place] the lawn, work in the garden, or [employer]. Take the stairs instead of the elevator at work. · If you smoke, quit. People who smoke have an increased risk for heart attack, stroke, cancer, and other lung illnesses. Quitting is hard, but there are ways to boost your chance of quitting tobacco for good. ? Use nicotine gum, patches, or lozenges. ? Ask your doctor about stop-smoking programs and medicines. ? Keep trying. In addition to reducing your risk of diseases in the future, you will notice some benefits soon after you stop using tobacco. If you have shortness of breath or asthma symptoms, they will likely get better within a few weeks after you quit. · Limit how much alcohol you drink. Moderate amounts of alcohol (up to 2 drinks a day for men, 1 drink a day for women) are okay. But drinking too much can lead to liver problems, high blood pressure, and other health problems. Family health  If you have a family, there are many things you can do together to improve your health. · Eat meals together as a family as often as possible. · Eat healthy foods. This includes fruits, vegetables, lean meats and dairy, and whole grains. · Include your family in your fitness plan. Most people think of activities such as jogging or tennis as the way to fitness, but there are many ways you and your family can be more active. Anything that makes you breathe hard and gets your heart pumping is exercise. Here are some tips:  ? Walk to do errands or to take your child to school or the bus.  ? Go for a family bike ride after dinner instead of watching TV. Where can you learn more? Go to http://milo-vania.info/. Enter N361 in the search box to learn more about \"A Healthy Lifestyle: Care Instructions. \"  Current as of: September 11, 2018  Content Version: 12.1  © 3645-4340 [employer]wise, Incorporated. Care instructions adapted under license by [employer] (which disclaims liability or warranty for this information). If you have questions about a medical condition or this instruction, always ask your healthcare professional. [address] any warranty or liability for your use of this information.

## 2019-07-30 NOTE — PROGRESS NOTES
Identified pt with two pt identifiers(name and ). Chief Complaint   Patient presents with   [facility]     Patient is fasting    Diabetes        Health Maintenance Due   Topic    Pneumococcal 0-64 years (1 of 3 - PCV13)    DTaP/Tdap/Td series (1 - Tdap)    Shingrix Vaccine Age 50> (1 of 2)    FOBT Q 1 YEAR AGE 50-75     HEMOGLOBIN A1C Q6M     MEDICARE YEARLY EXAM     FOOT EXAM Q1     LIPID PANEL Q1        Wt Readings from Last 3 Encounters:   19 144 lb (65.3 kg)   19 146 lb (66.2 kg)   18 144 lb 6.4 oz (65.5 kg)     Temp Readings from Last 3 Encounters:   19 98.3 °F (36.8 °C) (Oral)   19 97.7 °F (36.5 °C) (Oral)   18 97.9 °F (36.6 °C) (Oral)     BP Readings from Last 3 Encounters:   19 128/72   19 178/70   18 180/68     Pulse Readings from Last 3 Encounters:   19 72   19 76   18 72         Learning Assessment:  :     Learning Assessment 2016   PRIMARY LEARNER Patient   HIGHEST LEVEL OF EDUCATION - PRIMARY LEARNER  DID NOT GRADUATE HIGH SCHOOL   BARRIERS PRIMARY LEARNER NONE   CO-LEARNER CAREGIVER No   PRIMARY LANGUAGE ENGLISH   LEARNER PREFERENCE PRIMARY DEMONSTRATION   ANSWERED BY patient   RELATIONSHIP SELF       Depression Screening:  :     3 most recent PHQ Screens 2019   Little interest or pleasure in doing things Not at all   Feeling down, depressed, irritable, or hopeless Not at all   Total Score PHQ 2 0       Fall Risk Assessment:  :     Fall Risk Assessment, last 12 mths 2019   Able to walk? Yes   Fall in past 12 months? No       Abuse Screening:  :     Abuse Screening Questionnaire 2019   Do you ever feel afraid of your partner? N N   Are you in a relationship with someone who physically or mentally threatens you? N N   Is it safe for you to go home?  Y Y       Coordination of Care Questionnaire:  :     1) Have you been to an emergency room, urgent care clinic since your last visit? no Hospitalized since your last visit? no             2) Have you seen or consulted any other health care providers outside of [address] [place] [address] since your last visit? no  (Include any pap smears or colon screenings in this section.)    3) Do you have an Advance Directive on file? no  Are you interested in receiving information about Advance Directives? no    Reviewed record in preparation for visit and have obtained necessary documentation. Medication reconciliation up to date and corrected with patient at this time.

## 2019-07-30 NOTE — PROGRESS NOTES
Subjective:     Erick Shelby is a 59 y.o. female who presents for follow up of diabetes. Fasting for labs. Has undergone workup for renal transplant at [address] [place] [address]. Diabetes mellitus:   · Medication compliance: not on medication, diet controlled. · Diabetic diet compliance: compliant most of the time  · Home glucose monitoring: is performed sporadically  · Further diabetic ROS: no polyuria or polydipsia, no chest pain, dyspnea or TIA's, has pain in the feet (states it feels like her feet are \"balled up tissue paper\". New concerns: has had intermittent muscle cramping in the feet and legs. Current Outpatient Medications   Medication Sig Dispense Refill    NIFEdipine ER (PROCARDIA XL) 30 mg ER tablet TAKE 1 (ONE) TABLET BY MOUTH TWO TIMES A DAY  11    labetalol (NORMODYNE) 200 mg tablet TAKE 1 TABLET BY MOUTH EVERY 8 HOURS. 90 Tab 5    furosemide (LASIX) 40 mg tablet TAKE 1 TABLET BY MOUTH ONCE DAILY  11    calcium acetate (PHOSLO) 667 mg cap Take 2 Caps by mouth three (3) times daily. And one with snack  11    cloNIDine HCl (CATAPRES) 0.1 mg tablet Take 1 Tab by mouth three (3) times daily. 6    atorvastatin (LIPITOR) 10 mg tablet TAKE 1 TABLET BY MOUTH ONCE DAILY AT BEDTIME. 90 Tab 0    bumetanide (BUMEX) 2 mg tablet take 1 tablet by mouth once daily ONLY IF WEIGHT GOES UP BY 4LBS 30 Tab 2    aspirin delayed-release 81 mg tablet take 2 tablets by mouth once daily 60 Tab 3    doxazosin (CARDURA) 2 mg tablet Take 1 Tab by mouth daily. 90 Tab 1    DUREZOL 0.05 % ophthalmic emulsion instill 1 drop into left eye twice a day START AFTER CATARACT SURGERY  0    ferrous sulfate 325 mg (65 mg iron) tablet Take 1 Tab by mouth daily (with breakfast).  30 Tab 3       Allergies   Allergen Reactions    Ciprofloxacin Angioedema       Past Medical History:   Diagnosis Date    Anatomical narrow angle of both eyes     Anemia     Arthritis     Calculus of kidney     Chronic open angle glaucoma     [facility]     Diabetes ([facility]     ESRD (end stage renal disease) on dialysis ([facility]     Mon/Wed/Fri, HD started 4/2018    Hematuria     Macular edema     Proliferative diabetic retinopathy of both eyes ([facility]        Social History     Tobacco Use    Smoking status: Never Smoker    Smokeless tobacco: Never Used   Substance Use Topics    Alcohol use: No        Lab Results   Component Value Date/Time    Hemoglobin A1c 5.0 12/20/2017 11:46 AM    Hemoglobin A1c <4.2 (L) 10/05/2017 09:53 AM    Hemoglobin A1c <3.5 (L) 03/10/2017 03:29 AM    Glucose 96 12/20/2017 11:46 AM    Glucose (POC) 121 (H) 05/19/2017 11:54 PM    LDL, calculated 78 10/05/2017 09:53 AM    Creatinine 4.84 (H) 12/20/2017 11:46 AM         Review of Systems, additional:  Pertinent items are noted in HPI. Objective:     Visit Vitals  /72 (BP 1 Location: Right arm, BP Patient Position: Sitting) Comment: Manual   Pulse 72   Temp 98.3 °F (36.8 °C) (Oral) Comment: .   Resp 18   Ht 5' 1\" (1.549 m)   Wt 144 lb (65.3 kg)   SpO2 99%   BMI 27.21 kg/m²     General appearance - alert, well appearing, and in no distress  Mental status - alert, oriented to person, place, and time, normal mood, behavior, speech, dress, motor activity, and thought processes  Chest - clear to auscultation, no wheezes, rales or rhonchi, symmetric air entry, no tachypnea, retractions or cyanosis  Heart - normal rate, regular rhythm, normal S1, S2, no murmurs, rubs, clicks or gallops  Extremities - peripheral pulses normal, no pedal edema, no clubbing or cyanosis, fistula LUE    Diabetic foot exam:     Left Foot:   Visual Exam: normal    Pulse DP: 1+ (weak)   Filament test: reduced sensation       Right Foot:   Visual Exam: normal    Pulse DP: 1+ (weak)   Filament test: reduced sensation     Lab review: orders written for new lab studies as appropriate; see orders. Assessment/Plan:   Claire Aquino is a 59 y.o. female seen today for:     1.  Type 2 diabetes mellitus with chronic kidney disease on chronic dialysis, without long-term current use of insulin ([facility]): diet controlled. Check labs. - HEMOGLOBIN A1C WITH EAG  - LIPID PANEL  -  DIABETES FOOT EXAM    2. Neuropathic pain of foot, unspecified laterality: Cymbalta not recommended in ESRD. Due to her h/o heart failure, would not wish to use Lyrica. Venlafaxine has off label use for neuropathy and will see how she does with this (renally dosed). - venlafaxine-SR (EFFEXOR-XR) 37.5 mg capsule; Take 1 Cap by mouth daily. Dispense: 30 Cap; Refill: 5    3. Muscle cramps  - METABOLIC PANEL, BASIC  - try B complex multivitamin     4. BMI 27.0-27.9,adult: I have reviewed/discussed the above normal BMI with the patient. I have recommended the following interventions: dietary management education, guidance, and counseling and encourage exercise. 5. Vitamin D deficiency  - VITAMIN D, 25 HYDROXY    I have discussed the diagnosis with the patient and the intended plan as seen in the above orders. The patient has received an after-visit summary and questions were answered concerning future plans. I have discussed medication side effects and warnings with the patient as well. Patient verbalizes understanding of plan of care and denies further questions or concerns at this time. Informed patient to return to the office if symptoms worsen or if new symptoms arise. Follow-up and Dispositions    · Return in about 4 months (around 11/30/2019) for follow up or sooner as needed.

## 2019-07-31 LAB
25(OH)D3+25(OH)D2 SERPL-MCNC: 12.2 NG/ML (ref 30–100)
BUN SERPL-MCNC: 26 MG/DL (ref 8–27)
BUN/CREAT SERPL: 6 (ref 12–28)
CALCIUM SERPL-MCNC: 9.4 MG/DL (ref 8.7–10.3)
CHLORIDE SERPL-SCNC: 97 MMOL/L (ref 96–106)
CHOLEST SERPL-MCNC: 154 MG/DL (ref 100–199)
CO2 SERPL-SCNC: 26 MMOL/L (ref 20–29)
CREAT SERPL-MCNC: 4.1 MG/DL (ref 0.57–1)
EST. AVERAGE GLUCOSE BLD GHB EST-MCNC: 157 MG/DL
GLUCOSE SERPL-MCNC: 197 MG/DL (ref 65–99)
HBA1C MFR BLD: 7.1 % (ref 4.8–5.6)
HDLC SERPL-MCNC: 80 MG/DL
INTERPRETATION, 910389: NORMAL
INTERPRETATION: NORMAL
LDLC SERPL CALC-MCNC: 66 MG/DL (ref 0–99)
Lab: NORMAL
PDF IMAGE, 910387: NORMAL
POTASSIUM SERPL-SCNC: 5.3 MMOL/L (ref 3.5–5.2)
SODIUM SERPL-SCNC: 140 MMOL/L (ref 134–144)
TRIGL SERPL-MCNC: 39 MG/DL (ref 0–149)
VLDLC SERPL CALC-MCNC: 8 MG/DL (ref 5–40)

## 2020-01-01 ENCOUNTER — VIRTUAL VISIT (OUTPATIENT)
Dept: FAMILY MEDICINE CLINIC | Age: 65
End: 2020-01-01

## 2020-01-01 ENCOUNTER — TELEPHONE (OUTPATIENT)
Dept: FAMILY MEDICINE CLINIC | Age: 65
End: 2020-01-01

## 2020-01-01 ENCOUNTER — OFFICE VISIT (OUTPATIENT)
Dept: FAMILY MEDICINE CLINIC | Age: 65
End: 2020-01-01

## 2020-01-01 ENCOUNTER — HOSPITAL ENCOUNTER (OUTPATIENT)
Dept: MAMMOGRAPHY | Age: 65
Discharge: HOME | End: 2020-07-07
Attending: FAMILY MEDICINE
Payer: MEDICARE

## 2020-01-01 ENCOUNTER — OFFICE VISIT (OUTPATIENT)
Dept: FAMILY MEDICINE CLINIC | Age: 65
End: 2020-01-01
Payer: MEDICARE

## 2020-01-01 VITALS
WEIGHT: 166 LBS | BODY MASS INDEX: 31.34 KG/M2 | SYSTOLIC BLOOD PRESSURE: 122 MMHG | TEMPERATURE: 98.8 F | RESPIRATION RATE: 16 BRPM | OXYGEN SATURATION: 98 % | HEART RATE: 82 BPM | DIASTOLIC BLOOD PRESSURE: 64 MMHG | HEIGHT: 61 IN

## 2020-01-01 VITALS — BODY MASS INDEX: 28.72 KG/M2 | HEIGHT: 61 IN

## 2020-01-01 VITALS
TEMPERATURE: 98.1 F | HEART RATE: 83 BPM | WEIGHT: 152 LBS | HEIGHT: 61 IN | RESPIRATION RATE: 18 BRPM | BODY MASS INDEX: 28.7 KG/M2 | SYSTOLIC BLOOD PRESSURE: 142 MMHG | OXYGEN SATURATION: 98 % | DIASTOLIC BLOOD PRESSURE: 62 MMHG

## 2020-01-01 DIAGNOSIS — E11.22 TYPE 2 DIABETES MELLITUS WITH CHRONIC KIDNEY DISEASE ON CHRONIC DIALYSIS, WITHOUT LONG-TERM CURRENT USE OF INSULIN (HCC): ICD-10-CM

## 2020-01-01 DIAGNOSIS — E11.22 TYPE 2 DIABETES MELLITUS WITH CHRONIC KIDNEY DISEASE ON CHRONIC DIALYSIS, WITHOUT LONG-TERM CURRENT USE OF INSULIN (HCC): Primary | ICD-10-CM

## 2020-01-01 DIAGNOSIS — R25.2 LEG CRAMPING: ICD-10-CM

## 2020-01-01 DIAGNOSIS — R42 DIZZINESS: ICD-10-CM

## 2020-01-01 DIAGNOSIS — Z12.31 SCREENING MAMMOGRAM FOR HIGH-RISK PATIENT: ICD-10-CM

## 2020-01-01 DIAGNOSIS — M79.605 PAIN IN BOTH LOWER EXTREMITIES: Primary | ICD-10-CM

## 2020-01-01 DIAGNOSIS — N18.6 TYPE 2 DIABETES MELLITUS WITH CHRONIC KIDNEY DISEASE ON CHRONIC DIALYSIS, WITHOUT LONG-TERM CURRENT USE OF INSULIN (HCC): Primary | ICD-10-CM

## 2020-01-01 DIAGNOSIS — N18.6 TYPE 2 DIABETES MELLITUS WITH CHRONIC KIDNEY DISEASE ON CHRONIC DIALYSIS, WITHOUT LONG-TERM CURRENT USE OF INSULIN (HCC): ICD-10-CM

## 2020-01-01 DIAGNOSIS — Z99.2 TYPE 2 DIABETES MELLITUS WITH CHRONIC KIDNEY DISEASE ON CHRONIC DIALYSIS, WITHOUT LONG-TERM CURRENT USE OF INSULIN (HCC): ICD-10-CM

## 2020-01-01 DIAGNOSIS — J01.00 ACUTE NON-RECURRENT MAXILLARY SINUSITIS: Primary | ICD-10-CM

## 2020-01-01 DIAGNOSIS — E55.9 VITAMIN D DEFICIENCY: ICD-10-CM

## 2020-01-01 DIAGNOSIS — G62.9 NEUROPATHY: ICD-10-CM

## 2020-01-01 DIAGNOSIS — M79.604 PAIN IN BOTH LOWER EXTREMITIES: Primary | ICD-10-CM

## 2020-01-01 DIAGNOSIS — H91.91 ACUTE HEARING LOSS OF RIGHT EAR: ICD-10-CM

## 2020-01-01 DIAGNOSIS — Z99.2 TYPE 2 DIABETES MELLITUS WITH CHRONIC KIDNEY DISEASE ON CHRONIC DIALYSIS, WITHOUT LONG-TERM CURRENT USE OF INSULIN (HCC): Primary | ICD-10-CM

## 2020-01-01 PROCEDURE — G8510 SCR DEP NEG, NO PLAN REQD: HCPCS | Performed by: FAMILY MEDICINE

## 2020-01-01 PROCEDURE — G9899 SCRN MAM PERF RSLTS DOC: HCPCS | Performed by: FAMILY MEDICINE

## 2020-01-01 PROCEDURE — G0463 HOSPITAL OUTPT CLINIC VISIT: HCPCS | Performed by: FAMILY MEDICINE

## 2020-01-01 PROCEDURE — G9231 DOC ESRD DIA TRANS PREG: HCPCS | Performed by: FAMILY MEDICINE

## 2020-01-01 PROCEDURE — G8427 DOCREV CUR MEDS BY ELIG CLIN: HCPCS | Performed by: FAMILY MEDICINE

## 2020-01-01 PROCEDURE — G8417 CALC BMI ABV UP PARAM F/U: HCPCS | Performed by: FAMILY MEDICINE

## 2020-01-01 PROCEDURE — 3017F COLORECTAL CA SCREEN DOC REV: CPT | Performed by: FAMILY MEDICINE

## 2020-01-01 PROCEDURE — 77067 SCR MAMMO BI INCL CAD: CPT

## 2020-01-01 PROCEDURE — 99213 OFFICE O/P EST LOW 20 MIN: CPT | Performed by: FAMILY MEDICINE

## 2020-01-01 PROCEDURE — 2022F DILAT RTA XM EVC RTNOPTHY: CPT | Performed by: FAMILY MEDICINE

## 2020-01-01 PROCEDURE — 3046F HEMOGLOBIN A1C LEVEL >9.0%: CPT | Performed by: FAMILY MEDICINE

## 2020-01-01 RX ORDER — AMOXICILLIN AND CLAVULANATE POTASSIUM 500; 125 MG/1; MG/1
1 TABLET, FILM COATED ORAL DAILY
Qty: 10 TAB | Refills: 0 | Status: SHIPPED | OUTPATIENT
Start: 2020-01-01 | End: 2020-01-01

## 2020-01-01 RX ORDER — AMLODIPINE BESYLATE 10 MG/1
10 TABLET ORAL DAILY
COMMUNITY

## 2020-01-01 RX ORDER — ERGOCALCIFEROL 1.25 MG/1
50000 CAPSULE ORAL
Qty: 12 CAP | Refills: 0 | Status: SHIPPED | OUTPATIENT
Start: 2020-01-01 | End: 2020-01-01

## 2020-01-01 RX ORDER — GABAPENTIN 100 MG/1
100 CAPSULE ORAL
Qty: 36 CAP | Refills: 1 | Status: SHIPPED | OUTPATIENT
Start: 2020-01-01

## 2020-01-01 RX ORDER — QUINIDINE GLUCONATE 324 MG
240 TABLET, EXTENDED RELEASE ORAL DAILY
COMMUNITY
End: 2020-01-01 | Stop reason: SDUPTHER

## 2020-02-13 NOTE — PROGRESS NOTES
Identified pt with two pt identifiers(name and ). Chief Complaint   Patient presents with    Dizziness     Began about a few months ago. worse here recently. does not depend on position    Generalized Body Aches     Cramping more often. More in abdomen        Health Maintenance Due   Topic    Pneumococcal 0-64 years (1 of 3 - PCV13)    DTaP/Tdap/Td series (1 - Tdap)    Shingrix Vaccine Age 49> (1 of 2)    Medicare Yearly Exam     Influenza Age 5 to Adult        Wt Readings from Last 3 Encounters:   20 152 lb (68.9 kg)   19 144 lb (65.3 kg)   19 146 lb (66.2 kg)     Temp Readings from Last 3 Encounters:   20 98.1 °F (36.7 °C) (Oral)   19 98.3 °F (36.8 °C) (Oral)   19 97.7 °F (36.5 °C) (Oral)     BP Readings from Last 3 Encounters:   20 142/62   19 128/72   19 178/70     Pulse Readings from Last 3 Encounters:   20 83   19 72   19 76         Learning Assessment:  :     Learning Assessment 2016   PRIMARY LEARNER Patient   HIGHEST LEVEL OF EDUCATION - PRIMARY LEARNER  DID NOT GRADUATE HIGH SCHOOL   BARRIERS PRIMARY LEARNER NONE   CO-LEARNER CAREGIVER No   PRIMARY LANGUAGE ENGLISH   LEARNER PREFERENCE PRIMARY DEMONSTRATION   ANSWERED BY patient   RELATIONSHIP SELF       Depression Screening:  :     3 most recent PHQ Screens 2019   Little interest or pleasure in doing things Not at all   Feeling down, depressed, irritable, or hopeless Not at all   Total Score PHQ 2 0       Fall Risk Assessment:  :     Fall Risk Assessment, last 12 mths 2019   Able to walk? Yes   Fall in past 12 months? No       Abuse Screening:  :     Abuse Screening Questionnaire 2019   Do you ever feel afraid of your partner? N N   Are you in a relationship with someone who physically or mentally threatens you? N N   Is it safe for you to go home?  Y Y       Coordination of Care Questionnaire:  :     1) Have you been to an emergency room, urgent care clinic since your last visit? no   Hospitalized since your last visit? no             2) Have you seen or consulted any other health care providers outside of [address] [place] [address] since your last visit? no  (Include any pap smears or colon screenings in this section.)    3) Do you have an Advance Directive on file? no  Are you interested in receiving information about Advance Directives? no    Reviewed record in preparation for visit and have obtained necessary documentation. Medication reconciliation up to date and corrected with patient at this time.

## 2020-02-13 NOTE — PATIENT INSTRUCTIONS
Dizziness: Care Instructions  Your Care Instructions  Dizziness is the feeling of unsteadiness or fuzziness in your head. It is different than having vertigo, which is a feeling that the room is spinning or that you are moving or falling. It is also different from lightheadedness, which is the feeling that you are about to faint. It can be hard to know what causes dizziness. Some people feel dizzy when they have migraine headaches. Sometimes bouts of flu can make you feel dizzy. Some medical conditions, such as heart problems or high blood pressure, can make you feel dizzy. Many medicines can cause dizziness, including medicines for high blood pressure, pain, or anxiety. If a medicine causes your symptoms, your doctor may recommend that you stop or change the medicine. If it is a problem with your heart, you may need medicine to help your heart work better. If there is no clear reason for your symptoms, your doctor may suggest watching and waiting for a while to see if the dizziness goes away on its own. Follow-up care is a key part of your treatment and safety. Be sure to make and go to all appointments, and call your doctor if you are having problems. It's also a good idea to know your test results and keep a list of the medicines you take. How can you care for yourself at home? · If your doctor recommends or prescribes medicine, take it exactly as directed. Call your doctor if you think you are having a problem with your medicine. · Do not drive while you feel dizzy. · Try to prevent falls. Steps you can take include:  ? Using nonskid mats, adding grab bars near the tub, and using night-lights. ? Clearing your home so that walkways are free of anything you might trip on.  ? Letting family and friends know that you have been feeling dizzy. This will help them know how to help you. When should you call for help? Call 911 anytime you think you may need emergency care.  For example, call if:    · You passed out (lost consciousness).     · You have dizziness along with symptoms of a heart attack. These may include:  ? Chest pain or pressure, or a strange feeling in the chest.  ? Sweating. ? Shortness of breath. ? Nausea or vomiting. ? Pain, pressure, or a strange feeling in the back, neck, jaw, or upper belly or in one or both shoulders or arms. ? Lightheadedness or sudden weakness. ? A fast or irregular heartbeat.     · You have symptoms of a stroke. These may include:  ? Sudden numbness, tingling, weakness, or loss of movement in your face, arm, or leg, especially on only one side of your body. ? Sudden vision changes. ? Sudden trouble speaking. ? Sudden confusion or trouble understanding simple statements. ? Sudden problems with walking or balance. ? A sudden, severe headache that is different from past headaches.    Call your doctor now or seek immediate medical care if:    · You feel dizzy and have a fever, headache, or ringing in your ears.     · You have new or increased nausea and vomiting.     · Your dizziness does not go away or comes back.    Watch closely for changes in your health, and be sure to contact your doctor if:    · You do not get better as expected. Where can you learn more? Go to http://milo-vania.info/. Enter K793 in the search box to learn more about \"Dizziness: Care Instructions. \"  Current as of: June 26, 2019  Content Version: 12.2  © 5616-3367 [employer]. Care instructions adapted under license by [employer] (which disclaims liability or warranty for this information). If you have questions about a medical condition or this instruction, always ask your healthcare professional. [place] [address] any warranty or liability for your use of this information.

## 2020-02-13 NOTE — PROGRESS NOTES
Subjective:      Baylee Lam is a 62 y.o. female here with complaint of feeling dizzy and having decreased hearing in the right ear. Onset was about 1 week ago. Associated with nasal congestion, sinus congestion. Symptoms are worse with turning her head. No vertigo symptoms. No syncope. No headaches, extremity weakness. No new medication use. - Evaluation to date: none   - Treatment to date: none     Current Outpatient Medications   Medication Sig Dispense Refill    amLODIPine (NORVASC) 10 mg tablet Take 10 mg by mouth daily.  ferrous gluconate (FERGON) 240 mg (27 mg iron) tablet Take 240 mg by mouth daily.  ferric citrate (AURYXIA) 210 mg iron tablet Take  by mouth three (3) times daily (with meals).  labetalol (NORMODYNE) 200 mg tablet TAKE 1 TABLET BY MOUTH EVERY 8 HOURS. 90 Tab 5    furosemide (LASIX) 40 mg tablet TAKE 1 TABLET BY MOUTH ONCE DAILY  11    calcium acetate (PHOSLO) 667 mg cap Take 2 Caps by mouth three (3) times daily. And one with snack  11    cloNIDine HCl (CATAPRES) 0.1 mg tablet Take 1 Tab by mouth three (3) times daily. 6    atorvastatin (LIPITOR) 10 mg tablet TAKE 1 TABLET BY MOUTH ONCE DAILY AT BEDTIME. 90 Tab 0    aspirin delayed-release 81 mg tablet take 2 tablets by mouth once daily 60 Tab 3    doxazosin (CARDURA) 2 mg tablet Take 1 Tab by mouth daily. 90 Tab 1    DUREZOL 0.05 % ophthalmic emulsion instill 1 drop into left eye twice a day START AFTER CATARACT SURGERY  0    ferrous sulfate 325 mg (65 mg iron) tablet Take 1 Tab by mouth daily (with breakfast).  30 Tab 3       Allergies   Allergen Reactions    Ciprofloxacin Angioedema       Past Medical History:   Diagnosis Date    Anatomical narrow angle of both eyes     Anemia     Arthritis     Calculus of kidney     Chronic open angle glaucoma     Congestive heart failure (HCC)     Diabetes ([facility] [address]     ESRD (end stage renal disease) on dialysis ([facility] [address]     Mon/Wed/Fri, HD started 4/2018    Hematuria  Macular edema     Proliferative diabetic retinopathy of both eyes (HCC)        Social History     Tobacco Use    Smoking status: Never Smoker    Smokeless tobacco: Never Used   Substance Use Topics    Alcohol use: No        Review of Systems  Pertinent items are noted in HPI. Objective:     Visit Vitals  /62 (BP 1 Location: Right arm, BP Patient Position: Sitting) Comment: Manual   Pulse 83   Temp 98.1 °F (36.7 °C) (Oral) Comment: .   Resp 18   Ht 5' 1\" (1.549 m)   Wt 152 lb (68.9 kg)   SpO2 98%   BMI 28.72 kg/m²      General appearance - alert, well appearing, and in no distress  Eyes - pupils equal and reactive, extraocular eye movements intact, sclera anicteric  Ears - bilateral TM's and external ear canals normal.  Nasal exam - mucosal congestion, mucosal erythema and sinus tenderness noted maxillary sinuses  Oropharyngx - mucous membranes moist, pharynx normal without lesions  Neck - supple, no significant adenopathy  Chest - clear to auscultation, no wheezes, rales or rhonchi, symmetric air entry, no tachypnea, retractions or cyanosis  Heart - normal rate, regular rhythm, normal S1, S2, no murmurs, rubs, clicks or gallops    Assessment/Plan:   Cindi Blake is a 62 y.o. female seen for:     1. Acute non-recurrent maxillary sinusitis  - amoxicillin-clavulanate (AUGMENTIN) 500-125 mg per tablet; Take 1 Tab by mouth daily for 10 days. Dispense: 10 Tab; Refill: 0  - nasal saline spray for congestion, warm compresses to sinuses    2. Dizziness: at this time felt to be due to the above. Return for evaluation if no improvement noted. 3. Acute hearing loss of right ear: refer to ENT for evaluation.   - REFERRAL TO ENT-OTOLARYNGOLOGY      I have discussed the diagnosis with the patient and the intended plan as seen in the above orders. The patient has received an after-visit summary and questions were answered concerning future plans.  I have discussed medication side effects and warnings with the patient as well. Patient verbalizes understanding of plan of care and denies further questions or concerns at this time. Informed patient to return to the office if symptoms worsen or if new symptoms arise. Follow-up and Dispositions    · Return if symptoms worsen or fail to improve.

## 2020-05-12 NOTE — PROGRESS NOTES
Glen Soliz is a 62 y.o. female who was seen by synchronous (real-time) audio-video technology on 5/12/2020. Consent: Glen Soliz, who was seen by synchronous (real-time) audio-video technology, and/or her healthcare decision maker, is aware that this patient-initiated, Telehealth encounter on 5/12/2020 is a billable service, with coverage as determined by her insurance carrier. She is aware that she may receive a bill and has provided verbal consent to proceed: Yes. Assessment & Plan:  
Diagnoses and all orders for this visit: 
 
1. Type 2 diabetes mellitus with chronic kidney disease on chronic dialysis, without long-term current use of insulin ([facility] [address]): has been controlled off medication. Check labs. -     HEMOGLOBIN A1C WITH EAG; Future -     METABOLIC PANEL, COMPREHENSIVE; Future -     LIPID PANEL; Future 2. Vitamin D deficiency: restart vitamin D supplement and will see if this improves cramping.  
-     ergocalciferol (ERGOCALCIFEROL) 1,250 mcg (50,000 unit) capsule; Take 1 Cap by mouth every seven (7) days. -     VITAMIN D, 25 HYDROXY; Future 3. Leg cramping: check electrolytes and vitamin D. Start B-complex multivitamin. [address] Subjective:  
Glen Soliz is a 62 y.o. female who was seen for No chief complaint on file. Diabetes mellitus: · Medication compliance: diet controlled,  
· Diabetic diet compliance: compliant most of the time,  
· Home glucose monitoring: is not performed,  
· Further diabetic ROS: no polyuria or polydipsia, no chest pain, dyspnea or TIA's, no hypoglycemia, has dysesthesias in the feet. Lab Results Component Value Date/Time  Hemoglobin A1c 7.1 (H) 07/30/2019 09:34 AM  
 Hemoglobin A1c 5.0 12/20/2017 11:46 AM  
 Hemoglobin A1c <4.2 (L) 10/05/2017 09:53 AM  
 Glucose 197 (H) 07/30/2019 09:34 AM  
 Glucose (POC) 121 (H) 05/19/2017 11:54 PM  
 Microalbumin/creat ratio (POC) 300 02/09/2017 11:10 AM  
 LDL, calculated 66 07/30/2019 09:34 AM  
 Creatinine 4.10 (H) 07/30/2019 09:34 AM  
 
 
Additional concerns:  
Having cramping in the legs and feet, onset was about 3 months ago. Episodes last for about 5 minutes before resolving, soreness after cramping. No identifiable triggers. She has eaten mustard with some improvement noted. Prior to Admission medications Medication Sig Start Date End Date Taking? Authorizing Provider  
amLODIPine (NORVASC) 10 mg tablet Take 10 mg by mouth daily. Yes Provider, Historical  
ferric citrate (AURYXIA) 210 mg iron tablet Take  by mouth three (3) times daily (with meals). Yes Provider, Historical  
labetalol (NORMODYNE) 200 mg tablet TAKE 1 TABLET BY MOUTH EVERY 8 HOURS. 6/4/19  Yes Catalino Wolf MD  
furosemide (LASIX) 40 mg tablet TAKE 1 TABLET BY MOUTH ONCE DAILY 11/29/18  Yes Provider, Historical  
calcium acetate (PHOSLO) 667 mg cap Take 2 Caps by mouth three (3) times daily. And one with snack 7/10/18  Yes Provider, Historical  
cloNIDine HCl (CATAPRES) 0.1 mg tablet Take 1 Tab by mouth three (3) times daily. 7/10/18  Yes Provider, Historical  
atorvastatin (LIPITOR) 10 mg tablet TAKE 1 TABLET BY MOUTH ONCE DAILY AT BEDTIME. 1/17/18  Yes Catalino Wolf MD  
aspirin delayed-release 81 mg tablet take 2 tablets by mouth once daily 10/12/17  Yes Catalino Wolf MD  
doxazosin (CARDURA) 2 mg tablet Take 1 Tab by mouth daily. 10/5/17  Yes Catalino Wolf MD  
ferrous sulfate 325 mg (65 mg iron) tablet Take 1 Tab by mouth daily (with breakfast). 5/22/17  Yes Tin Esqueda MD  
ferrous gluconate (FERGON) 240 mg (27 mg iron) tablet Take 240 mg by mouth daily. 5/12/20  Provider, Historical  
DUREZOL 0.05 % ophthalmic emulsion instill 1 drop into left eye twice a day START AFTER CATARACT SURGERY 7/19/17 5/12/20  Provider, Historical  
 
Allergies Allergen Reactions  Ciprofloxacin Angioedema Past Medical History:  
Diagnosis Date  Anatomical narrow angle of both eyes  Anemia  Arthritis  Calculus of kidney  Chronic open angle glaucoma  Congestive heart failure ([facility] [address]  Diabetes ([facility] [address]  ESRD (end stage renal disease) on dialysis ([facility] [address] Mon/Wed/Fri, HD started 4/2018  Hematuria  Macular edema  Proliferative diabetic retinopathy of both eyes ([facility] [address] Social History Tobacco Use  Smoking status: Never Smoker  Smokeless tobacco: Never Used Substance Use Topics  Alcohol use: No  
 
 
ROS Pertinent items noted in HPI Objective:  
 
Visit Vitals Ht 5' 1\" (1.549 m) BMI 28.72 kg/m² General: alert, cooperative, no distress Mental  status: normal mood, behavior, speech, dress, motor activity, and thought processes, able to follow commands HENT: NCAT Neck: no visualized mass Resp: no respiratory distress Neuro: no gross deficits Skin: no discoloration or lesions of concern on visible areas Psychiatric: normal affect, consistent with stated mood, no evidence of hallucinations We discussed the expected course, resolution and complications of the diagnosis(es) in detail. Medication risks, benefits, costs, interactions, and alternatives were discussed as indicated. I advised her to contact the office if her condition worsens, changes or fails to improve as anticipated. She expressed understanding with the diagnosis(es) and plan. Stacie Ovalle is a 62 y.o. female who was evaluated by a video visit encounter for concerns as above. Patient identification was verified prior to start of the visit. A caregiver was present when appropriate. Due to this being a TeleHealth encounter (During XOA-26 public health emergency), evaluation of the following organ systems was limited: Vitals/Constitutional/EENT/Resp/CV/GI//MS/Neuro/Skin/Heme-Lymph-Imm.  
Pursuant to the emergency declaration under the [address] and the National Emergencies Act, [facility] waiver authority and the [place] [employer] Act, this Virtual  Visit was conducted, with patient's (and/or legal guardian's) consent, to reduce the patient's risk of exposure to COVID-19 and provide necessary medical care. Services were provided through a video synchronous discussion virtually to substitute for in-person clinic visit. Patient and provider were located at their individual homes.  
 
 
Linda Botello MD

## 2020-05-12 NOTE — PROGRESS NOTES
Identified pt with two pt identifiers(name and ). Chief Complaint Patient presents with  Diabetes  Hip Pain Health Maintenance Due Topic  Pneumococcal 0-64 years (1 of 3 - PCV13)  DTaP/Tdap/Td series (1 - Tdap)  Shingrix Vaccine Age 50> (1 of 2)  Medicare Yearly Exam   
 
 
Wt Readings from Last 3 Encounters:  
20 152 lb (68.9 kg) 19 144 lb (65.3 kg) 19 146 lb (66.2 kg) Temp Readings from Last 3 Encounters:  
20 98.1 °F (36.7 °C) (Oral) 19 98.3 °F (36.8 °C) (Oral) 19 97.7 °F (36.5 °C) (Oral) BP Readings from Last 3 Encounters:  
20 142/62  
19 128/72  
19 178/70 Pulse Readings from Last 3 Encounters:  
20 83  
19 72  
19 76 Learning Assessment: 
:  
 
Learning Assessment 2016 PRIMARY LEARNER Patient HIGHEST LEVEL OF EDUCATION - PRIMARY LEARNER  DID NOT GRADUATE HIGH SCHOOL  
BARRIERS PRIMARY LEARNER NONE  
CO-LEARNER CAREGIVER No  
PRIMARY LANGUAGE ENGLISH  
LEARNER PREFERENCE PRIMARY DEMONSTRATION  
ANSWERED BY patient RELATIONSHIP SELF Depression Screening: 
:  
 
3 most recent PHQ Screens 2019 Little interest or pleasure in doing things Not at all Feeling down, depressed, irritable, or hopeless Not at all Total Score PHQ 2 0 Fall Risk Assessment: 
:  
 
Fall Risk Assessment, last 12 mths 2019 Able to walk? Yes Fall in past 12 months? No  
 
 
Abuse Screening: 
:  
 
Abuse Screening Questionnaire 2019 Do you ever feel afraid of your partner? Kevin Hernandez Are you in a relationship with someone who physically or mentally threatens you? Kevin Hernandez Is it safe for you to go home? Rafi Crawford Coordination of Care Questionnaire: 
:  
 
1) Have you been to an emergency room, urgent care clinic since your last visit? no  
Hospitalized since your last visit? no          
 
2) Have you seen or consulted any other health care providers outside of [facility] since your last visit? no  (Include any pap smears or colon screenings in this section.) 3) Do you have an Advance Directive on file? no 
Are you interested in receiving information about Advance Directives? no 
 
 
Reviewed record in preparation for visit and have obtained necessary documentation. Medication reconciliation up to date and corrected with patient at this time.

## 2020-06-29 NOTE — TELEPHONE ENCOUNTER
Patient is calling and would like to discuss information about her having mammogram done.   Please call at [phone]

## 2020-12-15 NOTE — PATIENT INSTRUCTIONS
A Healthy Lifestyle: Care Instructions Your Care Instructions A healthy lifestyle can help you feel good, stay at a healthy weight, and have plenty of energy for both work and play. A healthy lifestyle is something you can share with your whole family. A healthy lifestyle also can lower your risk for serious health problems, such as high blood pressure, heart disease, and diabetes. You can follow a few steps listed below to improve your health and the health of your family. Follow-up care is a key part of your treatment and safety. Be sure to make and go to all appointments, and call your doctor if you are having problems. It's also a good idea to know your test results and keep a list of the medicines you take. How can you care for yourself at home? · Do not eat too much sugar, fat, or fast foods. You can still have dessert and treats now and then. The goal is moderation. · Start small to improve your eating habits. Pay attention to portion sizes, drink less juice and soda pop, and eat more fruits and vegetables. ? Eat a healthy amount of food. A 3-ounce serving of meat, for example, is about the size of a deck of cards. Fill the rest of your plate with vegetables and whole grains. ? Limit the amount of soda and sports drinks you have every day. Drink more water when you are thirsty. ? Eat at least 5 servings of fruits and vegetables every day. It may seem like a lot, but it is not hard to reach this goal. A serving or helping is 1 piece of fruit, 1 cup of vegetables, or 2 cups of leafy, raw vegetables. Have an apple or some carrot sticks as an afternoon snack instead of a candy bar.  Try to have fruits and/or vegetables at every meal. 
 · Make exercise part of your daily routine. You may want to start with simple activities, such as walking, bicycling, or slow swimming. Try to be active 30 to 60 minutes every day. You do not need to do all 30 to 60 minutes all at once. For example, you can exercise 3 times a day for 10 or 20 minutes. Moderate exercise is safe for most people, but it is always a good idea to talk to your doctor before starting an exercise program. 
· Keep moving. [place] the lawn, work in the garden, or [place]. Take the stairs instead of the elevator at work. · If you smoke, quit. People who smoke have an increased risk for heart attack, stroke, cancer, and other lung illnesses. Quitting is hard, but there are ways to boost your chance of quitting tobacco for good. ? Use nicotine gum, patches, or lozenges. ? Ask your doctor about stop-smoking programs and medicines. ? Keep trying. In addition to reducing your risk of diseases in the future, you will notice some benefits soon after you stop using tobacco. If you have shortness of breath or asthma symptoms, they will likely get better within a few weeks after you quit. · Limit how much alcohol you drink. Moderate amounts of alcohol (up to 2 drinks a day for men, 1 drink a day for women) are okay. But drinking too much can lead to liver problems, high blood pressure, and other health problems. Family health If you have a family, there are many things you can do together to improve your health. · Eat meals together as a family as often as possible. · Eat healthy foods. This includes fruits, vegetables, lean meats and dairy, and whole grains. · Include your family in your fitness plan. Most people think of activities such as jogging or tennis as the way to fitness, but there are many ways you and your family can be more active. Anything that makes you breathe hard and gets your heart pumping is exercise. Here are some tips: ? Walk to do errands or to take your child to school or the bus. 
? Go for a family bike ride after dinner instead of watching TV. Where can you learn more? Go to http://www.gray.com/ Enter I021 in the search box to learn more about \"A Healthy Lifestyle: Care Instructions. \" Current as of: January 31, 2020               Content Version: 12.6 © 2006-2020 [employer]. Care instructions adapted under license by [employer] (which disclaims liability or warranty for this information). If you have questions about a medical condition or this instruction, always ask your healthcare professional. [address] any warranty or liability for your use of this information.

## 2020-12-15 NOTE — PROGRESS NOTES
Warren Bolanos is a 60 y.o. female Chief Complaint Patient presents with  Leg Pain  
  both legs have been hurting for about 3 wks. the pain comes and goes. Health Maintenance Due Topic Date Due  Pneumococcal 0-64 years (1 of 3 - PCV13) 10[birth year]/1962  DTaP/Tdap/Td series (1 - Tdap) 10/23/1977  Shingrix Vaccine Age 50> (1 of 2) 10/23/2006  Medicare Yearly Exam  08/01/2018  Foot Exam Q1  07/30/2020  A1C test (Diabetic or Prediabetic)  07/30/2020  Lipid Screen  07/30/2020  Flu Vaccine (1) 09/01/2020  Eye Exam Retinal or Dilated  11/08/2020 Visit Vitals /64 (BP 1 Location: Right arm, BP Patient Position: Sitting) Pulse 82 Temp 98.8 °F (37.1 °C) (Oral) Resp 16 Ht 5' 1\" (1.549 m) Wt 166 lb (75.3 kg) SpO2 98% BMI 31.37 kg/m² 3 most recent PHQ Screens 12/15/2020 Little interest or pleasure in doing things Not at all Feeling down, depressed, irritable, or hopeless Not at all Total Score PHQ 2 0 Fall Risk Assessment, last 12 mths 7/30/2019 Able to walk? Yes Fall in past 12 months? No  
 
 
Abuse Screening Questionnaire 12/15/2020 Do you ever feel afraid of your partner? Polo Saulo Are you in a relationship with someone who physically or mentally threatens you? Polo Saulo Is it safe for you to go home? Y  
 
 
 
1. Have you been to the ER, urgent care clinic since your last visit? Hospitalized since your last visit? No 
 
2. Have you seen or consulted any other health care providers outside of the [address] [place] [address] since your last visit? Include any pap smears or colon screening.  No

## 2020-12-15 NOTE — PROGRESS NOTES
Subjective:  
  
Chris Alfredo is a 60 y.o. female here with c/o \"my body be aching\". Reports pain in the legs. Onset was about a month or more. Occurring throughout the day and night.  
- Pain described as \"funny pain\" and \"it's hard to describe\" - Pain is intermittent, anterior in location - No exacerbating factors. No alleviating factors - No skin changes in skin color, non-healing lesions - Associated with muscle spasms. 
- Denies claudication symptoms.  
 - Treatment to date: Benadryl Current Outpatient Medications Medication Sig Dispense Refill  b complex-vitamin c-folic acid 0.8 mg (NEPHRO-SANDIP) 0.8 mg tab tablet Take 1 Tab by mouth daily.  amLODIPine (NORVASC) 10 mg tablet Take 10 mg by mouth daily.  ferric citrate (AURYXIA) 210 mg iron tablet Take  by mouth three (3) times daily (with meals).  labetalol (NORMODYNE) 200 mg tablet TAKE 1 TABLET BY MOUTH EVERY 8 HOURS. (Patient taking differently: Take 200 mg by mouth three (3) times daily.) 90 Tab 5  furosemide (LASIX) 40 mg tablet TAKE 1 TABLET BY MOUTH ONCE DAILY  11  
 cloNIDine HCl (CATAPRES) 0.1 mg tablet Take 1 Tab by mouth three (3) times daily. 6  
 atorvastatin (LIPITOR) 10 mg tablet TAKE 1 TABLET BY MOUTH ONCE DAILY AT BEDTIME. 90 Tab 0  
 aspirin delayed-release 81 mg tablet take 2 tablets by mouth once daily 60 Tab 3  
 doxazosin (CARDURA) 2 mg tablet Take 1 Tab by mouth daily. 90 Tab 1  
 ferrous sulfate 325 mg (65 mg iron) tablet Take 1 Tab by mouth daily (with breakfast). 30 Tab 3 Allergies Allergen Reactions  Ciprofloxacin Angioedema Past Medical History:  
Diagnosis Date  Anatomical narrow angle of both eyes  Anemia  Arthritis  Calculus of kidney  Chronic open angle glaucoma  Congestive heart failure ([address]  Diabetes ([address]  ESRD (end stage renal disease) on dialysis ([facility] [address] Mon/Wed/Fri, HD started 4/2018  Hematuria  Macular edema  Proliferative diabetic retinopathy of both eyes ([facility] [address] Social History Tobacco Use  Smoking status: Never Smoker  Smokeless tobacco: Never Used Substance Use Topics  Alcohol use: No  
  
 
Review of Systems Pertinent items are noted in HPI. Objective:  
 
Visit Vitals /64 (BP 1 Location: Right arm, BP Patient Position: Sitting) Pulse 82 Temp 98.8 °F (37.1 °C) (Oral) Resp 16 Ht 5' 1\" (1.549 m) Wt 166 lb (75.3 kg) SpO2 98% BMI 31.37 kg/m² General appearance - alert, well appearing, and in no distress Chest - clear to auscultation, no wheezes, rales or rhonchi, symmetric air entry, no tachypnea, retractions or cyanosis Heart - normal rate, regular rhythm, normal S1, S2, no murmurs, rubs, clicks or gallops Extremities - peripheral pulses normal, no pedal edema, no clubbing or cyanosis Assessment/Plan:  
Maryan Patel is a 60 y.o. female seen for: 1. Pain in both lower extremities: concerned for neuropathy, will start gabapentin as below. Advised to take after dialysis. - gabapentin (NEURONTIN) 100 mg capsule; Take 1 Cap by mouth every Monday, Wednesday, Friday. Max Daily Amount: 100 mg. Take after dialysis. Dispense: 36 Cap; Refill: 1 2. Neuropathy 
- gabapentin (NEURONTIN) 100 mg capsule; Take 1 Cap by mouth every Monday, Wednesday, Friday. Max Daily Amount: 100 mg. Take after dialysis. Dispense: 36 Cap; Refill: 1 3. Type 2 diabetes mellitus with chronic kidney disease on chronic dialysis, without long-term current use of insulin ([facility] [address]): diet controlled, check labs. - HEMOGLOBIN A1C WITH EAG; Future - LIPID PANEL; Future I have discussed the diagnosis with the patient and the intended plan as seen in the above orders. The patient has received an after-visit summary and questions were answered concerning future plans. I have discussed medication side effects and warnings with the patient as well. Patient verbalizes understanding of plan of care and denies further questions or concerns at this time. Informed patient to return to the office if symptoms worsen or if new symptoms arise. Follow-up and Dispositions · Return in about 4 months (around 4/15/2021) for follow up or sooner as needed.

## 2021-02-18 NOTE — ANESTHESIA PREPROCEDURE EVALUATION
Anesthetic History   No history of anesthetic complications            Review of Systems / Medical History  Patient summary reviewed, nursing notes reviewed and pertinent labs reviewed    Pulmonary  Within defined limits                 Neuro/Psych   Within defined limits           Cardiovascular    Hypertension: well controlled          Hyperlipidemia    Exercise tolerance: >4 METS     GI/Hepatic/Renal         Renal disease: CRI and stones       Endo/Other    Diabetes: well controlled, type 2    Obesity, arthritis and anemia     Other Findings   Comments: hgb 6.7 on arrival, received 1 unit in ED           Physical Exam    Airway  Mallampati: IV  TM Distance: > 6 cm  Neck ROM: normal range of motion   Mouth opening: Normal     Cardiovascular    Rhythm: regular  Rate: normal         Dental    Dentition: Full lower dentures and Full upper dentures     Pulmonary  Breath sounds clear to auscultation               Abdominal         Other Findings            Anesthetic Plan    ASA: 3  Anesthesia type: MAC            Anesthetic plan and risks discussed with: Patient      Informed consent obtained. Azithromycin Counseling:  I discussed with the patient the risks of azithromycin including but not limited to GI upset, allergic reaction, drug rash, diarrhea, and yeast infections.

## 2021-09-15 ENCOUNTER — TELEPHONE (OUTPATIENT)
Dept: FAMILY MEDICINE CLINIC | Age: 66
End: 2021-09-15

## 2021-09-15 NOTE — TELEPHONE ENCOUNTER
Spoke to pt's daughter. She has her mother's medical records from [facility] from kidney transplant in January 2021 and has many questions that the docotrs there have not answered. They told her to talk to mothers PCP. I explained that Dr Jimbo Mitchell has left the practice and that we do not have the medical records she is talking about. She stated she has they but doesn't understand them and wants someone to go over them with her. I told her I would have to talk to my  to see if we are able to do this and that it will be the beginning of next week before I can call her back as Fran[employer] Poke is on Vacation this week. She understood.

## 2021-09-15 NOTE — TELEPHONE ENCOUNTER
----- Message from Hector Chan sent at 9/15/2021  8:38 AM EDT -----  Regarding: /telephone  Contact: [phone]  General Message/Vendor Calls    Caller's first and last name: Elysia Petersen Daughter      Reason for call: She is requesting a call back to discuss the hospital notes from her surgery.        Callback required yes/no and why: Yes      Best contact number(s): [phone]      Details to clarify the request:N/a      Hector Chan

## 2021-09-20 NOTE — TELEPHONE ENCOUNTER
Spoke to pt's daughter and let her know that we are not able to go over pt's hospital records from [facility] with her due to them not being our doctor's and hospital system. She understood. I did suggest she contact pt advocacy at [facility] for additional help understanding the records.

## (undated) DEVICE — KIT COLON W/ 1.1OZ LUB AND 2 END

## (undated) DEVICE — TUBING ADMIN SET INTRAV ARTERI -- CONVERT TO ITEM 340436

## (undated) DEVICE — CANN NASAL O2 CAPNOGRAPHY AD -- FILTERLINE

## (undated) DEVICE — BAG BELONG PT PERS CLEAR HANDL

## (undated) DEVICE — 3M™ CUROS™ DISINFECTING CAP FOR NEEDLELESS CONNECTORS 270/CARTON 20 CARTONS/CASE CFF1-270: Brand: CUROS™

## (undated) DEVICE — KENDALL RADIOLUCENT FOAM MONITORING ELECTRODE -RECTANGULAR SHAPE: Brand: KENDALL

## (undated) DEVICE — ADULT SPO2 SENSOR: Brand: NELLCOR

## (undated) DEVICE — 1200 GUARD II KIT W/5MM TUBE W/O VAC TUBE: Brand: GUARDIAN

## (undated) DEVICE — SOLIDIFIER MEDC 1200ML -- CONVERT TO 356117

## (undated) DEVICE — Device

## (undated) DEVICE — BITEBLOCK ENDOSCP 60FR MAXI WHT POLYETH STURDY W/ VELC WVN

## (undated) DEVICE — BASIN EMESIS 500CC ROSE 250/CS 60/PLT: Brand: MEDEGEN MEDICAL PRODUCTS, LLC